# Patient Record
Sex: FEMALE | HISPANIC OR LATINO | Employment: FULL TIME | URBAN - METROPOLITAN AREA
[De-identification: names, ages, dates, MRNs, and addresses within clinical notes are randomized per-mention and may not be internally consistent; named-entity substitution may affect disease eponyms.]

---

## 2017-01-10 ENCOUNTER — CARE COORDINATION (OUTPATIENT)
Dept: CARE COORDINATION | Facility: CLINIC | Age: 52
End: 2017-01-10

## 2017-01-10 NOTE — PROGRESS NOTES
Clinic Care Coordination Contact  [facility]/Voicemail    Referral Source: PCP  Clinical Data: Care Coordinator Outreach  Outreach attempted x 1.  Left message on voicemail with call back information and requested return call.    Plan:  Care Coordinator will try to reach patient again in 10-14 business days.    STANLEY Che, [facility]  Social Work - Care Coordinator  [facility]- [place], [place], and [place]  Phone: [phone]

## 2017-01-23 ENCOUNTER — CARE COORDINATION (OUTPATIENT)
Dept: CARE COORDINATION | Facility: CLINIC | Age: 52
End: 2017-01-23

## 2017-01-23 NOTE — PROGRESS NOTES
Clinic Care Coordination Contact  [facility] [place]/Voicemail    Referral Source: PCP  Clinical Data: Care Coordinator Outreach  Outreach attempted x 2.  SW has left previous messages on voicemail with call back information and requested return call. Pt has not corresponded with SW since 07/28/16. SW has mailed UT without return responses.    Plan: Care Coordinator will try to reach patient again in one month.    STANLEY Che, LGSW  Social Work - Care Coordinator  [facility]- [place], [place], and [place]  Phone: [phone]

## 2017-02-13 ENCOUNTER — TELEPHONE (OUTPATIENT)
Dept: PEDIATRICS | Facility: CLINIC | Age: 52
End: 2017-02-13

## 2017-02-13 DIAGNOSIS — F33.1 MAJOR DEPRESSIVE DISORDER, RECURRENT EPISODE, MODERATE (H): ICD-10-CM

## 2017-02-13 DIAGNOSIS — E03.9 HYPOTHYROIDISM: ICD-10-CM

## 2017-02-13 DIAGNOSIS — E11.9 TYPE 2 DIABETES MELLITUS WITHOUT COMPLICATION, WITHOUT LONG-TERM CURRENT USE OF INSULIN (H): Primary | ICD-10-CM

## 2017-02-13 NOTE — LETTER
[facility]  [address]  [address]  [place] [address] [phone]      February 22, 2017      Jazlyn Bartlett  [address]  [place] [address]        Dear Jazlyn,      I care about your health and have reviewed your health plan. I have reviewed your medical conditions, medication list, and lab results and am making recommendations based on this review, to better manage your health.    You are in particular need of attention regarding:  -Depression  -Diabetes    I am recommending that you:  -schedule a LAB ONLY APPOINTMENT to recheck your: A1c and TSH (thyroid test) tests within the next 1-4 weeks.             - Please complete the PHQ-9 and mail it back with enclosed envelope.        Please call us at [phone] (or use [employer]) to address the above recommendations.     Thank you for trusting [facility] and we appreciate the opportunity to serve you.  We look forward to supporting your healthcare needs in the future.    Healthy Regards,    Nat Garza CMA ([facility]) / Marcy Thomas MD

## 2017-02-13 NOTE — LETTER
My Depression Action Plan  Name: Jazlyn Bartlett   Date of Birth 1965  Date: 2/13/2017    My doctor: Marcy Thomas   My clinic: [facility]  [address] [place] [address]  [address]  [facility] [address] [phone]          GREEN    ZONE   Good Control    What it looks like:     Things are going generally well. You have normal up s and down s. You may even feel depressed from time to time, but bad moods usually last less than a day.   What you need to do:  1. Continue to care for yourself (see self care plan)  2. Check your depression survival kit and update it as needed  3. Follow your physician s recommendations including any medication.  4. Do not stop taking medication unless you consult with your physician first.           YELLOW         ZONE Getting Worse    What it looks like:     Depression is starting to interfere with your life.     It may be hard to get out of bed; you may be starting to isolate yourself from others.    Symptoms of depression are starting to last most all day and this has happened for several days.     You may have suicidal thoughts but they are not constant.   What you need to do:     1. Call your care team, your response to treatment will improve if you keep your care team informed of your progress. Yellow periods are signs an adjustment may need to be made.     2. Continue your self-care, even if you have to fake it!    3. Talk to someone in your support network    4. Open up your depression survival kit           RED    ZONE Medical Alert - Get Help    What it looks like:     Depression is seriously interfering with your life.     You may experience these or other symptoms: You can t get out of bed most days, can t work or engage in other necessary activities, you have trouble taking care of basic hygiene, or basic responsibilities, thoughts of suicide or death that will not go away, self-injurious behavior.     What you need to do:  1. Call your  care team and request a same-day appointment. If they are not available (weekends or after hours) call your local crisis line, emergency room or 911.      Electronically signed by: Nat Garza, February 13, 2017    Depression Self Care Plan / Survival Kit    Self-Care for Depression  Here s the deal. Your body and mind are really not as separate as most people think.  What you do and think affects how you feel and how you feel influences what you do and think. This means if you do things that people who feel good do, it will help you feel better.  Sometimes this is all it takes.  There is also a place for medication and therapy depending on how severe your depression is, so be sure to consult with your medical provider and/ or Behavioral Health Consultant if your symptoms are worsening or not improving.     In order to better manage my stress, I will:    Exercise  Get some form of exercise, every day. This will help reduce pain and release endorphins, the  feel good  chemicals in your brain. This is almost as good as taking antidepressants!  This is not the same as joining a gym and then never going! (they count on that by the way ) It can be as simple as just going for a walk or doing some gardening, anything that will get you moving.      Hygiene   Maintain good hygiene (Get out of bed in the morning, Make your bed, Brush your teeth, Take a shower, and Get dressed like you were going to work, even if you are unemployed).  If your clothes don't fit try to get ones that do.    Diet  I will strive to eat foods that are good for me, drink plenty of water, and avoid excessive sugar, caffeine, alcohol, and other mood-altering substances.  Some foods that are helpful in depression are: complex carbohydrates, B vitamins, flaxseed, fish or fish oil, fresh fruits and vegetables.    Psychotherapy  I agree to participate in Individual Therapy (if recommended).    Medication  If prescribed medications, I agree to take them.   Missing doses can result in serious side effects.  I understand that drinking alcohol, or other illicit drug use, may cause potential side effects.  I will not stop my medication abruptly without first discussing it with my provider.    Staying Connected With Others  I will stay in touch with my friends, family members, and my primary care provider/team.    Use your imagination  Be creative.  We all have a creative side; it doesn t matter if it s oil painting, sand castles, or mud pies! This will also kick up the endorphins.    Witness Beauty  (AKA stop and smell the roses) Take a look outside, even in mid-winter. Notice colors, textures. Watch the squirrels and birds.     Service to others  Be of service to others.  There is always someone else in need.  By helping others we can  get out of ourselves  and remember the really important things.  This also provides opportunities for practicing all the other parts of the program.    Humor  Laugh and be silly!  Adjust your TV habits for less news and crime-drama and more comedy.    Control your stress  Try breathing deep, massage therapy, biofeedback, and meditation. Find time to relax each day.     My support system    Clinic Contact:  Phone number:    Contact 1:  Phone number:    Contact 2:  Phone number:    Anabaptism/:  Phone number:    Therapist:  Phone number:    Local crisis center:    Phone number:    Other community support:  Phone number:

## 2017-02-13 NOTE — TELEPHONE ENCOUNTER
Type of outreach:  Sent [facility]ESCEL message.  Health Maintenance Due   Topic Date Due     HEPATITIS C SCREENING  09/13/1983     EYE EXAM Q1 YEAR( NO INBASKET)  04/23/2016     MICROALBUMIN Q1 YEAR( NO INBASKET)  01/05/2017     DEPRESSION ACTION PLAN Q1 YR (NO INBASKET)  01/05/2017     A1C Q3 MO( NO INBASKET)  02/03/2017     TSH W/ FREE T4 REFLEX Q1 YEAR (NO INBASKET)  03/01/2017     COPD ACTION PLAN Q1 YR  03/01/2017     Nat Garza CMA ([facility])

## 2017-02-22 NOTE — TELEPHONE ENCOUNTER
Type of outreach:  Sent letter.  Health Maintenance Due   Topic Date Due     HEPATITIS C SCREENING  09[birth year]1983     EYE EXAM Q1 YEAR( NO INBASKET)  04/23/2016     MICROALBUMIN Q1 YEAR( NO INBASKET)  01/05/2017     A1C Q3 MO( NO INBASKET)  02/03/2017     TSH W/ FREE T4 REFLEX Q1 YEAR (NO INBASKET)  03/01/2017     COPD ACTION PLAN Q1 YR  03/01/2017     Nat Garza CMA ([facility])

## 2017-02-23 ENCOUNTER — CARE COORDINATION (OUTPATIENT)
Dept: CARE COORDINATION | Facility: CLINIC | Age: 52
End: 2017-02-23

## 2017-02-23 NOTE — PROGRESS NOTES
Clinic Care Coordination Contact  [facility]/Voicemail    Referral Source: PCP  Clinical Data: Care Coordinator Outreach  SW has made multiple attempts to reach pt without luck. Per chart review, pt has not responded to [employer] messages sent by care team.     Plan:  Care Coordinator will do no further outreaches at this time. Pt has SW contact information from previous attempts.     STANLEY Che, [facility]  Social Work - Care Coordinator  [facility]- [place], [place], and [place]  Phone: [phone]

## 2017-03-13 NOTE — TELEPHONE ENCOUNTER
Type of outreach:  Phone, left message for patient to call back.   Health Maintenance Due   Topic Date Due     HEPATITIS C SCREENING  09/13/1983     EYE EXAM Q1 YEAR( NO INBASKET)  04/23/2016     MICROALBUMIN Q1 YEAR( NO INBASKET)  01/05/2017     A1C Q3 MO( NO INBASKET)  02/03/2017     TSH W/ FREE T4 REFLEX Q1 YEAR (NO INBASKET)  03/01/2017     COPD ACTION PLAN Q1 YR  03/01/2017     PHQ-9 Q6 MONTHS (NO INBASKET)  03/27/2017       Attempt #3. Closing encounter.     Nat Garza CMA ([facility])

## 2017-04-26 ENCOUNTER — TELEPHONE (OUTPATIENT)
Dept: LAB | Facility: CLINIC | Age: 52
End: 2017-04-26

## 2017-04-26 NOTE — TELEPHONE ENCOUNTER
Type of outreach:  Phone, left message for patient to call back.   Health Maintenance Due   Topic Date Due     HEPATITIS C SCREENING  09/13/1983     EYE EXAM Q1 YEAR( NO INBASKET)  04/23/2016     MICROALBUMIN Q1 YEAR( NO INBASKET)  01/05/2017     A1C Q3 MO( NO INBASKET)  02/03/2017     TSH W/ FREE T4 REFLEX Q1 YEAR (NO INBASKET)  03/01/2017     COPD ACTION PLAN Q1 YR  03/01/2017     PHQ-9 Q6 MONTHS (NO INBASKET)  03/27/2017     CREATININE Q1 YEAR (NO INBASKET)  05/22/2017     Nat Garza CMA ([facility])

## 2017-04-30 ENCOUNTER — APPOINTMENT (OUTPATIENT)
Dept: CT IMAGING | Facility: CLINIC | Age: 52
End: 2017-04-30
Attending: EMERGENCY MEDICINE
Payer: COMMERCIAL

## 2017-04-30 ENCOUNTER — APPOINTMENT (OUTPATIENT)
Dept: GENERAL RADIOLOGY | Facility: CLINIC | Age: 52
End: 2017-04-30
Attending: EMERGENCY MEDICINE
Payer: COMMERCIAL

## 2017-04-30 ENCOUNTER — HOSPITAL ENCOUNTER (EMERGENCY)
Facility: CLINIC | Age: 52
Discharge: HOME | End: 2017-04-30
Attending: EMERGENCY MEDICINE | Admitting: EMERGENCY MEDICINE
Payer: COMMERCIAL

## 2017-04-30 VITALS
RESPIRATION RATE: 26 BRPM | TEMPERATURE: 97.6 F | OXYGEN SATURATION: 96 % | BODY MASS INDEX: 42.6 KG/M2 | HEIGHT: 60 IN | HEART RATE: 98 BPM | SYSTOLIC BLOOD PRESSURE: 115 MMHG | DIASTOLIC BLOOD PRESSURE: 69 MMHG | WEIGHT: 217 LBS

## 2017-04-30 DIAGNOSIS — R10.9 ABDOMINAL PAIN, UNSPECIFIED LOCATION: ICD-10-CM

## 2017-04-30 DIAGNOSIS — N39.0 ACUTE UTI: ICD-10-CM

## 2017-04-30 DIAGNOSIS — M62.830 BACK MUSCLE SPASM: ICD-10-CM

## 2017-04-30 DIAGNOSIS — S29.012A UPPER BACK STRAIN, INITIAL ENCOUNTER: ICD-10-CM

## 2017-04-30 DIAGNOSIS — R11.2 NON-INTRACTABLE VOMITING WITH NAUSEA, UNSPECIFIED VOMITING TYPE: ICD-10-CM

## 2017-04-30 LAB
ALBUMIN SERPL-MCNC: 3.8 G/DL (ref 3.4–5)
ALBUMIN UR-MCNC: NEGATIVE MG/DL
ALP SERPL-CCNC: 86 U/L (ref 40–150)
ALT SERPL W P-5'-P-CCNC: 20 U/L (ref 0–50)
ANION GAP SERPL CALCULATED.3IONS-SCNC: 7 MMOL/L (ref 3–14)
APPEARANCE UR: ABNORMAL
AST SERPL W P-5'-P-CCNC: 15 U/L (ref 0–45)
BACTERIA #/AREA URNS HPF: ABNORMAL /HPF
BASOPHILS # BLD AUTO: 0 10E9/L (ref 0–0.2)
BASOPHILS NFR BLD AUTO: 0.3 %
BILIRUB SERPL-MCNC: 0.7 MG/DL (ref 0.2–1.3)
BILIRUB UR QL STRIP: NEGATIVE
BUN SERPL-MCNC: 15 MG/DL (ref 7–30)
CALCIUM SERPL-MCNC: 9.4 MG/DL (ref 8.5–10.1)
CHLORIDE SERPL-SCNC: 102 MMOL/L (ref 94–109)
CO2 SERPL-SCNC: 28 MMOL/L (ref 20–32)
COLOR UR AUTO: ABNORMAL
CREAT SERPL-MCNC: 0.6 MG/DL (ref 0.52–1.04)
D DIMER PPP FEU-MCNC: NORMAL UG/ML FEU (ref 0–0.5)
DIFFERENTIAL METHOD BLD: ABNORMAL
EOSINOPHIL # BLD AUTO: 0.3 10E9/L (ref 0–0.7)
EOSINOPHIL NFR BLD AUTO: 2.4 %
ERYTHROCYTE [DISTWIDTH] IN BLOOD BY AUTOMATED COUNT: 14.2 % (ref 10–15)
GFR SERPL CREATININE-BSD FRML MDRD: ABNORMAL ML/MIN/1.7M2
GLUCOSE SERPL-MCNC: 129 MG/DL (ref 70–99)
GLUCOSE UR STRIP-MCNC: NEGATIVE MG/DL
HCT VFR BLD AUTO: 45.4 % (ref 35–47)
HGB BLD-MCNC: 15 G/DL (ref 11.7–15.7)
HGB UR QL STRIP: ABNORMAL
HYALINE CASTS #/AREA URNS LPF: 2 /LPF (ref 0–2)
IMM GRANULOCYTES # BLD: 0 10E9/L (ref 0–0.4)
IMM GRANULOCYTES NFR BLD: 0.3 %
INTERPRETATION ECG - MUSE: NORMAL
KETONES UR STRIP-MCNC: NEGATIVE MG/DL
LACTATE BLD-SCNC: 1.4 MMOL/L (ref 0.7–2.1)
LEUKOCYTE ESTERASE UR QL STRIP: NEGATIVE
LIPASE SERPL-CCNC: 191 U/L (ref 73–393)
LYMPHOCYTES # BLD AUTO: 3.8 10E9/L (ref 0.8–5.3)
LYMPHOCYTES NFR BLD AUTO: 31.8 %
MCH RBC QN AUTO: 29.2 PG (ref 26.5–33)
MCHC RBC AUTO-ENTMCNC: 33 G/DL (ref 31.5–36.5)
MCV RBC AUTO: 88 FL (ref 78–100)
MONOCYTES # BLD AUTO: 0.9 10E9/L (ref 0–1.3)
MONOCYTES NFR BLD AUTO: 7.5 %
MUCOUS THREADS #/AREA URNS LPF: PRESENT /LPF
NEUTROPHILS # BLD AUTO: 6.8 10E9/L (ref 1.6–8.3)
NEUTROPHILS NFR BLD AUTO: 57.7 %
NITRATE UR QL: POSITIVE
NRBC # BLD AUTO: 0 10*3/UL
NRBC BLD AUTO-RTO: 0 /100
NT-PROBNP SERPL-MCNC: 43 PG/ML (ref 0–900)
PH UR STRIP: 5 PH (ref 5–7)
PLATELET # BLD AUTO: 327 10E9/L (ref 150–450)
POTASSIUM SERPL-SCNC: 3.6 MMOL/L (ref 3.4–5.3)
PROT SERPL-MCNC: 7.8 G/DL (ref 6.8–8.8)
RBC # BLD AUTO: 5.14 10E12/L (ref 3.8–5.2)
RBC #/AREA URNS AUTO: 10 /HPF (ref 0–2)
SODIUM SERPL-SCNC: 137 MMOL/L (ref 133–144)
SP GR UR STRIP: 1.03 (ref 1–1.03)
SQUAMOUS #/AREA URNS AUTO: 12 /HPF (ref 0–1)
TROPONIN I BLD-MCNC: 0 UG/L (ref 0–0.1)
TROPONIN I SERPL-MCNC: NORMAL UG/L (ref 0–0.04)
URN SPEC COLLECT METH UR: ABNORMAL
UROBILINOGEN UR STRIP-MCNC: 4 MG/DL (ref 0–2)
WBC # BLD AUTO: 11.8 10E9/L (ref 4–11)
WBC #/AREA URNS AUTO: 3 /HPF (ref 0–2)

## 2017-04-30 PROCEDURE — 96361 HYDRATE IV INFUSION ADD-ON: CPT

## 2017-04-30 PROCEDURE — 25500064 ZZH RX 255 OP 636: Performed by: EMERGENCY MEDICINE

## 2017-04-30 PROCEDURE — 80053 COMPREHEN METABOLIC PANEL: CPT | Performed by: EMERGENCY MEDICINE

## 2017-04-30 PROCEDURE — 74177 CT ABD & PELVIS W/CONTRAST: CPT

## 2017-04-30 PROCEDURE — 85379 FIBRIN DEGRADATION QUANT: CPT | Performed by: EMERGENCY MEDICINE

## 2017-04-30 PROCEDURE — 84484 ASSAY OF TROPONIN QUANT: CPT

## 2017-04-30 PROCEDURE — 96374 THER/PROPH/DIAG INJ IV PUSH: CPT | Mod: 59

## 2017-04-30 PROCEDURE — 25000128 H RX IP 250 OP 636: Performed by: EMERGENCY MEDICINE

## 2017-04-30 PROCEDURE — 83690 ASSAY OF LIPASE: CPT | Performed by: EMERGENCY MEDICINE

## 2017-04-30 PROCEDURE — 99285 EMERGENCY DEPT VISIT HI MDM: CPT | Mod: 25

## 2017-04-30 PROCEDURE — 96375 TX/PRO/DX INJ NEW DRUG ADDON: CPT

## 2017-04-30 PROCEDURE — 83605 ASSAY OF LACTIC ACID: CPT | Performed by: EMERGENCY MEDICINE

## 2017-04-30 PROCEDURE — 71020 XR CHEST 2 VW: CPT

## 2017-04-30 PROCEDURE — 84484 ASSAY OF TROPONIN QUANT: CPT | Mod: 91 | Performed by: EMERGENCY MEDICINE

## 2017-04-30 PROCEDURE — 87086 URINE CULTURE/COLONY COUNT: CPT | Performed by: EMERGENCY MEDICINE

## 2017-04-30 PROCEDURE — 96376 TX/PRO/DX INJ SAME DRUG ADON: CPT

## 2017-04-30 PROCEDURE — 25000132 ZZH RX MED GY IP 250 OP 250 PS 637: Performed by: EMERGENCY MEDICINE

## 2017-04-30 PROCEDURE — 93005 ELECTROCARDIOGRAM TRACING: CPT

## 2017-04-30 PROCEDURE — 83880 ASSAY OF NATRIURETIC PEPTIDE: CPT | Performed by: EMERGENCY MEDICINE

## 2017-04-30 PROCEDURE — 81001 URINALYSIS AUTO W/SCOPE: CPT | Performed by: EMERGENCY MEDICINE

## 2017-04-30 PROCEDURE — 85025 COMPLETE CBC W/AUTO DIFF WBC: CPT | Performed by: EMERGENCY MEDICINE

## 2017-04-30 RX ORDER — CYCLOBENZAPRINE HCL 10 MG
10 TABLET ORAL ONCE
Status: COMPLETED | OUTPATIENT
Start: 2017-04-30 | End: 2017-04-30

## 2017-04-30 RX ORDER — TRAMADOL HYDROCHLORIDE 50 MG/1
50-100 TABLET ORAL EVERY 6 HOURS PRN
Qty: 15 TABLET | Refills: 0 | Status: SHIPPED | OUTPATIENT
Start: 2017-04-30 | End: 2017-05-16

## 2017-04-30 RX ORDER — NITROFURANTOIN 25; 75 MG/1; MG/1
100 CAPSULE ORAL 2 TIMES DAILY
Qty: 10 CAPSULE | Refills: 0 | Status: SHIPPED | OUTPATIENT
Start: 2017-04-30 | End: 2017-05-05

## 2017-04-30 RX ORDER — MORPHINE SULFATE 4 MG/ML
4 INJECTION, SOLUTION INTRAMUSCULAR; INTRAVENOUS
Status: DISCONTINUED | OUTPATIENT
Start: 2017-04-30 | End: 2017-04-30 | Stop reason: HOSPADM

## 2017-04-30 RX ORDER — CYCLOBENZAPRINE HCL 10 MG
10 TABLET ORAL 3 TIMES DAILY PRN
Qty: 20 TABLET | Refills: 0 | Status: SHIPPED | OUTPATIENT
Start: 2017-04-30 | End: 2017-05-06

## 2017-04-30 RX ORDER — OXYCODONE AND ACETAMINOPHEN 5; 325 MG/1; MG/1
2 TABLET ORAL ONCE
Status: COMPLETED | OUTPATIENT
Start: 2017-04-30 | End: 2017-04-30

## 2017-04-30 RX ORDER — LIDOCAINE 40 MG/G
CREAM TOPICAL
Status: DISCONTINUED | OUTPATIENT
Start: 2017-04-30 | End: 2017-04-30 | Stop reason: HOSPADM

## 2017-04-30 RX ORDER — IOPAMIDOL 755 MG/ML
500 INJECTION, SOLUTION INTRAVASCULAR ONCE
Status: COMPLETED | OUTPATIENT
Start: 2017-04-30 | End: 2017-04-30

## 2017-04-30 RX ORDER — ONDANSETRON 4 MG/1
4 TABLET, ORALLY DISINTEGRATING ORAL EVERY 8 HOURS PRN
Qty: 10 TABLET | Refills: 0 | Status: SHIPPED | OUTPATIENT
Start: 2017-04-30 | End: 2017-05-03

## 2017-04-30 RX ORDER — SODIUM CHLORIDE 9 MG/ML
1000 INJECTION, SOLUTION INTRAVENOUS CONTINUOUS
Status: DISCONTINUED | OUTPATIENT
Start: 2017-04-30 | End: 2017-04-30 | Stop reason: HOSPADM

## 2017-04-30 RX ORDER — ONDANSETRON 2 MG/ML
4 INJECTION INTRAMUSCULAR; INTRAVENOUS EVERY 30 MIN PRN
Status: DISCONTINUED | OUTPATIENT
Start: 2017-04-30 | End: 2017-04-30 | Stop reason: HOSPADM

## 2017-04-30 RX ADMIN — SODIUM CHLORIDE 66 ML: 9 INJECTION, SOLUTION INTRAVENOUS at 03:47

## 2017-04-30 RX ADMIN — IOPAMIDOL 100 ML: 755 INJECTION, SOLUTION INTRAVENOUS at 03:46

## 2017-04-30 RX ADMIN — MORPHINE SULFATE 4 MG: 4 INJECTION, SOLUTION INTRAMUSCULAR; INTRAVENOUS at 04:09

## 2017-04-30 RX ADMIN — OXYCODONE HYDROCHLORIDE AND ACETAMINOPHEN 2 TABLET: 5; 325 TABLET ORAL at 04:50

## 2017-04-30 RX ADMIN — MORPHINE SULFATE 4 MG: 4 INJECTION, SOLUTION INTRAMUSCULAR; INTRAVENOUS at 02:39

## 2017-04-30 RX ADMIN — CYCLOBENZAPRINE HYDROCHLORIDE 10 MG: 10 TABLET, FILM COATED ORAL at 05:15

## 2017-04-30 RX ADMIN — ONDANSETRON 4 MG: 2 INJECTION INTRAMUSCULAR; INTRAVENOUS at 02:39

## 2017-04-30 RX ADMIN — SODIUM CHLORIDE 500 ML: 9 INJECTION, SOLUTION INTRAVENOUS at 02:39

## 2017-04-30 ASSESSMENT — ENCOUNTER SYMPTOMS
DIFFICULTY URINATING: 0
ABDOMINAL PAIN: 1
DYSURIA: 0
CONSTIPATION: 0
FEVER: 0
NAUSEA: 1
DIARRHEA: 1
BACK PAIN: 1
VOMITING: 1

## 2017-04-30 NOTE — ED NOTES
Pt c/o upper back pain between her shoulder blades.  States taking a deep breath makes it worse.  Pain started on Saturday but has been getting worse.  Also notes nausea, no other associated symptoms.

## 2017-04-30 NOTE — ED AVS SNAPSHOT
[facility]    [address]    [facility] [address]    Phone:  [phone]    Fax:  [phone]                                       Jazlyn Bartlett   MRN: [id number]    Department:  [facility] Emergency Department   Date of Visit:  4/30/2017           After Visit Summary Signature Page     I have received my discharge instructions, and my questions have been answered. I have discussed any challenges I see with this plan with the nurse or doctor.    ..........................................................................................................................................  Patient/Patient Representative Signature      ..........................................................................................................................................  Patient Representative Print Name and Relationship to Patient    ..................................................               ................................................  Date                                            Time    ..........................................................................................................................................  Reviewed by Signature/Title    ...................................................              ..............................................  Date                                                            Time

## 2017-04-30 NOTE — ED AVS SNAPSHOT
[facility]    [address]    [facility] [address]    Phone:  [phone]    Fax:  [phone]                                       Jazlyn aBrtlett   MRN: [id number]    Department:  [facility]   Date of Visit:  4/30/2017           Patient Information     Date Of Birth          1965        Your diagnoses for this visit were:     Abdominal pain, unspecified location     Upper back strain, initial encounter     Back muscle spasm     Acute UTI     Non-intractable vomiting with nausea, unspecified vomiting type        You were seen by Stacey Kidd MD.      Follow-up Information     Follow up with Marcy Thomas MD.    Specialties:  Internal Medicine, Pediatrics    Why:  3 days for reassessment    Contact information:    [facility] [address] [place] [address]  [phone]          Follow up with [facility].    Specialty:  EMERGENCY MEDICINE    Why:  As needed, If symptoms worsen    Contact information:    [address]  [facility] [id number] [phone]        Discharge Instructions       Discharge Instructions  Abdominal Pain    Abdominal pain can be caused by many things. Your evaluation today does not show the exact cause for your pain. Your doctor today has decided that it is unlikely your pain is due to a life threatening problem, or a problem requiring surgery or hospital admission. Sometimes those problems cannot be found right away, so it is very important that you follow up as directed.  Sometimes only the changes which occur over time allow the cause of your pain to be found.    Return to the Emergency Department for a recheck in 8-12 hours if your pain continues.  If your pain gets worse, changes in location, or feels different, return to the Emergency Department right away.    ADULTS:  Return to the Emergency Department right  away if:      You get an oral temperature above 102oF or as directed by your doctor.    You have blood in your stools (bright red or black, tarry stools).    You keep throwing up or can t drink liquids.    You see blood when you throw up.    You can t have a bowel movement or you can t pass gas.    Your stomach gets bloated or bigger.    Your skin or the whites of your eyes look yellow.    You faint.    You have bloody, frequent or painful urination.    You have new symptoms or anything that worries you.    CHILDREN:  Return to the Emergency Department right away if your child has any of the above-listed symptoms or the following:      Pushes your hand away or screams/cries when his/her belly is touched.    You notice your child is very fussy or weak.    Your child is very tired and is too tired to eat or drink.    Your child is dehydrated.  Signs of dehydration can be:  o Your infant has had no wet diapers in 4-5 hours.  o Your older child has not passed urine in 6-8 hours.  o Your infant or child starts to have dry mouth and lips, or no saliva or tears.    PREGNANT WOMEN:  Return to the Emergency Department right away if you have any of the above-listed symptoms or the following:      You have bleeding, leaking fluid or passing tissue from the vagina.    You have worse pain or cramping, or pain in your shoulder or back.    You have vomiting that will not stop.    You have painful or bloody urination.    You have a temperature of 100oF or more.    Your baby is not moving as much as usual.    You faint.    You get a bad headache with or without eye problems and abdominal pain.    You have a convulsion or seizure.    You have unusual discharge from your vagina and abdominal pain.    Abdominal pain is pretty common during pregnancy.  Your pain may or may not be related to your pregnancy. You should follow-up closely with your OB doctor so they can evaluate you and your baby.  Until you follow-up with your regular  "doctor, do the following:       Avoid sex and do not put anything in your vagina.    Drink clear fluids.    Only take medications approved by your doctor.    MORE INFORMATION:    Appendicitis:  A possible cause of abdominal pain in any person who still has their appendix is acute appendicitis. Appendicitis is often hard to diagnose.  Testing does not always rule out early appendicitis or other causes of abdominal pain. Close follow-up with your doctor and re-evaluations may be needed to figure out the reason for your abdominal pain.    Follow-up:  It is very important that you make an appointment with your clinic and go to the appointment.  If you do not follow-up with your primary doctor, it may result in missing an important development which could result in permanent injury or disability and/or lasting pain.  If there is any problem keeping your appointment, call your doctor or return to the Emergency Department.    Medications:  Take your medications as directed by your doctor today.  Before using over-the-counter medications, ask your doctor and make sure to take the medications as directed.  If you have any questions about medications, ask your doctor.    Diet:  Resume your normal diet as much as possible, but do not eat fried, fatty or spicy foods while you have pain.  Do not drink alcohol or have caffeine.  Do not smoke tobacco.    Probiotics: If you have been given an antibiotic, you may want to also take a probiotic pill or eat yogurt with live cultures. Probiotics have \"good bacteria\" to help your intestines stay healthy. Studies have shown that probiotics help prevent diarrhea and other intestine problems (including C. diff infection) when you take antibiotics. You can buy these without a prescription in the pharmacy section of the store.     If you were given a prescription for medicine here today, be sure to read all of the information (including the package insert) that comes with your prescription.  " This will include important information about the medicine, its side effects, and any warnings that you need to know about.  The pharmacist who fills the prescription can provide more information and answer questions you may have about the medicine.  If you have questions or concerns that the pharmacist cannot address, please call or return to the Emergency Department.         Opioid Medication Information    Pain medications are among the most commonly prescribed medicines, so we are including this information for all our patients. If you did not receive pain medication or get a prescription for pain medicine, you can ignore it.     You may have been given a prescription for an opioid (narcotic) pain medicine and/or have received a pain medicine while here in the Emergency Department. These medicines can make you drowsy or impaired. You must not drive, operate dangerous equipment, or engage in any other dangerous activities while taking these medications. If you drive while taking these medications, you could be arrested for DUI, or driving under the influence. Do not drink any alcohol while you are taking these medications.     Opioid pain medications can cause addiction. If you have a history of chemical dependency of any type, you are at a higher risk of becoming addicted to pain medications.  Only take these prescribed medications to treat your pain when all other options have been tried. Take it for as short a time and as few doses as possible. Store your pain pills in a secure place, as they are frequently stolen and provide a dangerous opportunity for children or visitors in your house to start abusing these powerful medications. We will not replace any lost or stolen medicine.  As soon as your pain is better, you should flush all your remaining medication.     Many prescription pain medications contain Tylenol  (acetaminophen), including Vicodin , Tylenol #3 , Norco , Lortab , and Percocet .  You should not  take any extra pills of Tylenol  if you are using these prescription medications or you can get very sick.  Do not ever take more than 3000 mg of acetaminophen in any 24 hour period.    All opioids tend to cause constipation. Drink plenty of water and eat foods that have a lot of fiber, such as fruits, vegetables, prune juice, apple juice and high fiber cereal.  Take a laxative if you don t move your bowels at least every other day. Miralax , Milk of Magnesia, Colace , or Senna  can be used to keep you regular.      Remember that you can always come back to the Emergency Department if you are not able to see your regular doctor in the amount of time listed above, if you get any new symptoms, or if there is anything that worries you.        Back Sprain or Strain    Injury to the muscles (strain) or ligaments (sprain) around the spine can be troubling. Injury may occur after a sudden forceful twisting or bending force such as in a car accident, after a simple awkward movement, or after lifting something heavy with poor body positioning. In any case, muscle spasm is often present and adds to the pain.  Thankfully, most people feel better in 1 to 2 weeks, and most of the rest in 1 to 2 months. Most people can remain active. Unless you had a forceful physical injury such as a car accident or fall, X-rays are usually not ordered for the first evaluation of a back sprain or strain. If pain continues and does not respond to medical treatment, your healthcare provider may order X-rays and other tests.  Home care  The following guidelines will help you care for your injury at home:    When in bed, try to find a comfortable position. A firm mattress is best. Try lying flat on your back with pillows under your knees. You can also try lying on your side with your knees bent up toward your chest and a pillow between your knees.    Don't sit for long periods. Try not to take long car rides or take other trips that have you sitting  for a long time. This puts more stress on the lower back than standing or walking.    During the first 24 to 72 hours after an injury or flare-up, apply an ice pack to the painful area for 20 minutes. Then remove it for 20 minutes. Do this for 60 to 90 minutes, or several times a day, This will reduce swelling and pain. Be sure to wrap the ice pack in a thin towel or plastic to protect your skin.    You can start with ice, then switch to heat. Heat from a hot shower, hot bath, or heating pad reduces swelling and pain and works well for muscle spasms. Put heat on the painful area for 20 minutes, then remove for 20 minutes. Do this for 60 to 90 minutes, or several times a day. Do not use a heating pad while sleeping. It can burn the skin.    You can alternate the ice and heat. Talk with your healthcare provider to find out the best treatment or therapy for your back pain.    Therapeutic massage will help relax the back muscles without stretching them.    Be aware of safe lifting methods. Do not lift anything over 15 pounds until all of the pain is gone.  Medicines  Talk to your healthcare provider before using medicines, especially if you have other health problems or are taking other medicines.    You may use acetaminophen or ibuprofen to control pain, unless another pain medicine was prescribed. If you have chronic conditions like diabetes, liver or kidney disease, stomach ulcers, or gastrointestinal bleeding, or are taking blood-thinner medicines, talk with your doctor before taking any medicines.    Be careful if you are given prescription medicines, narcotics, or medicine for muscle spasm. They can cause drowsiness, and affect your coordination, reflexes, and judgment. Do not drive or operate heavy machinery.  Follow-up care  Follow up with your healthcare provider or this facility as advised. You may need physical therapy or more tests if your symptoms get worse.  If you had X-rays today, they didn t show any  broken bones, breaks, or fractures. Sometimes fractures don t show up on the first X-ray. Bruises and sprains can sometimes hurt as much as a fracture. These injuries can take time to heal completely. If your symptoms don t improve or they get worse, talk with your healthcare provider. You may need a repeat X-ray.  Call 911  Call for emergency care if any of the following occur:    Trouble breathing    Confused    Very drowsy or trouble awakening    Fainting or loss of consciousness    Rapid or very slow heart rate    Loss of bowel or bladder control  When to seek medical advice  Call your healthcare provider right away if any of the following occur:    Pain gets worse or spreads to your arms or legs    Weakness or numbness in one or both arms or legs    Numbness in the groin or genital area    [id number] [employer]. [address] [place] [address]. All rights reserved. This information is not intended as a substitute for professional medical care. Always follow your healthcare professional's instructions.    Discharge Instructions  Urinary Tract Infection (Possible)  You have urinary tract infection, or UTI. The urinary tract includes the kidneys (which make urine), ureters (the tubes that carry urine from the kidneys to the bladder), the bladder (which stores urine), and urethra (the tube that carries urine out of the bladder).  Urinary tract infections occur when bacteria travel up the urethra into the bladder. We suspect a UTI based on chemical and microscopic findings in your urine, but if there is a question about your findings, we will do a culture to see if bacteria grow. A urine culture takes several days. You should always follow-up with your primary physician to find out about results of your culture if one was done.   Return to the Emergency Department if:    You have severe back pain.    You are vomiting so that you can t take your medicine, or have signs of dehydration (such as  urinating less than 3 times per day).    You have fever over 101.5 degrees F.    You have significant confusion or are very weak, or feel very ill.    Your child seems much more ill, won t wake up, won t respond right, or is crying for a long time and won t calm down.    Your child is showing signs of dehydration, Signs of dehydration can be:  o Your infant has had no wet diapers in 4-5 hours.  o Your older child has not passed urine in 6-8 hours.  o Your infant or child starts to have dry mouth and lips, or no saliva or tears.    Follow-up with your doctor:     Children under 24 months need to be seen by their regular doctor within one week after a diagnosis of a UTI. It may be necessary to do some imaging tests to look at the child s kidney or bladder.    You should begin to feel better within 24 - 48 hours of starting your antibiotic.  If you do not, you need to be seen again.      Treatment:     You will be treated with an antibiotic to kill the bacteria. We have to make an educated guess as to which antibiotic will work for your infection. In most healthy people, we can guess right almost all of the time. Sometimes a culture is done to show which antibiotics will work. This usually takes 2-3 days. When the culture is done, we may have to contact you to put you on a different antibiotic.    Take a pain medication such as Tylenol  (acetaminophen), Advil  (ibuprofen), Nuprin  (ibuprofen), or Aleve  (naproxen). If you have been given a narcotic such as Vicodin  (hydrocodone with acetaminophen), Percocet  (oxycodone with acetaminophen), or codeine, do not drive for four hours after you have taken it. If the narcotic contains Tylenol  (acetaminophen), do not take Tylenol  with it. All narcotics will cause constipation, so eat a high fiber diet.      Pyridium  (phenazopyridine) or Uristat  (phenazopyridine) is a prescription medication that numbs the bladder to reduce the burning pain of some UTIs.  The same  "medication is available in a non-prescription version called Azo-Standard  (phenazopyridine), Urodol  (phenazopyridine), or other brand names. This medication will change the color of the urine and tears (usually blue or orange). If you wear contacts, do not wear them while taking this medication as they may be stained by the medication.    Antibiotic Warning:     If you have been placed on antibiotics - watch for signs of allergic reaction.  These include rash, lip swelling, difficulty breathing, wheezing, and dizziness.  If you develop any of these symptoms, stop the antibiotic immediately and go to an emergency room or urgent care for evaluation.    Probiotics: If you have been given an antibiotic, you may want to also take a probiotic pill or eat yogurt with live cultures. Probiotics have \"good bacteria\" to help your intestines stay healthy. Studies have shown that probiotics help prevent diarrhea and other intestine problems (including C. diff infection) when you take antibiotics. You can buy these without a prescription in the pharmacy section of the store.   If you were given a prescription for medicine here today, be sure to read all of the information (including the package insert) that comes with your prescription.  This will include important information about the medicine, its side effects, and any warnings that you need to know about.  The pharmacist who fills the prescription can provide more information and answer questions you may have about the medicine.  If you have questions or concerns that the pharmacist cannot address, please call or return to the Emergency Department.   Remember that you can always come back to the Emergency Department if you are not able to see your regular doctor in the amount of time listed above, if you get any new symptoms, or if there is anything that worries you.            24 Hour Appointment Hotline       To make an appointment at any [facility], call " [phone] ([phone]). If you don't have a family doctor or clinic, we will help you find one. [facility] clinics are conveniently located to serve the needs of you and your family.             Review of your medicines      START taking        Dose / Directions Last dose taken    cyclobenzaprine 10 MG tablet   Commonly known as:  FLEXERIL   Dose:  10 mg   Quantity:  20 tablet        Take 1 tablet (10 mg) by mouth 3 times daily as needed for muscle spasms   Refills:  0        nitrofurantoin (macrocrystal-monohydrate) 100 MG capsule   Commonly known as:  MACROBID   Dose:  100 mg   Quantity:  10 capsule        Take 1 capsule (100 mg) by mouth 2 times daily for 5 days   Refills:  0        ondansetron 4 MG ODT tab   Commonly known as:  ZOFRAN ODT   Dose:  4 mg   Quantity:  10 tablet        Take 1 tablet (4 mg) by mouth every 8 hours as needed for nausea   Refills:  0          CONTINUE these medicines which may have CHANGED, or have new prescriptions. If we are uncertain of the size of tablets/capsules you have at home, strength may be listed as something that might have changed.        Dose / Directions Last dose taken    traMADol 50 MG tablet   Commonly known as:  ULTRAM   Dose:   mg   What changed:    - how much to take  - reasons to take this   Quantity:  15 tablet        Take 1-2 tablets ( mg) by mouth every 6 hours as needed for pain   Refills:  0          Our records show that you are taking the medicines listed below. If these are incorrect, please call your family doctor or clinic.        Dose / Directions Last dose taken    * albuterol 108 (90 BASE) MCG/ACT Inhaler   Commonly known as:  PROAIR HFA/PROVENTIL HFA/VENTOLIN HFA   Dose:  2 puff   Quantity:  3 Inhaler        Inhale 2 puffs into the lungs every 6 hours as needed for shortness of breath / dyspnea   Refills:  1        * albuterol (2.5 MG/3ML) 0.083% neb solution   Dose:  1 vial   Quantity:  30 vial        Take 1 vial (2.5 mg) by  nebulization every 6 hours as needed for shortness of breath / dyspnea or wheezing   Refills:  1        ALPRAZolam 0.5 MG tablet   Commonly known as:  XANAX   Dose:  0.5 mg   Quantity:  2 tablet        Take 1 tablet (0.5 mg) by mouth once as needed for anxiety (before MRI, may repeat in 30 mins if needed)   Refills:  0        aspirin 81 MG EC tablet   Dose:  81 mg   Quantity:  100 tablet        Take 1 tablet (81 mg) by mouth daily   Refills:  3        AZO TABS PO        Refills:  0        buPROPion 150 MG 12 hr tablet   Commonly known as:  WELLBUTRIN SR   Dose:  150 mg   Quantity:  180 tablet        Take 1 tablet (150 mg) by mouth 2 times daily Take 1 tablet once daily and increase to 1 tablet twice daily after 4 to 7 days   Refills:  1        fluticasone-salmeterol 500-50 MCG/DOSE diskus inhaler   Commonly known as:  ADVAIR   Dose:  1 puff   Quantity:  3 Inhaler        Inhale 1 puff into the lungs 2 times daily   Refills:  1        gabapentin 100 MG capsule   Commonly known as:  NEURONTIN   Dose:  100-300 mg   Quantity:  540 capsule        Take 1-3 capsules (100-300 mg) by mouth 3 times daily   Refills:  0        glipiZIDE 10 MG 24 hr tablet   Commonly known as:  glipiZIDE XL   Dose:  10 mg   Quantity:  90 tablet        Take 1 tablet (10 mg) by mouth daily   Refills:  1        hydrochlorothiazide 25 MG tablet   Commonly known as:  HYDRODIURIL   Dose:  25 mg   Quantity:  90 tablet        Take 1 tablet (25 mg) by mouth daily   Refills:  3        ipratropium - albuterol 0.5 mg/2.5 mg/3 mL 0.5-2.5 (3) MG/3ML neb solution   Commonly known as:  DUONEB   Dose:  1 vial   Quantity:  1 vial        Take 1 vial (3 mLs) by nebulization every 6 hours as needed for shortness of breath / dyspnea or wheezing   Refills:  0        ipratropium 0.06 % spray   Commonly known as:  ATROVENT   Dose:  2 spray   Quantity:  1 Box        Spray 2 sprays into both nostrils 2 times daily as needed for rhinitis   Refills:  2        levothyroxine  112 MCG tablet   Commonly known as:  SYNTHROID/LEVOTHROID   Dose:  112 mcg   Quantity:  90 tablet        Take 1 tablet (112 mcg) by mouth daily   Refills:  1        losartan 25 MG tablet   Commonly known as:  COZAAR   Dose:  25 mg   Quantity:  90 tablet        Take 1 tablet (25 mg) by mouth daily   Refills:  2        nicotine polacrilex 2 MG gum   Commonly known as:  NICORETTE   Dose:  2 mg   Quantity:  160 tablet        Place 1 each (2 mg) inside cheek as needed for smoking cessation   Refills:  3        ondansetron 4 MG tablet   Commonly known as:  ZOFRAN   Dose:  4 mg   Quantity:  18 tablet        Take 1 tablet (4 mg) by mouth every 6 hours as needed for nausea   Refills:  2        polyethylene glycol powder   Commonly known as:  MIRALAX   Dose:  1 capful   Quantity:  850 g        Take 17 g by mouth daily   Refills:  2        predniSONE 20 MG tablet   Commonly known as:  DELTASONE   Quantity:  20 tablet        Take 3 tabs (60 mg) orally daily for 2 days, 2 tabs (40 mg) orally daily for 4 days, 1 tab (20 mg) orally daily for 4 days, then 1/2 tab (10 mg) orally for 4 days   Refills:  0        PROBIOTIC DAILY PO        Refills:  0        simvastatin 20 MG tablet   Commonly known as:  ZOCOR   Dose:  20 mg   Quantity:  90 tablet        Take 1 tablet (20 mg) by mouth At Bedtime   Refills:  2        tiotropium 18 MCG capsule   Commonly known as:  SPIRIVA   Dose:  18 mcg   Quantity:  90 capsule        Inhale 1 capsule (18 mcg) into the lungs daily   Refills:  3        vitamin D 2000 UNITS tablet   Dose:  2000 Units   Quantity:  100 tablet        Take 2,000 Units by mouth daily   Refills:  3        * Notice:  This list has 2 medication(s) that are the same as other medications prescribed for you. Read the directions carefully, and ask your doctor or other care provider to review them with you.            Prescriptions were sent or printed at these locations (4 Prescriptions)                   Other Prescriptions                 Printed at Department/Unit printer (4 of 4)         ondansetron (ZOFRAN ODT) 4 MG ODT tab               nitrofurantoin, macrocrystal-monohydrate, (MACROBID) 100 MG capsule               traMADol (ULTRAM) 50 MG tablet               cyclobenzaprine (FLEXERIL) 10 MG tablet                Procedures and tests performed during your visit     CBC with platelets differential    CT Abdomen Pelvis w Contrast    Cardiac Continuous Monitoring    Comprehensive metabolic panel    D dimer quantitative    EKG 12-lead, tracing only    ISTAT troponin nursing POCT    Lactic acid whole blood    Lipase    Nt probnp inpatient (BNP)    Peripheral IV catheter    Pulse oximetry nursing    Troponin I    Troponin POCT    UA with Microscopic    Urine Culture Aerobic Bacterial    XR Chest 2 Views      Orders Needing Specimen Collection     None      Pending Results     Date and Time Order Name Status Description    4/30/2017 0353 Urine Culture Aerobic Bacterial In process     4/30/2017 0222 EKG 12-lead, tracing only Preliminary             Pending Culture Results     Date and Time Order Name Status Description    4/30/2017 0353 Urine Culture Aerobic Bacterial In process             Test Results From Your Hospital Stay        4/30/2017  2:47 AM      Component Results     Component Value Ref Range & Units Status    WBC 11.8 (H) 4.0 - 11.0 10e9/L Final    RBC Count 5.14 3.8 - 5.2 10e12/L Final    Hemoglobin 15.0 11.7 - 15.7 g/dL Final    Hematocrit 45.4 35.0 - 47.0 % Final    MCV 88 78 - 100 fl Final    MCH 29.2 26.5 - 33.0 pg Final    MCHC 33.0 31.5 - 36.5 g/dL Final    RDW 14.2 10.0 - 15.0 % Final    Platelet Count 327 150 - 450 10e9/L Final    Diff Method Automated Method  Final    % Neutrophils 57.7 % Final    % Lymphocytes 31.8 % Final    % Monocytes 7.5 % Final    % Eosinophils 2.4 % Final    % Basophils 0.3 % Final    % Immature Granulocytes 0.3 % Final    Nucleated RBCs 0 0 /100 Final    Absolute Neutrophil 6.8 1.6 - 8.3 10e9/L  Final    Absolute Lymphocytes 3.8 0.8 - 5.3 10e9/L Final    Absolute Monocytes 0.9 0.0 - 1.3 10e9/L Final    Absolute Eosinophils 0.3 0.0 - 0.7 10e9/L Final    Absolute Basophils 0.0 0.0 - 0.2 10e9/L Final    Abs Immature Granulocytes 0.0 0 - 0.4 10e9/L Final    Absolute Nucleated RBC 0.0  Final         4/30/2017  2:57 AM      Component Results     Component Value Ref Range & Units Status    D Dimer  0.0 - 0.50 ug/ml FEU Final    <0.3  This D-dimer assay is intended for use in conjuntion with a clinical pretest   probability assessment model to exclude pulmonary embolism (PE) and as an aid   in the diagnosis of deep venous thrombosis (DVT) in outpatients suspected of PE   or DVT. The cut-off value is 0.5 g/mL FEU.           4/30/2017  3:06 AM      Component Results     Component Value Ref Range & Units Status    Sodium 137 133 - 144 mmol/L Final    Potassium 3.6 3.4 - 5.3 mmol/L Final    Chloride 102 94 - 109 mmol/L Final    Carbon Dioxide 28 20 - 32 mmol/L Final    Anion Gap 7 3 - 14 mmol/L Final    Glucose 129 (H) 70 - 99 mg/dL Final    Urea Nitrogen 15 7 - 30 mg/dL Final    Creatinine 0.60 0.52 - 1.04 mg/dL Final    GFR Estimate >90  Non  GFR Calc   >60 mL/min/1.7m2 Final    GFR Estimate If Black >90   GFR Calc   >60 mL/min/1.7m2 Final    Calcium 9.4 8.5 - 10.1 mg/dL Final    Bilirubin Total 0.7 0.2 - 1.3 mg/dL Final    Albumin 3.8 3.4 - 5.0 g/dL Final    Protein Total 7.8 6.8 - 8.8 g/dL Final    Alkaline Phosphatase 86 40 - 150 U/L Final    ALT 20 0 - 50 U/L Final    AST 15 0 - 45 U/L Final         4/30/2017  3:06 AM      Component Results     Component Value Ref Range & Units Status    Lipase 191 73 - 393 U/L Final         4/30/2017  2:48 AM      Component Results     Component Value Ref Range & Units Status    Lactic Acid 1.4 0.7 - 2.1 mmol/L Final         4/30/2017  3:07 AM      Component Results     Component Value Ref Range & Units Status    Troponin I ES  0.000 - 0.045 ug/L  Final    <0.015  The 99th percentile for upper reference range is 0.045 ug/L.  Troponin values in   the range of 0.045 - 0.120 ug/L may be associated with risks of adverse   clinical events.           4/30/2017  3:07 AM      Component Results     Component Value Ref Range & Units Status    N-Terminal Pro BNP Inpatient 43 0 - 900 pg/mL Final    Reference range shown and results flagged as abnormal are suggested inpatient   cut points for confirming diagnosis if CHF in an acute setting. Establishing   a   baseline value for each individual patient is useful for follow-up. An   inpatient or emergency department NT-proPBNP <300 pg/mL effectively rules out   acute CHF, with 99% negative predictive value.  The outpatient non-acute reference range for ruling out CHF is:   0-125 pg/mL (age 18 to less than 75)   0-450 pg/mL (age 75 yrs and older)           4/30/2017  3:47 AM      Component Results     Component Value Ref Range & Units Status    Color Urine Saniya  Final    Appearance Urine Slightly Cloudy  Final    Glucose Urine Negative NEG mg/dL Final    Bilirubin Urine Negative NEG Final    Ketones Urine Negative NEG mg/dL Final    Specific Gravity Urine 1.026 1.003 - 1.035 Final    Blood Urine Small (A) NEG Final    pH Urine 5.0 5.0 - 7.0 pH Final    Protein Albumin Urine Negative NEG mg/dL Final    Urobilinogen mg/dL 4.0 (H) 0.0 - 2.0 mg/dL Final    Nitrite Urine Positive (A) NEG Final    Leukocyte Esterase Urine Negative NEG Final    Source Midstream Urine  Final    WBC Urine 3 (H) 0 - 2 /HPF Final    RBC Urine 10 (H) 0 - 2 /HPF Final    Bacteria Urine Many (A) NEG /HPF Final    Squamous Epithelial /HPF Urine 12 (H) 0 - 1 /HPF Final    Mucous Urine Present (A) NEG /LPF Final    Hyaline Casts 2 0 - 2 /LPF Final         4/30/2017  3:16 AM      Narrative     XR CHEST 2 VW 4/30/2017 3:01 AM    HISTORY: Chest pain and shortness of breath.    COMPARISON: None.    FINDINGS: No consolidation, effusion or pneumothorax. Stable  heart  size.        Impression     IMPRESSION: No acute cardiopulmonary abnormality.    SCOTT DSOUZA MD         4/30/2017  2:46 AM      Component Results     Component Value Ref Range & Units Status    Troponin I 0.00 0.00 - 0.10 ug/L Final         4/30/2017  4:20 AM      Narrative     CT ABDOMEN PELVIS W CONTRAST 4/30/2017 4:02 AM    HISTORY: Abdominal pain.    COMPARISON: Noncontrast abdomen and pelvis CT, 5/22/2016    TECHNIQUE:  Abdomen and pelvis CT was performed following intravenous  administration of 100 cc Isovue-370.  Radiation dose for this scan was  reduced using automated exposure control, adjustment of the mA and/or  kV according to patient size, or iterative reconstruction technique.    FINDINGS: Small benign granuloma in the left lower lobe. Lung bases  are otherwise clear.    Liver, gallbladder, spleen, pancreas and right adrenal gland are  unremarkable. Left adrenal adenoma is unchanged from 5/22/2016. The  kidneys are excreting contrast symmetrically.     Normal caliber bowel. Normal appendix. No free air. No free or  loculated intraperitoneal fluid collection.    Urinary bladder is collapsed. Uterus and adnexa are unremarkable.        Impression     IMPRESSION: No specific finding to explain abdominal pain.    SCOTT DSOUZA MD         4/30/2017  4:40 AM                Clinical Quality Measure: Blood Pressure Screening     Your blood pressure was checked while you were in the emergency department today. The last reading we obtained was  BP: (!) 134/94 . Please read the guidelines below about what these numbers mean and what you should do about them.  If your systolic blood pressure (the top number) is less than 120 and your diastolic blood pressure (the bottom number) is less than 80, then your blood pressure is normal. There is nothing more that you need to do about it.  If your systolic blood pressure (the top number) is 120-139 or your diastolic blood pressure (the bottom number) is 80-89,  your blood pressure may be higher than it should be. You should have your blood pressure rechecked within a year by a primary care provider.  If your systolic blood pressure (the top number) is 140 or greater or your diastolic blood pressure (the bottom number) is 90 or greater, you may have high blood pressure. High blood pressure is treatable, but if left untreated over time it can put you at risk for heart attack, stroke, or kidney failure. You should have your blood pressure rechecked by a primary care provider within the next 4 weeks.  If your provider in the emergency department today gave you specific instructions to follow-up with your doctor or provider even sooner than that, you should follow that instruction and not wait for up to 4 weeks for your follow-up visit.        Thank you for choosing [facility]       Thank you for choosing [facility] for your care. Our goal is always to provide you with excellent care. Hearing back from our patients is one way we can continue to improve our services. Please take a few minutes to complete the written survey that you may receive in the mail after you visit with us. Thank you!        [employer] Information     [employer] gives you secure access to your electronic health record. If you see a primary care provider, you can also send messages to your care team and make appointments. If you have questions, please call your primary care clinic.  If you do not have a primary care provider, please call [phone] and they will assist you.        Care EveryWhere ID     This is your Care EveryWhere ID. This could be used by other organizations to access your [facility] medical records  [id number]        After Visit Summary       This is your record. Keep this with you and show to your community pharmacist(s) and doctor(s) at your next visit.

## 2017-04-30 NOTE — ED PROVIDER NOTES
"  History     Chief Complaint:  Back Pain    HPI   Jazlyn Bartlett is a 51 year old female with a history of COPD, who presents to the emergency department today for evaluation of back pain. The patient reports upper back pain that radiates to her shoulder and chest for the past few days (constant) with associated shortness of breath, abdominal pain, mild leg swelling, and nausea. She describes it as if someone is \"squishing\" it and tight. She had episode of diarrhea today and urinary continence today which is normal when she gets sick. Her pain worsened today, therefore prompting her visit to the ED for further evaluation. She notes the abdominal pain has been intermittent but is present now. Upon presentation, she had her first episode of vomiting. She also endorse abdominal pain that started today. She has had pain like this before, although not to this severity. The patient denies recent cold or illness, recent injury or strains, fever, vaginal symptoms, and leg pain. On further recheck, the patient states the back pain started to become constant after she was walking her dog and was pulled abruptly by the leash.     Allergies:  Ibuprofen Sodium  Vicodin [Hydrocodone-Acetaminophen]     Medications:    Hydrodiuril   Glipizide  Delatsone   Zocor  Synthroid   Ultram   Wellbutrin   Neurontin   Xanax  Cozaar  Nicorette  Probiotic   AZO Tabs  Duoneb  Zofran   Vitamin D  Advair   Miralax  Aspirin   Albuterol   Atrovent  Spiriva     Past Medical History:    Hypertension   Hypothyroidism   COPD  Kidney stone   GERD  Diabetes mellitus   Chronic pain   Mucoepidermoid carcinoma of parotid gland      Past Surgical History:   Removal of cancerous lump on neck  Tonsillectomy   Dilation and curettage, hysteroscopy, ablate endometrium novasure, combined (  Ablation, endometrial thermal, w/o hysteroscopic guidance   EGD combined    Family History:    Diabetes  Breast cancer    Social History:  The patient was accompanied to the " ED by her .  Smoking Status: Current  Smokeless Tobacco: Never  Alcohol Use: No  Marital Status:   [2]     Review of Systems   Constitutional: Negative for fever.   Cardiovascular: Positive for chest pain and leg swelling.   Gastrointestinal: Positive for abdominal pain, diarrhea, nausea and vomiting. Negative for constipation.   Genitourinary: Negative for decreased urine volume, difficulty urinating, dysuria, urgency, vaginal bleeding, vaginal discharge and vaginal pain.   Musculoskeletal: Positive for back pain.   All other systems reviewed and are negative.    Physical Exam   First Vitals:  BP: (!) 127/102  Pulse: 98  Temp: 97.6  F (36.4  C)  Resp: 26  Height: 152.4 cm (5')  Weight: 98.4 kg (217 lb)  SpO2: 98 %      Physical Exam  General: Uncomfortable appearing large adult female sitting upright.   Eyes: PERRL, Conjunctive within normal limits. No scleral icterus.   ENT: Moist mucous membranes, oropharynx clear.   CV: Normal S1S2, no murmur, rub or gallop. Tachycardic rate and rhythm  Resp: Diminished throughout both lungs, no wheezes, rales or rhonchi. Tachypneic.   GI: Abdomen is soft, nondistended. No palpable masses. No rebound or guarding. Diffuse tenderness most prominent in lower abdomen.   MSK: No edema. Normal active range of motion. Tenderness to palpation right paraspinal thoracic musculature. No calf asymmetry or tenderness to palpation. Normal and equal strength bilateral upper and lower extremities.  Skin: Warm and dry. No rashes or lesions or ecchymoses on visible skin.  Neuro: Alert and oriented. Responds appropriately to all questions and commands. No focal findings appreciated. Normal muscle tone.  Psych: Normal mood and affect. Pleasant.    Emergency Department Course     ECG:  ECG taken at 0303, ECG read at 0303  Sinus bradycardia  Low voltage QRS  T wave abnormality, consider anterior ischemia  Abnormal ECG  Rate 59 bpm. NC interval 144. QRS duration 94. QT/QTc 436/431. P-R-T  axes 36 9 38.    Imaging:  Radiology findings were communicated with the patient and family who voiced understanding of the findings.  CT Abdomen Pelvis w Contrast  IMPRESSION: No specific finding to explain abdominal pain.  Report per radiology     XR Chest 2 Views  IMPRESSION: No acute cardiopulmonary abnormality.  Report per radiology     Laboratory:  Laboratory findings were communicated with the patient and family who voiced understanding of the findings.  UA: slightly cloudy sara colored urine, small blood, urobilinogen 4, nitrite positive, WBC 3, RBC 10, many bacteria, squamous epithelial many, mucous present o/w WNL.  Troponin (Collected 0226): <0.015  Troponin POCT (Collected 0234): 0.00  CBC: WBC 11.8(H) o/w WNL. (, HGB 15.0, )   D Dimer (Collected 0226): <0.3  CMP: Glucose 129(H) o/w WNL (Creatinine 0.60)  Lipase: 191  Lactic Acid: 1.4  BNP: 43    Urine Culture Aerobic Bacterial: Pending      Interventions:  0239 NS 1,000mL IV  0239 Zofran 4mg IV  0409 Morphine 4mg IV  0450 Percocet 5-325mg 2 Tablets PO  0515 Flexeril 10mg PO     Emergency Department Course:  Nursing notes and vitals reviewed.  The patient provided a urine sample here in the emergency department. This was sent for laboratory testing, findings above.  IV was inserted and blood was drawn for laboratory testing, results above.  The patient was sent for a CT Abdomen Pelvis w Contrast and XR Chest 2 Views while in the emergency department, results above.   0216: I performed an exam of the patient as documented above.   0508: Patient rechecked and updated. She is sitting upright in no respiratory distress, appearing well. She reports feeling much better.  I discussed the treatment plan with the patient. They expressed understanding of this plan and consented to discharge. They will be discharged home with instructions for care and follow up. In addition, the patient will return to the emergency department if their symptoms persist,  worsen, if new symptoms arise or if there is any concern.  All questions were answered.  I personally reviewed the laboratory and imaging results with the Patient and spouse and answered all related questions prior to discharge.    Impression & Plan      Medical Decision Making:  Jazlyn Bartlett is a 51 year old female who presents to the emergency department with concerns for back pain, nausea, and intermittent abdominal pain. The back pain has been constant for days radiating to the chest. It is much worse after walking the dog when the dog pulled her on the leash. These seems musculoskeletal in origin, however, given the somewhat atypical presentation, cardiovascular work up was obtained as well. With constant pain for long duration of time and normal troponin is assuring. ECG did have some abnormalities that seemed new from previous ECG although this was in light of the constant nature symptoms and normal troponin, this seems unlikely to represent acute pathology especially given the reproducible nature of her symptoms in the back. The abdominal pain is unclear in cause although there are findings consistent with probable UTI with nitrite positive urine. She is no having dysuria, but given the nitrite positive abnormality, she is treated with Macrobid with a urine culture pending. On reassessment, she is feeling much better and appears comfortable. She is not having any vomiting with oral challenge. She is denying any significant pain. It seems appropriate to discharge her home at this time using reasonable clinical judgement. She is recommended follow up with PCP within 3 days for reassessment. Return immediately to the emergency department if symptoms worsen. Flexeril and tramadol as needed for spasm and pain. Drink plenty of fluids and rest. All questions answered prior to discharge.      Diagnosis:    ICD-10-CM    1. Abdominal pain, unspecified location R10.9    2. Upper back strain, initial encounter  S29.012A    3. Back muscle spasm M62.830    4. Acute UTI N39.0    5. Non-intractable vomiting with nausea, unspecified vomiting type R11.2      Disposition:   Discharged to home with the below prescriptions    Discharge Medications:  Discharge Medication List as of 4/30/2017  5:21 AM      START taking these medications    Details   ondansetron (ZOFRAN ODT) 4 MG ODT tab Take 1 tablet (4 mg) by mouth every 8 hours as needed for nausea, Disp-10 tablet, R-0, Local Print      nitrofurantoin, macrocrystal-monohydrate, (MACROBID) 100 MG capsule Take 1 capsule (100 mg) by mouth 2 times daily for 5 days, Disp-10 capsule, R-0, Local Print      cyclobenzaprine (FLEXERIL) 10 MG tablet Take 1 tablet (10 mg) by mouth 3 times daily as needed for muscle spasms, Disp-20 tablet, R-0, Local Print           Scribe Disclosure:  I, Tiffanie Baumann, am serving as a scribe at 2:11 AM on 4/30/2017 to document services personally performed by Stacey Kidd MD, based on my observations and the provider's statements to me.    4/30/2017   [facility] EMERGENCY DEPARTMENT       Stacey Kidd MD  05/01/17 0134

## 2017-04-30 NOTE — DISCHARGE INSTRUCTIONS
Discharge Instructions  Abdominal Pain    Abdominal pain can be caused by many things. Your evaluation today does not show the exact cause for your pain. Your doctor today has decided that it is unlikely your pain is due to a life threatening problem, or a problem requiring surgery or hospital admission. Sometimes those problems cannot be found right away, so it is very important that you follow up as directed.  Sometimes only the changes which occur over time allow the cause of your pain to be found.    Return to the Emergency Department for a recheck in 8-12 hours if your pain continues.  If your pain gets worse, changes in location, or feels different, return to the Emergency Department right away.    ADULTS:  Return to the Emergency Department right away if:      You get an oral temperature above 102oF or as directed by your doctor.    You have blood in your stools (bright red or black, tarry stools).    You keep throwing up or can t drink liquids.    You see blood when you throw up.    You can t have a bowel movement or you can t pass gas.    Your stomach gets bloated or bigger.    Your skin or the whites of your eyes look yellow.    You faint.    You have bloody, frequent or painful urination.    You have new symptoms or anything that worries you.    CHILDREN:  Return to the Emergency Department right away if your child has any of the above-listed symptoms or the following:      Pushes your hand away or screams/cries when his/her belly is touched.    You notice your child is very fussy or weak.    Your child is very tired and is too tired to eat or drink.    Your child is dehydrated.  Signs of dehydration can be:  o Your infant has had no wet diapers in 4-5 hours.  o Your older child has not passed urine in 6-8 hours.  o Your infant or child starts to have dry mouth and lips, or no saliva or tears.    PREGNANT WOMEN:  Return to the Emergency Department right away if you have any of the above-listed symptoms or  the following:      You have bleeding, leaking fluid or passing tissue from the vagina.    You have worse pain or cramping, or pain in your shoulder or back.    You have vomiting that will not stop.    You have painful or bloody urination.    You have a temperature of 100oF or more.    Your baby is not moving as much as usual.    You faint.    You get a bad headache with or without eye problems and abdominal pain.    You have a convulsion or seizure.    You have unusual discharge from your vagina and abdominal pain.    Abdominal pain is pretty common during pregnancy.  Your pain may or may not be related to your pregnancy. You should follow-up closely with your OB doctor so they can evaluate you and your baby.  Until you follow-up with your regular doctor, do the following:       Avoid sex and do not put anything in your vagina.    Drink clear fluids.    Only take medications approved by your doctor.    MORE INFORMATION:    Appendicitis:  A possible cause of abdominal pain in any person who still has their appendix is acute appendicitis. Appendicitis is often hard to diagnose.  Testing does not always rule out early appendicitis or other causes of abdominal pain. Close follow-up with your doctor and re-evaluations may be needed to figure out the reason for your abdominal pain.    Follow-up:  It is very important that you make an appointment with your clinic and go to the appointment.  If you do not follow-up with your primary doctor, it may result in missing an important development which could result in permanent injury or disability and/or lasting pain.  If there is any problem keeping your appointment, call your doctor or return to the Emergency Department.    Medications:  Take your medications as directed by your doctor today.  Before using over-the-counter medications, ask your doctor and make sure to take the medications as directed.  If you have any questions about medications, ask your doctor.    Diet:   "Resume your normal diet as much as possible, but do not eat fried, fatty or spicy foods while you have pain.  Do not drink alcohol or have caffeine.  Do not smoke tobacco.    Probiotics: If you have been given an antibiotic, you may want to also take a probiotic pill or eat yogurt with live cultures. Probiotics have \"good bacteria\" to help your intestines stay healthy. Studies have shown that probiotics help prevent diarrhea and other intestine problems (including C. diff infection) when you take antibiotics. You can buy these without a prescription in the pharmacy section of the store.     If you were given a prescription for medicine here today, be sure to read all of the information (including the package insert) that comes with your prescription.  This will include important information about the medicine, its side effects, and any warnings that you need to know about.  The pharmacist who fills the prescription can provide more information and answer questions you may have about the medicine.  If you have questions or concerns that the pharmacist cannot address, please call or return to the Emergency Department.         Opioid Medication Information    Pain medications are among the most commonly prescribed medicines, so we are including this information for all our patients. If you did not receive pain medication or get a prescription for pain medicine, you can ignore it.     You may have been given a prescription for an opioid (narcotic) pain medicine and/or have received a pain medicine while here in the Emergency Department. These medicines can make you drowsy or impaired. You must not drive, operate dangerous equipment, or engage in any other dangerous activities while taking these medications. If you drive while taking these medications, you could be arrested for DUI, or driving under the influence. Do not drink any alcohol while you are taking these medications.     Opioid pain medications can cause " addiction. If you have a history of chemical dependency of any type, you are at a higher risk of becoming addicted to pain medications.  Only take these prescribed medications to treat your pain when all other options have been tried. Take it for as short a time and as few doses as possible. Store your pain pills in a secure place, as they are frequently stolen and provide a dangerous opportunity for children or visitors in your house to start abusing these powerful medications. We will not replace any lost or stolen medicine.  As soon as your pain is better, you should flush all your remaining medication.     Many prescription pain medications contain Tylenol  (acetaminophen), including Vicodin , Tylenol #3 , Norco , Lortab , and Percocet .  You should not take any extra pills of Tylenol  if you are using these prescription medications or you can get very sick.  Do not ever take more than 3000 mg of acetaminophen in any 24 hour period.    All opioids tend to cause constipation. Drink plenty of water and eat foods that have a lot of fiber, such as fruits, vegetables, prune juice, apple juice and high fiber cereal.  Take a laxative if you don t move your bowels at least every other day. Miralax , Milk of Magnesia, Colace , or Senna  can be used to keep you regular.      Remember that you can always come back to the Emergency Department if you are not able to see your regular doctor in the amount of time listed above, if you get any new symptoms, or if there is anything that worries you.        Back Sprain or Strain    Injury to the muscles (strain) or ligaments (sprain) around the spine can be troubling. Injury may occur after a sudden forceful twisting or bending force such as in a car accident, after a simple awkward movement, or after lifting something heavy with poor body positioning. In any case, muscle spasm is often present and adds to the pain.  Thankfully, most people feel better in 1 to 2 weeks, and most of  the rest in 1 to 2 months. Most people can remain active. Unless you had a forceful physical injury such as a car accident or fall, X-rays are usually not ordered for the first evaluation of a back sprain or strain. If pain continues and does not respond to medical treatment, your healthcare provider may order X-rays and other tests.  Home care  The following guidelines will help you care for your injury at home:    When in bed, try to find a comfortable position. A firm mattress is best. Try lying flat on your back with pillows under your knees. You can also try lying on your side with your knees bent up toward your chest and a pillow between your knees.    Don't sit for long periods. Try not to take long car rides or take other trips that have you sitting for a long time. This puts more stress on the lower back than standing or walking.    During the first 24 to 72 hours after an injury or flare-up, apply an ice pack to the painful area for 20 minutes. Then remove it for 20 minutes. Do this for 60 to 90 minutes, or several times a day, This will reduce swelling and pain. Be sure to wrap the ice pack in a thin towel or plastic to protect your skin.    You can start with ice, then switch to heat. Heat from a hot shower, hot bath, or heating pad reduces swelling and pain and works well for muscle spasms. Put heat on the painful area for 20 minutes, then remove for 20 minutes. Do this for 60 to 90 minutes, or several times a day. Do not use a heating pad while sleeping. It can burn the skin.    You can alternate the ice and heat. Talk with your healthcare provider to find out the best treatment or therapy for your back pain.    Therapeutic massage will help relax the back muscles without stretching them.    Be aware of safe lifting methods. Do not lift anything over 15 pounds until all of the pain is gone.  Medicines  Talk to your healthcare provider before using medicines, especially if you have other health problems  or are taking other medicines.    You may use acetaminophen or ibuprofen to control pain, unless another pain medicine was prescribed. If you have chronic conditions like diabetes, liver or kidney disease, stomach ulcers, or gastrointestinal bleeding, or are taking blood-thinner medicines, talk with your doctor before taking any medicines.    Be careful if you are given prescription medicines, narcotics, or medicine for muscle spasm. They can cause drowsiness, and affect your coordination, reflexes, and judgment. Do not drive or operate heavy machinery.  Follow-up care  Follow up with your healthcare provider or this facility as advised. You may need physical therapy or more tests if your symptoms get worse.  If you had X-rays today, they didn t show any broken bones, breaks, or fractures. Sometimes fractures don t show up on the first X-ray. Bruises and sprains can sometimes hurt as much as a fracture. These injuries can take time to heal completely. If your symptoms don t improve or they get worse, talk with your healthcare provider. You may need a repeat X-ray.  Call 911  Call for emergency care if any of the following occur:    Trouble breathing    Confused    Very drowsy or trouble awakening    Fainting or loss of consciousness    Rapid or very slow heart rate    Loss of bowel or bladder control  When to seek medical advice  Call your healthcare provider right away if any of the following occur:    Pain gets worse or spreads to your arms or legs    Weakness or numbness in one or both arms or legs    Numbness in the groin or genital area    9849-8297 [employer]. [address] [place] [address]. All rights reserved. This information is not intended as a substitute for professional medical care. Always follow your healthcare professional's instructions.    Discharge Instructions  Urinary Tract Infection (Possible)  You have urinary tract infection, or UTI. The urinary tract includes the  kidneys (which make urine), ureters (the tubes that carry urine from the kidneys to the bladder), the bladder (which stores urine), and urethra (the tube that carries urine out of the bladder).  Urinary tract infections occur when bacteria travel up the urethra into the bladder. We suspect a UTI based on chemical and microscopic findings in your urine, but if there is a question about your findings, we will do a culture to see if bacteria grow. A urine culture takes several days. You should always follow-up with your primary physician to find out about results of your culture if one was done.   Return to the Emergency Department if:    You have severe back pain.    You are vomiting so that you can t take your medicine, or have signs of dehydration (such as urinating less than 3 times per day).    You have fever over 101.5 degrees F.    You have significant confusion or are very weak, or feel very ill.    Your child seems much more ill, won t wake up, won t respond right, or is crying for a long time and won t calm down.    Your child is showing signs of dehydration, Signs of dehydration can be:  o Your infant has had no wet diapers in 4-5 hours.  o Your older child has not passed urine in 6-8 hours.  o Your infant or child starts to have dry mouth and lips, or no saliva or tears.    Follow-up with your doctor:     Children under 24 months need to be seen by their regular doctor within one week after a diagnosis of a UTI. It may be necessary to do some imaging tests to look at the child s kidney or bladder.    You should begin to feel better within 24 - 48 hours of starting your antibiotic.  If you do not, you need to be seen again.      Treatment:     You will be treated with an antibiotic to kill the bacteria. We have to make an educated guess as to which antibiotic will work for your infection. In most healthy people, we can guess right almost all of the time. Sometimes a culture is done to show which antibiotics  "will work. This usually takes 2-3 days. When the culture is done, we may have to contact you to put you on a different antibiotic.    Take a pain medication such as Tylenol  (acetaminophen), Advil  (ibuprofen), Nuprin  (ibuprofen), or Aleve  (naproxen). If you have been given a narcotic such as Vicodin  (hydrocodone with acetaminophen), Percocet  (oxycodone with acetaminophen), or codeine, do not drive for four hours after you have taken it. If the narcotic contains Tylenol  (acetaminophen), do not take Tylenol  with it. All narcotics will cause constipation, so eat a high fiber diet.      Pyridium  (phenazopyridine) or Uristat  (phenazopyridine) is a prescription medication that numbs the bladder to reduce the burning pain of some UTIs.  The same medication is available in a non-prescription version called Azo-Standard  (phenazopyridine), Urodol  (phenazopyridine), or other brand names. This medication will change the color of the urine and tears (usually blue or orange). If you wear contacts, do not wear them while taking this medication as they may be stained by the medication.    Antibiotic Warning:     If you have been placed on antibiotics - watch for signs of allergic reaction.  These include rash, lip swelling, difficulty breathing, wheezing, and dizziness.  If you develop any of these symptoms, stop the antibiotic immediately and go to an emergency room or urgent care for evaluation.    Probiotics: If you have been given an antibiotic, you may want to also take a probiotic pill or eat yogurt with live cultures. Probiotics have \"good bacteria\" to help your intestines stay healthy. Studies have shown that probiotics help prevent diarrhea and other intestine problems (including C. diff infection) when you take antibiotics. You can buy these without a prescription in the pharmacy section of the store.   If you were given a prescription for medicine here today, be sure to read all of the information (including " the package insert) that comes with your prescription.  This will include important information about the medicine, its side effects, and any warnings that you need to know about.  The pharmacist who fills the prescription can provide more information and answer questions you may have about the medicine.  If you have questions or concerns that the pharmacist cannot address, please call or return to the Emergency Department.   Remember that you can always come back to the Emergency Department if you are not able to see your regular doctor in the amount of time listed above, if you get any new symptoms, or if there is anything that worries you.

## 2017-04-30 NOTE — LETTER
[facility] EMERGENCY DEPARTMENT  [address]  [facility] [address]  Phone: [phone]  Fax: [phone]    April 30, 2017        Jazlyn RECIO Bartlett  [address] [place] [id number]          To whom it may concern:    RE: Jazlyn L Dhruv    Please excuse Jazlyn from work 4/31/2017.  She should return to work on 5/1/2017 if feeling better.    Please contact me for questions or concerns.      Sincerely,

## 2017-05-01 LAB
BACTERIA SPEC CULT: NO GROWTH
Lab: NORMAL
MICRO REPORT STATUS: NORMAL
SPECIMEN SOURCE: NORMAL

## 2017-05-16 ENCOUNTER — OFFICE VISIT (OUTPATIENT)
Dept: FAMILY MEDICINE | Facility: CLINIC | Age: 52
End: 2017-05-16
Payer: COMMERCIAL

## 2017-05-16 VITALS
HEART RATE: 65 BPM | OXYGEN SATURATION: 97 % | SYSTOLIC BLOOD PRESSURE: 140 MMHG | HEIGHT: 60 IN | TEMPERATURE: 97.7 F | WEIGHT: 222.2 LBS | BODY MASS INDEX: 43.62 KG/M2 | DIASTOLIC BLOOD PRESSURE: 90 MMHG

## 2017-05-16 DIAGNOSIS — Z11.59 NEED FOR HEPATITIS C SCREENING TEST: ICD-10-CM

## 2017-05-16 DIAGNOSIS — J44.9 CHRONIC OBSTRUCTIVE PULMONARY DISEASE, UNSPECIFIED COPD TYPE (H): ICD-10-CM

## 2017-05-16 DIAGNOSIS — E03.8 OTHER SPECIFIED HYPOTHYROIDISM: ICD-10-CM

## 2017-05-16 DIAGNOSIS — I10 HYPERTENSION GOAL BP (BLOOD PRESSURE) < 140/80: ICD-10-CM

## 2017-05-16 DIAGNOSIS — E78.5 HYPERLIPIDEMIA LDL GOAL <100: ICD-10-CM

## 2017-05-16 DIAGNOSIS — E11.9 TYPE 2 DIABETES MELLITUS WITHOUT COMPLICATION, WITHOUT LONG-TERM CURRENT USE OF INSULIN (H): Primary | ICD-10-CM

## 2017-05-16 PROCEDURE — 99214 OFFICE O/P EST MOD 30 MIN: CPT | Performed by: FAMILY MEDICINE

## 2017-05-16 RX ORDER — GLIPIZIDE 10 MG/1
10 TABLET, FILM COATED, EXTENDED RELEASE ORAL DAILY
Qty: 90 TABLET | Refills: 1 | Status: SHIPPED | OUTPATIENT
Start: 2017-05-16 | End: 2017-11-07

## 2017-05-16 RX ORDER — LOSARTAN POTASSIUM 50 MG/1
50 TABLET ORAL DAILY
Qty: 90 TABLET | Refills: 1 | Status: SHIPPED | OUTPATIENT
Start: 2017-05-16 | End: 2017-11-07

## 2017-05-16 RX ORDER — TIOTROPIUM BROMIDE 18 UG/1
18 CAPSULE ORAL; RESPIRATORY (INHALATION) DAILY
Qty: 90 CAPSULE | Refills: 1 | Status: SHIPPED | OUTPATIENT
Start: 2017-05-16 | End: 2017-11-07

## 2017-05-16 RX ORDER — SIMVASTATIN 20 MG
20 TABLET ORAL AT BEDTIME
Qty: 90 TABLET | Refills: 3 | Status: SHIPPED | OUTPATIENT
Start: 2017-05-16 | End: 2017-11-07

## 2017-05-16 RX ORDER — LEVOTHYROXINE SODIUM 112 UG/1
112 TABLET ORAL DAILY
Qty: 90 TABLET | Refills: 1 | Status: SHIPPED | OUTPATIENT
Start: 2017-05-16 | End: 2017-05-19

## 2017-05-16 RX ORDER — HYDROCHLOROTHIAZIDE 25 MG/1
25 TABLET ORAL DAILY
Qty: 90 TABLET | Refills: 3 | Status: SHIPPED | OUTPATIENT
Start: 2017-05-16 | End: 2017-11-07

## 2017-05-16 NOTE — PROGRESS NOTES
SUBJECTIVE:                                                    Jazlyn Bartlett is a 51 year old female who presents to clinic today for the following health issues:        Diabetes Follow-up      Patient is checking blood sugars: not at all    Diabetic concerns: None     Symptoms of hypoglycemia (low blood sugar): lethargy, blurred vision, confusion     Paresthesias (numbness or burning in feet) or sores: Yes      Date of last diabetic eye exam: DUE     Hyperlipidemia Follow-Up      Rate your low fat/cholesterol diet?: not monitoring fat    Taking statin?  Yes, possible muscle aches from statin    Other lipid medications/supplements?:  none     Hypertension Follow-up      Outpatient blood pressures are not being checked.    Low Salt Diet: no added salt       COPD Follow-Up    Symptoms are currently: slightly worsened    Current fatigue or dyspnea with ambulation: none    Shortness of breath: slightly worsened    Increased or change in Cough/Sputum: Yes-      Fever(s): No      History   Smoking Status     Current Every Day Smoker     Packs/day: 0.50     Types: Cigarettes   Smokeless Tobacco     Never Used     Lab Results   Component Value Date    FEV1 1.75 10/03/2013    DQT8VJD 67% 10/03/2013        Hypothyroidism Follow-up      Since last visit, patient describes the following symptoms: Weight stable, no hair loss, no skin changes, no constipation, no loose stools       Amount of exercise or physical activity: None    Problems taking medications regularly: No    Medication side effects: none    Diet: low salt        Problem list and histories reviewed & adjusted, as indicated.  Additional history: as documented    Patient Active Problem List   Diagnosis     Hypothyroidism     Neck mass     Moderate major depression (H)     Anxiety     Vitamin D deficiency disease     CARDIOVASCULAR SCREENING; LDL GOAL LESS THAN 100     GERD (gastroesophageal reflux disease)     Hypertension goal BP (blood pressure) < 140/80      Type 2 diabetes mellitus without complication (H)     Chronic airway obstruction (H)     Advanced directives, counseling/discussion     Autoimmune gastritis- repeat UGI 1/16 with gastritis without eosinophils     Urinary incontinence     Osteoarthritis of patellofemoral joint     Morbid obesity (H)     Tobacco abuse     Lumbar radiculopathy     Hyperlipidemia LDL goal <100     Past Surgical History:   Procedure Laterality Date     ABLATION, ENDOMETRIAL, THERMAL, W/O HYSTEROSCOPIC GUIDANCE       DILATION AND CURETTAGE, HYSTEROSCOPY, ABLATE ENDOMETRIUM NOVASURE, COMBINED  10/11/2012    Procedure: COMBINED DILATION AND CURETTAGE, HYSTEROSCOPY, ABLATE ENDOMETRIUM NOVASURE;  COMBINED DILATION AND CURETTAGE, HYSTEROSCOPY, ABLATE ENDOMETRIUM ,pelvic exam under anesthesia;  Surgeon: Shruti Barrios MD;  Location: [facility] OR     ESOPHAGOSCOPY, GASTROSCOPY, DUODENOSCOPY (EGD), COMBINED N/A 1/19/2016    Procedure: COMBINED ESOPHAGOSCOPY, GASTROSCOPY, DUODENOSCOPY (EGD), BIOPSY SINGLE OR MULTIPLE;  Surgeon: Kristofer Molina MD;  Location: [facility]     Removal of cancerous lump on neck       TONSILLECTOMY         Social History   Substance Use Topics     Smoking status: Current Every Day Smoker     Packs/day: 0.50     Types: Cigarettes     Smokeless tobacco: Never Used     Alcohol use No     Family History   Problem Relation Age of Onset     DIABETES Mother      Breast Cancer Mother      DIABETES Father      Lung Cancer Father          Current Outpatient Prescriptions   Medication Sig Dispense Refill     losartan (COZAAR) 50 MG tablet Take 1 tablet (50 mg) by mouth daily 90 tablet 1     hydrochlorothiazide (HYDRODIURIL) 25 MG tablet Take 1 tablet (25 mg) by mouth daily 90 tablet 3     simvastatin (ZOCOR) 20 MG tablet Take 1 tablet (20 mg) by mouth At Bedtime 90 tablet 3     glipiZIDE (GLIPIZIDE XL) 10 MG 24 hr tablet Take 1 tablet (10 mg) by mouth daily 90 tablet 1     tiotropium (SPIRIVA) 18 MCG capsule Inhale 1 capsule (18 mcg)  into the lungs daily 90 capsule 1     ipratropium - albuterol 0.5 mg/2.5 mg/3 mL (DUONEB) 0.5-2.5 (3) MG/3ML nebulization Take 1 vial (3 mLs) by nebulization every 6 hours as needed for shortness of breath / dyspnea or wheezing 1 vial 0     ondansetron (ZOFRAN) 4 MG tablet Take 1 tablet (4 mg) by mouth every 6 hours as needed for nausea 18 tablet 2     Cholecalciferol (VITAMIN D) 2000 UNITS tablet Take 2,000 Units by mouth daily 100 tablet 3     aspirin 81 MG EC tablet Take 1 tablet (81 mg) by mouth daily 100 tablet 3     albuterol (2.5 MG/3ML) 0.083% nebulizer solution Take 1 vial (2.5 mg) by nebulization every 6 hours as needed for shortness of breath / dyspnea or wheezing 30 vial 1     ipratropium (ATROVENT) 0.06 % nasal spray Spray 2 sprays into both nostrils 2 times daily as needed for rhinitis 1 Box 2     albuterol (PROAIR HFA, PROVENTIL HFA, VENTOLIN HFA) 108 (90 BASE) MCG/ACT inhaler Inhale 2 puffs into the lungs every 6 hours as needed for shortness of breath / dyspnea 3 Inhaler 1     levothyroxine (SYNTHROID/LEVOTHROID) 137 MCG tablet Take 1 tablet (137 mcg) by mouth daily 90 tablet 0     [DISCONTINUED] glyBURIDE-metFORMIN (GLUCOVANCE) 2.5-500 MG per tablet Take 1 tablet by mouth daily (with breakfast) 90 tablet 1     Allergies   Allergen Reactions     Ibuprofen Sodium Nausea and Vomiting     Vicodin [Hydrocodone-Acetaminophen] Nausea and Vomiting       Reviewed and updated as needed this visit by clinical staff       Reviewed and updated as needed this visit by Provider         ROS:  C: NEGATIVE for fever, chills, change in weight  INTEGUMENTARY/SKIN: NEGATIVE for worrisome rashes, moles or lesions  E/M: NEGATIVE for ear, mouth and throat problems  R: NEGATIVE for significant cough or SOB  CV: NEGATIVE for chest pain, palpitations or peripheral edema  GI: NEGATIVE for nausea, abdominal pain, heartburn, or change in bowel habits    OBJECTIVE:                                                    /90   Pulse 65  Temp 97.7  F (36.5  C) (Tympanic)  Ht 5' (1.524 m)  Wt 222 lb 3.2 oz (100.8 kg)  SpO2 97%  BMI 43.4 kg/m2  Body mass index is 43.4 kg/(m^2).   GENERAL: healthy, alert, well nourished, well hydrated, no distress  HENT: ear canals- normal; TMs- normal; Nose- normal; Mouth- no ulcers, no lesions  NECK: no tenderness, no adenopathy, no asymmetry, no masses, no stiffness; thyroid- normal to palpation  RESP: lungs clear to auscultation - no rales, no rhonchi, no wheezes  CV: regular rates and rhythm, normal S1 S2, no S3 or S4 and no murmur, no click or rub -  ABDOMEN: soft, no tenderness, no  hepatosplenomegaly, no masses, normal bowel sounds  MS: extremities- no gross deformities noted, no edema  PSYCH: Alert and oriented times 3; speech- coherent , normal rate and volume; able to articulate logical thoughts, able to abstract reason, no tangential thoughts, no hallucinations or delusions, affect- normal           ASSESSMENT/PLAN:                                                      1. Hypertension goal BP (blood pressure) < 140/80  Not well controlled. Resume HCTZ 25 mg QD. Increase the dose of Losartan from 25 to 50 mg QD. F/u in 2-4 weeks for BP check.  Fasting labs ordered  - losartan (COZAAR) 50 MG tablet; Take 1 tablet (50 mg) by mouth daily  Dispense: 90 tablet; Refill: 1  - hydrochlorothiazide (HYDRODIURIL) 25 MG tablet; Take 1 tablet (25 mg) by mouth daily  Dispense: 90 tablet; Refill: 3  - Lipid Profile; Future  - Comprehensive metabolic panel; Future    2. Other specified hypothyroidism  Questionable control.   - TSH; Future    3. Type 2 diabetes mellitus without complication, without long-term current use of insulin (H)  Well controlled.   Resume current regimen.  - Hemoglobin A1c; Future  - Albumin Random Urine Quantitative; Future  - glipiZIDE (GLIPIZIDE XL) 10 MG 24 hr tablet; Take 1 tablet (10 mg) by mouth daily  Dispense: 90 tablet; Refill: 1    4. Hyperlipidemia LDL goal <100  Questionable  control.  - Lipid Profile; Future  - Comprehensive metabolic panel; Future    5. Chronic obstructive pulmonary disease, unspecified COPD type (H)  Not well controlled. Patient is instructed to use Spiriva QD instead of as needed.  - tiotropium (SPIRIVA) 18 MCG capsule; Inhale 1 capsule (18 mcg) into the lungs daily  Dispense: 90 capsule; Refill: 1    6. Need for hepatitis C screening test    - Hepatitis C Screen Reflex to HCV RNA Quant and Genotype; Future      Follow up with Provider - 1 month     Lokesh Casanova MD  [facility]

## 2017-05-16 NOTE — MR AVS SNAPSHOT
After Visit Summary   5/16/2017    Jazlyn Bartlett    MRN: [id number]           Patient Information     Date Of Birth          1965        Visit Information        Provider Department      5/16/2017 5:40 PM Lokesh Casanova MD [facility]        Today's Diagnoses     Type 2 diabetes mellitus without complication, without long-term current use of insulin (H)    -  1    Hypertension goal BP (blood pressure) < 140/80        Other specified hypothyroidism        Hyperlipidemia LDL goal <100        Chronic obstructive pulmonary disease, unspecified COPD type (H)        Need for hepatitis C screening test           Follow-ups after your visit        Future tests that were ordered for you today     Open Future Orders        Priority Expected Expires Ordered    Hepatitis C Screen Reflex to HCV RNA Quant and Genotype Routine  5/16/2018 5/16/2017    Hemoglobin A1c Routine  5/16/2018 5/16/2017    Albumin Random Urine Quantitative Routine  5/16/2018 5/16/2017    TSH Routine  5/16/2018 5/16/2017    Lipid Profile Routine  5/16/2018 5/16/2017    Comprehensive metabolic panel Routine  5/16/2018 5/16/2017            Who to contact     If you have questions or need follow up information about today's clinic visit or your schedule please [facility] directly at [phone].  Normal or non-critical lab and imaging results will be communicated to you by MyChart, letter or phone within 4 business days after the clinic has received the results. If you do not hear from us within 7 days, please contact the clinic through MyChart or phone. If you have a critical or abnormal lab result, we will notify you by phone as soon as possible.  Submit refill requests through [employer] or call your pharmacy and they will forward the refill request to us. Please allow 3 business days for your refill to be completed.          Additional Information About Your Visit        MyChart Information      [employer] gives you secure access to your electronic health record. If you see a primary care provider, you can also send messages to your care team and make appointments. If you have questions, please call your primary care clinic.  If you do not have a primary care provider, please call [phone] and they will assist you.        Care EveryWhere ID     This is your Care EveryWhere ID. This could be used by other organizations to access your [facility] medical records  [id number]        Your Vitals Were     Pulse Temperature Height Pulse Oximetry BMI (Body Mass Index)       65 97.7  F (36.5  C) (Tympanic) 5' (1.524 m) 97% 43.4 kg/m2        Blood Pressure from Last 3 Encounters:   05/16/17 140/90   04/30/17 115/69   11/26/16 106/55    Weight from Last 3 Encounters:   05/16/17 222 lb 3.2 oz (100.8 kg)   04/30/17 217 lb (98.4 kg)   11/15/16 220 lb 9.6 oz (100.1 kg)                 Today's Medication Changes          These changes are accurate as of: 5/16/17  6:23 PM.  If you have any questions, ask your nurse or doctor.               These medicines have changed or have updated prescriptions.        Dose/Directions    losartan 50 MG tablet   Commonly known as:  COZAAR   This may have changed:    - medication strength  - how much to take   Used for:  Hypertension goal BP (blood pressure) < 140/80   Changed by:  Lokesh Casanova MD        Dose:  50 mg   Take 1 tablet (50 mg) by mouth daily   Quantity:  90 tablet   Refills:  1         Stop taking these medicines if you haven't already. Please contact your care team if you have questions.     buPROPion 150 MG 12 hr tablet   Commonly known as:  WELLBUTRIN SR   Stopped by:  Lokesh Casanova MD           fluticasone-salmeterol 500-50 MCG/DOSE diskus inhaler   Commonly known as:  ADVAIR   Stopped by:  Lokesh Casanova MD           gabapentin 100 MG capsule   Commonly known as:  NEURONTIN   Stopped by:  Lokesh Casanova MD           nicotine polacrilex 2 MG gum   Commonly known as:   NICORETTE   Stopped by:  Lokesh Casanova MD           predniSONE 20 MG tablet   Commonly known as:  DELTASONE   Stopped by:  Lokesh Casanova MD           PROBIOTIC DAILY PO   Stopped by:  Lokesh Casanova MD           traMADol 50 MG tablet   Commonly known as:  ULTRAM   Stopped by:  Lokesh Casanova MD                Where to get your medicines      These medications were sent to [facility]/pharmacy #[id number] - [place], [place] - [address]  [address] [place], [facility] [address]     Phone:  [phone]     glipiZIDE 10 MG 24 hr tablet    hydrochlorothiazide 25 MG tablet    levothyroxine 112 MCG tablet    losartan 50 MG tablet    simvastatin 20 MG tablet    tiotropium 18 MCG capsule                Primary Care Provider Office Phone # Fax #    Lokesh Casanova [phone]       [address]  [place] [address]        Goals        Result Component    A1C < 7.0     Related Problems    Type 2 diabetes mellitus without complication (H)      Thank you!     Thank you for [place] [facility]  [place] your [place]. Our goal is always to provide you with excellent care. Hearing back from our patients is one way we can continue to improve our services. Please take a few minutes to complete the written survey that you may receive in the mail after your visit with us. Thank you!             Your Updated Medication List - Protect others around you: Learn how to safely use, store and throw away your medicines at www.disposemymeds.org.          This list is accurate as of: 5/16/17  6:23 PM.  Always use your most recent med list.                   Brand Name Dispense Instructions for use    * albuterol 108 (90 BASE) MCG/ACT Inhaler    PROAIR HFA/PROVENTIL HFA/VENTOLIN HFA    3 Inhaler    Inhale 2 puffs into the lungs every 6 hours as needed for shortness of breath / dyspnea       * albuterol (2.5 MG/3ML) 0.083% neb solution     30 vial    Take 1 vial (2.5 mg) by nebulization  every 6 hours as needed for shortness of breath / dyspnea or wheezing       aspirin 81 MG EC tablet     100 tablet    Take 1 tablet (81 mg) by mouth daily       glipiZIDE 10 MG 24 hr tablet    glipiZIDE XL    90 tablet    Take 1 tablet (10 mg) by mouth daily       hydrochlorothiazide 25 MG tablet    HYDRODIURIL    90 tablet    Take 1 tablet (25 mg) by mouth daily       ipratropium - albuterol 0.5 mg/2.5 mg/3 mL 0.5-2.5 (3) MG/3ML neb solution    DUONEB    1 vial    Take 1 vial (3 mLs) by nebulization every 6 hours as needed for shortness of breath / dyspnea or wheezing       ipratropium 0.06 % spray    ATROVENT    1 Box    Spray 2 sprays into both nostrils 2 times daily as needed for rhinitis       levothyroxine 112 MCG tablet    SYNTHROID/LEVOTHROID    90 tablet    Take 1 tablet (112 mcg) by mouth daily       losartan 50 MG tablet    COZAAR    90 tablet    Take 1 tablet (50 mg) by mouth daily       ondansetron 4 MG tablet    ZOFRAN    18 tablet    Take 1 tablet (4 mg) by mouth every 6 hours as needed for nausea       simvastatin 20 MG tablet    ZOCOR    90 tablet    Take 1 tablet (20 mg) by mouth At Bedtime       tiotropium 18 MCG capsule    SPIRIVA    90 capsule    Inhale 1 capsule (18 mcg) into the lungs daily       vitamin D 2000 UNITS tablet     100 tablet    Take 2,000 Units by mouth daily       * Notice:  This list has 2 medication(s) that are the same as other medications prescribed for you. Read the directions carefully, and ask your doctor or other care provider to review them with you.

## 2017-05-17 ASSESSMENT — ANXIETY QUESTIONNAIRES
3. WORRYING TOO MUCH ABOUT DIFFERENT THINGS: NEARLY EVERY DAY
5. BEING SO RESTLESS THAT IT IS HARD TO SIT STILL: NEARLY EVERY DAY
1. FEELING NERVOUS, ANXIOUS, OR ON EDGE: NEARLY EVERY DAY
7. FEELING AFRAID AS IF SOMETHING AWFUL MIGHT HAPPEN: MORE THAN HALF THE DAYS
2. NOT BEING ABLE TO STOP OR CONTROL WORRYING: NEARLY EVERY DAY
6. BECOMING EASILY ANNOYED OR IRRITABLE: NEARLY EVERY DAY
IF YOU CHECKED OFF ANY PROBLEMS ON THIS QUESTIONNAIRE, HOW DIFFICULT HAVE THESE PROBLEMS MADE IT FOR YOU TO DO YOUR WORK, TAKE CARE OF THINGS AT HOME, OR GET ALONG WITH OTHER PEOPLE: VERY DIFFICULT
GAD7 TOTAL SCORE: 20

## 2017-05-17 ASSESSMENT — PATIENT HEALTH QUESTIONNAIRE - PHQ9: 5. POOR APPETITE OR OVEREATING: NEARLY EVERY DAY

## 2017-05-18 DIAGNOSIS — Z11.59 NEED FOR HEPATITIS C SCREENING TEST: ICD-10-CM

## 2017-05-18 DIAGNOSIS — E03.8 OTHER SPECIFIED HYPOTHYROIDISM: ICD-10-CM

## 2017-05-18 DIAGNOSIS — I10 HYPERTENSION GOAL BP (BLOOD PRESSURE) < 140/80: ICD-10-CM

## 2017-05-18 DIAGNOSIS — E11.9 TYPE 2 DIABETES MELLITUS WITHOUT COMPLICATION, WITHOUT LONG-TERM CURRENT USE OF INSULIN (H): ICD-10-CM

## 2017-05-18 DIAGNOSIS — E78.5 HYPERLIPIDEMIA LDL GOAL <100: ICD-10-CM

## 2017-05-18 LAB — HBA1C MFR BLD: 7 % (ref 4.3–6)

## 2017-05-18 PROCEDURE — 80061 LIPID PANEL: CPT | Performed by: INTERNAL MEDICINE

## 2017-05-18 PROCEDURE — 80053 COMPREHEN METABOLIC PANEL: CPT | Performed by: INTERNAL MEDICINE

## 2017-05-18 PROCEDURE — 86803 HEPATITIS C AB TEST: CPT | Performed by: INTERNAL MEDICINE

## 2017-05-18 PROCEDURE — 36415 COLL VENOUS BLD VENIPUNCTURE: CPT | Performed by: INTERNAL MEDICINE

## 2017-05-18 PROCEDURE — 83036 HEMOGLOBIN GLYCOSYLATED A1C: CPT | Performed by: INTERNAL MEDICINE

## 2017-05-18 PROCEDURE — 82043 UR ALBUMIN QUANTITATIVE: CPT | Performed by: INTERNAL MEDICINE

## 2017-05-18 PROCEDURE — 84443 ASSAY THYROID STIM HORMONE: CPT | Performed by: INTERNAL MEDICINE

## 2017-05-18 ASSESSMENT — ANXIETY QUESTIONNAIRES: GAD7 TOTAL SCORE: 20

## 2017-05-18 ASSESSMENT — ASTHMA QUESTIONNAIRES: ACT_TOTALSCORE: 8

## 2017-05-18 ASSESSMENT — PATIENT HEALTH QUESTIONNAIRE - PHQ9: SUM OF ALL RESPONSES TO PHQ QUESTIONS 1-9: 19

## 2017-05-19 LAB
ALBUMIN SERPL-MCNC: 4 G/DL (ref 3.4–5)
ALP SERPL-CCNC: 83 U/L (ref 40–150)
ALT SERPL W P-5'-P-CCNC: 21 U/L (ref 0–50)
ANION GAP SERPL CALCULATED.3IONS-SCNC: 10 MMOL/L (ref 3–14)
AST SERPL W P-5'-P-CCNC: 17 U/L (ref 0–45)
BILIRUB SERPL-MCNC: 1.2 MG/DL (ref 0.2–1.3)
BUN SERPL-MCNC: 10 MG/DL (ref 7–30)
CALCIUM SERPL-MCNC: 9.6 MG/DL (ref 8.5–10.1)
CHLORIDE SERPL-SCNC: 102 MMOL/L (ref 94–109)
CHOLEST SERPL-MCNC: 141 MG/DL
CO2 SERPL-SCNC: 27 MMOL/L (ref 20–32)
CREAT SERPL-MCNC: 0.71 MG/DL (ref 0.52–1.04)
CREAT UR-MCNC: 166 MG/DL
GFR SERPL CREATININE-BSD FRML MDRD: 87 ML/MIN/1.7M2
GLUCOSE SERPL-MCNC: 101 MG/DL (ref 70–99)
HCV AB SERPL QL IA: NORMAL
HDLC SERPL-MCNC: 44 MG/DL
LDLC SERPL CALC-MCNC: 65 MG/DL
MICROALBUMIN UR-MCNC: 8 MG/L
MICROALBUMIN/CREAT UR: 4.63 MG/G CR (ref 0–25)
NONHDLC SERPL-MCNC: 97 MG/DL
POTASSIUM SERPL-SCNC: 3.3 MMOL/L (ref 3.4–5.3)
PROT SERPL-MCNC: 7.5 G/DL (ref 6.8–8.8)
SODIUM SERPL-SCNC: 139 MMOL/L (ref 133–144)
TRIGL SERPL-MCNC: 160 MG/DL
TSH SERPL DL<=0.05 MIU/L-ACNC: 10.29 MU/L (ref 0.4–4)

## 2017-05-19 RX ORDER — LEVOTHYROXINE SODIUM 137 UG/1
137 TABLET ORAL DAILY
Qty: 90 TABLET | Refills: 0 | Status: SHIPPED | OUTPATIENT
Start: 2017-05-19 | End: 2017-11-07

## 2017-06-01 NOTE — TELEPHONE ENCOUNTER
Patients health maintenance is now UTD; closing encounter.    Nat Garza CMA ([facility])

## 2017-11-06 ENCOUNTER — MYC MEDICAL ADVICE (OUTPATIENT)
Dept: FAMILY MEDICINE | Facility: CLINIC | Age: 52
End: 2017-11-06

## 2017-11-06 ASSESSMENT — PATIENT HEALTH QUESTIONNAIRE - PHQ9
10. IF YOU CHECKED OFF ANY PROBLEMS, HOW DIFFICULT HAVE THESE PROBLEMS MADE IT FOR YOU TO DO YOUR WORK, TAKE CARE OF THINGS AT HOME, OR GET ALONG WITH OTHER PEOPLE: SOMEWHAT DIFFICULT
SUM OF ALL RESPONSES TO PHQ QUESTIONS 1-9: 14
SUM OF ALL RESPONSES TO PHQ QUESTIONS 1-9: 14

## 2017-11-06 NOTE — TELEPHONE ENCOUNTER
Routing to TC to assist patient in scheduling.   Rosa Maria Daniel RN   [facility] - Triage

## 2017-11-06 NOTE — TELEPHONE ENCOUNTER
Patient is scheduled for  11/07/17 (Tue) 4:40 PM 20 min Lokesh Casanova MD       medidametricsalber message sent back  Shandra ROJAS

## 2017-11-07 ENCOUNTER — OFFICE VISIT (OUTPATIENT)
Dept: FAMILY MEDICINE | Facility: CLINIC | Age: 52
End: 2017-11-07
Payer: COMMERCIAL

## 2017-11-07 VITALS
HEART RATE: 75 BPM | TEMPERATURE: 98.1 F | HEIGHT: 60 IN | OXYGEN SATURATION: 96 % | DIASTOLIC BLOOD PRESSURE: 82 MMHG | BODY MASS INDEX: 44.76 KG/M2 | WEIGHT: 228 LBS | SYSTOLIC BLOOD PRESSURE: 139 MMHG

## 2017-11-07 DIAGNOSIS — I10 HYPERTENSION GOAL BP (BLOOD PRESSURE) < 140/80: ICD-10-CM

## 2017-11-07 DIAGNOSIS — E66.01 MORBID OBESITY (H): ICD-10-CM

## 2017-11-07 DIAGNOSIS — Z00.00 ROUTINE GENERAL MEDICAL EXAMINATION AT A HEALTH CARE FACILITY: Primary | ICD-10-CM

## 2017-11-07 DIAGNOSIS — F32.1 MODERATE MAJOR DEPRESSION (H): ICD-10-CM

## 2017-11-07 DIAGNOSIS — Z72.0 TOBACCO ABUSE: ICD-10-CM

## 2017-11-07 DIAGNOSIS — Z23 NEED FOR PROPHYLACTIC VACCINATION AND INOCULATION AGAINST INFLUENZA: ICD-10-CM

## 2017-11-07 DIAGNOSIS — F41.9 ANXIETY: ICD-10-CM

## 2017-11-07 DIAGNOSIS — E03.9 HYPOTHYROIDISM, UNSPECIFIED TYPE: ICD-10-CM

## 2017-11-07 DIAGNOSIS — J44.9 CHRONIC OBSTRUCTIVE PULMONARY DISEASE, UNSPECIFIED COPD TYPE (H): ICD-10-CM

## 2017-11-07 DIAGNOSIS — E11.9 TYPE 2 DIABETES MELLITUS WITHOUT COMPLICATION, WITHOUT LONG-TERM CURRENT USE OF INSULIN (H): ICD-10-CM

## 2017-11-07 DIAGNOSIS — Z12.31 ENCOUNTER FOR SCREENING MAMMOGRAM FOR BREAST CANCER: ICD-10-CM

## 2017-11-07 DIAGNOSIS — E78.5 HYPERLIPIDEMIA LDL GOAL <100: ICD-10-CM

## 2017-11-07 LAB — HBA1C MFR BLD: 6.7 % (ref 4.3–6)

## 2017-11-07 PROCEDURE — 80048 BASIC METABOLIC PNL TOTAL CA: CPT | Performed by: FAMILY MEDICINE

## 2017-11-07 PROCEDURE — 84443 ASSAY THYROID STIM HORMONE: CPT | Performed by: FAMILY MEDICINE

## 2017-11-07 PROCEDURE — 99396 PREV VISIT EST AGE 40-64: CPT | Mod: 25 | Performed by: FAMILY MEDICINE

## 2017-11-07 PROCEDURE — 36415 COLL VENOUS BLD VENIPUNCTURE: CPT | Performed by: FAMILY MEDICINE

## 2017-11-07 PROCEDURE — 87624 HPV HI-RISK TYP POOLED RSLT: CPT | Performed by: FAMILY MEDICINE

## 2017-11-07 PROCEDURE — 83036 HEMOGLOBIN GLYCOSYLATED A1C: CPT | Performed by: FAMILY MEDICINE

## 2017-11-07 PROCEDURE — 90686 IIV4 VACC NO PRSV 0.5 ML IM: CPT | Performed by: FAMILY MEDICINE

## 2017-11-07 PROCEDURE — 99213 OFFICE O/P EST LOW 20 MIN: CPT | Mod: 25 | Performed by: FAMILY MEDICINE

## 2017-11-07 PROCEDURE — 99207 C FOOT EXAM  NO CHARGE: CPT | Mod: 25 | Performed by: FAMILY MEDICINE

## 2017-11-07 PROCEDURE — 90471 IMMUNIZATION ADMIN: CPT | Performed by: FAMILY MEDICINE

## 2017-11-07 PROCEDURE — G0145 SCR C/V CYTO,THINLAYER,RESCR: HCPCS | Performed by: FAMILY MEDICINE

## 2017-11-07 RX ORDER — LEVOTHYROXINE SODIUM 137 UG/1
137 TABLET ORAL DAILY
Qty: 90 TABLET | Refills: 3 | Status: SHIPPED | OUTPATIENT
Start: 2017-11-07 | End: 2017-11-08

## 2017-11-07 RX ORDER — ALBUTEROL SULFATE 90 UG/1
2 AEROSOL, METERED RESPIRATORY (INHALATION) EVERY 6 HOURS PRN
Qty: 3 INHALER | Refills: 1 | Status: SHIPPED | OUTPATIENT
Start: 2017-11-07 | End: 2018-05-21

## 2017-11-07 RX ORDER — ESCITALOPRAM OXALATE 10 MG/1
10 TABLET ORAL DAILY
Qty: 30 TABLET | Refills: 1 | Status: SHIPPED | OUTPATIENT
Start: 2017-11-07 | End: 2018-02-27

## 2017-11-07 RX ORDER — TIOTROPIUM BROMIDE 18 UG/1
18 CAPSULE ORAL; RESPIRATORY (INHALATION) DAILY
Qty: 90 CAPSULE | Refills: 1 | Status: SHIPPED | OUTPATIENT
Start: 2017-11-07 | End: 2019-05-03

## 2017-11-07 RX ORDER — SIMVASTATIN 20 MG
20 TABLET ORAL AT BEDTIME
Qty: 90 TABLET | Refills: 3 | Status: SHIPPED | OUTPATIENT
Start: 2017-11-07 | End: 2018-12-05

## 2017-11-07 RX ORDER — HYDROCHLOROTHIAZIDE 25 MG/1
25 TABLET ORAL DAILY
Qty: 90 TABLET | Refills: 3 | Status: SHIPPED | OUTPATIENT
Start: 2017-11-07 | End: 2018-12-05

## 2017-11-07 RX ORDER — IPRATROPIUM BROMIDE AND ALBUTEROL SULFATE 2.5; .5 MG/3ML; MG/3ML
1 SOLUTION RESPIRATORY (INHALATION) EVERY 6 HOURS PRN
Qty: 1 VIAL | Refills: 0 | Status: CANCELLED | OUTPATIENT
Start: 2017-11-07

## 2017-11-07 RX ORDER — LOSARTAN POTASSIUM 50 MG/1
50 TABLET ORAL DAILY
Qty: 90 TABLET | Refills: 3 | Status: SHIPPED | OUTPATIENT
Start: 2017-11-07 | End: 2018-12-05

## 2017-11-07 RX ORDER — IPRATROPIUM BROMIDE AND ALBUTEROL SULFATE 2.5; .5 MG/3ML; MG/3ML
1 SOLUTION RESPIRATORY (INHALATION) EVERY 6 HOURS PRN
Qty: 1 VIAL | Refills: 0 | Status: SHIPPED | OUTPATIENT
Start: 2017-11-07 | End: 2018-05-21

## 2017-11-07 RX ORDER — GLIPIZIDE 10 MG/1
10 TABLET, FILM COATED, EXTENDED RELEASE ORAL DAILY
Qty: 90 TABLET | Refills: 3 | Status: SHIPPED | OUTPATIENT
Start: 2017-11-07 | End: 2018-03-01

## 2017-11-07 RX ORDER — IPRATROPIUM BROMIDE 42 UG/1
2 SPRAY, METERED NASAL 2 TIMES DAILY PRN
Qty: 1 BOX | Refills: 2 | Status: SHIPPED | OUTPATIENT
Start: 2017-11-07 | End: 2019-01-15

## 2017-11-07 ASSESSMENT — ANXIETY QUESTIONNAIRES
1. FEELING NERVOUS, ANXIOUS, OR ON EDGE: NEARLY EVERY DAY
3. WORRYING TOO MUCH ABOUT DIFFERENT THINGS: NEARLY EVERY DAY
7. FEELING AFRAID AS IF SOMETHING AWFUL MIGHT HAPPEN: NEARLY EVERY DAY
2. NOT BEING ABLE TO STOP OR CONTROL WORRYING: NEARLY EVERY DAY
6. BECOMING EASILY ANNOYED OR IRRITABLE: NEARLY EVERY DAY
5. BEING SO RESTLESS THAT IT IS HARD TO SIT STILL: NEARLY EVERY DAY
GAD7 TOTAL SCORE: 21
IF YOU CHECKED OFF ANY PROBLEMS ON THIS QUESTIONNAIRE, HOW DIFFICULT HAVE THESE PROBLEMS MADE IT FOR YOU TO DO YOUR WORK, TAKE CARE OF THINGS AT HOME, OR GET ALONG WITH OTHER PEOPLE: SOMEWHAT DIFFICULT

## 2017-11-07 ASSESSMENT — PATIENT HEALTH QUESTIONNAIRE - PHQ9: 5. POOR APPETITE OR OVEREATING: NEARLY EVERY DAY

## 2017-11-07 NOTE — NURSING NOTE
Chief Complaint   Patient presents with     Physical       Initial /82  Pulse 75  Temp 98.1  F (36.7  C) (Tympanic)  Ht 5' (1.524 m)  Wt 228 lb (103.4 kg)  SpO2 96%  BMI 44.53 kg/m2 Estimated body mass index is 44.53 kg/(m^2) as calculated from the following:    Height as of this encounter: 5' (1.524 m).    Weight as of this encounter: 228 lb (103.4 kg).  Medication Reconciliation: complete

## 2017-11-07 NOTE — MR AVS SNAPSHOT
After Visit Summary   11/7/2017    Jazlyn Bartlett    MRN: [id number]           Patient Information     Date Of Birth          1965        Visit Information        Provider Department      11/7/2017 4:40 PM Lokesh Casanova MD [facility] [place]        Today's Diagnoses     Routine general medical examination at a health care facility    -  1    Screening for diabetic peripheral neuropathy        Need for prophylactic vaccination and inoculation against influenza        Moderate major depression (H)        Anxiety        Hypothyroidism, unspecified type        Hypertension goal BP (blood pressure) < 140/80        Type 2 diabetes mellitus without complication, without long-term current use of insulin (H)        Chronic obstructive pulmonary disease, unspecified COPD type (H)        Hyperlipidemia LDL goal <100        Tobacco abuse        Morbid obesity (H)        Other specified hypothyroidism        Encounter for screening mammogram for breast cancer           Follow-ups after your visit        Follow-up notes from your care team     Return in about 1 month (around 12/7/2017) for Mood Recheck.      Future tests that were ordered for you today     Open Future Orders        Priority Expected Expires Ordered    *MA Screening Digital Bilateral Routine  11/7/2018 11/7/2017            Who to contact     If you have questions or need follow up information about today's clinic visit or your schedule please contact [facility] [place] directly at [phone].  Normal or non-critical lab and imaging results will be communicated to you by MyChart, letter or phone within 4 business days after the clinic has received the results. If you do not hear from us within 7 days, please contact the clinic through MyChart or phone. If you have a critical or abnormal lab result, we will notify you by phone as soon as possible.  Submit refill requests through [employer] or call your pharmacy and they will  forward the refill request to us. Please allow 3 business days for your refill to be completed.          Additional Information About Your Visit        [employer] Information     [employer] gives you secure access to your electronic health record. If you see a primary care provider, you can also send messages to your care team and make appointments. If you have questions, please call your primary care clinic.  If you do not have a primary care provider, please call [phone] and they will assist you.        Care EveryWhere ID     This is your Care EveryWhere ID. This could be used by other organizations to access your [facility] medical records  [id number]        Your Vitals Were     Pulse Temperature Height Pulse Oximetry BMI (Body Mass Index)       75 98.1  F (36.7  C) (Tympanic) 5' (1.524 m) 96% 44.53 kg/m2        Blood Pressure from Last 3 Encounters:   11/07/17 139/82   05/16/17 140/90   04/30/17 115/69    Weight from Last 3 Encounters:   11/07/17 228 lb (103.4 kg)   05/16/17 222 lb 3.2 oz (100.8 kg)   04/30/17 217 lb (98.4 kg)              We Performed the Following     Basic metabolic panel     FOOT EXAM  NO CHARGE [39228.114]     Hemoglobin A1c     HPV High Risk Types DNA Cervical     Pap imaged thin layer screen with HPV - recommended age 30 - 65 years (select HPV order below)     TSH          Today's Medication Changes          These changes are accurate as of: 11/7/17  5:16 PM.  If you have any questions, ask your nurse or doctor.               Start taking these medicines.        Dose/Directions    budesonide 180 MCG/ACT inhaler   Commonly known as:  PULMICORT FLEXHALER   Used for:  Chronic obstructive pulmonary disease, unspecified COPD type (H)   Started by:  Lokesh Casanova MD        Dose:  1 puff   Inhale 1 puff into the lungs 2 times daily   Quantity:  3 Inhaler   Refills:  3       escitalopram 10 MG tablet   Commonly known as:  LEXAPRO   Used for:  Moderate major depression (H), Anxiety   Started by:   Lokesh Casanova MD        Dose:  10 mg   Take 1 tablet (10 mg) by mouth daily   Quantity:  30 tablet   Refills:  1         These medicines have changed or have updated prescriptions.        Dose/Directions    albuterol 108 (90 BASE) MCG/ACT Inhaler   Commonly known as:  PROAIR HFA/PROVENTIL HFA/VENTOLIN HFA   This may have changed:  Another medication with the same name was removed. Continue taking this medication, and follow the directions you see here.   Used for:  Chronic obstructive pulmonary disease, unspecified COPD type (H)   Changed by:  Lokesh Casanova MD        Dose:  2 puff   Inhale 2 puffs into the lungs every 6 hours as needed for shortness of breath / dyspnea   Quantity:  3 Inhaler   Refills:  1         Stop taking these medicines if you haven't already. Please contact your care team if you have questions.     ondansetron 4 MG tablet   Commonly known as:  ZOFRAN   Stopped by:  Lokesh Casanova MD                Where to get your medicines      These medications were sent to [facility]/pharmacy #[id number] - [place], [place] - [address] [address]  [address] [address] [address]     Phone:  [phone]     albuterol 108 (90 BASE) MCG/ACT Inhaler    budesonide 180 MCG/ACT inhaler    escitalopram 10 MG tablet    glipiZIDE 10 MG 24 hr tablet    hydrochlorothiazide 25 MG tablet    ipratropium - albuterol 0.5 mg/2.5 mg/3 mL 0.5-2.5 (3) MG/3ML neb solution    ipratropium 0.06 % spray    levothyroxine 137 MCG tablet    losartan 50 MG tablet    simvastatin 20 MG tablet    tiotropium 18 MCG capsule                Primary Care Provider Office Phone # Fax #    Lokesh Casanova [phone]       [address]  [place] [address]        Goals        Result Component    A1C < 7.0     Related Problems    Type 2 diabetes mellitus without complication (H)      Equal Access to Services     SHIRLEY STOKES AH: Marga Kwon, onel west, qaybta kaaljag michael, jenifer duncan  lajuan david tovar. So [facility] [phone].    ATENCIÓN: Si habla francis, tiene a tracey disposición servicios gratuitos de asistencia lingüística. Enrique feliciano [phone].    We comply with applicable federal civil rights laws and [place] laws. We do not discriminate on the basis of race, color, national origin, age, disability, sex, sexual orientation, or gender identity.            Thank you!     Thank you for choosing [facility] [place]  for your care. Our goal is always to provide you with excellent care. Hearing back from our patients is one way we can continue to improve our services. Please take a few minutes to complete the written survey that you may receive in the mail after your visit with us. Thank you!             Your Updated Medication List - Protect others around you: Learn how to safely use, store and throw away your medicines at www.disposemymeds.org.          This list is accurate as of: 11/7/17  5:16 PM.  Always use your most recent med list.                   Brand Name Dispense Instructions for use Diagnosis    albuterol 108 (90 BASE) MCG/ACT Inhaler    PROAIR HFA/PROVENTIL HFA/VENTOLIN HFA    3 Inhaler    Inhale 2 puffs into the lungs every 6 hours as needed for shortness of breath / dyspnea    Chronic obstructive pulmonary disease, unspecified COPD type (H)       aspirin 81 MG EC tablet     100 tablet    Take 1 tablet (81 mg) by mouth daily    Hypertension goal BP (blood pressure) < 140/80       budesonide 180 MCG/ACT inhaler    PULMICORT FLEXHALER    3 Inhaler    Inhale 1 puff into the lungs 2 times daily    Chronic obstructive pulmonary disease, unspecified COPD type (H)       escitalopram 10 MG tablet    LEXAPRO    30 tablet    Take 1 tablet (10 mg) by mouth daily    Moderate major depression (H), Anxiety       glipiZIDE 10 MG 24 hr tablet    glipiZIDE XL    90 tablet    Take 1 tablet (10 mg) by mouth daily    Type 2 diabetes mellitus without complication, without long-term current use of  insulin (H)       hydrochlorothiazide 25 MG tablet    HYDRODIURIL    90 tablet    Take 1 tablet (25 mg) by mouth daily    Hypertension goal BP (blood pressure) < 140/80       ipratropium - albuterol 0.5 mg/2.5 mg/3 mL 0.5-2.5 (3) MG/3ML neb solution    DUONEB    1 vial    Take 1 vial (3 mLs) by nebulization every 6 hours as needed for shortness of breath / dyspnea or wheezing    Chronic obstructive pulmonary disease, unspecified COPD type (H)       ipratropium 0.06 % spray    ATROVENT    1 Box    Spray 2 sprays into both nostrils 2 times daily as needed for rhinitis    Chronic obstructive pulmonary disease, unspecified COPD type (H)       levothyroxine 137 MCG tablet    SYNTHROID/LEVOTHROID    90 tablet    Take 1 tablet (137 mcg) by mouth daily    Other specified hypothyroidism       losartan 50 MG tablet    COZAAR    90 tablet    Take 1 tablet (50 mg) by mouth daily    Hypertension goal BP (blood pressure) < 140/80       simvastatin 20 MG tablet    ZOCOR    90 tablet    Take 1 tablet (20 mg) by mouth At Bedtime    Hyperlipidemia LDL goal <100       tiotropium 18 MCG capsule    SPIRIVA    90 capsule    Inhale 1 capsule (18 mcg) into the lungs daily    Chronic obstructive pulmonary disease, unspecified COPD type (H)       vitamin D 2000 UNITS tablet     100 tablet    Take 2,000 Units by mouth daily    Vitamin D deficiency disease
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
## 2017-11-08 LAB
ANION GAP SERPL CALCULATED.3IONS-SCNC: 12 MMOL/L (ref 3–14)
BUN SERPL-MCNC: 11 MG/DL (ref 7–30)
CALCIUM SERPL-MCNC: 9.2 MG/DL (ref 8.5–10.1)
CHLORIDE SERPL-SCNC: 102 MMOL/L (ref 94–109)
CO2 SERPL-SCNC: 25 MMOL/L (ref 20–32)
CREAT SERPL-MCNC: 0.61 MG/DL (ref 0.52–1.04)
GFR SERPL CREATININE-BSD FRML MDRD: >90 ML/MIN/1.7M2
GLUCOSE SERPL-MCNC: 145 MG/DL (ref 70–99)
POTASSIUM SERPL-SCNC: 3.6 MMOL/L (ref 3.4–5.3)
SODIUM SERPL-SCNC: 139 MMOL/L (ref 133–144)
TSH SERPL DL<=0.005 MIU/L-ACNC: 10.24 MU/L (ref 0.4–4)

## 2017-11-08 RX ORDER — LEVOTHYROXINE SODIUM 150 UG/1
150 TABLET ORAL DAILY
Qty: 90 TABLET | Refills: 0 | Status: SHIPPED | OUTPATIENT
Start: 2017-11-08 | End: 2018-02-07

## 2017-11-08 ASSESSMENT — ANXIETY QUESTIONNAIRES: GAD7 TOTAL SCORE: 21

## 2017-11-10 LAB
COPATH REPORT: NORMAL
PAP: NORMAL

## 2017-11-13 LAB
FINAL DIAGNOSIS: NORMAL
HPV HR 12 DNA CVX QL NAA+PROBE: NEGATIVE
HPV16 DNA SPEC QL NAA+PROBE: NEGATIVE
HPV18 DNA SPEC QL NAA+PROBE: NEGATIVE
SPECIMEN DESCRIPTION: NORMAL

## 2017-11-18 DIAGNOSIS — E03.8 OTHER SPECIFIED HYPOTHYROIDISM: ICD-10-CM

## 2017-11-20 RX ORDER — LEVOTHYROXINE SODIUM 112 UG/1
TABLET ORAL
Qty: 90 TABLET | Refills: 1 | OUTPATIENT
Start: 2017-11-20

## 2017-11-20 NOTE — TELEPHONE ENCOUNTER
Med was changed to 150 mcg- info sent to pharmacy  Patient Result Comments   Entered by Lokesh Casanova MD at 11/8/2017  3:09 PM   Read by Jazlyn Bartlett at 11/10/2017  9:02 AM   Dear Jazlyn,       I have reviewed your recent labs. Here are the results:     -Kidney function is normal (Cr, GFR), Sodium is normal, Potassium is normal, Calcium is normal     -TSH (thyroid stimulating hormone) is abnormal suggesting you are currently underreplaced.  ADVISE: changing your medication dose to 150  micrograms/day and recheck your TSH with a lab appointment in 8 weeks            TSH   Date Value Ref Range Status   11/07/2017 10.24 (H) 0.40 - 4.00 mU/L Final

## 2017-11-21 ENCOUNTER — MYC MEDICAL ADVICE (OUTPATIENT)
Dept: FAMILY MEDICINE | Facility: CLINIC | Age: 52
End: 2017-11-21

## 2017-11-21 NOTE — TELEPHONE ENCOUNTER
Please see My Chart message below and advise as appropriate.  Farideh Bonilla RN - Triage  [facility]

## 2017-11-21 NOTE — TELEPHONE ENCOUNTER
This should be addressed at an office visit. She should come in for COPD recheck and bring her forms with her.     Lokesh Casanova MD  [facility], [place]

## 2017-11-28 ENCOUNTER — OFFICE VISIT (OUTPATIENT)
Dept: FAMILY MEDICINE | Facility: CLINIC | Age: 52
End: 2017-11-28
Payer: COMMERCIAL

## 2017-11-28 VITALS
WEIGHT: 229 LBS | OXYGEN SATURATION: 97 % | RESPIRATION RATE: 16 BRPM | HEIGHT: 60 IN | HEART RATE: 74 BPM | TEMPERATURE: 98 F | SYSTOLIC BLOOD PRESSURE: 136 MMHG | DIASTOLIC BLOOD PRESSURE: 72 MMHG | BODY MASS INDEX: 44.96 KG/M2

## 2017-11-28 DIAGNOSIS — J44.9 CHRONIC OBSTRUCTIVE PULMONARY DISEASE, UNSPECIFIED COPD TYPE (H): Primary | ICD-10-CM

## 2017-11-28 PROCEDURE — 99213 OFFICE O/P EST LOW 20 MIN: CPT | Performed by: FAMILY MEDICINE

## 2017-11-28 NOTE — PROGRESS NOTES
Chief Complaint   Patient presents with     COPD     follow up     Forms     [place] paper work       Initial /72  Pulse 74  Temp 98  F (36.7  C)  Resp 16  Ht 5' (1.524 m)  Wt 229 lb (103.9 kg)  SpO2 97%  BMI 44.72 kg/m2 Estimated body mass index is 44.72 kg/(m^2) as calculated from the following:    Height as of this encounter: 5' (1.524 m).    Weight as of this encounter: 229 lb (103.9 kg).  Medication Reconciliation: complete. ROSMERY Sosa LPN        SUBJECTIVE:   Jazlyn Bartlett is a 52 year old female who presents to clinic today for the following health issues:      COPD Follow-Up    Symptoms are currently: stable    Current fatigue or dyspnea with ambulation: worsened from baseline    Shortness of breath: stable    Increased or change in Cough/Sputum: Yes-  States is worse - increased phlegm    Fever(s): No    Baseline ambulation without stopping to rest:  1 block . Able to walk up 0-1 flights of stairs without stopping to rest.    Any ER/UC or hospital admissions since your last visit? No     History   Smoking Status     Current Every Day Smoker     Packs/day: 0.50     Types: Cigarettes   Smokeless Tobacco     Never Used     Lab Results   Component Value Date    FEV1 1.75 10/03/2013    [id number] 67% 10/03/2013         Amount of exercise or physical activity: None    Problems taking medications regularly: No    Medication side effects: none    Diet: regular (no restrictions)        Problem list and histories reviewed & adjusted, as indicated.  Additional history: as documented    Patient Active Problem List   Diagnosis     Neck mass     Moderate major depression (H)     Anxiety     Vitamin D deficiency disease     CARDIOVASCULAR SCREENING; LDL GOAL LESS THAN 100     GERD (gastroesophageal reflux disease)     Hypertension goal BP (blood pressure) < 140/80     Type 2 diabetes mellitus without complication (H)     Chronic airway obstruction (H)     Advanced directives, counseling/discussion     Autoimmune  gastritis- repeat UGI 1/16 with gastritis without eosinophils     Urinary incontinence     Osteoarthritis of patellofemoral joint     Morbid obesity (H)     Tobacco abuse     Lumbar radiculopathy     Hyperlipidemia LDL goal <100     Hypothyroidism, unspecified type     Chronic obstructive pulmonary disease, unspecified COPD type (H)     Past Surgical History:   Procedure Laterality Date     ABLATION, ENDOMETRIAL, THERMAL, W/O HYSTEROSCOPIC GUIDANCE       DILATION AND CURETTAGE, HYSTEROSCOPY, ABLATE ENDOMETRIUM NOVASURE, COMBINED  10/11/2012    Procedure: COMBINED DILATION AND CURETTAGE, HYSTEROSCOPY, ABLATE ENDOMETRIUM NOVASURE;  COMBINED DILATION AND CURETTAGE, HYSTEROSCOPY, ABLATE ENDOMETRIUM ,pelvic exam under anesthesia;  Surgeon: Shruti Barrios MD;  Location: [facility] OR     ESOPHAGOSCOPY, GASTROSCOPY, DUODENOSCOPY (EGD), COMBINED N/A 1/19/2016    Procedure: COMBINED ESOPHAGOSCOPY, GASTROSCOPY, DUODENOSCOPY (EGD), BIOPSY SINGLE OR MULTIPLE;  Surgeon: Kristofer Molina MD;  Location: [facility] GI     Removal of cancerous lump on neck       TONSILLECTOMY         Social History   Substance Use Topics     Smoking status: Current Every Day Smoker     Packs/day: 0.50     Types: Cigarettes     Smokeless tobacco: Never Used     Alcohol use No     Family History   Problem Relation Age of Onset     DIABETES Mother      Breast Cancer Mother      DIABETES Father      Lung Cancer Father          Current Outpatient Prescriptions   Medication Sig Dispense Refill     levothyroxine (SYNTHROID/LEVOTHROID) 150 MCG tablet Take 1 tablet (150 mcg) by mouth daily 90 tablet 0     losartan (COZAAR) 50 MG tablet Take 1 tablet (50 mg) by mouth daily 90 tablet 3     glipiZIDE (GLIPIZIDE XL) 10 MG 24 hr tablet Take 1 tablet (10 mg) by mouth daily 90 tablet 3     hydrochlorothiazide (HYDRODIURIL) 25 MG tablet Take 1 tablet (25 mg) by mouth daily 90 tablet 3     simvastatin (ZOCOR) 20 MG tablet Take 1 tablet (20 mg) by mouth At Bedtime 90 tablet 3      ipratropium (ATROVENT) 0.06 % spray Spray 2 sprays into both nostrils 2 times daily as needed for rhinitis 1 Box 2     albuterol (PROAIR HFA/PROVENTIL HFA/VENTOLIN HFA) 108 (90 BASE) MCG/ACT Inhaler Inhale 2 puffs into the lungs every 6 hours as needed for shortness of breath / dyspnea 3 Inhaler 1     ipratropium - albuterol 0.5 mg/2.5 mg/3 mL (DUONEB) 0.5-2.5 (3) MG/3ML neb solution Take 1 vial (3 mLs) by nebulization every 6 hours as needed for shortness of breath / dyspnea or wheezing 1 vial 0     tiotropium (SPIRIVA) 18 MCG capsule Inhale 1 capsule (18 mcg) into the lungs daily 90 capsule 1     budesonide (PULMICORT FLEXHALER) 180 MCG/ACT inhaler Inhale 1 puff into the lungs 2 times daily 3 Inhaler 3     Cholecalciferol (VITAMIN D) 2000 UNITS tablet Take 2,000 Units by mouth daily 100 tablet 3     aspirin 81 MG EC tablet Take 1 tablet (81 mg) by mouth daily 100 tablet 3     escitalopram (LEXAPRO) 10 MG tablet Take 1 tablet (10 mg) by mouth daily (Patient not taking: Reported on 11/28/2017) 30 tablet 1     [DISCONTINUED] glyBURIDE-metFORMIN (GLUCOVANCE) 2.5-500 MG per tablet Take 1 tablet by mouth daily (with breakfast) 90 tablet 1     Allergies   Allergen Reactions     Ibuprofen Sodium Nausea and Vomiting     Vicodin [Hydrocodone-Acetaminophen] Nausea and Vomiting         Reviewed and updated as needed this visit by clinical staffTobacco  Allergies  Meds  Surg Hx  Soc Hx      Reviewed and updated as needed this visit by Provider         ROS:  C: NEGATIVE for fever, chills, change in weight  E/M: NEGATIVE for ear, mouth and throat problems    OBJECTIVE:                                                    /72  Pulse 74  Temp 98  F (36.7  C)  Resp 16  Ht 5' (1.524 m)  Wt 229 lb (103.9 kg)  SpO2 97%  BMI 44.72 kg/m2  Body mass index is 44.72 kg/(m^2).   GENERAL: healthy, alert, well nourished, well hydrated, no distress  HENT: ear canals- normal; TMs- normal; Nose- normal; Mouth- no ulcers, no  lesions  NECK: no tenderness, no adenopathy, no asymmetry, no masses, no stiffness; thyroid- normal to palpation  RESP: lungs clear to auscultation - no rales, no rhonchi, no wheezes  CV: regular rates and rhythm, normal S1 S2, no S3 or S4 and no murmur, no click or rub -  ABDOMEN: soft, no tenderness, no  hepatosplenomegaly, no masses, normal bowel sounds         ASSESSMENT/PLAN:                                                        ICD-10-CM    1. Chronic obstructive pulmonary disease, unspecified COPD type (H) J44.9      Stable. [facility] papers for intermittent leave of absence for COPD exacerbation filled.   Follow up with Provider - as needed     Lokesh Casanova MD  [facility]

## 2017-11-28 NOTE — Clinical Note
Please abstract the following data from this visit with this patient into the appropriate field in Epic:  Eye exam with ophthalmology on this date: 01/03/2018

## 2017-11-28 NOTE — MR AVS SNAPSHOT
After Visit Summary   11/28/2017    Jazlyn Bartlett    MRN: [id number]           Patient Information     Date Of Birth          1965        Visit Information        Provider Department      11/28/2017 4:20 PM Lokesh Casanova MD [facility] [place]        Today's Diagnoses     Chronic obstructive pulmonary disease, unspecified COPD type (H)    -  1       Follow-ups after your visit        Who to contact     If you have questions or need follow up information about today's clinic visit or your schedule please contact [facility] [place] directly at [phone].  Normal or non-critical lab and imaging results will be communicated to you by BuddyBouncehart, letter or phone within 4 business days after the clinic has received the results. If you do not hear from us within 7 days, please contact the clinic through SinglePipe Communicationst or phone. If you have a critical or abnormal lab result, we will notify you by phone as soon as possible.  Submit refill requests through Cubeacon or call your pharmacy and they will forward the refill request to us. Please allow 3 business days for your refill to be completed.          Additional Information About Your Visit        MyChart Information     Cubeacon gives you secure access to your electronic health record. If you see a primary care provider, you can also send messages to your care team and make appointments. If you have questions, please call your primary care clinic.  If you do not have a primary care provider, please call [phone] and they will assist you.        Care EveryWhere ID     This is your Care EveryWhere ID. This could be used by other organizations to access your [facility] medical records  [id number]        Your Vitals Were     Pulse Temperature Respirations Height Pulse Oximetry BMI (Body Mass Index)    74 98  F (36.7  C) 16 5' (1.524 m) 97% 44.72 kg/m2       Blood Pressure from Last 3 Encounters:   11/28/17 136/72   11/07/17 139/82    05/16/17 140/90    Weight from Last 3 Encounters:   11/28/17 229 lb (103.9 kg)   11/07/17 228 lb (103.4 kg)   05/16/17 222 lb 3.2 oz (100.8 kg)              Today, you had the following     No orders found for display       Primary Care Provider Office Phone # Fax #    Lokesh Casanova [phone]        [facility] [address] [address]  [place] [address]        Goals        Result Component    A1C < 7.0     Related Problems    Type 2 diabetes mellitus without complication (H)      Equal Access to Services     [facility]: Hadii aad ku hadasho Soomaali, waaxda luqadaha, qaybta kaalmada adeegyada, jenifer saavedra . So [facility] [phone].    ATENCIÓN: Si habla español, tiene a tracey disposición servicios gratuitos de asistencia lingüística. [facility] [phone].    We comply with applicable federal civil rights laws and [place] laws. We do not discriminate on the basis of race, color, national origin, age, disability, sex, sexual orientation, or gender identity.            Thank you!     Thank you for choosing [facility] [place]  for your care. Our goal is always to provide you with excellent care. Hearing back from our patients is one way we can continue to improve our services. Please take a few minutes to complete the written survey that you may receive in the mail after your visit with us. Thank you!             Your Updated Medication List - Protect others around you: Learn how to safely use, store and throw away your medicines at www.disposemymeds.org.          This list is accurate as of: 11/28/17 11:59 PM.  Always use your most recent med list.                   Brand Name Dispense Instructions for use Diagnosis    albuterol 108 (90 BASE) MCG/ACT Inhaler    PROAIR HFA/PROVENTIL HFA/VENTOLIN HFA    3 Inhaler    Inhale 2 puffs into the lungs every 6 hours as needed for shortness of breath / dyspnea    Chronic obstructive pulmonary disease, unspecified COPD type (H)        aspirin 81 MG EC tablet     100 tablet    Take 1 tablet (81 mg) by mouth daily    Hypertension goal BP (blood pressure) < 140/80       budesonide 180 MCG/ACT inhaler    PULMICORT FLEXHALER    3 Inhaler    Inhale 1 puff into the lungs 2 times daily    Chronic obstructive pulmonary disease, unspecified COPD type (H)       escitalopram 10 MG tablet    LEXAPRO    30 tablet    Take 1 tablet (10 mg) by mouth daily    Moderate major depression (H), Anxiety       glipiZIDE 10 MG 24 hr tablet    glipiZIDE XL    90 tablet    Take 1 tablet (10 mg) by mouth daily    Type 2 diabetes mellitus without complication, without long-term current use of insulin (H)       hydrochlorothiazide 25 MG tablet    HYDRODIURIL    90 tablet    Take 1 tablet (25 mg) by mouth daily    Hypertension goal BP (blood pressure) < 140/80       ipratropium - albuterol 0.5 mg/2.5 mg/3 mL 0.5-2.5 (3) MG/3ML neb solution    DUONEB    1 vial    Take 1 vial (3 mLs) by nebulization every 6 hours as needed for shortness of breath / dyspnea or wheezing    Chronic obstructive pulmonary disease, unspecified COPD type (H)       ipratropium 0.06 % spray    ATROVENT    1 Box    Spray 2 sprays into both nostrils 2 times daily as needed for rhinitis    Chronic obstructive pulmonary disease, unspecified COPD type (H)       levothyroxine 150 MCG tablet    SYNTHROID/LEVOTHROID    90 tablet    Take 1 tablet (150 mcg) by mouth daily    Hypothyroidism, unspecified type       losartan 50 MG tablet    COZAAR    90 tablet    Take 1 tablet (50 mg) by mouth daily    Hypertension goal BP (blood pressure) < 140/80       simvastatin 20 MG tablet    ZOCOR    90 tablet    Take 1 tablet (20 mg) by mouth At Bedtime    Hyperlipidemia LDL goal <100       tiotropium 18 MCG capsule    SPIRIVA    90 capsule    Inhale 1 capsule (18 mcg) into the lungs daily    Chronic obstructive pulmonary disease, unspecified COPD type (H)       vitamin D 2000 UNITS tablet     100 tablet    Take 2,000 Units  by mouth daily    Vitamin D deficiency disease

## 2017-11-29 ENCOUNTER — HOSPITAL ENCOUNTER (OUTPATIENT)
Dept: MAMMOGRAPHY | Facility: CLINIC | Age: 52
Discharge: HOME | End: 2017-11-29
Attending: FAMILY MEDICINE | Admitting: FAMILY MEDICINE
Payer: COMMERCIAL

## 2017-11-29 DIAGNOSIS — Z12.31 ENCOUNTER FOR SCREENING MAMMOGRAM FOR BREAST CANCER: ICD-10-CM

## 2017-11-29 PROCEDURE — G0202 SCR MAMMO BI INCL CAD: HCPCS

## 2017-11-29 PROCEDURE — 77063 BREAST TOMOSYNTHESIS BI: CPT

## 2018-01-03 ENCOUNTER — TRANSFERRED RECORDS (OUTPATIENT)
Dept: HEALTH INFORMATION MANAGEMENT | Facility: CLINIC | Age: 53
End: 2018-01-03

## 2018-02-27 ENCOUNTER — OFFICE VISIT (OUTPATIENT)
Dept: FAMILY MEDICINE | Facility: CLINIC | Age: 53
End: 2018-02-27
Payer: COMMERCIAL

## 2018-02-27 VITALS
HEART RATE: 80 BPM | SYSTOLIC BLOOD PRESSURE: 128 MMHG | DIASTOLIC BLOOD PRESSURE: 76 MMHG | OXYGEN SATURATION: 96 % | WEIGHT: 224 LBS | BODY MASS INDEX: 43.75 KG/M2 | TEMPERATURE: 97.8 F

## 2018-02-27 DIAGNOSIS — E11.9 TYPE 2 DIABETES MELLITUS WITHOUT COMPLICATION, UNSPECIFIED LONG TERM INSULIN USE STATUS: ICD-10-CM

## 2018-02-27 DIAGNOSIS — R07.89 ATYPICAL CHEST PAIN: Primary | ICD-10-CM

## 2018-02-27 DIAGNOSIS — R06.83 SNORING: ICD-10-CM

## 2018-02-27 DIAGNOSIS — J44.9 CHRONIC OBSTRUCTIVE PULMONARY DISEASE, UNSPECIFIED COPD TYPE (H): ICD-10-CM

## 2018-02-27 DIAGNOSIS — R53.82 CHRONIC FATIGUE: ICD-10-CM

## 2018-02-27 DIAGNOSIS — I10 HYPERTENSION GOAL BP (BLOOD PRESSURE) < 140/80: ICD-10-CM

## 2018-02-27 DIAGNOSIS — E03.9 HYPOTHYROIDISM, UNSPECIFIED TYPE: ICD-10-CM

## 2018-02-27 DIAGNOSIS — H02.9 EYELID LESION: ICD-10-CM

## 2018-02-27 LAB — HBA1C MFR BLD: 7.3 % (ref 4.3–6)

## 2018-02-27 PROCEDURE — 36415 COLL VENOUS BLD VENIPUNCTURE: CPT | Performed by: FAMILY MEDICINE

## 2018-02-27 PROCEDURE — 84443 ASSAY THYROID STIM HORMONE: CPT | Performed by: FAMILY MEDICINE

## 2018-02-27 PROCEDURE — 99214 OFFICE O/P EST MOD 30 MIN: CPT | Performed by: FAMILY MEDICINE

## 2018-02-27 PROCEDURE — 83036 HEMOGLOBIN GLYCOSYLATED A1C: CPT | Performed by: FAMILY MEDICINE

## 2018-02-27 RX ORDER — LEVOTHYROXINE SODIUM 150 UG/1
TABLET ORAL
Qty: 90 TABLET | Refills: 1 | Status: SHIPPED | OUTPATIENT
Start: 2018-02-27 | End: 2018-09-04

## 2018-02-27 NOTE — MR AVS SNAPSHOT
After Visit Summary   2/27/2018    Jazlyn Bartlett    MRN: [id number]           Patient Information     Date Of Birth          1965        Visit Information        Provider Department      2/27/2018 4:20 PM Lokesh Casanova MD [facility] [place]        Today's Diagnoses     Eyelid lesion    -  1    Snoring        Chronic fatigue        Hypertension goal BP (blood pressure) < 140/80        Type 2 diabetes mellitus without complication, unspecified long term insulin use status (H)        Hypothyroidism, unspecified type        Atypical chest pain        Chronic obstructive pulmonary disease, unspecified COPD type (H)           Follow-ups after your visit        Additional Services     PULMONARY MEDICINE REFERRAL       Your provider has referred you to: N: [facility] [phone]   [email or web address]    Please be aware that coverage of these services is subject to the terms and limitations of your health insurance plan.  Call member services at your health plan with any benefit or coverage questions.      Please bring the following with you to your appointment:    (1) Any X-Rays, CTs or MRIs which have been performed.  Contact the facility where they were done to arrange for  prior to your scheduled appointment.    (2) List of current medications   (3) This referral request   (4) Any documents/labs given to you for this referral            SKIN CARE REFERRAL       Your provider has referred you to: [facility] [place]: [facility] [place] [phone]   [email or web address]     Please be aware that coverage of these services is subject to the terms and limitations of your health insurance plan.  Please check with your insurance prior to the appointment to ensure appropriate coverage for any services considered cosmetic in nature or not medically necessary.    Please bring the following with you to your  appointment:    (1) Any X-Rays, CTs or MRIs which have been performed.  Contact the facility where they were done to arrange for  prior to your scheduled appointment.  Any new CT, MRI or other procedures ordered by your specialist must be performed at a [place] facility or coordinated by your clinic's referral office.  (2) List of current medications  (3) This referral request   (4) Any documents/labs given to you for this referral            SLEEP EVALUATION & MANAGEMENT REFERRAL - ADULT -[facility] [place]  [phone] (Age 18 and up)       Please be aware that coverage of these services is subject to the terms and limitations of your health insurance plan.  Call member services at your health plan with any benefit or coverage questions.      Please bring the following to your appointment:    >>   List of current medications   >>   This referral request   >>   Any documents/labs given to you for this referral                      Follow-up notes from your care team     Return in about 3 months (around 5/18/2018) for Cholesterol Recheck.      Your next 10 appointments already scheduled     Feb 27, 2018  4:20 PM CST   Office Visit with Lokesh Casanova MD   [facility] [place] ([address] [id number] [phone]           Bring a current list of meds and any records pertaining to this visit. For Physicals, please bring immunization records and any forms needing to be filled out. Please arrive 10 minutes early to complete paperwork.            Mar 05, 2018 12:00 PM CST   (Arrive by 11:45 AM)   NM MPI WITH LEXISCAN with NM1   [facility] ([facility])    [address] [facility] [address]   [phone]           For a ONE day exam: Allow 3-4 hours for test. For a TWO day exam: Allow  minutes PER day for test.  On the day of your resting scan: Please stop eating 3  hours before the test. You may drink water or juice.  On the day of your stress test: Stop all caffeine 12 hours before the test. This includes coffee, tea, soda pop, chocolate and certain medicines (such as Anacin, Excedrin and NoDoz). Also avoid decaf coffee and tea, as these contain small amounts of caffeine.  Stop eating 3 hours before the test. You may drink water.  You may need to stop some medicines before the test. Follow your doctor s orders. - If you take a beta blocker: Do not take your beta-blocker on the day before your test, unless specifically told to by your doctor. And do not take it on the day of your test. Bring it with you to take after the test. - If you take Aggrenox or dipyridamole (Persantine, Permole), stop taking it 48 hours before your test. - If you take Viagra, Cialis or Levitra, stop taking it 48 hours before your test. - If you take theophylline or aminophylline, stop taking it 12 hours before your test.  Do not take nitrates on the day of your test. Do not wear your Nitro-Patch.  Please wear a loose two-piece outfit. If you will have an exercise test, bring rubber-soled walking shoes.  When you arrive, please tell us if you have a pacemaker or ICD (implantable defibrillator).  Please call your Imaging Department at your exam site with any questions.            Mar 05, 2018  1:30 PM CST   Ekg Stress Nm Lexiscan with RESRM3   [facility] ([facility])    [address] [facility] [id number] [phone]              Future tests that were ordered for you today     Open Future Orders        Priority Expected Expires Ordered    NM Lexiscan stress test Routine  2/27/2019 2/27/2018    SLEEP EVALUATION & MANAGEMENT REFERRAL - ADULT -[facility] [place]  [phone] (Age 18 and up) Routine  2/27/2019 2/27/2018            Who to contact     If you have questions or need follow up information about today's clinic visit or your  schedule please contact [facility] [place] directly at [phone].  Normal or non-critical lab and imaging results will be communicated to you by MyChart, letter or phone within 4 business days after the clinic has received the results. If you do not hear from us within 7 days, please contact the clinic through [employer] or phone. If you have a critical or abnormal lab result, we will notify you by phone as soon as possible.  Submit refill requests through bigtincan or call your pharmacy and they will forward the refill request to us. Please allow 3 business days for your refill to be completed.          Additional Information About Your Visit        [employer] Information     bigtincan gives you secure access to your electronic health record. If you see a primary care provider, you can also send messages to your care team and make appointments. If you have questions, please call your primary care clinic.  If you do not have a primary care provider, please call [phone] and they will assist you.        Care EveryWhere ID     This is your Care EveryWhere ID. This could be used by other organizations to access your [place] medical records  [id number]        Your Vitals Were     Pulse Temperature Pulse Oximetry BMI (Body Mass Index)          80 97.8  F (36.6  C) (Tympanic) 96% 43.75 kg/m2         Blood Pressure from Last 3 Encounters:   02/27/18 128/76   11/28/17 136/72   11/07/17 139/82    Weight from Last 3 Encounters:   02/27/18 224 lb (101.6 kg)   11/28/17 229 lb (103.9 kg)   11/07/17 228 lb (103.4 kg)              We Performed the Following     Hemoglobin A1c     PULMONARY MEDICINE REFERRAL     SKIN CARE REFERRAL     TSH          Today's Medication Changes          These changes are accurate as of 2/27/18  4:13 PM.  If you have any questions, ask your nurse or doctor.               These medicines have changed or have updated prescriptions.        Dose/Directions    levothyroxine 150 MCG tablet    Commonly known as:  SYNTHROID/LEVOTHROID   This may have changed:  See the new instructions.   Used for:  Hypothyroidism, unspecified type   Changed by:  Lokesh Casanova MD        TAKE 1 TABLET (150 MCG) BY MOUTH DAILY   Quantity:  90 tablet   Refills:  1            Where to get your medicines      These medications were sent to [facility]/pharmacy #[id number] - [place], [place] - [address]  [address] [place], [facility] [address]     Phone:  [phone]     levothyroxine 150 MCG tablet                Primary Care Provider Office Phone # Fax #    Lokesh Casanova [phone]       [address]  [place] [address]        Goals        Result Component    A1C < 7.0     Related Problems    Type 2 diabetes mellitus without complication (H)      Equal Access to Services     SHIRLEY STOKES : Hadii christine manjarrez Sochapo, waaxda luqadaha, qaybta kaalmada adeegyada, jenifer saavedra . So [facility] [phone].    ATENCIÓN: Si habla español, tiene a tracey disposición servicios gratuitos de asistencia lingüística. Llame al [phone].    We comply with applicable federal civil rights laws and [place] laws. We do not discriminate on the basis of race, color, national origin, age, disability, sex, sexual orientation, or gender identity.            Thank you!     [facility]  [facility] your [facility]. Our goal is always to provide you with excellent care. Hearing back from our patients is one way we can continue to improve our services. Please take a few minutes to complete the written survey that you may receive in the mail after your visit with us. Thank you!             Your Updated Medication List - Protect others around you: Learn how to safely use, store and throw away your medicines at www.disposemymeds.org.          This list is accurate as of 2/27/18  4:13 PM.  Always use your most recent med list.                   Brand Name Dispense Instructions for  use Diagnosis    albuterol 108 (90 BASE) MCG/ACT Inhaler    PROAIR HFA/PROVENTIL HFA/VENTOLIN HFA    3 Inhaler    Inhale 2 puffs into the lungs every 6 hours as needed for shortness of breath / dyspnea    Chronic obstructive pulmonary disease, unspecified COPD type (H)       aspirin 81 MG EC tablet     100 tablet    Take 1 tablet (81 mg) by mouth daily    Hypertension goal BP (blood pressure) < 140/80       budesonide 180 MCG/ACT inhaler    PULMICORT FLEXHALER    3 Inhaler    Inhale 1 puff into the lungs 2 times daily    Chronic obstructive pulmonary disease, unspecified COPD type (H)       glipiZIDE 10 MG 24 hr tablet    glipiZIDE XL    90 tablet    Take 1 tablet (10 mg) by mouth daily    Type 2 diabetes mellitus without complication, without long-term current use of insulin (H)       hydrochlorothiazide 25 MG tablet    HYDRODIURIL    90 tablet    Take 1 tablet (25 mg) by mouth daily    Hypertension goal BP (blood pressure) < 140/80       ipratropium - albuterol 0.5 mg/2.5 mg/3 mL 0.5-2.5 (3) MG/3ML neb solution    DUONEB    1 vial    Take 1 vial (3 mLs) by nebulization every 6 hours as needed for shortness of breath / dyspnea or wheezing    Chronic obstructive pulmonary disease, unspecified COPD type (H)       ipratropium 0.06 % spray    ATROVENT    1 Box    Spray 2 sprays into both nostrils 2 times daily as needed for rhinitis    Chronic obstructive pulmonary disease, unspecified COPD type (H)       levothyroxine 150 MCG tablet    SYNTHROID/LEVOTHROID    90 tablet    TAKE 1 TABLET (150 MCG) BY MOUTH DAILY    Hypothyroidism, unspecified type       losartan 50 MG tablet    COZAAR    90 tablet    Take 1 tablet (50 mg) by mouth daily    Hypertension goal BP (blood pressure) < 140/80       simvastatin 20 MG tablet    ZOCOR    90 tablet    Take 1 tablet (20 mg) by mouth At Bedtime    Hyperlipidemia LDL goal <100       tiotropium 18 MCG capsule    SPIRIVA    90 capsule    Inhale 1 capsule (18 mcg) into the lungs daily     Chronic obstructive pulmonary disease, unspecified COPD type (H)       vitamin D 2000 UNITS tablet     100 tablet    Take 2,000 Units by mouth daily    Vitamin D deficiency disease

## 2018-02-27 NOTE — PROGRESS NOTES
SUBJECTIVE:   Jazlyn Bartlett is a 52 year old female who presents to clinic today for the following health issues:    Patient complains of off-and-on mid to left-sided chest pain.  Symptoms are with exertion and on rest.  Symptoms resolved within a few minutes.  No associated shortness of breath, palpitation, diaphoresis.  No nausea or vomiting associated with that.  Symptoms are present for the past few months.  No recent evaluation done.  She does not exercise.  No radiation of the pain.  No associated numbness or tingling in the arm.  Discomfort is a 3-4 out of 10.    Diabetes Follow-up      Diabetic concerns: None     Symptoms of hypoglycemia (low blood sugar): none     Paresthesias (numbness or burning in feet) or sores: No  BP Readings from Last 2 Encounters:   02/27/18 128/76   11/28/17 136/72     Hemoglobin A1C (%)   Date Value   11/07/2017 6.7 (H)     LDL Cholesterol Calculated (mg/dL)   Date Value   05/18/2017 65   07/15/2016 58     Hyperlipidemia Follow-Up      Rate your low fat/cholesterol diet?: good    Taking statin?  Yes, no muscle aches from statin    Other lipid medications/supplements?:  none    Hypertension Follow-up      Outpatient blood pressures are not being checked.    Low Salt Diet: no added salt    COPD Follow-Up    Symptoms are currently: Not well controlled but manageable.  She is using all her medications.    Current fatigue or dyspnea with ambulation: yes- off and on.    Shortness of breath:sometimes    Increased or change in Cough/Sputum: Yes- dry.         History   Smoking Status     Current Every Day Smoker     Packs/day: 0.50     Types: Cigarettes   Smokeless Tobacco     Never Used     Lab Results   Component Value Date    FEV1 1.75 10/03/2013    SSP3ACD 67% 10/03/2013       Hypothyroidism Follow-up      Since last visit, patient describes the following symptoms: Weight stable, no hair loss, no skin changes, no constipation, no loose stools    Complain of chronic fatigue.  Her   reported that she snores a lot.  Unsure about apneic spells.  Reports of daytime sleepiness.  Her  was recently diagnosed with sleep apnea.  Wonders if she may also have that.    Complain of lesions on the eyelid.  Would like them removed.      Problem list and histories reviewed & adjusted, as indicated.  Additional history: as documented    Patient Active Problem List   Diagnosis     Neck mass     Moderate major depression (H)     Anxiety     Vitamin D deficiency disease     CARDIOVASCULAR SCREENING; LDL GOAL LESS THAN 100     GERD (gastroesophageal reflux disease)     Hypertension goal BP (blood pressure) < 140/80     Type 2 diabetes mellitus without complication (H)     Chronic airway obstruction (H)     Advanced directives, counseling/discussion     Autoimmune gastritis- repeat UGI 1/16 with gastritis without eosinophils     Urinary incontinence     Osteoarthritis of patellofemoral joint     Morbid obesity (H)     Tobacco abuse     Lumbar radiculopathy     Hyperlipidemia LDL goal <100     Hypothyroidism, unspecified type     Chronic obstructive pulmonary disease, unspecified COPD type (H)     Past Surgical History:   Procedure Laterality Date     ABLATION, ENDOMETRIAL, THERMAL, W/O HYSTEROSCOPIC GUIDANCE       DILATION AND CURETTAGE, HYSTEROSCOPY, ABLATE ENDOMETRIUM NOVASURE, COMBINED  10/11/2012    Procedure: COMBINED DILATION AND CURETTAGE, HYSTEROSCOPY, ABLATE ENDOMETRIUM NOVASURE;  COMBINED DILATION AND CURETTAGE, HYSTEROSCOPY, ABLATE ENDOMETRIUM ,pelvic exam under anesthesia;  Surgeon: Shruti Barrios MD;  Location:  OR     ESOPHAGOSCOPY, GASTROSCOPY, DUODENOSCOPY (EGD), COMBINED N/A 1/19/2016    Procedure: COMBINED ESOPHAGOSCOPY, GASTROSCOPY, DUODENOSCOPY (EGD), BIOPSY SINGLE OR MULTIPLE;  Surgeon: Kristofer Molina MD;  Location:  GI     Removal of cancerous lump on neck       TONSILLECTOMY         Social History   Substance Use Topics     Smoking status: Current Every Day Smoker      Packs/day: 0.50     Types: Cigarettes     Smokeless tobacco: Never Used     Alcohol use No     Family History   Problem Relation Age of Onset     DIABETES Mother      Breast Cancer Mother      DIABETES Father      Lung Cancer Father          Current Outpatient Prescriptions   Medication Sig Dispense Refill     levothyroxine (SYNTHROID/LEVOTHROID) 150 MCG tablet TAKE 1 TABLET (150 MCG) BY MOUTH DAILY 90 tablet 1     losartan (COZAAR) 50 MG tablet Take 1 tablet (50 mg) by mouth daily 90 tablet 3     hydrochlorothiazide (HYDRODIURIL) 25 MG tablet Take 1 tablet (25 mg) by mouth daily 90 tablet 3     simvastatin (ZOCOR) 20 MG tablet Take 1 tablet (20 mg) by mouth At Bedtime 90 tablet 3     ipratropium (ATROVENT) 0.06 % spray Spray 2 sprays into both nostrils 2 times daily as needed for rhinitis 1 Box 2     albuterol (PROAIR HFA/PROVENTIL HFA/VENTOLIN HFA) 108 (90 BASE) MCG/ACT Inhaler Inhale 2 puffs into the lungs every 6 hours as needed for shortness of breath / dyspnea 3 Inhaler 1     ipratropium - albuterol 0.5 mg/2.5 mg/3 mL (DUONEB) 0.5-2.5 (3) MG/3ML neb solution Take 1 vial (3 mLs) by nebulization every 6 hours as needed for shortness of breath / dyspnea or wheezing 1 vial 0     tiotropium (SPIRIVA) 18 MCG capsule Inhale 1 capsule (18 mcg) into the lungs daily 90 capsule 1     budesonide (PULMICORT FLEXHALER) 180 MCG/ACT inhaler Inhale 1 puff into the lungs 2 times daily 3 Inhaler 3     Cholecalciferol (VITAMIN D) 2000 UNITS tablet Take 2,000 Units by mouth daily 100 tablet 3     aspirin 81 MG EC tablet Take 1 tablet (81 mg) by mouth daily 100 tablet 3     ketoconazole (NIZORAL) 2 % cream Apply topically 2 times daily as needed for itching Apply to AA on the face bid when needed 15 g 3     glipiZIDE (GLIPIZIDE XL) 10 MG 24 hr tablet Take 1 tablet (10 mg) by mouth 2 times daily (with meals) 180 tablet 0     [DISCONTINUED] glyBURIDE-metFORMIN (GLUCOVANCE) 2.5-500 MG per tablet Take 1 tablet by mouth daily (with  breakfast) 90 tablet 1     Allergies   Allergen Reactions     Ibuprofen Sodium Nausea and Vomiting     Vicodin [Hydrocodone-Acetaminophen] Nausea and Vomiting     Recent Labs   Lab Test  02/27/18   1616  11/07/17   1725  05/18/17   1710  04/30/17   0226   07/15/16   1037  05/22/16   1746   07/24/15   0910   A1C  7.3*  6.7*  7.0*   --    < >  7.9*   --    < >  7.2*   LDL   --    --   65   --    --   58   --    --   83   HDL   --    --   44*   --    --   46*   --    --   48*   TRIG   --    --   160*   --    --   141   --    --   146   ALT   --    --   21  20   --    --   25   --    --    CR   --   0.61  0.71  0.60   --    --   0.66   < >   --    GFRESTIMATED   --   >90  87  >90  Non  GFR Calc     --    --   >90  Non  GFR Calc     < >   --    GFRESTBLACK   --   >90  >90  African American GFR Calc    >90   GFR Calc     --    --   >90   GFR Calc     < >   --    POTASSIUM   --   3.6  3.3*  3.6   --    --   3.5   < >   --    TSH  0.55  10.24*  10.29*   --    --    --    --    < >   --     < > = values in this interval not displayed.      BP Readings from Last 3 Encounters:   02/27/18 128/76   11/28/17 136/72   11/07/17 139/82    Wt Readings from Last 3 Encounters:   02/27/18 224 lb (101.6 kg)   11/28/17 229 lb (103.9 kg)   11/07/17 228 lb (103.4 kg)                  Labs reviewed in EPIC    Reviewed and updated as needed this visit by clinical staff  Tobacco       Reviewed and updated as needed this visit by Provider         ROS:  CONSTITUTIONAL: NEGATIVE for fever, chills, change in weight  INTEGUMENTARY/SKIN: NEGATIVE for worrisome rashes, moles or lesions  EYES: NEGATIVE for vision changes or irritation  ENT/MOUTH: NEGATIVE for ear, mouth and throat problems  RESP: NEGATIVE for significant cough or SOB  CV: NEGATIVE for palpitations or peripheral edema  GI: NEGATIVE for nausea, abdominal pain, heartburn, or change in bowel habits  MUSCULOSKELETAL: NEGATIVE  for significant arthralgias or myalgia  ENDOCRINE: NEGATIVE for temperature intolerance, skin/hair changes  PSYCHIATRIC: NEGATIVE for changes in mood or affect    OBJECTIVE:                                                    /76  Pulse 80  Temp 97.8  F (36.6  C) (Tympanic)  Wt 224 lb (101.6 kg)  SpO2 96%  BMI 43.75 kg/m2  Body mass index is 43.75 kg/(m^2).   GENERAL: healthy, alert, well nourished, well hydrated, no distress  EYES: Eyes grossly normal to inspection, extraocular movements - intact, and PERRL  HENT: ear canals- normal; TMs- normal; Nose- normal; Mouth- no ulcers, no lesions  NECK: no tenderness, no adenopathy, no asymmetry, no masses, no stiffness; thyroid- normal to palpation  RESP: lungs clear to auscultation - no rales, no rhonchi, off-and-on diffuse wheezes  CV: regular rates and rhythm, normal S1 S2, no S3 or S4 and no murmur, no click or rub -  ABDOMEN: soft, no tenderness, no  hepatosplenomegaly, no masses, normal bowel sounds  MS: extremities- no gross deformities noted, no edema  SKIN: Whitish papules noted on the eyelids.  PSYCH: Alert and oriented times 3; speech- coherent , normal rate and volume; able to articulate logical thoughts, able to abstract reason, no tangential thoughts, no hallucinations or delusions, affect- normal         ASSESSMENT/PLAN:                                                      1. Atypical chest pain  Patient is currently asymptomatic.  Vitals are stable.  Recommended a stress test.  Resume aspirin 81 mg daily.  If any symptomatic worsening noted, instructed to go to the emergency room.  - NM Lexiscan stress test; Future    2. Type 2 diabetes mellitus without complication, unspecified long term insulin use status (H)  Worsening of diabetic control noted.  Recommending to increase the dose of glipizide XL 10 mg from once a day to twice a day with food.  Recheck hemoglobin A1c in 3 months.  Low carbohydrate diet discussed.  - Hemoglobin A1c    3.  Hypertension goal BP (blood pressure) < 140/80  Current medications.  Blood pressures well controlled    4. Chronic obstructive pulmonary disease, unspecified COPD type (H)  Patient does not have well-controlled COPD.  She is a smoker.  Counseled about smoking cessation.  On exam today she is wheezing.  She is not hypoxic though.  Recommended to resume Spiriva, Pulmicort, albuterol inhaler, DuoNeb as needed.  Recommended to be seen by pulmonology.  No signs of exacerbation noted.  If any symptomatic worsening noted, instructed to notify me back.  - PULMONARY MEDICINE REFERRAL    5. Hypothyroidism, unspecified type  Previously not well controlled.  - TSH  - levothyroxine (SYNTHROID/LEVOTHROID) 150 MCG tablet; TAKE 1 TABLET (150 MCG) BY MOUTH DAILY  Dispense: 90 tablet; Refill: 1    6. Snoring  Patient has symptoms of chronic fatigue, snoring, daytime sleepiness.  I have recommended getting a sleep study done.  - SLEEP EVALUATION & MANAGEMENT REFERRAL - [facility]  [phone] (Age 18 and up); Future    7. Chronic fatigue  As above  - SLEEP EVALUATION & MANAGEMENT REFERRAL - [facility]  [phone] (Age 18 and up); Future    8. Eyelid lesion  Recommended to see skincare clinic.  - SKIN CARE REFERRAL          Lokesh Casanova MD  [facility] [place]

## 2018-02-27 NOTE — LETTER
[facility]          [address] [place] [address]                            [phone]  Fax: [phone]    Jazlyn Bartlett  [address] [place] [id number]    [id number]    February 27, 2018      To whom it may concern    Please excuse Jazlyn Bartlett from work for the office visit with me today.      If you have any other questions or concerns please feel free to contact me at anytime.        Sincerely,      Edilberto Campa M.D.

## 2018-02-27 NOTE — NURSING NOTE
Chief Complaint   Patient presents with     Thyroid Problem       Initial /74  Pulse 80  Temp 97.8  F (36.6  C) (Tympanic)  Wt 224 lb (101.6 kg)  SpO2 96%  BMI 43.75 kg/m2 Estimated body mass index is 43.75 kg/(m^2) as calculated from the following:    Height as of 11/28/17: 5' (1.524 m).    Weight as of this encounter: 224 lb (101.6 kg).  Medication Reconciliation: complete   Eleanor Aguilar CMA

## 2018-02-28 LAB — TSH SERPL DL<=0.005 MIU/L-ACNC: 0.55 MU/L (ref 0.4–4)

## 2018-03-01 DIAGNOSIS — E11.9 TYPE 2 DIABETES MELLITUS WITHOUT COMPLICATION, WITHOUT LONG-TERM CURRENT USE OF INSULIN (H): ICD-10-CM

## 2018-03-01 RX ORDER — GLIPIZIDE 10 MG/1
10 TABLET, FILM COATED, EXTENDED RELEASE ORAL 2 TIMES DAILY WITH MEALS
Qty: 180 TABLET | Refills: 0 | Status: SHIPPED | OUTPATIENT
Start: 2018-03-01 | End: 2018-05-31

## 2018-03-05 ENCOUNTER — HOSPITAL ENCOUNTER (OUTPATIENT)
Dept: NUCLEAR MEDICINE | Facility: CLINIC | Age: 53
Setting detail: NUCLEAR MEDICINE
Discharge: HOME | End: 2018-03-05
Attending: FAMILY MEDICINE | Admitting: FAMILY MEDICINE
Payer: COMMERCIAL

## 2018-03-05 ENCOUNTER — OFFICE VISIT (OUTPATIENT)
Dept: FAMILY MEDICINE | Facility: CLINIC | Age: 53
End: 2018-03-05
Payer: COMMERCIAL

## 2018-03-05 ENCOUNTER — HOSPITAL ENCOUNTER (OUTPATIENT)
Dept: CARDIOLOGY | Facility: CLINIC | Age: 53
Discharge: HOME | End: 2018-03-05
Attending: FAMILY MEDICINE | Admitting: FAMILY MEDICINE
Payer: COMMERCIAL

## 2018-03-05 DIAGNOSIS — L91.8 INFLAMED SKIN TAG: ICD-10-CM

## 2018-03-05 DIAGNOSIS — R07.89 ATYPICAL CHEST PAIN: ICD-10-CM

## 2018-03-05 DIAGNOSIS — L21.9 SEBORRHEIC DERMATITIS: Primary | ICD-10-CM

## 2018-03-05 PROCEDURE — 93017 CV STRESS TEST TRACING ONLY: CPT

## 2018-03-05 PROCEDURE — 78452 HT MUSCLE IMAGE SPECT MULT: CPT

## 2018-03-05 PROCEDURE — 99213 OFFICE O/P EST LOW 20 MIN: CPT | Mod: 25 | Performed by: FAMILY MEDICINE

## 2018-03-05 PROCEDURE — 78452 HT MUSCLE IMAGE SPECT MULT: CPT | Mod: 26 | Performed by: INTERNAL MEDICINE

## 2018-03-05 PROCEDURE — A9502 TC99M TETROFOSMIN: HCPCS | Performed by: FAMILY MEDICINE

## 2018-03-05 PROCEDURE — 11200 RMVL SKIN TAGS UP TO&INC 15: CPT | Performed by: FAMILY MEDICINE

## 2018-03-05 PROCEDURE — 93018 CV STRESS TEST I&R ONLY: CPT | Performed by: INTERNAL MEDICINE

## 2018-03-05 PROCEDURE — 34300033 ZZH RX 343: Performed by: FAMILY MEDICINE

## 2018-03-05 PROCEDURE — 93016 CV STRESS TEST SUPVJ ONLY: CPT | Performed by: INTERNAL MEDICINE

## 2018-03-05 RX ORDER — REGADENOSON 0.08 MG/ML
0.4 INJECTION, SOLUTION INTRAVENOUS ONCE
Status: COMPLETED | OUTPATIENT
Start: 2018-03-05 | End: 2018-03-05

## 2018-03-05 RX ORDER — REGADENOSON 0.08 MG/ML
INJECTION, SOLUTION INTRAVENOUS
Status: DISCONTINUED
Start: 2018-03-05 | End: 2018-03-06 | Stop reason: HOSPADM

## 2018-03-05 RX ORDER — KETOCONAZOLE 20 MG/G
CREAM TOPICAL 2 TIMES DAILY PRN
Qty: 15 G | Refills: 3 | Status: SHIPPED | OUTPATIENT
Start: 2018-03-05 | End: 2020-05-07

## 2018-03-05 RX ADMIN — REGADENOSON 0.4 MG: 0.08 INJECTION, SOLUTION INTRAVENOUS at 13:18

## 2018-03-05 RX ADMIN — TETROFOSMIN 10.8 MCI.: 1.38 INJECTION, POWDER, LYOPHILIZED, FOR SOLUTION INTRAVENOUS at 11:37

## 2018-03-05 RX ADMIN — TETROFOSMIN 32.5 MCI.: 1.38 INJECTION, POWDER, LYOPHILIZED, FOR SOLUTION INTRAVENOUS at 13:26

## 2018-03-05 NOTE — LETTER
"    3/5/2018         RE: Jazlyn Bartlett  [address]  [place] [address]        Dear Colleague,    Thank you for referring your patient, Jazlyn Bartlett, to the [facility]. Please see a copy of my visit note below.    [facility] - PRIMARY CARE SKIN    CC : Lesion(s)  SUBJECTIVE:                                                    Jazlyn Bartlett is a 52 year old female who presents to clinic today because of skin tags and moles.    Bothersome lesions noticed by the patient or other skin concerns :  Issue One : Skin tags on the neck, axillae and around eyes.  Onset : varies.  Enlarging : YES.  Bleeding : NO  Itchy or irritating : YES.  Pain or tenderness : YES - with increased swelling.  Changing color : NO.  Issue Two : She complains of bumps around the nose. More are beginning to develop around the nose. Scaling develops when using alcohol topically to cleanse. She denies an issue with dandruff.  Onset : \"a while\".  Enlarging : NO.  Bleeding : NO  Itchy or irritating : YES - irritated.  Pain or tenderness : YES.  Changing color : NO.    Personal history of skin cancer : NO.  Family history of skin cancer : NO.  Autoimmune disorders : NO.    Occupation : works for [employer] (indoor).    Refer to electronic medical record (EMR) for past medical history and medications.    INTEGUMENTARY/SKIN: POSITIVE for changing lesions  ROS : 14 point review of systems was negative except the symptoms listed above in the HPI.    This document serves as a record of the services and decisions personally performed and made by Martita Mills MD. It was created on her behalf by Emile Gaxiola, a trained medical scribe.  The creation of this document is based on the scribe's personal observations and the provider's statements to the medical scribe.  Emile Gaxiola, March 5, 2018 7:02 AM      OBJECTIVE:                                                    GENERAL: healthy, alert and no distress  SKIN: Clark " Skin Type - IV.  Face, Neck, Trunk and Arms were examined. The dermatoscope was used to help evaluate pigmented lesions.  Skin Pertinent Findings:  Nose : Small pinpoint flesh-colored crystalline dots around the ala grooves.    Face, Neck, Chest, Axillae : 1-3 mm in size, brown, pedunculated, benign-appearing skin tag(s).  Right upper axilla : 5 mm in size brown, pedunculated, benign-appearing skin tag(s)  Name : Lesion Removal  Indication : Irritated/inflamed benign skin tags.  Location(s) : left upper eyelid - x1, left side of neck - x4, right upper chest - x1, left axilla - x3, right axilla - 1, lateral chest wall - x2.  Completed by : Martita Mills MD  Note : Prior to treatment, discussed the risk of pain, blistering, infection, scarring, hypopigmentation, hyperpigmentation, and recurrence or need for retreatment. Benefits of treatment and alternative treatments were also discussed.     The lesion(s) were removed or treated with liquid nitrogen via cryactweezers after alcohol prep    Patient tolerated the procedure well and left in good condition.  Tissue sample sent in : No.  Total number of lesions treated : 12.    Diagnostic Test Results:  none     .MDM : the crystalline pinpoint papules ? Crystalloid milia but with scaling suggest seborrheic dermatitis .      ASSESSMENT:                                                      Encounter Diagnoses   Name Primary?     Seborrheic dermatitis Yes     Inflamed skin tag          PLAN:                                                    Patient Instructions   FUTURE APPOINTMENTS  Follow up as needed.    TOPICAL MEDICATION INSTRUCTIONS  Ketoconazole 2% cream    Apply a thin layer to the affected area(s) around the nose two times per day. Discontinue when symptoms have resolved.    This is not a steroid, and it can be used on the face.    Keep in mind to also regularly use moisturizer, as this preventative measure can help maintain your skin's natural moisture  barrier.    Apply moisturizer after application of medication.    CRYOTHERAPY POST-TREATMENT CARE INSTRUCTIONS  Liquid nitrogen is mildly uncomfortable when applied to the skin, but the discomfort rapidly subsides.    Post-Treatment:  You may experience burning and/or stinging immediately following the procedure. The discomfort from the procedure may persist over the next 12-24 hours. The area treated will look pinker and slightly swollen before the healing process begins. You may also notice redness, swelling, tenderness, weeping and crusts or scabs. Healing time is approximately 10-14 days.    Blister - You may or may notice blistering from the freezing. If you develop an uncomfortable blister from today's treatment, you may gently puncture this with a needle that has been cleaned with alcohol. However, do not remove the protective skin layer of the blister.    Scab - After a few days, you may notice scaliness or scab formation. Do not pick at the scabs because this may cause slower healing and a permanent scar.    The skin may appear temporarily darker at the treatment site, but this usually fades over a period of months, provided that the area is protected from the sun.    Care of the areas treated:    Wash the area with a mild cleanser.    Gently pat dry.    Do not rub.     Keep protected from the sun during the healing process and for a full year following treatment as the skin continues to remodel during this time.    If you experience dryness or persistent burning, you may use Vaseline or Aquaphor ointment sparingly.    Do not use Neosporin, as many people eventually develop a medication allergy, that can easily be confused with an infection, to Neomycin.    Return if:  If there is any concern that the lesion has persisted, make an appointment for a re-check. Healing time does vary depending on your individual healing process and the area of the body treated. Most patients will be healed in one  month.    Signs of Infection:  Thankfully this is rare. However if you notice persistent colored drainage, increasing pain, fever or other signs of infection, please call back at [phone].        PROCEDURES:                                                    None.    TT : 20 minutes.  CT : 15 minutes.      The information in this document, created by the medical scribe for me, accurately reflects the services I personally performed and the decisions made by me. I have reviewed and approved this document for accuracy prior to leaving the patient care area.  Martita Mills MD March 5, 2018 7:02 AM  [facility]    Again, thank you for allowing me to participate in the care of your patient.        Sincerely,        Cuca Mills MD

## 2018-03-05 NOTE — LETTER
March 8, 2018      Kassie Herrera  [address]  [place] [facility] [address]        Dear Kassie,     The stress test is negative.  They did not see any abnormality on the test.  Normal function of the heart and normal wall motion noted.    Lokesh Casanova MD  Results for orders placed or performed during the hospital encounter of 03/05/18   NM Lexiscan stress test    Narrative    GATED MYOCARDIAL PERFUSION SCINTIGRAPHY WITH INTRAVENOUS PHARMACOLOGIC  VASODILATATION LEXISCAN -ONE DAY STUDY     3/5/2018 2:27 PM  KASSIE HERRERA  52 years  Female  1965.    Indication/Clinical History: Chest pain    Impression  1.  Myocardial perfusion imaging using single isotope technique  demonstrated normal myocardial perfusion. There is no ischemia or  infarction identified on this study.   2. Gated images demonstrated normal wall motion and normal left  ventricular chamber size.  The left ventricular systolic function is  normal, with an ejection fraction measured at 87%.  3. No previous study available for comparison    Procedure  Pharmacologic stress testing was performed with Lexiscan at a rate of  0.08 mg/ml rapid bolus injection, for 15 seconds, 0.4 mg/5ml  intravenously. Low-level exercise was not performed along with the  vasodilator infusion.  The heart rate was 73 at baseline and danny to  95 beats per minute during the Lexiscan infusion. The rest blood  pressure was 141/92 mmHg and was 137/82 mm Hg during Lexiscan  infusion. The patient experienced shortness of breath and nausea   during the test.    Myocardial perfusion imaging was performed at rest, approximately 45  minutes after the injection intravenously of 10.8 mCi of Tc-99m  Myoview. At peak pharmacologic effect, 10-20 seconds after Lexiscan,   the patient was injected intravenously with 32.5 mCi of  Tc-99m  Myoview. The post-stress tomographic imaging was performed  approximately 60 minutes after stress.    EKG Findings  The resting EKG demonstrated  normal sinus rhythm without abnormality.  The stress EKG demonstrated no ischemic changes.    Tomographic Findings  Overall, the study quality is good . On the stress images, perfusion  is normal. On the rest images, perfusion is normal . Gated images  demonstrated normal wall motion and normal left ventricular chamber  size. The left ventricular ejection fraction was calculated to be 87%.  TID was absent.    DENA MURPHY MD         Sincerely,      Lokesh Casanova MD/O

## 2018-03-05 NOTE — MR AVS SNAPSHOT
After Visit Summary   3/5/2018    Jazlyn Bartlett    MRN: [id number]           Patient Information     Date Of Birth          1965        Visit Information        Provider Department      3/5/2018 7:20 AM Cuca Mills MD [facility]        Today's Diagnoses     Seborrheic dermatitis    -  1    Inflamed skin tag          Care Instructions    FUTURE APPOINTMENTS  Follow up as needed.    TOPICAL MEDICATION INSTRUCTIONS  Ketoconazole 2% cream    Apply a thin layer to the affected area(s) around the nose two times per day. Discontinue when symptoms have resolved.    This is not a steroid, and it can be used on the face.    Keep in mind to also regularly use moisturizer, as this preventative measure can help maintain your skin's natural moisture barrier.    Apply moisturizer after application of medication.    CRYOTHERAPY POST-TREATMENT CARE INSTRUCTIONS  Liquid nitrogen is mildly uncomfortable when applied to the skin, but the discomfort rapidly subsides.    Post-Treatment:  You may experience burning and/or stinging immediately following the procedure. The discomfort from the procedure may persist over the next 12-24 hours. The area treated will look pinker and slightly swollen before the healing process begins. You may also notice redness, swelling, tenderness, weeping and crusts or scabs. Healing time is approximately 10-14 days.    Blister - You may or may notice blistering from the freezing. If you develop an uncomfortable blister from today's treatment, you may gently puncture this with a needle that has been cleaned with alcohol. However, do not remove the protective skin layer of the blister.    Scab - After a few days, you may notice scaliness or scab formation. Do not pick at the scabs because this may cause slower healing and a permanent scar.    The skin may appear temporarily darker at the treatment site, but this usually fades over a period of months,  provided that the area is protected from the sun.    Care of the areas treated:    Wash the area with a mild cleanser.    Gently pat dry.    Do not rub.     Keep protected from the sun during the healing process and for a full year following treatment as the skin continues to remodel during this time.    If you experience dryness or persistent burning, you may use Vaseline or Aquaphor ointment sparingly.    Do not use Neosporin, as many people eventually develop a medication allergy, that can easily be confused with an infection, to Neomycin.    Return if:  If there is any concern that the lesion has persisted, make an appointment for a re-check. Healing time does vary depending on your individual healing process and the area of the body treated. Most patients will be healed in one month.    Signs of Infection:  Thankfully this is rare. However if you notice persistent colored drainage, increasing pain, fever or other signs of infection, please call back at [phone].          Follow-ups after your visit        Your next 10 appointments already scheduled     Mar 05, 2018  7:20 AM CST   Office Visit with Cuca Mills MD   [facility] ([address] [id number] [phone]           Bring a current list of meds and any records pertaining to this visit. For Physicals, please bring immunization records and any forms needing to be filled out. Please arrive 10 minutes early to complete paperwork.            Mar 05, 2018 12:00 PM CST   (Arrive by 11:45 AM)   NM MPI WITH LEXISCAN with [facility]   [facility] ([facility])    [address] [facility] [address]   [phone]           For a ONE day exam: Allow 3-4 hours for test. For a TWO day exam: Allow  minutes PER day for test.  On the day of your resting scan: Please stop eating 3 hours before the test. You may drink water or  juice.  On the day of your stress test: Stop all caffeine 12 hours before the test. This includes coffee, tea, soda pop, chocolate and certain medicines (such as Anacin, Excedrin and NoDoz). Also avoid decaf coffee and tea, as these contain small amounts of caffeine.  Stop eating 3 hours before the test. You may drink water.  You may need to stop some medicines before the test. Follow your doctor s orders. - If you take a beta blocker: Do not take your beta-blocker on the day before your test, unless specifically told to by your doctor. And do not take it on the day of your test. Bring it with you to take after the test. - If you take Aggrenox or dipyridamole (Persantine, Permole), stop taking it 48 hours before your test. - If you take Viagra, Cialis or Levitra, stop taking it 48 hours before your test. - If you take theophylline or aminophylline, stop taking it 12 hours before your test.  Do not take nitrates on the day of your test. Do not wear your Nitro-Patch.  Please wear a loose two-piece outfit. If you will have an exercise test, bring rubber-soled walking shoes.  When you arrive, please tell us if you have a pacemaker or ICD (implantable defibrillator).  Please call your Imaging Department at your exam site with any questions.            Mar 05, 2018  1:30 PM CST   Ekg Stress Nm Lexiscan with RESRM3   [facility] ([facility])    [address]  [facility] [id number] [phone]            Mar 13, 2018  3:00 PM CDT   New Sleep Patient with Rob Sierra MD   [facility] ([facility])    [address]  [facility] [address] [phone]              Who to contact     If you have questions or need follow up information about today's clinic visit or your schedule please contact [facility] [place] directly at [phone].  Normal or non-critical lab and imaging results will be  communicated to you by [employer], letter or phone within 4 business days after the clinic has received the results. If you do not hear from us within 7 days, please contact the clinic through [employer] or phone. If you have a critical or abnormal lab result, we will notify you by phone as soon as possible.  Submit refill requests through [employer] or call your pharmacy and they will forward the refill request to us. Please allow 3 business days for your refill to be completed.          Additional Information About Your Visit        [employer] Information     [employer] gives you secure access to your electronic health record. If you see a primary care provider, you can also send messages to your care team and make appointments. If you have questions, please call your primary care clinic.  If you do not have a primary care provider, please call [phone] and they will assist you.        Care EveryWhere ID     This is your Care EveryWhere ID. This could be used by other organizations to access your [facility] medical records  [id number]         Blood Pressure from Last 3 Encounters:   02/27/18 128/76   11/28/17 136/72   11/07/17 139/82    Weight from Last 3 Encounters:   02/27/18 224 lb (101.6 kg)   11/28/17 229 lb (103.9 kg)   11/07/17 228 lb (103.4 kg)              Today, you had the following     No orders found for display       Primary Care Provider Office Phone # Fax #    Lokesh Casanova [phone]       [address]  [place] [address]        Goals        Result Component    A1C < 7.0     Related Problems    Type 2 diabetes mellitus without complication (H)      Equal Access to Services     [facility]: Hadii aad ku hadasho Soomaali, waaxda luqadaha, qaybta kaalmada ademarshall, jenifer saavedra . So [facility] [phone].    ATENCIÓN: Si habla español, tiene a tracey disposición servicios gratuitos de asistencia lingüística. Llame al [phone].    We comply with applicable  federal civil rights laws and [place] laws. We do not discriminate on the basis of race, color, national origin, age, disability, sex, sexual orientation, or gender identity.            Thank you!     Thank you for choosing [facility] [place]  for your care. Our goal is always to provide you with excellent care. Hearing back from our patients is one way we can continue to improve our services. Please take a few minutes to complete the written survey that you may receive in the mail after your visit with us. Thank you!             Your Updated Medication List - Protect others around you: Learn how to safely use, store and throw away your medicines at www.disposemymeds.org.          This list is accurate as of 3/5/18  7:17 AM.  Always use your most recent med list.                   Brand Name Dispense Instructions for use Diagnosis    albuterol 108 (90 BASE) MCG/ACT Inhaler    PROAIR HFA/PROVENTIL HFA/VENTOLIN HFA    3 Inhaler    Inhale 2 puffs into the lungs every 6 hours as needed for shortness of breath / dyspnea    Chronic obstructive pulmonary disease, unspecified COPD type (H)       aspirin 81 MG EC tablet     100 tablet    Take 1 tablet (81 mg) by mouth daily    Hypertension goal BP (blood pressure) < 140/80       budesonide 180 MCG/ACT inhaler    PULMICORT FLEXHALER    3 Inhaler    Inhale 1 puff into the lungs 2 times daily    Chronic obstructive pulmonary disease, unspecified COPD type (H)       glipiZIDE 10 MG 24 hr tablet    glipiZIDE XL    180 tablet    Take 1 tablet (10 mg) by mouth 2 times daily (with meals)    Type 2 diabetes mellitus without complication, without long-term current use of insulin (H)       hydrochlorothiazide 25 MG tablet    HYDRODIURIL    90 tablet    Take 1 tablet (25 mg) by mouth daily    Hypertension goal BP (blood pressure) < 140/80       ipratropium - albuterol 0.5 mg/2.5 mg/3 mL 0.5-2.5 (3) MG/3ML neb solution    DUONEB    1 vial    Take 1 vial (3 mLs) by  nebulization every 6 hours as needed for shortness of breath / dyspnea or wheezing    Chronic obstructive pulmonary disease, unspecified COPD type (H)       ipratropium 0.06 % spray    ATROVENT    1 Box    Spray 2 sprays into both nostrils 2 times daily as needed for rhinitis    Chronic obstructive pulmonary disease, unspecified COPD type (H)       levothyroxine 150 MCG tablet    SYNTHROID/LEVOTHROID    90 tablet    TAKE 1 TABLET (150 MCG) BY MOUTH DAILY    Hypothyroidism, unspecified type       losartan 50 MG tablet    COZAAR    90 tablet    Take 1 tablet (50 mg) by mouth daily    Hypertension goal BP (blood pressure) < 140/80       simvastatin 20 MG tablet    ZOCOR    90 tablet    Take 1 tablet (20 mg) by mouth At Bedtime    Hyperlipidemia LDL goal <100       tiotropium 18 MCG capsule    SPIRIVA    90 capsule    Inhale 1 capsule (18 mcg) into the lungs daily    Chronic obstructive pulmonary disease, unspecified COPD type (H)       vitamin D 2000 UNITS tablet     100 tablet    Take 2,000 Units by mouth daily    Vitamin D deficiency disease

## 2018-03-05 NOTE — PATIENT INSTRUCTIONS
FUTURE APPOINTMENTS  Follow up as needed.    TOPICAL MEDICATION INSTRUCTIONS  Ketoconazole 2% cream    Apply a thin layer to the affected area(s) around the nose two times per day. Discontinue when symptoms have resolved.    This is not a steroid, and it can be used on the face.    Keep in mind to also regularly use moisturizer, as this preventative measure can help maintain your skin's natural moisture barrier.    Apply moisturizer after application of medication.    CRYOTHERAPY POST-TREATMENT CARE INSTRUCTIONS  Liquid nitrogen is mildly uncomfortable when applied to the skin, but the discomfort rapidly subsides.    Post-Treatment:  You may experience burning and/or stinging immediately following the procedure. The discomfort from the procedure may persist over the next 12-24 hours. The area treated will look pinker and slightly swollen before the healing process begins. You may also notice redness, swelling, tenderness, weeping and crusts or scabs. Healing time is approximately 10-14 days.    Blister - You may or may notice blistering from the freezing. If you develop an uncomfortable blister from today's treatment, you may gently puncture this with a needle that has been cleaned with alcohol. However, do not remove the protective skin layer of the blister.    Scab - After a few days, you may notice scaliness or scab formation. Do not pick at the scabs because this may cause slower healing and a permanent scar.    The skin may appear temporarily darker at the treatment site, but this usually fades over a period of months, provided that the area is protected from the sun.    Care of the areas treated:    Wash the area with a mild cleanser.    Gently pat dry.    Do not rub.     Keep protected from the sun during the healing process and for a full year following treatment as the skin continues to remodel during this time.    If you experience dryness or persistent burning, you may use Vaseline or Aquaphor ointment  sparingly.    Do not use Neosporin, as many people eventually develop a medication allergy, that can easily be confused with an infection, to Neomycin.    Return if:  If there is any concern that the lesion has persisted, make an appointment for a re-check. Healing time does vary depending on your individual healing process and the area of the body treated. Most patients will be healed in one month.    Signs of Infection:  Thankfully this is rare. However if you notice persistent colored drainage, increasing pain, fever or other signs of infection, please call back at [phone].

## 2018-03-05 NOTE — PROGRESS NOTES
Pre-procedure:    Initial vital signs: /93, HR72, RR 11  Allergies reviewed:    Rhythm:   Medications taken within 48 hours of procedure:    Last Caffeine:   Lung sounds: CTA, no wheezing, crackles or rtx  Health History (COPD, Asthma, etc):     Procedure: Lexiscan  Reaction/symptoms after receiving Gloria injection:   Intensity of Pain:   Rhythm:   1. Vital Signs:/85, HR 94, RR 14  2. Vital Signs:/91, HR 84, RR 15     Reversal agent:     Post:   Resolution of symptoms?: YES  Vital signs: /91, HR 80, RR 11  Vital signs: BP /, HR , RR   Rhythm:   Walk: NO  Comment:   Return to Radiology  141

## 2018-03-05 NOTE — PROGRESS NOTES
"[facility] - PRIMARY CARE SKIN    CC : Lesion(s)  SUBJECTIVE:                                                    Jazlyn Bartlett is a 52 year old female who presents to clinic today because of skin tags and moles.    Bothersome lesions noticed by the patient or other skin concerns :  Issue One : Skin tags on the neck, axillae and around eyes.  Onset : varies.  Enlarging : YES.  Bleeding : NO  Itchy or irritating : YES.  Pain or tenderness : YES - with increased swelling.  Changing color : NO.  Issue Two : She complains of bumps around the nose. More are beginning to develop around the nose. Scaling develops when using alcohol topically to cleanse. She denies an issue with dandruff.  Onset : \"a while\".  Enlarging : NO.  Bleeding : NO  Itchy or irritating : YES - irritated.  Pain or tenderness : YES.  Changing color : NO.    Personal history of skin cancer : NO.  Family history of skin cancer : NO.  Autoimmune disorders : NO.    Occupation : works for [employer] (indoor).    Refer to electronic medical record (EMR) for past medical history and medications.    INTEGUMENTARY/SKIN: POSITIVE for changing lesions  ROS : 14 point review of systems was negative except the symptoms listed above in the HPI.    This document serves as a record of the services and decisions personally performed and made by Martita Mills MD. It was created on her behalf by Emile Gaxiola, a trained medical scribe.  The creation of this document is based on the scribe's personal observations and the provider's statements to the medical scribe.  Emile Gaxiola, March 5, 2018 7:02 AM      OBJECTIVE:                                                    GENERAL: healthy, alert and no distress  SKIN: Clark Skin Type - IV.  Face, Neck, Trunk and Arms were examined. The dermatoscope was used to help evaluate pigmented lesions.  Skin Pertinent Findings:  Nose : Small pinpoint flesh-colored crystalline dots around the ala grooves.    Face, Neck, Chest, Axillae " : 1-3 mm in size, brown, pedunculated, benign-appearing skin tag(s).  Right upper axilla : 5 mm in size brown, pedunculated, benign-appearing skin tag(s)  Name : Lesion Removal  Indication : Irritated/inflamed benign skin tags.  Location(s) : left upper eyelid - x1, left side of neck - x4, right upper chest - x1, left axilla - x3, right axilla - 1, lateral chest wall - x2.  Completed by : Martita Mills MD  Note : Prior to treatment, discussed the risk of pain, blistering, infection, scarring, hypopigmentation, hyperpigmentation, and recurrence or need for retreatment. Benefits of treatment and alternative treatments were also discussed.     The lesion(s) were removed or treated with liquid nitrogen via cryactweezers after alcohol prep    Patient tolerated the procedure well and left in good condition.  Tissue sample sent in : No.  Total number of lesions treated : 12.    Diagnostic Test Results:  none     .MDM : the crystalline pinpoint papules ? Crystalloid milia but with scaling suggest seborrheic dermatitis .      ASSESSMENT:                                                      Encounter Diagnoses   Name Primary?     Seborrheic dermatitis Yes     Inflamed skin tag          PLAN:                                                    Patient Instructions   FUTURE APPOINTMENTS  Follow up as needed.    TOPICAL MEDICATION INSTRUCTIONS  Ketoconazole 2% cream    Apply a thin layer to the affected area(s) around the nose two times per day. Discontinue when symptoms have resolved.    This is not a steroid, and it can be used on the face.    Keep in mind to also regularly use moisturizer, as this preventative measure can help maintain your skin's natural moisture barrier.    Apply moisturizer after application of medication.    CRYOTHERAPY POST-TREATMENT CARE INSTRUCTIONS  Liquid nitrogen is mildly uncomfortable when applied to the skin, but the discomfort rapidly subsides.    Post-Treatment:  You may experience burning  and/or stinging immediately following the procedure. The discomfort from the procedure may persist over the next 12-24 hours. The area treated will look pinker and slightly swollen before the healing process begins. You may also notice redness, swelling, tenderness, weeping and crusts or scabs. Healing time is approximately 10-14 days.    Blister - You may or may notice blistering from the freezing. If you develop an uncomfortable blister from today's treatment, you may gently puncture this with a needle that has been cleaned with alcohol. However, do not remove the protective skin layer of the blister.    Scab - After a few days, you may notice scaliness or scab formation. Do not pick at the scabs because this may cause slower healing and a permanent scar.    The skin may appear temporarily darker at the treatment site, but this usually fades over a period of months, provided that the area is protected from the sun.    Care of the areas treated:    Wash the area with a mild cleanser.    Gently pat dry.    Do not rub.     Keep protected from the sun during the healing process and for a full year following treatment as the skin continues to remodel during this time.    If you experience dryness or persistent burning, you may use Vaseline or Aquaphor ointment sparingly.    Do not use Neosporin, as many people eventually develop a medication allergy, that can easily be confused with an infection, to Neomycin.    Return if:  If there is any concern that the lesion has persisted, make an appointment for a re-check. Healing time does vary depending on your individual healing process and the area of the body treated. Most patients will be healed in one month.    Signs of Infection:  Thankfully this is rare. However if you notice persistent colored drainage, increasing pain, fever or other signs of infection, please call back at [phone].        PROCEDURES:                                                     None.    TT : 20 minutes.  CT : 15 minutes.      The information in this document, created by the medical scribe for me, accurately reflects the services I personally performed and the decisions made by me. I have reviewed and approved this document for accuracy prior to leaving the patient care area.  Martita Mills MD March 5, 2018 7:02 AM  [facility]

## 2018-03-06 ENCOUNTER — TELEPHONE (OUTPATIENT)
Dept: FAMILY MEDICINE | Facility: CLINIC | Age: 53
End: 2018-03-06

## 2018-03-06 NOTE — PROGRESS NOTES
Please call the patient to notify patient that the stress test is negative.  They did not see any abnormality on the test.  Normal function of the heart and normal wall motion noted.    Lokesh Casanova MD

## 2018-03-06 NOTE — TELEPHONE ENCOUNTER
Reason for Call:  Other returning call    Detailed comments: Pt called this evening and was returning a phone call for the skin clinic. Please give pt a call back when you can. Thank you.    Phone Number Patient can be reached at: Home number on file [phone] (home)    Best Time:     Can we leave a detailed message on this number? YES    Call taken on 3/6/2018 at 5:34 PM by Bethany Bynum

## 2018-03-07 NOTE — TELEPHONE ENCOUNTER
Called patient- left voice mail that the skin clinic did not call- it may have been an appointment confirmation call from her 03/5/18 appointment.  apologized for the confusion.    Leah MURILLO RN  [facility]  [phone]  [facility]   [phone]

## 2018-03-08 NOTE — PROGRESS NOTES
Please call the patients  to notify patient to call us back. He is also a patient of mine.    Lokesh Casanova MD

## 2018-03-13 ENCOUNTER — OFFICE VISIT (OUTPATIENT)
Dept: SLEEP MEDICINE | Facility: CLINIC | Age: 53
End: 2018-03-13
Attending: FAMILY MEDICINE
Payer: COMMERCIAL

## 2018-03-13 VITALS
HEART RATE: 86 BPM | RESPIRATION RATE: 21 BRPM | WEIGHT: 225 LBS | OXYGEN SATURATION: 95 % | DIASTOLIC BLOOD PRESSURE: 79 MMHG | BODY MASS INDEX: 42.48 KG/M2 | SYSTOLIC BLOOD PRESSURE: 125 MMHG | HEIGHT: 61 IN

## 2018-03-13 DIAGNOSIS — E66.01 MORBID OBESITY WITH BODY MASS INDEX (BMI) OF 40.0 TO 44.9 IN ADULT (H): ICD-10-CM

## 2018-03-13 DIAGNOSIS — F51.12 INSUFFICIENT SLEEP SYNDROME: ICD-10-CM

## 2018-03-13 DIAGNOSIS — G47.10 HYPERSOMNOLENCE: ICD-10-CM

## 2018-03-13 DIAGNOSIS — G47.9 SLEEP DISTURBANCE: ICD-10-CM

## 2018-03-13 DIAGNOSIS — G25.81 RESTLESS LEGS SYNDROME (RLS): ICD-10-CM

## 2018-03-13 DIAGNOSIS — R06.83 SNORING: Primary | ICD-10-CM

## 2018-03-13 PROCEDURE — 99244 OFF/OP CNSLTJ NEW/EST MOD 40: CPT | Performed by: INTERNAL MEDICINE

## 2018-03-13 RX ORDER — ZOLPIDEM TARTRATE 5 MG/1
TABLET ORAL
Qty: 1 TABLET | Refills: 0 | Status: SHIPPED | OUTPATIENT
Start: 2018-03-13 | End: 2018-06-19

## 2018-03-13 NOTE — PATIENT INSTRUCTIONS
"MY TREATMENT INFORMATION FOR SLEEP DISTURBANCE-  Jazlyn Bartlett    DOCTOR : Rob Sierra MD  SLEEP CENTER :  [facility]  MY CONTACT NUMBER:[phone]        If I haven't had a sleep study yet, what can I expect?  A personal story from Kenneth  [email or web address] turn [email or web address] the lights.com/watch?v=AxPLmlRpnCs    Suspected sleep apnea: Sleep study ordered.    Follow up in sleep clinic 1-2 weeks after sleep study to discuss results of sleep study and treatment options.    Patient was advised not to drive if drowsy or sleepy.    Frequently asked questions:  1. What is Obstructive Sleep Apnea (KATIE)? KATIE is the most common type of sleep apnea. Apnea literally means, \"without breath.\" It is characterized by repetitive pauses in breathing, despite continued effort to breathe, and is usually associated with a reduction in blood oxygen saturation. Apneas can last 10 to over 60 seconds. It is caused by narrowing or collapse of the upper airway as muscles relax during sleep. Severity of sleep apnea is determined by frequency of breathing events and their effect on your sleep and oxygen levels determined during sleep testing.   2. What are the consequences of KATIE? Symptoms include: daytime sleepiness- possibly increasing the risk of falling asleep while driving, unrefreshing/restless sleep, snoring, insomnia, waking frequently to urinate, waking with heartburn or reflux, reduced concentration and memory, and morning headaches. Other health consequences may include development of high blood pressure and other cardiovascular disease in persons who are susceptible. Untreated KATIE  can contribute to heart disease, stroke and diabetes.   3. What are the treatment options? In most situations, sleep apnea is a lifelong disease that must be managed with daily therapy. Medications are not effective for sleep apnea and surgery is generally not performed until other therapies have been tried. Therapy is usually tailored to the individual patient based on " many factors including your wishes as well as severity of sleep apnea and severity of obesity. Continuous Positive Airway (CPAP) is the most reliable treatment. An oral device to hold your jaw forward is usually the next most reliable option. Other options include postioning devices (to keep you off your back), weight loss, and surgery including a tongue pacing device. There is more detail about some of these options below.    Central sleep apnea is a disorder in which your breathing repeatedly stops and starts during sleep.  Central sleep apnea occurs because your brain doesn't send proper signals to the muscles that control your breathing. This condition is different from obstructive sleep apnea, in which you can't breathe normally because of upper airway obstruction. Central sleep apnea is less common than obstructive sleep apnea.  Central sleep apnea may occur as a result of other conditions, such as heart failure and stroke. Sleeping at a high altitude and pain medications also may cause central sleep apnea.        1. CPAP-  WHAT DOES IT DO AND HOW CAN I LEARN TO WEAR IT?                               BEFORE I START, CAN I WATCH A MOVIE TO GET A PLAN ON HOW TO USE CPAP?  https://www.Smallable.com/watch?y=p6Q86ce449A      Continuous positive airway pressure, or CPAP, is the most effective treatment for obstructive sleep apnea. It works by blowing room air, through a mask, to hold your throat open. A decision to use CPAP is a major step forward in the pursuit of a healthier life. The successful use of CPAP will help you breathe easier, sleep better and live healthier. You can choose CPAP equipment from any durable medical equipment provider that meets your needs.  Using CPAP can be a positive experience if you keep these garcia points in mind:  1. Commitment  CPAP is not a quick fix for your problem. It involves a long-term commitment to improve your sleep and your health.    2. Communication  Stay in close  "communication with both your sleep doctor and your CPAP supplier. Ask lots of questions and seek help when you need it.    3. Consistency  Use CPAP all night, every night and for every nap. You will receive the maximum health benefits from CPAP when you use it every time that you sleep. This will also make it easier for your body to adjust to the treatment.    4. Correction  The first machine and mask that you try may not be the best ones for you. Work with your sleep doctor and your CPAP supplier to make corrections to your equipment selection. Ask about trying a different type of machine or mask if you have ongoing problems. Make sure that your mask is a good fit and learn to use your equipment properly.    5. Challenge  Tell a family member or close friend to ask you each morning if you used your CPAP the previous night. Have someone to challenge you to give it your best effort.    6. Connection   Your adjustment to CPAP will be easier if you are able to connect with others who use the same treatment. Ask your sleep doctor if there is a support group in your area for people who have sleep apnea, or look for one on the Internet.  7. Comfort   Increase your level of comfort by using a saline spray, decongestant or heated humidifier if CPAP irritates your nose, mouth or throat. Use your unit's \"ramp\" setting to slowly get used to the air pressure level. There may be soft pads you can buy that will fit over your mask straps. Look on www.CPAP.com for accessories that can help make CPAP use more comfortable.  8. Cleaning   Clean your mask, tubing and headgear on a regular basis. Put this time in your schedule so that you don't forget to do it. Check and replace the filters for your CPAP unit and humidifier.    9. Completion   Although you are never finished with CPAP therapy, you should reward yourself by celebrating the completion of your first month of treatment. Expect this first month to be your hardest period of " adjustment. It will involve some trial and error as you find the machine, mask and pressure settings that are right for you.    10. Continuation  After your first month of treatment, continue to make a daily commitment to use your CPAP all night, every night and for every nap.    CPAP-Tips to starting with success:  Begin using your CPAP for short periods of time during the day while you watch TV or read.    Use CPAP every night and for every nap. Using it less often reduces the health benefits and makes it harder for your body to get used to it.    Make small adjustments to your mask, tubing, straps and headgear until you get the right fit. Tightening the mask may actually worsen the leak.  If it leaves significant marks on your face or irritates the bridge of your nose, it may not be the best mask for you.  Speak with the person who supplied the mask and consider trying other masks. Insurances will allow you to try different masks during the first month of starting CPAP.  Insurance also covers a new mask, hose and filter about every 6 months.    Use a saline nasal spray to ease mild nasal congestion. Neti-Pot or saline nasal rinses may also help. Nasal gel sprays can help reduce nasal dryness.  Biotene mouthwash can be helpful to protect your teeth if you experience frequent dry mouth.  Dry mouth may be a sign of air escaping out of your mouth or out of the mask in the case of a full face mask.  Speak with your provider if you expect that is the case.     Take a nasal decongestant to relieve more severe nasal or sinus congestion.  Do not use Afrin (oxymetazoline) nasal spray more than 3 days in a row.  Speak with your sleep doctor if your nasal congestion is chronic.    Use a heated humidifier that fits your CPAP model to enhance your breathing comfort. Adjust the heat setting up if you get a dry nose or throat, down if you get condensation in the hose or mask.  Position the CPAP lower than you so that any  condensation in the hose drains back into the machine rather than towards the mask.    Try a system that uses nasal pillows if traditional masks give you problems.    Clean your mask, tubing and headgear once a week. Make sure the equipment dries fully.    Regularly check and replace the filters for your CPAP unit and humidifier.    Work closely with your sleep provider and your CPAP supplier to make sure that you have the machine, mask and air pressure setting that works best for you. It is better to stop using it and call your provider to solve problems than to lay awake all night frustrated with the device.    BESIDES CPAP, WHAT OTHER THERAPIES ARE THERE?      Positioning Device  Positioning devices are generally used when sleep apnea is mild and only occurs on your back.This example shows a pillow that straps around the waist. It may be appropriate for those whose sleep study shows milder sleep apnea that occurs primarily when lying flat on one's back. Preliminary studies have shown benefit but effectiveness at home may need to be verified by a home sleep test. These devices are generally not covered by medical insurance.                      Oral Appliance  What is oral appliance therapy?  An oral appliance is a small acrylic device that fits over the upper and lower teeth or tongue (similar to an orthodontic retainer or a mouth guard). This device slightly advances the lower jaw or tongue, which moves the base of the tongue forward, opens the airway, improves breathing and can effectively treat snoring and obstructive sleep apnea sleep apnea. The appliance is fabricated and customized by a qualified dentist with experience in treating snoring and sleep apnea. Oral appliances are usually well tolerated and have relatively high compliance by patients1, 2, 3.  When is an oral appliance indicated?  Oral appliance therapy is recommended as a first-line treatment for patients with primary snoring, mild sleep apnea,  and for patients with moderate sleep apnea who prefer appliance therapy to use of CPAP4, 5. Severity of sleep apnea is determined by sleep testing and is based on the number of respiratory events per hour of sleep.   How successful is oral appliance therapy?  The success rate of oral appliance therapy in patients with mild sleep apnea is 75-80% while in patients with moderate sleep apnea it is 50-70%. The chance of success in patients with severe sleep apnea is 40-50%. The research also shows that oral appliances have a beneficial effect on the cardiovascular health of KATIE patients at the same magnitude as CPAP therapy7.  Oral appliances should be a second-line treatment in cases of severe sleep apnea, but if not completely successful then a combination therapy utilizing CPAP plus oral appliance therapy may be effective. Oral appliances tend to be effective in a broad range of patients although studies show that the patients who have the highest success are females, younger patients, those with milder disease, and less severe obesity. 3, 6.   The chances of success are lower in patients who have more severe KATIE, are older, and those who are morbidly obese.     Example of an oral appliance   Finding a dentist that practices dental sleep medicine  Specific training is available through the American Academy of Dental Sleep Medicine for dentists interested in working in the field of sleep. To find a dentist who is educated in the field of sleep and the use of oral appliances, near you, visit the Web site of the American Academy of Dental Sleep Medicine; also see   http://www.accpstorage.org/newOrganization/patients/oralAppliances.pdf  To search for a dentist certified in these practices:  Http://aadsm.org/FindADentist.aspx?1  1. Sury et al. Objectively measured vs self-reported compliance during oral appliance therapy for sleep-disordered breathing. Chest 2013; 144(5): 5717-3701.  2. Faustino et al. Objective  measurement of compliance during oral appliance therapy for sleep-disordered breathing. Thorax 2013; 68(1): 91-96.  3. Brenda et al. Mandibular advancement devices in 620 men and women with KATIE and snoring: tolerability and predictors of treatment success. Chest 2004; 125: 4905-0929.  4. Lena et al. Oral appliances for snoring and KATIE: a review. Sleep 2006; 29: 244-262.  5. Sander et al. Oral appliance treatment for KATIE: an update. J Clin Sleep Med 2014; 10(2): 215-227.  6. Flory et al. Predictors of OSAH treatment outcome. J Dent Res 2007; 86: 7416-1881.    Nasal Valves                 Nasal valves may not be effective if you have frequent nasal congestion or have difficulty breathing through your nose. They may be an option for mild apnea if other options are not well tolerated. The efficacy of these devices is generally less than CPAP or oral appliances.      Weight Loss:    Weight management is a personal decision.  If you are interested in exploring weight loss strategies, the following discussion covers the impact on weight loss on sleep apnea and the approaches that may be successful.    Weight loss decreases severity of sleep apnea in most people with obesity. For those with mild obesity who have developed snoring with weight gain, even 15-30 pound weight loss can improve and occasionally eliminate sleep apnea.  Structured and life-long dietary and health habits are necessary to lose weight and keep healthier weight levels.     Though there may be significant health benefits from weight loss, long-term weight loss is very difficult to achieve- studies show success with dietary management in less than 10% of people. In addition, substantial weight loss may require years of dietary control and may be difficult if patients have severe obesity. In these cases, surgical management may be considered.  Finally, older individuals who have tolerated obesity without health complications may be less  likely to benefit from weight loss strategies.    Your BMI is Body mass index is 42.51 kg/(m^2).  Body mass index (BMI) is one way to tell whether you are at a healthy weight, overweight, or obese. It measures your weight in relation to your height.  A BMI of 18.5 to 24.9 is in the healthy range. A person with a BMI of 25 to 29.9 is considered overweight, and someone with a BMI of 30 or greater is considered obese. More than two-thirds of American adults are considered overweight or obese.  Being overweight or obese increases the risk for further weight gain. Excess weight may lead to heart disease and diabetes.  Creating and following plans for healthy eating and physical activity may help you improve your health.  Weight control is part of healthy lifestyle and includes exercise, emotional health, and healthy eating habits. Careful eating habits lifelong are the mainstay of weight control. Though there are significant health benefits from weight loss, long-term weight loss with diet alone may be very difficult to achieve- studies show long-term success with dietary management in less than 10% of people. Attaining a healthy weight may be especially difficult to achieve in those with severe obesity. In some cases, medications, devices and surgical management might be considered.  What can you do?  If you are overweight or obese and are interested in methods for weight loss, you should discuss this with your provider.     Consider reducing daily calorie intake by 500 calories.     Keep a food journal.     Avoiding skipping meals, consider cutting portions instead.    Diet combined with exercise helps maintain muscle while optimizing fat loss. Strength training is particularly important for building and maintaining muscle mass. Exercise helps reduce stress, increase energy, and improves fitness. Increasing exercise without diet control, however, may not burn enough calories to loose weight.       Start walking three  days a week 10-20 minutes at a time    Work towards walking thirty minutes five days a week     Eventually, increase the speed of your walking for 1-2 minutes at time    In addition, we recommend that you review healthy lifestyles and methods for weight loss available through the National Institutes of Health patient information sites:  http://win.niddk.nih.gov/publications/index.htm    And look into health and wellness programs that may be available through your health insurance provider, employer, local community center, or noemi club.    Weight management plan: Patient was referred to their PCP to discuss a diet and exercise plan.    Surgery:    Upper Airway Surgery for KATIE  Surgery for KATIE is a second-line treatment option in the management of sleep apnea.  Surgery should be considered for patients who are having a difficult time tolerating CPAP.    Surgery for KATIE is directed at areas that are responsible for narrowing or complete obstruction of the airway during sleep.  There are a wide range of procedures available to enlarge and/or stabilize the airway to prevent blockage of breathing in the three major areas where it can occur: the palate, tongue, and nasal regions.  Successful surgical treatment depends on the accurate identification of the factors responsible for obstructive sleep apnea in each person.  A personalized approach is required because there is no single treatment that works well for everyone.  Because of anatomic variation, consultation with an examination by a sleep surgeon is a critical first step in determining what surgical options are best for each patient.  In some cases, examination during sedation may be recommended in order to guide the selection of procedures.  Patients will be counseled about risks and benefits as well as the typical recovery course after surgery. Surgery is typically not a cure for a person s KATIE.  However, surgery will often significantly improve one s KATIE  severity (termed  success rate ).  Even in the absence of a cure, surgery will decrease the cardiovascular risk associated with OSA7; improve overall quality of life8 (sleepiness, functionality, sleep quality, etc).          Palate Procedures:  Patients with KATIE often have narrowing of their airway in the region of their tonsils and uvula.  The goals of palate procedures are to widen the airway in this region as well as to help the tissues resist collapse.  Modern palate procedure techniques focus on tissue conservation and soft tissue rearrangement, rather than tissue removal.  Often the uvula is preserved in this procedure. Residual sleep apnea is common in patient after pharyngoplasty with an average reduction in sleep apnea events of 33%2.      Tongue Procedures:  While patients are awake, the muscles that surround the throat are active and keep this region open for breathing. These muscles relax during sleep, allowing the tongue and other structures to collapse and block breathing.  There are several different tongue procedures available.  Selection of a tongue base procedure depends on characteristics seen on physical exam.  Generally, procedures are aimed at removing bulky tissues in this area or preventing the back of the tongue from falling back during sleep.  Success rates for tongue surgery range from 50-62%3.    Hypoglossal Nerve Stimulation:  Hypoglossal nerve stimulation has recently received approval from the [place] Food and Drug Administration for the treatment of obstructive sleep apnea.  This is based on research showing that the system was safe and effective in treating sleep apnea6.  Results showed that the median AHI score decreased 68%, from 29.3 to 9.0. This therapy uses an implant system that senses breathing patterns and delivers mild stimulation to airway muscles, which keeps the airway open during sleep.  The system consists of three fully implanted components: a small generator  (similar in size to a pacemaker), a breathing sensor, and a stimulation lead.  Using a small handheld remote, a patient turns the therapy on before bed and off upon awakening.    Candidates for this device must be greater than 22 years of age, have moderate to severe KATIE (AHI between 20-65), BMI less than 32, have tried CPAP/oral appliance without success, and have appropriate upper airway anatomy (determined by a sleep endoscopy performed by Dr. Hidalgo).    Hypoglossal Nerve Stimulation Pathway:    The sleep surgeon s office will work with the patient through the insurance prior-authorization process (including communications and appeals).    Nasal Procedures:  Nasal obstruction can interfere with nasal breathing during the day and night.  Studies have shown that relief of nasal obstruction can improve the ability of some patients to tolerate positive airway pressure therapy for obstructive sleep apnea1.  Treatment options include medications such as nasal saline, topical corticosteroid and antihistamine sprays, and oral medications such as antihistamines or decongestants. Non-surgical treatments can include external nasal dilators for selected patients. If these are not successful by themselves, surgery can improve the nasal airway either alone or in combination with these other options.      Combination Procedures:  Combination of surgical procedures and other treatments may be recommended, particularly if patients have more than one area of narrowing or persistent positional disease.  The success rate of combination surgery ranges from 66-80%2,3.      1. Eliana COREA. The Role of the Nose in Snoring and Obstructive Sleep Apnoea: An Update.  Eur Arch Otorhinolaryngol. 2011; 268: 1365-73.  2.  Tesha SM; Clyde JA; Marcial JR; Pallanch JF; Aviva SWIFT; Vinh AUSTIN; Melina KITCHEN. Surgical modifications of the upper airway for obstructive sleep apnea in adults: a systematic review and meta-analysis. SLEEP  2010;33(10):3294-4251. Nataly CONNOR. Hypopharyngeal surgery in obstructive sleep apnea: an evidence-based medicine review.  Arch Otolaryngol Head Neck Surg. 2006 Feb;132(2):206-13.  3. Tom CLEMENTS, Louie Y, John EDITA. The efficacy of anatomically based multilevel surgery for obstructive sleep apnea. Otolaryngol Head Neck Surg. 2003 Oct;129(4):327-35.  4. Kezirian E, Goldberg A. Hypopharyngeal Surgery in Obstructive Sleep Apnea: An Evidence-Based Medicine Review. Arch Otolaryngol Head Neck Surg. 2006 Feb;132(2):206-13.  5. Karena WATTS et al. Upper-Airway Stimulation for Obstructive Sleep Apnea.  N Engl J Med. 2014 Jan 9;370(2):139-49.  6. Dania Y et al. Increased Incidence of Cardiovascular Disease in Middle-aged Men with Obstructive Sleep Apnea. Am J Respir Crit Care Med; 2002 166: 159-165  7. Gisell EM et al. Studying Life Effects and Effectiveness of Palatopharyngoplasty (SLEEP) study: Subjective Outcomes of Isolated Uvulopalatopharyngoplasty. Otolaryngol Head Neck Surg. 2011; 144: 623-631.  8.   Your blood pressure was checked while you were in clinic today.  Please read the guidelines below about what these numbers mean and what you should do about them.  Your systolic blood pressure is the top number.  This is the pressure when the heart is pumping.  Your diastolic blood pressure is the bottom number.  This is the pressure in between beats.  If your systolic blood pressure is less than 120 and your diastolic blood pressure is less than 80, then your blood pressure is normal. There is nothing more that you need to do about it  If your systolic blood pressure is 120-139 or your diastolic blood pressure is 80-89, your blood pressure may be higher than it should be.  You should have your blood pressure re-checked within a year by a primary care provider.  If your systolic blood pressure is 140 or greater or your diastolic blood pressure is 90 or greater, you may have high blood pressure.  High blood pressure is treatable,  but if left untreated over time it can put you at risk for heart attack, stroke, or kidney failure.  You should have your blood pressure re-checked by a primary care provider within the next four weeks.    Restless Leg Syndrome:    Based on the information that you provided today you are likely to have restless leg syndrome that may be interfering with your sleep.  Treatment of restless leg syndrome usually depends on how much it is bothering you.  If it is a major problem then you might want to do something about it.    Here are some treatment options that you might want to consider:   Behavior Changes:   Reduce or eliminate caffeine and alcohol products   Increase the amount of stretching that you do, particularly before bedtime   Sometimes a leg message can be helpful   Some people find that a warm bath or shower in the evening is helpful    We recommend:  Ferritin level ordered    Low iron (below 50 ng/ml) can cause motor restlessness. Your iron [ferritin] level is in a low range that may be causing restlessness.     If ferritin is low, we recommend to follow up your primary care doctor to investigate the source of iron loss or bleeding, including the need for colonoscopy/upper endoscopy.        Good Sleep hygiene tips (American Academy of Sleep Medicine):  Maintain a regular sleep-wake routine    Go to bed at the same time. Wake up at the same time. Ideally, your schedule will remain the same (+/- 20 minutes) every night of the week.  Avoid naps if possible    Naps decrease the  Sleep Debt  that is so necessary for easy sleep onset.    Each of us needs a certain amount of sleep per 24-hour period. We need that amount, and we don t need more than that.    When we take naps, it decreases the amount of sleep that we need the next night   which may cause sleep fragmentation and difficulty initiating sleep, and may lead to insomnia.  Don t stay in bed awake for more than 15-20 minutes (Stimulus control)    If you  find your mind racing, or worrying about not being able to sleep during the middle of the night, get out of bed, and sit in a chair in the dark. Do your mind racing in the chair until you are sleepy, then return to bed. No TV, or phone/Ipad, or computer or internet or eat during these periods! That will just stimulate you more than desired.    If this happens several times during the night, that is OK. Just maintain your regular wake time, and try to avoid naps.  Don t watch TV, phone, computer, video games or read in bed or eat in bed.    When you watch TV or read in bed or eat in bed, you associate the bed with wakefulness.    The bed is reserved for two things   sleep and hanky panky.  Drink caffeinated drinks with caution    The effects of caffeine may last for several hours after ingestion. Caffeine can fragment sleep, and cause difficulty initiating sleep. If you drink caffeine, use it only before noon.    Remember that soda and tea contain caffeine as well.  Avoid inappropriate substances that interfere with sleep    Cigarettes, alcohol, and over-the-counter medications may cause fragmented sleep.  Exercise regularly    Exercise promotes continuous sleep.    Rigorous exercise (close to bedtime cause) circulates endorphins into the body which may cause difficulty initiating sleep.   Have a quiet, comfortable bedroom    Set your bedroom thermostat at a comfortable temperature. Generally, a little cooler is better than a little warmer.    Turn off the TV and other extraneous noise that may disrupt sleep. Background  white noise  like a fan is OK.    If your pets awaken you, keep them outside the bedroom.    Your bedroom should be dark. Turn off bright lights.    Have a comfortable mattress.  If you are a  clock watcher  at night, hide the clock.      Have a comfortable pre-bedtime routine    A warm bath, shower    Meditation, or quiet time    Wind-down 20 minutes prior of bedtime.     Dream enactment behavior  (Suspected REM behavior disorder):   Encourage good sleep hygiene, instructions given.  Reduce/avoid precipitating factors: sleep deprivation, alcohol use.  Environmental precautions is needed: mat under the bed, remove night stand and lamp. Lock guns in safe.

## 2018-03-13 NOTE — PROGRESS NOTES
[facility]  Outpatient Sleep Medicine Consultation  March 13, 2018      Name: Jazlyn Bartlett MRN# [id number]   Age: 52 year old YOB: 1965     Date of Consultation: March 13, 2018  Consultation is requested by: Lokesh Casanova MD  [address] [place] [address]  Primary care provider: Lokesh Casanova  Home clinic: [facility]       Reason for Sleep Consult:     Jazlyn Bartlett is a 52 year old female nightly snoring, gasping, poor quality of sleep and excessive daytime sleepiness and tiredness, acting out her dreams, leg restless and leg kicking, difficulty falling and staying asleep.         Assessment and Plan:     Summary Sleep Diagnoses/Recommendations:    1. Sleep Disturbance/hypersomnolence:  High suspicion of sleep disordered breathing based on patient's symptoms (snoring, excessive daytime sleepiness and tiredness), high BMI, neck circumference and oropharyngeal examination in setting of COPD, dream enactment, comorbid insomnia and restless legs. Will schedule PSG with PAP titration in second half if patient meets criteria for KATIE in first half of PSG with TCM CO2 and 4 limb montage. We also discussed the pathophysiology of sleep disordered breathing and the importance of treating it if S/he should have it. Patient is advised not to drive if he/she feels drowsy or sleepy. Patient is a poor candidate for Home Sleep Testing due to COPD, PLMD, insomnia and dream enactment parasomnia. Follow up after sleep study to discuss the result of sleep study and treatment options.    2. Morbid Obesity:  Counseled regarding weight loss through diet modification and increased physical activity. Patient was given instuctions of weight loss and advised to follow up her PCP for further weight loss interventions.     3. Dream enactment behavior (Suspected REM behavior disorder): may related to untreated sleep disordered breathing  Encourage good sleep hygiene, instructions  given.  Reduce/avoid precipitating factors: sleep deprivation, alcohol use.  Environmental precautions is needed: mat under the bed, remove night stand and lamp. Lock guns in safe.     4.  Insomnia, poor sleep hygiene and insufficient sleep syndrome  is also related untreated sleep disordered breathing, as above, sleep 7-8 hrs and sleep study and good sleep hygiene and stimulus control.    5. Restless legs and leg kicking may periodic leg movement, get sleep study. Get ferritin level too.    Addendum:  Patient's insurance denied in-lab sleep study, will order HST initially, if that fails, consider in-lab sleep study. May use Ambien during HST.      Orders Placed This Encounter   Procedures     Comprehensive Sleep Study     Ferritin     Ambien 5 mg x1    Summary Counseling:  See instructions    Counseling included a comprehensive review of diagnostic and therapeutic strategies as well as risks of inadequate therapy.  Educational materials provided in instructions.    All questions were answered.  The patient indicates understanding of the above issues and agrees with the plan set forth.           History of Present Illness:     Jazlyn Bartlett is a 52 year old female with history of hypertension, COPD, DM type II, GERD, hypothyroidism, chronic pain and kidney stones  who presents to the [facility] in [place] with complains of sleep disturbance. I was asked to see Ms. Bartlett regarding snoring and fatigue by Dr. Casanova.   Patient complains loud snoring, waking up gasping air, excessive daytime sleepiness and tiredness related to frequent awakening at night for over 10 years but getting worse for the last few years. Her sleepiness affects her job and tasks. Patient has morning headache, restless legs, leg kicking, sleep talks and fighting during asleep. She hit her  with arms and legs. She has difficulty falling and staying asleep. She denies witnessed apnea. She does not recall any vivid dreams. She  denies weight gain recently.    Please see below for sleep ROS details.    PREVIOUS IN- LAB or HOME SLEEP STUDIES:  None    SLEEP-WAKE SCHEDULE:     Jazlyn Bartlett     -Describes themself as neither a morning or night person;      -ON WEEKDAYS, goes to sleep at 9:30 PM during the week; awakens  4:00-5:15 AM with an alarm; falls asleep in 60 minutes; has difficulty falling asleep.     -ON WEEKENDS, goes to sleep at 11:00 PM and wakes up at 5:00 AM without an alarm; falls asleep in 60 minutes.       -Awakens 4-5 times a night for 60 minutes before falling back to sleep; awakens to go to the bathroom, uncertain reasons and pain.      -Total sleep time: 4-5 hours per night.    -Naps 1-2 times/days per week       BEDTIME ACTIVITIES AND SHIFT WORK:    Jazlyn Bartlett    -does use electronics in bed and watch TV in bed and does not read in bed.     -does not do shift work.  She works day shifts.      Occupation:      [employer]       SLEEP APNEA: [employer]ang score: 6       INSOMNIA:  Insomnia severity score: 24       SLEEPINESS: [place] sleepiness scale (ESS):13   Drowsy driving yes but no near accidents, she takes bus          PHQ9: N/A    SLEEP COMPLAINTS:   Snoring- 7 days/week  Witness apnea: No  Gasping/Choking: Yes  Excessive daytime sleep: Yes  Toss/turn: Yes  Excessive tiredness/fatigue:  Yes  Morning headaches: Yes  Dry mouth/throat: Yes  Dyspnea: Yes  Coexisting Lung disease: Yes    Coexisting Heart disease: No    Does patient have a bed partner: Yes  Has bed partner been sleeping separately because of snoring:  No            RLS Screen: When you try to relax in the evening or sleep at  night, do you ever have unpleasant, restless feelings in your  legs that can be relieved by walking or movement? Yes    Periodic limb movement: Yes    Narcolepsy:       denies sudden urges of sleep attacks     denies cataplexy     denies sleep paralysis      denies hallucinations     Sleep Behaviors:     denies leg  symptoms/movements     denies motor restlessness     denies night terrors     denies bruxism     denies automatic behaviors    Other subjective complaints:     denies anxiety or rumination      denies pain and discomfort at  night     denies waking up with heart pounding or racing     Yes GERD/heartburn         Parasomnia:   NREM - denies recurrent persistent confusional arousal, night eating, sleep walking or sleep terrors   REM  - she has dream enactment, as above; no injuries.     Safety: None             Medications:     Current Outpatient Prescriptions   Medication Sig     ketoconazole (NIZORAL) 2 % cream Apply topically 2 times daily as needed for itching Apply to AA on the face bid when needed     glipiZIDE (GLIPIZIDE XL) 10 MG 24 hr tablet Take 1 tablet (10 mg) by mouth 2 times daily (with meals)     levothyroxine (SYNTHROID/LEVOTHROID) 150 MCG tablet TAKE 1 TABLET (150 MCG) BY MOUTH DAILY     losartan (COZAAR) 50 MG tablet Take 1 tablet (50 mg) by mouth daily     hydrochlorothiazide (HYDRODIURIL) 25 MG tablet Take 1 tablet (25 mg) by mouth daily     simvastatin (ZOCOR) 20 MG tablet Take 1 tablet (20 mg) by mouth At Bedtime     ipratropium (ATROVENT) 0.06 % spray Spray 2 sprays into both nostrils 2 times daily as needed for rhinitis     albuterol (PROAIR HFA/PROVENTIL HFA/VENTOLIN HFA) 108 (90 BASE) MCG/ACT Inhaler Inhale 2 puffs into the lungs every 6 hours as needed for shortness of breath / dyspnea     ipratropium - albuterol 0.5 mg/2.5 mg/3 mL (DUONEB) 0.5-2.5 (3) MG/3ML neb solution Take 1 vial (3 mLs) by nebulization every 6 hours as needed for shortness of breath / dyspnea or wheezing     tiotropium (SPIRIVA) 18 MCG capsule Inhale 1 capsule (18 mcg) into the lungs daily     budesonide (PULMICORT FLEXHALER) 180 MCG/ACT inhaler Inhale 1 puff into the lungs 2 times daily     Cholecalciferol (VITAMIN D) 2000 UNITS tablet Take 2,000 Units by mouth daily     aspirin 81 MG EC tablet Take 1 tablet (81 mg) by  mouth daily     [DISCONTINUED] glyBURIDE-metFORMIN (GLUCOVANCE) 2.5-500 MG per tablet Take 1 tablet by mouth daily (with breakfast)     No current facility-administered medications for this visit.         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, s/p resection               Past Surgical History:    Yes previous upper airway surgery     Past Surgical History:   Procedure Laterality Date     ABLATION, ENDOMETRIAL, THERMAL, W/O HYSTEROSCOPIC GUIDANCE       DILATION AND CURETTAGE, HYSTEROSCOPY, ABLATE ENDOMETRIUM NOVASURE, COMBINED  10/11/2012    Procedure: COMBINED DILATION AND CURETTAGE, HYSTEROSCOPY, ABLATE ENDOMETRIUM NOVASURE;  COMBINED DILATION AND CURETTAGE, HYSTEROSCOPY, ABLATE ENDOMETRIUM ,pelvic exam under anesthesia;  Surgeon: Shruti Barrios MD;  Location:  OR     ESOPHAGOSCOPY, GASTROSCOPY, DUODENOSCOPY (EGD), COMBINED N/A 1/19/2016    Procedure: COMBINED ESOPHAGOSCOPY, GASTROSCOPY, DUODENOSCOPY (EGD), BIOPSY SINGLE OR MULTIPLE;  Surgeon: Kristofer Molina MD;  Location:  GI     Removal of cancerous lump on neck       TONSILLECTOMY              Social History:     Social History   Substance Use Topics     Smoking status: Current Every Day Smoker     Packs/day: 0.50     Types: Cigarettes     Smokeless tobacco: Never Used     Alcohol use No         Chemical History:     Tobacco: Yes, plan to quit     Uses 3 sodas/day. Last caffeine intake is usually  "before dinner    EtOH: No   Recreational Drugs: No    Psych Hx:   Depression and anxiety    Current dangers to self or others: None           Family History:     Family History   Problem Relation Age of Onset     DIABETES Mother      Breast Cancer Mother      DIABETES Father      Lung Cancer Father         Sleep Family Hx:        RLS- No  KATIE - No  Insomnia - No  Parasomnia - Nephew with sleepwalking         Review of Systems:     A complete 10 point review of systems was negative other than HPI or as commented below:   Patient denies chest pain, abdominal pain, n&v, fever, chills, dysuria, leg pain or swelling.Patient has sinus pain, sore throat, postnasal drip, running nose, dry cough, productive cough, dyspnea with activity and or rest, wheezing, headache, joint and muscle pain, night sweat and weight gain.      Jazlyn Bartlett has gained 0-5 pounds in the last year.            Physical Examination:   /79  Pulse 86  Resp 21  Ht 1.549 m (5' 1\")  Wt 102.1 kg (225 lb)  SpO2 95%  BMI 42.51 kg/m2     Neck Circumference: 42 cm   Constitutional: . Awake, alert, cooperative, in no apparent distress  Mood: euthymic; affect congruent with full range and intensity.  Attention/Concentration:  Normal   Eyes: Pupils round and reactive. No icterus.  ENT: Mallampati Class: IV.   Tonsillar Stage: 0  surgically removed  Clear nasal passages. Enlarged inferior turbinates. No deviated septum.  Oropharynx: No high arched palate. No pharyngeal erythema or exudates, elongated uvula. No lateral narrowing  Tongue: No relative macroglossia   Dentition: fair, missing frontal upper teeth.  Dentures: None  Neck: Supple, no thyroid enlargement.   Cardiovascular: Regular S1 and S2, no gallops or murmurs.   Pulmonary:  Chest symmetric, lungs clear bilaterally and no crackles, wheezes or rales.  Abdomen: Soft, obese, non tender.  Extremities:  No pedal edema.  Muscle/joint: Strength and tone normal   Skin:  No rash or significant " lesions.   Neurologic: Alert, oriented x3, no focal neurological deficit.           Data: All pertinent previous laboratory data reviewed     No results found for: PH, PHARTERIAL, PO2, [id number], SAT, PCO2, HCO3, BASEEXCESS, DONNIE, BEB  Lab Results   Component Value Date    TSH 0.55 02/27/2018    TSH 10.24 (H) 11/07/2017     Lab Results   Component Value Date     (H) 11/07/2017     (H) 05/18/2017     Lab Results   Component Value Date    HGB 15.0 04/30/2017    HGB 14.7 05/22/2016     Lab Results   Component Value Date    BUN 11 11/07/2017    BUN 10 05/18/2017    CR 0.61 11/07/2017    CR 0.71 05/18/2017     Lab Results   Component Value Date    CO2 25 11/07/2017    CO2 27 05/18/2017     No results found for: ASAD      Stress Echocardiography: 1/13/12  This was a normal stress echocardiogram.  Left ventricular cavity size decreases with exercise.  Global LV systolic function augments with exercise.  Normal resting wall motion and no stress-induced wall motion abnormality.    Chest x-ray: No    PFT: 10/3/13  FVC  90%  FEV1 73%  FEV/FVC 80%        Copy to: Lokesh Casanova  Copy to: Lokesh Sierra MD 3/13/2018   [facility] [place] [facility] [address], [place] [address]   [phone] Clinic    Total time spent with patient: 41 minutes with this patient today in which 25 minutes was spent in counseling/coordination of care and going over planned testing and recommendations.

## 2018-03-13 NOTE — NURSING NOTE
"Chief Complaint   Patient presents with     Consult     Snores per family,  Fatigued all the time,  Can fall asleep but not stay asleep.  No SS or cpap       Initial /79  Pulse 86  Resp 21  Ht 1.549 m (5' 1\")  Wt 102.1 kg (225 lb)  SpO2 95%  BMI 42.51 kg/m2 Estimated body mass index is 42.51 kg/(m^2) as calculated from the following:    Height as of this encounter: 1.549 m (5' 1\").    Weight as of this encounter: 102.1 kg (225 lb).  Medication Reconciliation: complete       Neck 42cm  16.5in  Ess 13      Sharron Connell LPN/CMA  "

## 2018-03-13 NOTE — MR AVS SNAPSHOT
"              After Visit Summary   3/13/2018    Jazlyn Bartlett    MRN: [id number]           Patient Information     Date Of Birth          1965        Visit Information        Provider Department      3/13/2018 3:00 PM Rob Sierra MD [facility]        Today's Diagnoses     Snoring    -  1    Sleep disturbance        Hypersomnolence        Morbid obesity with body mass index (BMI) of 40.0 to 44.9 in adult (H)        Insufficient sleep syndrome        Restless legs syndrome (RLS)          Care Instructions    MY TREATMENT INFORMATION FOR SLEEP DISTURBANCE-  Jazlyn HEMAL Dhruv    DOCTOR : Rob Sierra MD  SLEEP CENTER :  [facility]  MY CONTACT NUMBER:[phone]        If I haven't had a sleep study yet, what can I expect?  A personal story from [employer]  https://www.[employer].com/watch?v=AxPLmlRpnCs    Suspected sleep apnea: Sleep study ordered.    Follow up in sleep clinic 1-2 weeks after sleep study to discuss results of sleep study and treatment options.    Patient was advised not to drive if drowsy or sleepy.    Frequently asked questions:  1. What is Obstructive Sleep Apnea (KATIE)? KATIE is the most common type of sleep apnea. Apnea literally means, \"without breath.\" It is characterized by repetitive pauses in breathing, despite continued effort to breathe, and is usually associated with a reduction in blood oxygen saturation. Apneas can last 10 to over 60 seconds. It is caused by narrowing or collapse of the upper airway as muscles relax during sleep. Severity of sleep apnea is determined by frequency of breathing events and their effect on your sleep and oxygen levels determined during sleep testing.   2. What are the consequences of KATIE? Symptoms include: daytime sleepiness- possibly increasing the risk of falling asleep while driving, unrefreshing/restless sleep, snoring, insomnia, waking frequently to urinate, waking with heartburn or reflux, reduced concentration and " memory, and morning headaches. Other health consequences may include development of high blood pressure and other cardiovascular disease in persons who are susceptible. Untreated KATIE  can contribute to heart disease, stroke and diabetes.   3. What are the treatment options? In most situations, sleep apnea is a lifelong disease that must be managed with daily therapy. Medications are not effective for sleep apnea and surgery is generally not performed until other therapies have been tried. Therapy is usually tailored to the individual patient based on many factors including your wishes as well as severity of sleep apnea and severity of obesity. Continuous Positive Airway (CPAP) is the most reliable treatment. An oral device to hold your jaw forward is usually the next most reliable option. Other options include postioning devices (to keep you off your back), weight loss, and surgery including a tongue pacing device. There is more detail about some of these options below.    Central sleep apnea is a disorder in which your breathing repeatedly stops and starts during sleep.  Central sleep apnea occurs because your brain doesn't send proper signals to the muscles that control your breathing. This condition is different from obstructive sleep apnea, in which you can't breathe normally because of upper airway obstruction. Central sleep apnea is less common than obstructive sleep apnea.  Central sleep apnea may occur as a result of other conditions, such as heart failure and stroke. Sleeping at a high altitude and pain medications also may cause central sleep apnea.        1. CPAP-  WHAT DOES IT DO AND HOW CAN I LEARN TO WEAR IT?                               BEFORE I START, CAN I WATCH A MOVIE TO GET A PLAN ON HOW TO USE CPAP?  https://www.Extend Media.com/watch?n=j8A69ct631K      Continuous positive airway pressure, or CPAP, is the most effective treatment for obstructive sleep apnea. It works by blowing room air, through a  "mask, to hold your throat open. A decision to use CPAP is a major step forward in the pursuit of a healthier life. The successful use of CPAP will help you breathe easier, sleep better and live healthier. You can choose CPAP equipment from any durable medical equipment provider that meets your needs.  Using CPAP can be a positive experience if you keep these garcia points in mind:  1. Commitment  CPAP is not a quick fix for your problem. It involves a long-term commitment to improve your sleep and your health.    2. Communication  Stay in close communication with both your sleep doctor and your CPAP supplier. Ask lots of questions and seek help when you need it.    3. Consistency  Use CPAP all night, every night and for every nap. You will receive the maximum health benefits from CPAP when you use it every time that you sleep. This will also make it easier for your body to adjust to the treatment.    4. Correction  The first machine and mask that you try may not be the best ones for you. Work with your sleep doctor and your CPAP supplier to make corrections to your equipment selection. Ask about trying a different type of machine or mask if you have ongoing problems. Make sure that your mask is a good fit and learn to use your equipment properly.    5. Challenge  Tell a family member or close friend to ask you each morning if you used your CPAP the previous night. Have someone to challenge you to give it your best effort.    6. Connection   Your adjustment to CPAP will be easier if you are able to connect with others who use the same treatment. Ask your sleep doctor if there is a support group in your area for people who have sleep apnea, or look for one on the Internet.  7. Comfort   Increase your level of comfort by using a saline spray, decongestant or heated humidifier if CPAP irritates your nose, mouth or throat. Use your unit's \"ramp\" setting to slowly get used to the air pressure level. There may be soft pads " you can buy that will fit over your mask straps. Look on www.CPAP.com for accessories that can help make CPAP use more comfortable.  8. Cleaning   Clean your mask, tubing and headgear on a regular basis. Put this time in your schedule so that you don't forget to do it. Check and replace the filters for your CPAP unit and humidifier.    9. Completion   Although you are never finished with CPAP therapy, you should reward yourself by celebrating the completion of your first month of treatment. Expect this first month to be your hardest period of adjustment. It will involve some trial and error as you find the machine, mask and pressure settings that are right for you.    10. Continuation  After your first month of treatment, continue to make a daily commitment to use your CPAP all night, every night and for every nap.    CPAP-Tips to starting with success:  Begin using your CPAP for short periods of time during the day while you watch TV or read.    Use CPAP every night and for every nap. Using it less often reduces the health benefits and makes it harder for your body to get used to it.    Make small adjustments to your mask, tubing, straps and headgear until you get the right fit. Tightening the mask may actually worsen the leak.  If it leaves significant marks on your face or irritates the bridge of your nose, it may not be the best mask for you.  Speak with the person who supplied the mask and consider trying other masks. Insurances will allow you to try different masks during the first month of starting CPAP.  Insurance also covers a new mask, hose and filter about every 6 months.    Use a saline nasal spray to ease mild nasal congestion. Neti-Pot or saline nasal rinses may also help. Nasal gel sprays can help reduce nasal dryness.  Biotene mouthwash can be helpful to protect your teeth if you experience frequent dry mouth.  Dry mouth may be a sign of air escaping out of your mouth or out of the mask in the case  of a full face mask.  Speak with your provider if you expect that is the case.     Take a nasal decongestant to relieve more severe nasal or sinus congestion.  Do not use Afrin (oxymetazoline) nasal spray more than 3 days in a row.  Speak with your sleep doctor if your nasal congestion is chronic.    Use a heated humidifier that fits your CPAP model to enhance your breathing comfort. Adjust the heat setting up if you get a dry nose or throat, down if you get condensation in the hose or mask.  Position the CPAP lower than you so that any condensation in the hose drains back into the machine rather than towards the mask.    Try a system that uses nasal pillows if traditional masks give you problems.    Clean your mask, tubing and headgear once a week. Make sure the equipment dries fully.    Regularly check and replace the filters for your CPAP unit and humidifier.    Work closely with your sleep provider and your CPAP supplier to make sure that you have the machine, mask and air pressure setting that works best for you. It is better to stop using it and call your provider to solve problems than to lay awake all night frustrated with the device.    BESIDES CPAP, WHAT OTHER THERAPIES ARE THERE?      Positioning Device  Positioning devices are generally used when sleep apnea is mild and only occurs on your back.This example shows a pillow that straps around the waist. It may be appropriate for those whose sleep study shows milder sleep apnea that occurs primarily when lying flat on one's back. Preliminary studies have shown benefit but effectiveness at home may need to be verified by a home sleep test. These devices are generally not covered by medical insurance.                      Oral Appliance  What is oral appliance therapy?  An oral appliance is a small acrylic device that fits over the upper and lower teeth or tongue (similar to an orthodontic retainer or a mouth guard). This device slightly advances the lower  jaw or tongue, which moves the base of the tongue forward, opens the airway, improves breathing and can effectively treat snoring and obstructive sleep apnea sleep apnea. The appliance is fabricated and customized by a qualified dentist with experience in treating snoring and sleep apnea. Oral appliances are usually well tolerated and have relatively high compliance by patients1, 2, 3.  When is an oral appliance indicated?  Oral appliance therapy is recommended as a first-line treatment for patients with primary snoring, mild sleep apnea, and for patients with moderate sleep apnea who prefer appliance therapy to use of CPAP4, 5. Severity of sleep apnea is determined by sleep testing and is based on the number of respiratory events per hour of sleep.   How successful is oral appliance therapy?  The success rate of oral appliance therapy in patients with mild sleep apnea is 75-80% while in patients with moderate sleep apnea it is 50-70%. The chance of success in patients with severe sleep apnea is 40-50%. The research also shows that oral appliances have a beneficial effect on the cardiovascular health of KATIE patients at the same magnitude as CPAP therapy7.  Oral appliances should be a second-line treatment in cases of severe sleep apnea, but if not completely successful then a combination therapy utilizing CPAP plus oral appliance therapy may be effective. Oral appliances tend to be effective in a broad range of patients although studies show that the patients who have the highest success are females, younger patients, those with milder disease, and less severe obesity. 3, 6.   The chances of success are lower in patients who have more severe KATIE, are older, and those who are morbidly obese.     Example of an oral appliance   Finding a dentist that practices dental sleep medicine  Specific training is available through the American Academy of Dental Sleep Medicine for dentists interested in working in the field of  sleep. To find a dentist who is educated in the field of sleep and the use of oral appliances, near you, visit the Web site of the American Academy of Dental Sleep Medicine; also see   http://www.accpstorage.org/newOrganization/patients/oralAppliances.pdf  To search for a dentist certified in these practices:  Http://aadsm.org/FindADentist.aspx?1  1. Sury, et al. Objectively measured vs self-reported compliance during oral appliance therapy for sleep-disordered breathing. Chest 2013; 144(5): 2223-0057.  2. Faustino, et al. Objective measurement of compliance during oral appliance therapy for sleep-disordered breathing. Thorax 2013; 68(1): 91-96.  3. Brenda et al. Mandibular advancement devices in 620 men and women with KATIE and snoring: tolerability and predictors of treatment success. Chest 2004; 125: 8056-7214.  4. Lena, et al. Oral appliances for snoring and KATIE: a review. Sleep 2006; 29: 244-262.  5. Sander et al. Oral appliance treatment for KATIE: an update. J Clin Sleep Med 2014; 10(2): 215-227.  6. Flory, et al. Predictors of OSAH treatment outcome. J Dent Res 2007; 86: 1903-9025.    Nasal Valves                 Nasal valves may not be effective if you have frequent nasal congestion or have difficulty breathing through your nose. They may be an option for mild apnea if other options are not well tolerated. The efficacy of these devices is generally less than CPAP or oral appliances.      Weight Loss:    Weight management is a personal decision.  If you are interested in exploring weight loss strategies, the following discussion covers the impact on weight loss on sleep apnea and the approaches that may be successful.    Weight loss decreases severity of sleep apnea in most people with obesity. For those with mild obesity who have developed snoring with weight gain, even 15-30 pound weight loss can improve and occasionally eliminate sleep apnea.  Structured and life-long dietary and health  habits are necessary to lose weight and keep healthier weight levels.     Though there may be significant health benefits from weight loss, long-term weight loss is very difficult to achieve- studies show success with dietary management in less than 10% of people. In addition, substantial weight loss may require years of dietary control and may be difficult if patients have severe obesity. In these cases, surgical management may be considered.  Finally, older individuals who have tolerated obesity without health complications may be less likely to benefit from weight loss strategies.    Your BMI is Body mass index is 42.51 kg/(m^2).  Body mass index (BMI) is one way to tell whether you are at a healthy weight, overweight, or obese. It measures your weight in relation to your height.  A BMI of 18.5 to 24.9 is in the healthy range. A person with a BMI of 25 to 29.9 is considered overweight, and someone with a BMI of 30 or greater is considered obese. More than two-thirds of American adults are considered overweight or obese.  Being overweight or obese increases the risk for further weight gain. Excess weight may lead to heart disease and diabetes.  Creating and following plans for healthy eating and physical activity may help you improve your health.  Weight control is part of healthy lifestyle and includes exercise, emotional health, and healthy eating habits. Careful eating habits lifelong are the mainstay of weight control. Though there are significant health benefits from weight loss, long-term weight loss with diet alone may be very difficult to achieve- studies show long-term success with dietary management in less than 10% of people. Attaining a healthy weight may be especially difficult to achieve in those with severe obesity. In some cases, medications, devices and surgical management might be considered.  What can you do?  If you are overweight or obese and are interested in methods for weight loss, you  should discuss this with your provider.     Consider reducing daily calorie intake by 500 calories.     Keep a food journal.     Avoiding skipping meals, consider cutting portions instead.    Diet combined with exercise helps maintain muscle while optimizing fat loss. Strength training is particularly important for building and maintaining muscle mass. Exercise helps reduce stress, increase energy, and improves fitness. Increasing exercise without diet control, however, may not burn enough calories to loose weight.       Start walking three days a week 10-20 minutes at a time    Work towards walking thirty minutes five days a week     Eventually, increase the speed of your walking for 1-2 minutes at time    In addition, we recommend that you review healthy lifestyles and methods for weight loss available through the National Institutes of Health patient information sites:  http://win.niddk.nih.gov/publications/index.htm    And look into health and wellness programs that may be available through your health insurance provider, employer, local community center, or noemi club.    Weight management plan: Patient was referred to their PCP to discuss a diet and exercise plan.    Surgery:    Upper Airway Surgery for KATIE  Surgery for KATIE is a second-line treatment option in the management of sleep apnea.  Surgery should be considered for patients who are having a difficult time tolerating CPAP.    Surgery for KATIE is directed at areas that are responsible for narrowing or complete obstruction of the airway during sleep.  There are a wide range of procedures available to enlarge and/or stabilize the airway to prevent blockage of breathing in the three major areas where it can occur: the palate, tongue, and nasal regions.  Successful surgical treatment depends on the accurate identification of the factors responsible for obstructive sleep apnea in each person.  A personalized approach is required because there is no single  treatment that works well for everyone.  Because of anatomic variation, consultation with an examination by a sleep surgeon is a critical first step in determining what surgical options are best for each patient.  In some cases, examination during sedation may be recommended in order to guide the selection of procedures.  Patients will be counseled about risks and benefits as well as the typical recovery course after surgery. Surgery is typically not a cure for a person s KATIE.  However, surgery will often significantly improve one s KATIE severity (termed  success rate ).  Even in the absence of a cure, surgery will decrease the cardiovascular risk associated with OSA7; improve overall quality of life8 (sleepiness, functionality, sleep quality, etc).          Palate Procedures:  Patients with KATIE often have narrowing of their airway in the region of their tonsils and uvula.  The goals of palate procedures are to widen the airway in this region as well as to help the tissues resist collapse.  Modern palate procedure techniques focus on tissue conservation and soft tissue rearrangement, rather than tissue removal.  Often the uvula is preserved in this procedure. Residual sleep apnea is common in patient after pharyngoplasty with an average reduction in sleep apnea events of 33%2.      Tongue Procedures:  While patients are awake, the muscles that surround the throat are active and keep this region open for breathing. These muscles relax during sleep, allowing the tongue and other structures to collapse and block breathing.  There are several different tongue procedures available.  Selection of a tongue base procedure depends on characteristics seen on physical exam.  Generally, procedures are aimed at removing bulky tissues in this area or preventing the back of the tongue from falling back during sleep.  Success rates for tongue surgery range from 50-62%3.    Hypoglossal Nerve Stimulation:  Hypoglossal nerve  stimulation has recently received approval from the [place] Food and Drug Administration for the treatment of obstructive sleep apnea.  This is based on research showing that the system was safe and effective in treating sleep apnea6.  Results showed that the median AHI score decreased 68%, from 29.3 to 9.0. This therapy uses an implant system that senses breathing patterns and delivers mild stimulation to airway muscles, which keeps the airway open during sleep.  The system consists of three fully implanted components: a small generator (similar in size to a pacemaker), a breathing sensor, and a stimulation lead.  Using a small handheld remote, a patient turns the therapy on before bed and off upon awakening.    Candidates for this device must be greater than 22 years of age, have moderate to severe KATIE (AHI between 20-65), BMI less than 32, have tried CPAP/oral appliance without success, and have appropriate upper airway anatomy (determined by a sleep endoscopy performed by Dr. Hidalgo).    Hypoglossal Nerve Stimulation Pathway:    The sleep surgeon s office will work with the patient through the insurance prior-authorization process (including communications and appeals).    Nasal Procedures:  Nasal obstruction can interfere with nasal breathing during the day and night.  Studies have shown that relief of nasal obstruction can improve the ability of some patients to tolerate positive airway pressure therapy for obstructive sleep apnea1.  Treatment options include medications such as nasal saline, topical corticosteroid and antihistamine sprays, and oral medications such as antihistamines or decongestants. Non-surgical treatments can include external nasal dilators for selected patients. If these are not successful by themselves, surgery can improve the nasal airway either alone or in combination with these other options.      Combination Procedures:  Combination of surgical procedures and other treatments may  be recommended, particularly if patients have more than one area of narrowing or persistent positional disease.  The success rate of combination surgery ranges from 66-80%2,3.      1. Eliana COREA. The Role of the Nose in Snoring and Obstructive Sleep Apnoea: An Update.  Eur Arch Otorhinolaryngol. 2011; 268: 1365-73.  2.  Tesha SM; Clyde JA; Marcial JR; Pallanch JF; Aviva MB; Vinh SG; Melina KITCHEN. Surgical modifications of the upper airway for obstructive sleep apnea in adults: a systematic review and meta-analysis. SLEEP 2010;33(10):2230-6107. Nataly CONNOR. Hypopharyngeal surgery in obstructive sleep apnea: an evidence-based medicine review.  Arch Otolaryngol Head Neck Surg. 2006 Feb;132(2):206-13.  3. Tom LEEH1, Louie Y, John EDITA. The efficacy of anatomically based multilevel surgery for obstructive sleep apnea. Otolaryngol Head Neck Surg. 2003 Oct;129(4):327-35.  4. Nataly CONNOR, Goldberg A. Hypopharyngeal Surgery in Obstructive Sleep Apnea: An Evidence-Based Medicine Review. Arch Otolaryngol Head Neck Surg. 2006 Feb;132(2):206-13.  5. Karena WATTS et al. Upper-Airway Stimulation for Obstructive Sleep Apnea.  N Engl J Med. 2014 Jan 9;370(2):139-49.  6. Dania Y et al. Increased Incidence of Cardiovascular Disease in Middle-aged Men with Obstructive Sleep Apnea. Am J Respir Crit Care Med; 2002 166: 159-165  7. Jameson EM et al. Studying Life Effects and Effectiveness of Palatopharyngoplasty (SLEEP) study: Subjective Outcomes of Isolated Uvulopalatopharyngoplasty. Otolaryngol Head Neck Surg. 2011; 144: 623-631.  8.   Your blood pressure was checked while you were in clinic today.  Please read the guidelines below about what these numbers mean and what you should do about them.  Your systolic blood pressure is the top number.  This is the pressure when the heart is pumping.  Your diastolic blood pressure is the bottom number.  This is the pressure in between beats.  If your systolic blood pressure is less than 120 and your  diastolic blood pressure is less than 80, then your blood pressure is normal. There is nothing more that you need to do about it  If your systolic blood pressure is 120-139 or your diastolic blood pressure is 80-89, your blood pressure may be higher than it should be.  You should have your blood pressure re-checked within a year by a primary care provider.  If your systolic blood pressure is 140 or greater or your diastolic blood pressure is 90 or greater, you may have high blood pressure.  High blood pressure is treatable, but if left untreated over time it can put you at risk for heart attack, stroke, or kidney failure.  You should have your blood pressure re-checked by a primary care provider within the next four weeks.    Restless Leg Syndrome:    Based on the information that you provided today you are likely to have restless leg syndrome that may be interfering with your sleep.  Treatment of restless leg syndrome usually depends on how much it is bothering you.  If it is a major problem then you might want to do something about it.    Here are some treatment options that you might want to consider:   Behavior Changes:   Reduce or eliminate caffeine and alcohol products   Increase the amount of stretching that you do, particularly before bedtime   Sometimes a leg message can be helpful   Some people find that a warm bath or shower in the evening is helpful    We recommend:  Ferritin level ordered    Low iron (below 50 ng/ml) can cause motor restlessness. Your iron [ferritin] level is in a low range that may be causing restlessness.     If ferritin is low, we recommend to follow up your primary care doctor to investigate the source of iron loss or bleeding, including the need for colonoscopy/upper endoscopy.        Good Sleep hygiene tips (American Academy of Sleep Medicine):  Maintain a regular sleep-wake routine    Go to bed at the same time. Wake up at the same time. Ideally, your schedule will remain the  same (+/- 20 minutes) every night of the week.  Avoid naps if possible    Naps decrease the  Sleep Debt  that is so necessary for easy sleep onset.    Each of us needs a certain amount of sleep per 24-hour period. We need that amount, and we don t need more than that.    When we take naps, it decreases the amount of sleep that we need the next night - which may cause sleep fragmentation and difficulty initiating sleep, and may lead to insomnia.  Don t stay in bed awake for more than 15-20 minutes (Stimulus control)    If you find your mind racing, or worrying about not being able to sleep during the middle of the night, get out of bed, and sit in a chair in the dark. Do your mind racing in the chair until you are sleepy, then return to bed. No TV, or phone/Ipad, or computer or internet or eat during these periods! That will just stimulate you more than desired.    If this happens several times during the night, that is OK. Just maintain your regular wake time, and try to avoid naps.  Don t watch TV, phone, computer, video games or read in bed or eat in bed.    When you watch TV or read in bed or eat in bed, you associate the bed with wakefulness.    The bed is reserved for two things - sleep and hanky panky.  Drink caffeinated drinks with caution    The effects of caffeine may last for several hours after ingestion. Caffeine can fragment sleep, and cause difficulty initiating sleep. If you drink caffeine, use it only before noon.    Remember that soda and tea contain caffeine as well.  Avoid inappropriate substances that interfere with sleep    Cigarettes, alcohol, and over-the-counter medications may cause fragmented sleep.  Exercise regularly    Exercise promotes continuous sleep.    Rigorous exercise (close to bedtime cause) circulates endorphins into the body which may cause difficulty initiating sleep.   Have a quiet, comfortable bedroom    Set your bedroom thermostat at a comfortable temperature. Generally, a  little cooler is better than a little warmer.    Turn off the TV and other extraneous noise that may disrupt sleep. Background  white noise  like a fan is OK.    If your pets awaken you, keep them outside the bedroom.    Your bedroom should be dark. Turn off bright lights.    Have a comfortable mattress.  If you are a  clock watcher  at night, hide the clock.      Have a comfortable pre-bedtime routine    A warm bath, shower    Meditation, or quiet time    Wind-down 20 minutes prior of bedtime.     Dream enactment behavior (Suspected REM behavior disorder):   Encourage good sleep hygiene, instructions given.  Reduce/avoid precipitating factors: sleep deprivation, alcohol use.  Environmental precautions is needed: mat under the bed, remove night stand and lamp. Lock guns in safe.                         Follow-ups after your visit        Future tests that were ordered for you today     Open Future Orders        Priority Expected Expires Ordered    Comprehensive Sleep Study Routine  9/9/2018 3/13/2018    Ferritin Routine  5/12/2018 3/13/2018            Who to contact     If you have questions or need follow up information about today's clinic visit or your schedule please contact [facility] directly at [phone].  Normal or non-critical lab and imaging results will be communicated to you by MyChart, letter or phone within 4 business days after the clinic has received the results. If you do not hear from us within 7 days, please contact the clinic through indenihart or phone. If you have a critical or abnormal lab result, we will notify you by phone as soon as possible.  Submit refill requests through Scint-X or call your pharmacy and they will forward the refill request to us. Please allow 3 business days for your refill to be completed.          Additional Information About Your Visit        indenihart Information     Scint-X gives you secure access to your electronic health record. If you see a  "primary care provider, you can also send messages to your care team and make appointments. If you have questions, please call your primary care clinic.  If you do not have a primary care provider, please call [phone] and they will assist you.        Care EveryWhere ID     This is your Care EveryWhere ID. This could be used by other organizations to access your [facility] medical records  [id number]        Your Vitals Were     Pulse Respirations Height Pulse Oximetry BMI (Body Mass Index)       86 21 1.549 m (5' 1\") 95% 42.51 kg/m2        Blood Pressure from Last 3 Encounters:   03/13/18 125/79   02/27/18 128/76   11/28/17 136/72    Weight from Last 3 Encounters:   03/13/18 102.1 kg (225 lb)   02/27/18 101.6 kg (224 lb)   11/28/17 103.9 kg (229 lb)              We Performed the Following     SLEEP EVALUATION & MANAGEMENT REFERRAL - ADULT -[facility]  [phone] (Age 18 and up)          Today's Medication Changes          These changes are accurate as of 3/13/18  3:34 PM.  If you have any questions, ask your nurse or doctor.               Start taking these medicines.        Dose/Directions    zolpidem 5 MG tablet   Commonly known as:  AMBIEN   Used for:  Sleep disturbance        Take tablet by mouth 15 minutes prior to sleep, for Sleep Study   Quantity:  1 tablet   Refills:  0            Where to get your medicines      Some of these will need a paper prescription and others can be bought over the counter.  Ask your nurse if you have questions.     Bring a paper prescription for each of these medications     zolpidem 5 MG tablet                Primary Care Provider Office Phone # Fax #    Lokesh Casanova [phone]       [address]  [place] [address]        Goals        Result Component    A1C < 7.0     Related Problems    Type 2 diabetes mellitus without complication (H)      Equal Access to Services     SHIRLEY STOKES AH: Marga Kwon, " waclarissaabdi west, qaybta kaeduin michael, jenifer mercado maamediane gatesaaautumn ah. So [facility] [phone].    ATENCIÓN: Si linda blanco, tiene a tracey disposición servicios gratuitos de asistencia lingüística. Enrique al [phone].    We comply with applicable federal civil rights laws and [place] laws. We do not discriminate on the basis of race, color, national origin, age, disability, sex, sexual orientation, or gender identity.            Thank you!     Thank you for choosing [facility]  for your care. Our goal is always to provide you with excellent care. Hearing back from our patients is one way we can continue to improve our services. Please take a few minutes to complete the written survey that you may receive in the mail after your visit with us. Thank you!             Your Updated Medication List - Protect others around you: Learn how to safely use, store and throw away your medicines at www.disposemymeds.org.          This list is accurate as of 3/13/18  3:34 PM.  Always use your most recent med list.                   Brand Name Dispense Instructions for use Diagnosis    albuterol 108 (90 BASE) MCG/ACT Inhaler    PROAIR HFA/PROVENTIL HFA/VENTOLIN HFA    3 Inhaler    Inhale 2 puffs into the lungs every 6 hours as needed for shortness of breath / dyspnea    Chronic obstructive pulmonary disease, unspecified COPD type (H)       aspirin 81 MG EC tablet     100 tablet    Take 1 tablet (81 mg) by mouth daily    Hypertension goal BP (blood pressure) < 140/80       budesonide 180 MCG/ACT inhaler    PULMICORT FLEXHALER    3 Inhaler    Inhale 1 puff into the lungs 2 times daily    Chronic obstructive pulmonary disease, unspecified COPD type (H)       glipiZIDE 10 MG 24 hr tablet    glipiZIDE XL    180 tablet    Take 1 tablet (10 mg) by mouth 2 times daily (with meals)    Type 2 diabetes mellitus without complication, without long-term current use of insulin (H)       hydrochlorothiazide  25 MG tablet    HYDRODIURIL    90 tablet    Take 1 tablet (25 mg) by mouth daily    Hypertension goal BP (blood pressure) < 140/80       ipratropium - albuterol 0.5 mg/2.5 mg/3 mL 0.5-2.5 (3) MG/3ML neb solution    DUONEB    1 vial    Take 1 vial (3 mLs) by nebulization every 6 hours as needed for shortness of breath / dyspnea or wheezing    Chronic obstructive pulmonary disease, unspecified COPD type (H)       ipratropium 0.06 % spray    ATROVENT    1 Box    Spray 2 sprays into both nostrils 2 times daily as needed for rhinitis    Chronic obstructive pulmonary disease, unspecified COPD type (H)       ketoconazole 2 % cream    NIZORAL    15 g    Apply topically 2 times daily as needed for itching Apply to AA on the face bid when needed    Seborrheic dermatitis       levothyroxine 150 MCG tablet    SYNTHROID/LEVOTHROID    90 tablet    TAKE 1 TABLET (150 MCG) BY MOUTH DAILY    Hypothyroidism, unspecified type       losartan 50 MG tablet    COZAAR    90 tablet    Take 1 tablet (50 mg) by mouth daily    Hypertension goal BP (blood pressure) < 140/80       simvastatin 20 MG tablet    ZOCOR    90 tablet    Take 1 tablet (20 mg) by mouth At Bedtime    Hyperlipidemia LDL goal <100       tiotropium 18 MCG capsule    SPIRIVA    90 capsule    Inhale 1 capsule (18 mcg) into the lungs daily    Chronic obstructive pulmonary disease, unspecified COPD type (H)       vitamin D 2000 UNITS tablet     100 tablet    Take 2,000 Units by mouth daily    Vitamin D deficiency disease       zolpidem 5 MG tablet    AMBIEN    1 tablet    Take tablet by mouth 15 minutes prior to sleep, for Sleep Study    Sleep disturbance

## 2018-03-20 ENCOUNTER — OFFICE VISIT (OUTPATIENT)
Dept: FAMILY MEDICINE | Facility: CLINIC | Age: 53
End: 2018-03-20
Payer: COMMERCIAL

## 2018-03-20 ENCOUNTER — RADIANT APPOINTMENT (OUTPATIENT)
Dept: GENERAL RADIOLOGY | Facility: CLINIC | Age: 53
End: 2018-03-20
Attending: FAMILY MEDICINE
Payer: COMMERCIAL

## 2018-03-20 VITALS
SYSTOLIC BLOOD PRESSURE: 132 MMHG | BODY MASS INDEX: 42.7 KG/M2 | HEART RATE: 81 BPM | WEIGHT: 226 LBS | DIASTOLIC BLOOD PRESSURE: 87 MMHG | TEMPERATURE: 97 F | OXYGEN SATURATION: 96 %

## 2018-03-20 DIAGNOSIS — R07.89 CHEST WALL PAIN: ICD-10-CM

## 2018-03-20 DIAGNOSIS — G25.81 RESTLESS LEGS SYNDROME (RLS): ICD-10-CM

## 2018-03-20 DIAGNOSIS — M25.549 METACARPOPHALANGEAL JOINT PAIN, UNSPECIFIED LATERALITY: ICD-10-CM

## 2018-03-20 DIAGNOSIS — N62 LARGE BREASTS: ICD-10-CM

## 2018-03-20 DIAGNOSIS — M25.549 METACARPOPHALANGEAL JOINT PAIN, UNSPECIFIED LATERALITY: Primary | ICD-10-CM

## 2018-03-20 LAB — ERYTHROCYTE [SEDIMENTATION RATE] IN BLOOD BY WESTERGREN METHOD: 17 MM/H (ref 0–30)

## 2018-03-20 PROCEDURE — 73130 X-RAY EXAM OF HAND: CPT | Mod: 59

## 2018-03-20 PROCEDURE — 86225 DNA ANTIBODY NATIVE: CPT | Performed by: FAMILY MEDICINE

## 2018-03-20 PROCEDURE — 86431 RHEUMATOID FACTOR QUANT: CPT | Performed by: FAMILY MEDICINE

## 2018-03-20 PROCEDURE — 86140 C-REACTIVE PROTEIN: CPT | Performed by: FAMILY MEDICINE

## 2018-03-20 PROCEDURE — 86235 NUCLEAR ANTIGEN ANTIBODY: CPT | Performed by: FAMILY MEDICINE

## 2018-03-20 PROCEDURE — 82728 ASSAY OF FERRITIN: CPT | Performed by: INTERNAL MEDICINE

## 2018-03-20 PROCEDURE — 85652 RBC SED RATE AUTOMATED: CPT | Performed by: FAMILY MEDICINE

## 2018-03-20 PROCEDURE — 36415 COLL VENOUS BLD VENIPUNCTURE: CPT | Performed by: FAMILY MEDICINE

## 2018-03-20 PROCEDURE — 99214 OFFICE O/P EST MOD 30 MIN: CPT | Performed by: FAMILY MEDICINE

## 2018-03-20 PROCEDURE — 86200 CCP ANTIBODY: CPT | Performed by: FAMILY MEDICINE

## 2018-03-20 PROCEDURE — 86038 ANTINUCLEAR ANTIBODIES: CPT | Performed by: FAMILY MEDICINE

## 2018-03-20 ASSESSMENT — ANXIETY QUESTIONNAIRES
3. WORRYING TOO MUCH ABOUT DIFFERENT THINGS: NEARLY EVERY DAY
7. FEELING AFRAID AS IF SOMETHING AWFUL MIGHT HAPPEN: MORE THAN HALF THE DAYS
IF YOU CHECKED OFF ANY PROBLEMS ON THIS QUESTIONNAIRE, HOW DIFFICULT HAVE THESE PROBLEMS MADE IT FOR YOU TO DO YOUR WORK, TAKE CARE OF THINGS AT HOME, OR GET ALONG WITH OTHER PEOPLE: NOT DIFFICULT AT ALL
GAD7 TOTAL SCORE: 17
5. BEING SO RESTLESS THAT IT IS HARD TO SIT STILL: SEVERAL DAYS
6. BECOMING EASILY ANNOYED OR IRRITABLE: NEARLY EVERY DAY
1. FEELING NERVOUS, ANXIOUS, OR ON EDGE: NEARLY EVERY DAY
2. NOT BEING ABLE TO STOP OR CONTROL WORRYING: MORE THAN HALF THE DAYS

## 2018-03-20 ASSESSMENT — PATIENT HEALTH QUESTIONNAIRE - PHQ9: 5. POOR APPETITE OR OVEREATING: NEARLY EVERY DAY

## 2018-03-20 NOTE — MR AVS SNAPSHOT
After Visit Summary   3/20/2018    Jazlyn Bartlett    MRN: [id number]           Patient Information     Date Of Birth          1965        Visit Information        Provider Department      3/20/2018 5:00 PM Lokesh Casanova MD [facility] [place]        Today's Diagnoses     Metacarpophalangeal joint pain, unspecified laterality    -  1    Large breasts        Chest wall pain           Follow-ups after your visit        Additional Services     PLASTIC SURGERY REFERRAL       Your provider has referred you to: [facility] [place] [phone]   [email or web address]    Please be aware that coverage of these services is subject to the terms and limitations of your health insurance plan.  Call member services at your health plan with any benefit or coverage questions.      Please bring the following with you to your appointment:    (1) Any X-Rays, CTs or MRIs which have been performed.  Contact the facility where they were done to arrange for  prior to your scheduled appointment.    (2) List of current medications  (3) This referral request   (4) Any documents/labs given to you for this referral                  Follow-up notes from your care team     Return in about 3 months (around 6/5/2018) for Diabetes Recheck, Mood Recheck.      Your next 10 appointments already scheduled     May 04, 2018  8:30 PM CDT   Psg Split W/Tcm with BED 5 SH SLEEP   [facility] ([facility])    [address]  [place] [id number] [phone]            May 10, 2018  5:00 PM CDT   Return Sleep Patient with Rob Sierra MD   [facility] ([facility])    [address]  [place] [id number] [phone]            Jun 05, 2018  5:20 PM CDT   Office Visit with Lokesh Casanova MD   [facility] [place] ([facility] [place])    [address]  [facility]  [place] [address] [phone]           Bring a current list of meds and any records pertaining to this visit. For Physicals, please bring immunization records and any forms needing to be filled out. Please arrive 10 minutes early to complete paperwork.              Who to contact     If you have questions or need follow up information about today's clinic visit or your schedule please contact [facility] [place] directly at [phone].  Normal or non-critical lab and imaging results will be communicated to you by Secrettehart, letter or phone within 4 business days after the clinic has received the results. If you do not hear from us within 7 days, please contact the clinic through Zazzlet or phone. If you have a critical or abnormal lab result, we will notify you by phone as soon as possible.  Submit refill requests through NaPopravku or call your pharmacy and they will forward the refill request to us. Please allow 3 business days for your refill to be completed.          Additional Information About Your Visit        Secrettehart Information     NaPopravku gives you secure access to your electronic health record. If you see a primary care provider, you can also send messages to your care team and make appointments. If you have questions, please call your primary care clinic.  If you do not have a primary care provider, please call [phone] and they will assist you.        Care EveryWhere ID     This is your Care EveryWhere ID. This could be used by other organizations to access your [facility] medical records  [id number]        Your Vitals Were     Pulse Temperature Pulse Oximetry BMI (Body Mass Index)          81 97  F (36.1  C) (Oral) 96% 42.7 kg/m2         Blood Pressure from Last 3 Encounters:   03/20/18 132/87   03/13/18 125/79   02/27/18 128/76    Weight from Last 3 Encounters:   03/20/18 226 lb (102.5 kg)   03/13/18 225 lb (102.1 kg)   02/27/18 224 lb (101.6 kg)              We  Performed the Following     Anti Nuclear Hilda IgG by IFA with Reflex     CRP inflammation     Cyclic Citrullinated Peptide Antibody IgG     DNA double stranded antibodies     Erythrocyte sedimentation rate auto     PLASTIC SURGERY REFERRAL     Rheumatoid factor     [place] MIRIAM Antibody IgG        Primary Care Provider Office Phone # Fax #    Lokesh Casanova [phone]       [address]  [place] [address]        Goals        Result Component    A1C < 7.0     Related Problems    Type 2 diabetes mellitus without complication (H)      Equal Access to Services     SHIRLEY STOKES : Hadii aad ku hadasho Soomaali, waaxda luqadaha, qaybta kaalmada adeegyada, waxay idiin hayaan adeeg kharash la'aan . So [facility] [phone].    ATENCIÓN: Si habla español, tiene a tracey disposición servicios gratuitos de asistencia lingüística. [facility] [phone].    We comply with applicable federal civil rights laws and [place] laws. We do not discriminate on the basis of race, color, national origin, age, disability, sex, sexual orientation, or gender identity.            Thank you!     [facility]. Our goal is always to provide you with excellent care. Hearing back from our patients is one way we can continue to improve our services. Please take a few minutes to complete the written survey that you may receive in the mail after your visit with us. Thank you!             Your Updated Medication List - Protect others around you: Learn how to safely use, store and throw away your medicines at www.disposemymeds.org.          This list is accurate as of 3/20/18  6:14 PM.  Always use your most recent med list.                   Brand Name Dispense Instructions for use Diagnosis    albuterol 108 (90 BASE) MCG/ACT Inhaler    PROAIR HFA/PROVENTIL HFA/VENTOLIN HFA    3 Inhaler    Inhale 2 puffs into the lungs every 6 hours as needed for shortness of breath / dyspnea     Chronic obstructive pulmonary disease, unspecified COPD type (H)       aspirin 81 MG EC tablet     100 tablet    Take 1 tablet (81 mg) by mouth daily    Hypertension goal BP (blood pressure) < 140/80       budesonide 180 MCG/ACT inhaler    PULMICORT FLEXHALER    3 Inhaler    Inhale 1 puff into the lungs 2 times daily    Chronic obstructive pulmonary disease, unspecified COPD type (H)       glipiZIDE 10 MG 24 hr tablet    glipiZIDE XL    180 tablet    Take 1 tablet (10 mg) by mouth 2 times daily (with meals)    Type 2 diabetes mellitus without complication, without long-term current use of insulin (H)       hydrochlorothiazide 25 MG tablet    HYDRODIURIL    90 tablet    Take 1 tablet (25 mg) by mouth daily    Hypertension goal BP (blood pressure) < 140/80       ipratropium - albuterol 0.5 mg/2.5 mg/3 mL 0.5-2.5 (3) MG/3ML neb solution    DUONEB    1 vial    Take 1 vial (3 mLs) by nebulization every 6 hours as needed for shortness of breath / dyspnea or wheezing    Chronic obstructive pulmonary disease, unspecified COPD type (H)       ipratropium 0.06 % spray    ATROVENT    1 Box    Spray 2 sprays into both nostrils 2 times daily as needed for rhinitis    Chronic obstructive pulmonary disease, unspecified COPD type (H)       ketoconazole 2 % cream    NIZORAL    15 g    Apply topically 2 times daily as needed for itching Apply to AA on the face bid when needed    Seborrheic dermatitis       levothyroxine 150 MCG tablet    SYNTHROID/LEVOTHROID    90 tablet    TAKE 1 TABLET (150 MCG) BY MOUTH DAILY    Hypothyroidism, unspecified type       losartan 50 MG tablet    COZAAR    90 tablet    Take 1 tablet (50 mg) by mouth daily    Hypertension goal BP (blood pressure) < 140/80       simvastatin 20 MG tablet    ZOCOR    90 tablet    Take 1 tablet (20 mg) by mouth At Bedtime    Hyperlipidemia LDL goal <100       tiotropium 18 MCG capsule    SPIRIVA    90 capsule    Inhale 1 capsule (18 mcg) into the lungs daily    Chronic  obstructive pulmonary disease, unspecified COPD type (H)       vitamin D 2000 UNITS tablet     100 tablet    Take 2,000 Units by mouth daily    Vitamin D deficiency disease       zolpidem 5 MG tablet    AMBIEN    1 tablet    Take tablet by mouth 15 minutes prior to sleep, for Sleep Study    Sleep disturbance

## 2018-03-20 NOTE — PROGRESS NOTES
SUBJECTIVE:   Jazlyn Bartlett is a 52 year old female who presents to clinic today for the following health issues:      Joint Pain    Onset:  Few months     Description:   Location: both hands   Character: Sharp    Intensity: severe    Progression of Symptoms: worse    Accompanying Signs & Symptoms:  Other symptoms: none    History:   Previous similar pain: YES      Precipitating factors:   Trauma or overuse: YES typing at work all day     Alleviating factors:  Improved by: nothing    Therapies Tried and outcome: ice      Also c/o large breasts and due to which she has chest wall pain. Would like to get breast reduction.           Problem list and histories reviewed & adjusted, as indicated.  Additional history: as documented    Patient Active Problem List   Diagnosis     Neck mass     Moderate major depression (H)     Anxiety     Vitamin D deficiency disease     CARDIOVASCULAR SCREENING; LDL GOAL LESS THAN 100     GERD (gastroesophageal reflux disease)     Hypertension goal BP (blood pressure) < 140/80     Type 2 diabetes mellitus without complication (H)     Chronic airway obstruction (H)     Advanced directives, counseling/discussion     Autoimmune gastritis- repeat UGI 1/16 with gastritis without eosinophils     Urinary incontinence     Osteoarthritis of patellofemoral joint     Morbid obesity (H)     Tobacco abuse     Lumbar radiculopathy     Hyperlipidemia LDL goal <100     Hypothyroidism, unspecified type     Chronic obstructive pulmonary disease, unspecified COPD type (H)     Past Surgical History:   Procedure Laterality Date     ABLATION, ENDOMETRIAL, THERMAL, W/O HYSTEROSCOPIC GUIDANCE       DILATION AND CURETTAGE, HYSTEROSCOPY, ABLATE ENDOMETRIUM NOVASURE, COMBINED  10/11/2012    Procedure: COMBINED DILATION AND CURETTAGE, HYSTEROSCOPY, ABLATE ENDOMETRIUM NOVASURE;  COMBINED DILATION AND CURETTAGE, HYSTEROSCOPY, ABLATE ENDOMETRIUM ,pelvic exam under anesthesia;  Surgeon: Shruti Barrios MD;   Location: [facility] OR     ESOPHAGOSCOPY, GASTROSCOPY, DUODENOSCOPY (EGD), COMBINED N/A 1/19/2016    Procedure: COMBINED ESOPHAGOSCOPY, GASTROSCOPY, DUODENOSCOPY (EGD), BIOPSY SINGLE OR MULTIPLE;  Surgeon: Kristofer Molina MD;  Location: [facility]     Removal of cancerous lump on neck       TONSILLECTOMY         Social History   Substance Use Topics     Smoking status: Current Every Day Smoker     Packs/day: 0.50     Types: Cigarettes     Smokeless tobacco: Never Used     Alcohol use No     Family History   Problem Relation Age of Onset     DIABETES Mother      Breast Cancer Mother      DIABETES Father      Lung Cancer Father      Rheumatoid Arthritis Father          Current Outpatient Prescriptions   Medication Sig Dispense Refill     zolpidem (AMBIEN) 5 MG tablet Take tablet by mouth 15 minutes prior to sleep, for Sleep Study 1 tablet 0     ketoconazole (NIZORAL) 2 % cream Apply topically 2 times daily as needed for itching Apply to AA on the face bid when needed 15 g 3     glipiZIDE (GLIPIZIDE XL) 10 MG 24 hr tablet Take 1 tablet (10 mg) by mouth 2 times daily (with meals) 180 tablet 0     levothyroxine (SYNTHROID/LEVOTHROID) 150 MCG tablet TAKE 1 TABLET (150 MCG) BY MOUTH DAILY 90 tablet 1     losartan (COZAAR) 50 MG tablet Take 1 tablet (50 mg) by mouth daily 90 tablet 3     hydrochlorothiazide (HYDRODIURIL) 25 MG tablet Take 1 tablet (25 mg) by mouth daily 90 tablet 3     simvastatin (ZOCOR) 20 MG tablet Take 1 tablet (20 mg) by mouth At Bedtime 90 tablet 3     ipratropium (ATROVENT) 0.06 % spray Spray 2 sprays into both nostrils 2 times daily as needed for rhinitis 1 Box 2     albuterol (PROAIR HFA/PROVENTIL HFA/VENTOLIN HFA) 108 (90 BASE) MCG/ACT Inhaler Inhale 2 puffs into the lungs every 6 hours as needed for shortness of breath / dyspnea 3 Inhaler 1     ipratropium - albuterol 0.5 mg/2.5 mg/3 mL (DUONEB) 0.5-2.5 (3) MG/3ML neb solution Take 1 vial (3 mLs) by nebulization every 6 hours as needed for shortness of  breath / dyspnea or wheezing 1 vial 0     tiotropium (SPIRIVA) 18 MCG capsule Inhale 1 capsule (18 mcg) into the lungs daily 90 capsule 1     budesonide (PULMICORT FLEXHALER) 180 MCG/ACT inhaler Inhale 1 puff into the lungs 2 times daily 3 Inhaler 3     Cholecalciferol (VITAMIN D) 2000 UNITS tablet Take 2,000 Units by mouth daily 100 tablet 3     aspirin 81 MG EC tablet Take 1 tablet (81 mg) by mouth daily 100 tablet 3     [DISCONTINUED] glyBURIDE-metFORMIN (GLUCOVANCE) 2.5-500 MG per tablet Take 1 tablet by mouth daily (with breakfast) 90 tablet 1     Allergies   Allergen Reactions     Ibuprofen Sodium Nausea and Vomiting     Vicodin [Hydrocodone-Acetaminophen] Nausea and Vomiting       Reviewed and updated as needed this visit by clinical staff  Tobacco  Allergies  Meds  Med Hx       Reviewed and updated as needed this visit by Provider  Med Hx         ROS:  CONSTITUTIONAL: NEGATIVE for fever, chills, change in weight  ENT/MOUTH: NEGATIVE for ear, mouth and throat problems  RESP: NEGATIVE for significant cough or SOB  CV: NEGATIVE for chest pain, palpitations or peripheral edema    OBJECTIVE:                                                    /87  Pulse 81  Temp 97  F (36.1  C) (Oral)  Wt 226 lb (102.5 kg)  SpO2 96%  BMI 42.7 kg/m2  Body mass index is 42.7 kg/(m^2).   GENERAL: healthy, alert, well nourished, well hydrated, no distress  HENT: ear canals- normal; TMs- normal; Nose- normal; Mouth- no ulcers, no lesions  NECK: no tenderness, no adenopathy, no asymmetry, no masses, no stiffness; thyroid- normal to palpation  RESP: lungs clear to auscultation - no rales, no rhonchi, no wheezes  CV: regular rates and rhythm, normal S1 S2, no S3 or S4 and no murmur, no click or rub -  ABDOMEN: soft, no tenderness, no  hepatosplenomegaly, no masses, normal bowel sounds  Hands: b/l 1st MCP joint swelling and tenderness noted.      ASSESSMENT/PLAN:                                                      1.  Metacarpophalangeal joint pain, unspecified laterality    - XR Hand Bilateral G/E 3 Views; ordered and reviewed. MCP degenerative changes noted b/l. Recommended to see ortho.   RA and lupus work up ordered as below.   - Rheumatoid factor  - Erythrocyte sedimentation rate auto  - CRP inflammation  - Anti Nuclear Hilda IgG by IFA with Reflex  - Cyclic Citrullinated Peptide Antibody IgG  - DNA double stranded antibodies  - [place] Antibody IgG  - ORTHOPEDICS ADULT REFERRAL    2. Large breasts  Recommended to see plastic surgery  - PLASTIC SURGERY REFERRAL    3. Chest wall pain  Likely due to large b/l breast.           Lokesh Casanova MD  [facility] [place]

## 2018-03-21 LAB
CRP SERPL-MCNC: 12 MG/L (ref 0–8)
FERRITIN SERPL-MCNC: 85 NG/ML (ref 8–252)

## 2018-03-21 ASSESSMENT — PATIENT HEALTH QUESTIONNAIRE - PHQ9: SUM OF ALL RESPONSES TO PHQ QUESTIONS 1-9: 17

## 2018-03-21 ASSESSMENT — ANXIETY QUESTIONNAIRES: GAD7 TOTAL SCORE: 17

## 2018-03-22 LAB
ANA SER QL IF: NEGATIVE
ENA SM IGG SER-ACNC: <0.2 AI (ref 0–0.9)
RHEUMATOID FACT SER NEPH-ACNC: <20 IU/ML (ref 0–20)

## 2018-03-23 LAB
CCP AB SER IA-ACNC: 1 U/ML
DSDNA AB SER-ACNC: 3 IU/ML

## 2018-03-28 ENCOUNTER — APPOINTMENT (OUTPATIENT)
Dept: CT IMAGING | Facility: CLINIC | Age: 53
End: 2018-03-28
Attending: EMERGENCY MEDICINE
Payer: COMMERCIAL

## 2018-03-28 ENCOUNTER — HOSPITAL ENCOUNTER (EMERGENCY)
Facility: CLINIC | Age: 53
Discharge: HOME | End: 2018-03-28
Attending: EMERGENCY MEDICINE | Admitting: EMERGENCY MEDICINE
Payer: COMMERCIAL

## 2018-03-28 VITALS
HEART RATE: 96 BPM | OXYGEN SATURATION: 94 % | RESPIRATION RATE: 18 BRPM | SYSTOLIC BLOOD PRESSURE: 111 MMHG | TEMPERATURE: 98 F | DIASTOLIC BLOOD PRESSURE: 75 MMHG

## 2018-03-28 DIAGNOSIS — R30.0 DYSURIA: ICD-10-CM

## 2018-03-28 DIAGNOSIS — R10.9 FLANK PAIN: ICD-10-CM

## 2018-03-28 LAB
ALBUMIN UR-MCNC: 30 MG/DL
ANION GAP SERPL CALCULATED.3IONS-SCNC: 8 MMOL/L (ref 3–14)
APPEARANCE UR: CLEAR
BACTERIA #/AREA URNS HPF: ABNORMAL /HPF
BASOPHILS # BLD AUTO: 0 10E9/L (ref 0–0.2)
BASOPHILS NFR BLD AUTO: 0.4 %
BILIRUB UR QL STRIP: NEGATIVE
BUN SERPL-MCNC: 7 MG/DL (ref 7–30)
CALCIUM SERPL-MCNC: 9.2 MG/DL (ref 8.5–10.1)
CHLORIDE SERPL-SCNC: 102 MMOL/L (ref 94–109)
CO2 SERPL-SCNC: 30 MMOL/L (ref 20–32)
COLOR UR AUTO: ABNORMAL
CREAT SERPL-MCNC: 0.69 MG/DL (ref 0.52–1.04)
DIFFERENTIAL METHOD BLD: NORMAL
EOSINOPHIL # BLD AUTO: 0.3 10E9/L (ref 0–0.7)
EOSINOPHIL NFR BLD AUTO: 3.8 %
ERYTHROCYTE [DISTWIDTH] IN BLOOD BY AUTOMATED COUNT: 13.7 % (ref 10–15)
GFR SERPL CREATININE-BSD FRML MDRD: 89 ML/MIN/1.7M2
GLUCOSE SERPL-MCNC: 146 MG/DL (ref 70–99)
GLUCOSE UR STRIP-MCNC: NEGATIVE MG/DL
HCT VFR BLD AUTO: 45.6 % (ref 35–47)
HGB BLD-MCNC: 15.1 G/DL (ref 11.7–15.7)
HGB UR QL STRIP: ABNORMAL
IMM GRANULOCYTES # BLD: 0 10E9/L (ref 0–0.4)
IMM GRANULOCYTES NFR BLD: 0.3 %
KETONES UR STRIP-MCNC: NEGATIVE MG/DL
LEUKOCYTE ESTERASE UR QL STRIP: NEGATIVE
LYMPHOCYTES # BLD AUTO: 2.9 10E9/L (ref 0.8–5.3)
LYMPHOCYTES NFR BLD AUTO: 32.8 %
MCH RBC QN AUTO: 29.4 PG (ref 26.5–33)
MCHC RBC AUTO-ENTMCNC: 33.1 G/DL (ref 31.5–36.5)
MCV RBC AUTO: 89 FL (ref 78–100)
MONOCYTES # BLD AUTO: 0.7 10E9/L (ref 0–1.3)
MONOCYTES NFR BLD AUTO: 8.3 %
MUCOUS THREADS #/AREA URNS LPF: PRESENT /LPF
NEUTROPHILS # BLD AUTO: 4.8 10E9/L (ref 1.6–8.3)
NEUTROPHILS NFR BLD AUTO: 54.4 %
NITRATE UR QL: POSITIVE
NRBC # BLD AUTO: 0 10*3/UL
NRBC BLD AUTO-RTO: 0 /100
PH UR STRIP: 5 PH (ref 5–7)
PLATELET # BLD AUTO: 331 10E9/L (ref 150–450)
POTASSIUM SERPL-SCNC: 3.4 MMOL/L (ref 3.4–5.3)
RBC # BLD AUTO: 5.14 10E12/L (ref 3.8–5.2)
RBC #/AREA URNS AUTO: 4 /HPF (ref 0–2)
SODIUM SERPL-SCNC: 140 MMOL/L (ref 133–144)
SOURCE: ABNORMAL
SP GR UR STRIP: 1.03 (ref 1–1.03)
SQUAMOUS #/AREA URNS AUTO: 9 /HPF (ref 0–1)
UROBILINOGEN UR STRIP-MCNC: 4 MG/DL (ref 0–2)
WBC # BLD AUTO: 8.9 10E9/L (ref 4–11)
WBC #/AREA URNS AUTO: 2 /HPF (ref 0–5)

## 2018-03-28 PROCEDURE — 96361 HYDRATE IV INFUSION ADD-ON: CPT

## 2018-03-28 PROCEDURE — 74176 CT ABD & PELVIS W/O CONTRAST: CPT

## 2018-03-28 PROCEDURE — 25000132 ZZH RX MED GY IP 250 OP 250 PS 637: Performed by: PHYSICIAN ASSISTANT

## 2018-03-28 PROCEDURE — 25000132 ZZH RX MED GY IP 250 OP 250 PS 637: Performed by: EMERGENCY MEDICINE

## 2018-03-28 PROCEDURE — 81001 URINALYSIS AUTO W/SCOPE: CPT | Performed by: EMERGENCY MEDICINE

## 2018-03-28 PROCEDURE — 87086 URINE CULTURE/COLONY COUNT: CPT | Performed by: EMERGENCY MEDICINE

## 2018-03-28 PROCEDURE — 80048 BASIC METABOLIC PNL TOTAL CA: CPT | Performed by: EMERGENCY MEDICINE

## 2018-03-28 PROCEDURE — 96374 THER/PROPH/DIAG INJ IV PUSH: CPT

## 2018-03-28 PROCEDURE — 25000128 H RX IP 250 OP 636: Performed by: EMERGENCY MEDICINE

## 2018-03-28 PROCEDURE — 85025 COMPLETE CBC W/AUTO DIFF WBC: CPT | Performed by: EMERGENCY MEDICINE

## 2018-03-28 PROCEDURE — 99285 EMERGENCY DEPT VISIT HI MDM: CPT | Mod: 25

## 2018-03-28 RX ORDER — SODIUM CHLORIDE 9 MG/ML
1000 INJECTION, SOLUTION INTRAVENOUS CONTINUOUS
Status: DISCONTINUED | OUTPATIENT
Start: 2018-03-28 | End: 2018-03-29 | Stop reason: HOSPADM

## 2018-03-28 RX ORDER — CEPHALEXIN 500 MG/1
500 CAPSULE ORAL 3 TIMES DAILY
Qty: 15 CAPSULE | Refills: 0 | Status: SHIPPED | OUTPATIENT
Start: 2018-03-28 | End: 2018-04-02

## 2018-03-28 RX ORDER — OXYCODONE HYDROCHLORIDE 5 MG/1
5 TABLET ORAL EVERY 6 HOURS PRN
Qty: 14 TABLET | Refills: 0 | Status: SHIPPED | OUTPATIENT
Start: 2018-03-28 | End: 2018-05-07

## 2018-03-28 RX ORDER — KETOROLAC TROMETHAMINE 15 MG/ML
15 INJECTION, SOLUTION INTRAMUSCULAR; INTRAVENOUS ONCE
Status: COMPLETED | OUTPATIENT
Start: 2018-03-28 | End: 2018-03-28

## 2018-03-28 RX ORDER — CEPHALEXIN 500 MG/1
500 CAPSULE ORAL ONCE
Status: COMPLETED | OUTPATIENT
Start: 2018-03-28 | End: 2018-03-28

## 2018-03-28 RX ORDER — OXYCODONE HYDROCHLORIDE 5 MG/1
5 TABLET ORAL ONCE
Status: COMPLETED | OUTPATIENT
Start: 2018-03-28 | End: 2018-03-28

## 2018-03-28 RX ADMIN — CEPHALEXIN 500 MG: 500 CAPSULE ORAL at 23:24

## 2018-03-28 RX ADMIN — KETOROLAC TROMETHAMINE 15 MG: 15 INJECTION, SOLUTION INTRAMUSCULAR; INTRAVENOUS at 22:31

## 2018-03-28 RX ADMIN — OXYCODONE HYDROCHLORIDE 5 MG: 5 TABLET ORAL at 22:40

## 2018-03-28 RX ADMIN — SODIUM CHLORIDE 1000 ML: 9 INJECTION, SOLUTION INTRAVENOUS at 22:31

## 2018-03-28 ASSESSMENT — ENCOUNTER SYMPTOMS
FEVER: 0
ABDOMINAL PAIN: 1
FATIGUE: 1
DYSURIA: 1
FLANK PAIN: 1

## 2018-03-28 NOTE — ED AVS SNAPSHOT
[facility] Emergency Department    [address] [place] [address]    Phone:  [phone]    Fax:  [phone]                                       Jazlyn Bartlett   MRN: [id number]    Department:  [facility] Emergency Department   Date of Visit:  3/28/2018           Patient Information     Date Of Birth          1965        Your diagnoses for this visit were:     Flank pain     Dysuria        You were seen by Rogelio Sarmiento MD.      Follow-up Information     Follow up with Lokesh Casanova MD In 3 days.    Specialty:  Family Practice    Why:  As needed    Contact information:    [address] [place] [address] [address]  [place] [address]  [phone]          Discharge Instructions       We have done a ct scan that shows no kidney stones, but small kidney stones can be missed.  Return to the ER with severe increase in pain, or fever greater than 100.4.    Please follow up with your regular physician if pain continues.      Discharge Instructions  Urinary Tract Infection  You have urinary tract infection, or UTI. The urinary tract includes the kidneys (which make urine), ureters (the tubes that carry urine from the kidneys to the bladder), the bladder (which stores urine), and urethra (the tube that carries urine out of the bladder).  Urinary tract infections occur when bacteria travel up the urethra into the bladder. We suspect a UTI based on chemical and microscopic findings in your urine, but if there is a question about your findings, we will do a culture to see if bacteria grow. A urine culture takes several days. You should always follow-up with your primary physician to find out about results of your culture if one was done.   Return to the Emergency Department if:    You have severe back pain.    You are vomiting so that you can t take your medicine, or have signs of dehydration (such as urinating less than 3 times per day).    You have fever over 101.5 degrees  F.    You have significant confusion or are very weak, or feel very ill.    Your child seems much more ill, won t wake up, won t respond right, or is crying for a long time and won t calm down.    Your child is showing signs of dehydration, Signs of dehydration can be:  o Your infant has had no wet diapers in 4-5 hours.  o Your older child has not passed urine in 6-8 hours.  o Your infant or child starts to have dry mouth and lips, or no saliva or tears.    Follow-up with your doctor:     Children under 24 months need to be seen by their regular doctor within one week after a diagnosis of a UTI. It may be necessary to do some imaging tests to look at the child s kidney or bladder.    You should begin to feel better within 24 - 48 hours of starting your antibiotic.  If you do not, you need to be seen again.      Treatment:     You will be treated with an antibiotic to kill the bacteria. We have to make an educated guess as to which antibiotic will work for your infection. In most healthy people, we can guess right almost all of the time. Sometimes a culture is done to show which antibiotics will work. This usually takes 2-3 days. When the culture is done, we may have to contact you to put you on a different antibiotic.    Take a pain medication such as Tylenol  (acetaminophen), Advil  (ibuprofen), Nuprin  (ibuprofen), or Aleve  (naproxen). If you have been given a narcotic such as Vicodin  (hydrocodone with acetaminophen), Percocet  (oxycodone with acetaminophen), or codeine, do not drive for four hours after you have taken it. If the narcotic contains Tylenol  (acetaminophen), do not take Tylenol  with it. All narcotics will cause constipation, so eat a high fiber diet.      Pyridium  (phenazopyridine) or Uristat  (phenazopyridine) is a prescription medication that numbs the bladder to reduce the burning pain of some UTIs.  The same medication is available in a non-prescription version called Azo-Standard   "(phenazopyridine), Urodol  (phenazopyridine), or other brand names. This medication will change the color of the urine and tears (usually blue or orange). If you wear contacts, do not wear them while taking this medication as they may be stained by the medication.    Antibiotic Warning:     If you have been placed on antibiotics - watch for signs of allergic reaction.  These include rash, lip swelling, difficulty breathing, wheezing, and dizziness.  If you develop any of these symptoms, stop the antibiotic immediately and go to an emergency room or urgent care for evaluation.    Probiotics: If you have been given an antibiotic, you may want to also take a probiotic pill or eat yogurt with live cultures. Probiotics have \"good bacteria\" to help your intestines stay healthy. Studies have shown that probiotics help prevent diarrhea and other intestine problems (including C. diff infection) when you take antibiotics. You can buy these without a prescription in the pharmacy section of the store.   If you were given a prescription for medicine here today, be sure to read all of the information (including the package insert) that comes with your prescription.  This will include important information about the medicine, its side effects, and any warnings that you need to know about.  The pharmacist who fills the prescription can provide more information and answer questions you may have about the medicine.  If you have questions or concerns that the pharmacist cannot address, please call or return to the Emergency Department.   Opioid Medication Information    Pain medications are among the most commonly prescribed medicines, so we are including this information for all our patients. If you did not receive pain medication or get a prescription for pain medicine, you can ignore it.     You may have been given a prescription for an opioid (narcotic) pain medicine and/or have received a pain medicine while here in the Emergency " Department. These medicines can make you drowsy or impaired. You must not drive, operate dangerous equipment, or engage in any other dangerous activities while taking these medications. If you drive while taking these medications, you could be arrested for DUI, or driving under the influence. Do not drink any alcohol while you are taking these medications.     Opioid pain medications can cause addiction. If you have a history of chemical dependency of any type, you are at a higher risk of becoming addicted to pain medications.  Only take these prescribed medications to treat your pain when all other options have been tried. Take it for as short a time and as few doses as possible. Store your pain pills in a secure place, as they are frequently stolen and provide a dangerous opportunity for children or visitors in your house to start abusing these powerful medications. We will not replace any lost or stolen medicine.  As soon as your pain is better, you should flush all your remaining medication.     Many prescription pain medications contain Tylenol  (acetaminophen), including Vicodin , Tylenol #3 , Norco , Lortab , and Percocet .  You should not take any extra pills of Tylenol  if you are using these prescription medications or you can get very sick.  Do not ever take more than 3000 mg of acetaminophen in any 24 hour period.    All opioids tend to cause constipation. Drink plenty of water and eat foods that have a lot of fiber, such as fruits, vegetables, prune juice, apple juice and high fiber cereal.  Take a laxative if you don t move your bowels at least every other day. Miralax , Milk of Magnesia, Colace , or Senna  can be used to keep you regular.      Remember that you can always come back to the Emergency Department if you are not able to see your regular doctor in the amount of time listed above, if you get any new symptoms, or if there is anything that worries you.        Your next 10 appointments already  scheduled     May 04, 2018  8:30 PM CDT   Psg Split W/Tcm with BED 5 SH SLEEP   [facility] ([facility]    [address]  [address]  [facility] [address] [phone]            May 10, 2018  5:00 PM CDT   Return Sleep Patient with Rob Sierra MD   [facility]    [address]  [facility] [id number] [phone]            Jun 05, 2018  5:20 PM CDT   Office Visit with Lokesh Casanova MD   [facility]    [address] [address] [phone]           Bring a current list of meds and any records pertaining to this visit. For Physicals, please bring immunization records and any forms needing to be filled out. Please arrive 10 minutes early to complete paperwork.              24 Hour Appointment Hotline       To make an appointment at any [facility] call [phone] ([phone]). If you don't have a family doctor or clinic, we will help you find one. [facility] are conveniently located to serve the needs of you and your family.             Review of your medicines      START taking        Dose / Directions Last dose taken    cephALEXin 500 MG capsule   Commonly known as:  KEFLEX   Dose:  500 mg   Quantity:  15 capsule        Take 1 capsule (500 mg) by mouth 3 times daily for 5 days   Refills:  0        oxyCODONE IR 5 MG tablet   Commonly known as:  ROXICODONE   Dose:  5 mg   Quantity:  14 tablet        Take 1 tablet (5 mg) by mouth every 6 hours as needed for pain   Refills:  0          Our records show that you are taking the medicines listed below. If these are incorrect, please call your family doctor or clinic.        Dose / Directions Last dose taken    albuterol 108 (90 BASE) MCG/ACT Inhaler   Commonly known as:  PROAIR HFA/PROVENTIL HFA/VENTOLIN HFA   Dose:  2 puff   Quantity:  3  Inhaler        Inhale 2 puffs into the lungs every 6 hours as needed for shortness of breath / dyspnea   Refills:  1        aspirin 81 MG EC tablet   Dose:  81 mg   Quantity:  100 tablet        Take 1 tablet (81 mg) by mouth daily   Refills:  3        budesonide 180 MCG/ACT inhaler   Commonly known as:  PULMICORT FLEXHALER   Dose:  1 puff   Quantity:  3 Inhaler        Inhale 1 puff into the lungs 2 times daily   Refills:  3        glipiZIDE 10 MG 24 hr tablet   Commonly known as:  glipiZIDE XL   Dose:  10 mg   Quantity:  180 tablet        Take 1 tablet (10 mg) by mouth 2 times daily (with meals)   Refills:  0        hydrochlorothiazide 25 MG tablet   Commonly known as:  HYDRODIURIL   Dose:  25 mg   Quantity:  90 tablet        Take 1 tablet (25 mg) by mouth daily   Refills:  3        ipratropium - albuterol 0.5 mg/2.5 mg/3 mL 0.5-2.5 (3) MG/3ML neb solution   Commonly known as:  DUONEB   Dose:  1 vial   Quantity:  1 vial        Take 1 vial (3 mLs) by nebulization every 6 hours as needed for shortness of breath / dyspnea or wheezing   Refills:  0        ipratropium 0.06 % spray   Commonly known as:  ATROVENT   Dose:  2 spray   Quantity:  1 Box        Spray 2 sprays into both nostrils 2 times daily as needed for rhinitis   Refills:  2        ketoconazole 2 % cream   Commonly known as:  NIZORAL   Quantity:  15 g        Apply topically 2 times daily as needed for itching Apply to AA on the face bid when needed   Refills:  3        levothyroxine 150 MCG tablet   Commonly known as:  SYNTHROID/LEVOTHROID   Quantity:  90 tablet        TAKE 1 TABLET (150 MCG) BY MOUTH DAILY   Refills:  1        losartan 50 MG tablet   Commonly known as:  COZAAR   Dose:  50 mg   Quantity:  90 tablet        Take 1 tablet (50 mg) by mouth daily   Refills:  3        simvastatin 20 MG tablet   Commonly known as:  ZOCOR   Dose:  20 mg   Quantity:  90 tablet        Take 1 tablet (20 mg) by mouth At Bedtime   Refills:  3        tiotropium 18 MCG  capsule   Commonly known as:  SPIRIVA   Dose:  18 mcg   Quantity:  90 capsule        Inhale 1 capsule (18 mcg) into the lungs daily   Refills:  1        vitamin D 2000 UNITS tablet   Dose:  2000 Units   Quantity:  100 tablet        Take 2,000 Units by mouth daily   Refills:  3        zolpidem 5 MG tablet   Commonly known as:  AMBIEN   Quantity:  1 tablet        Take tablet by mouth 15 minutes prior to sleep, for Sleep Study   Refills:  0                Prescriptions were sent or printed at these locations (2 Prescriptions)                   Other Prescriptions                Printed at Department/Unit printer (2 of 2)         cephALEXin (KEFLEX) 500 MG capsule               oxyCODONE IR (ROXICODONE) 5 MG tablet                Procedures and tests performed during your visit     Abd/pelvis CT no contrast - [facility] Protocol    Basic metabolic panel    CBC with platelets differential    UA with Microscopic    Urine Culture Aerobic Bacterial      Orders Needing Specimen Collection     None      Pending Results     Date and Time Order Name Status Description    3/28/2018 2316 Urine Culture Aerobic Bacterial In process     3/28/2018 2249 Abd/pelvis CT no contrast - [facility] Protocol Preliminary             Pending Culture Results     Date and Time Order Name Status Description    3/28/2018 2316 Urine Culture Aerobic Bacterial In process             Pending Results Instructions     If you had any lab results that were not finalized at the time of your Discharge, you can call the ED Lab Result RN at [phone]. You will be contacted by this team for any positive Lab results or changes in treatment. The nurses are available 7 days a week from 10A to 6:30P.  You can leave a message 24 hours per day and they will return your call.        Test Results From Your Hospital Stay        3/28/2018 10:27 PM      Component Results     Component Value Ref Range & Units Status    WBC 8.9 4.0 - 11.0 10e9/L Final    RBC Count 5.14 3.8 - 5.2  10e12/L Final    Hemoglobin 15.1 11.7 - 15.7 g/dL Final    Hematocrit 45.6 35.0 - 47.0 % Final    MCV 89 78 - 100 fl Final    MCH 29.4 26.5 - 33.0 pg Final    MCHC 33.1 31.5 - 36.5 g/dL Final    RDW 13.7 10.0 - 15.0 % Final    Platelet Count 331 150 - 450 10e9/L Final    Diff Method Automated Method  Final    % Neutrophils 54.4 % Final    % Lymphocytes 32.8 % Final    % Monocytes 8.3 % Final    % Eosinophils 3.8 % Final    % Basophils 0.4 % Final    % Immature Granulocytes 0.3 % Final    Nucleated RBCs 0 0 /100 Final    Absolute Neutrophil 4.8 1.6 - 8.3 10e9/L Final    Absolute Lymphocytes 2.9 0.8 - 5.3 10e9/L Final    Absolute Monocytes 0.7 0.0 - 1.3 10e9/L Final    Absolute Eosinophils 0.3 0.0 - 0.7 10e9/L Final    Absolute Basophils 0.0 0.0 - 0.2 10e9/L Final    Abs Immature Granulocytes 0.0 0 - 0.4 10e9/L Final    Absolute Nucleated RBC 0.0  Final         3/28/2018 10:43 PM      Component Results     Component Value Ref Range & Units Status    Sodium 140 133 - 144 mmol/L Final    Potassium 3.4 3.4 - 5.3 mmol/L Final    Chloride 102 94 - 109 mmol/L Final    Carbon Dioxide 30 20 - 32 mmol/L Final    Anion Gap 8 3 - 14 mmol/L Final    Glucose 146 (H) 70 - 99 mg/dL Final    Urea Nitrogen 7 7 - 30 mg/dL Final    Creatinine 0.69 0.52 - 1.04 mg/dL Final    GFR Estimate 89 >60 mL/min/1.7m2 Final    Non  GFR Calc    GFR Estimate If Black >90 >60 mL/min/1.7m2 Final    African American GFR Calc    Calcium 9.2 8.5 - 10.1 mg/dL Final         3/28/2018 10:46 PM      Component Results     Component Value Ref Range & Units Status    Color Urine Red  Final    Appearance Urine Clear  Final    Glucose Urine Negative NEG^Negative mg/dL Final    Bilirubin Urine Negative NEG^Negative Final    Ketones Urine Negative NEG^Negative mg/dL Final    Specific Gravity Urine 1.030 1.003 - 1.035 Final    Blood Urine Small (A) NEG^Negative Final    pH Urine 5.0 5.0 - 7.0 pH Final    Protein Albumin Urine 30 (A) NEG^Negative mg/dL  Final    Urobilinogen mg/dL 4.0 (H) 0.0 - 2.0 mg/dL Final    Nitrite Urine Positive (A) NEG^Negative Final    Leukocyte Esterase Urine Negative NEG^Negative Final    Source Midstream Urine  Final    WBC Urine 2 0 - 5 /HPF Final    RBC Urine 4 (H) 0 - 2 /HPF Final    Bacteria Urine Few (A) NEG^Negative /HPF Final    Squamous Epithelial /HPF Urine 9 (H) 0 - 1 /HPF Final    Mucous Urine Present (A) NEG^Negative /LPF Final         3/28/2018 11:09 PM      Narrative     CT ABDOMEN PELVIS W/O CONTRAST  3/28/2018 11:01 PM     INDICATION: Right flank pain.     TECHNIQUE: Thin axial images through the abdomen and pelvis without  contrast. Coronal reformatted images. Radiation dose for this scan was  reduced using automated exposure control, adjustment of the mA and/or  kV according to patient size, or iterative reconstruction technique.    COMPARISON: 4/30/2017.    FINDINGS: No urinary stones or hydronephrosis. Liver, gallbladder,  spleen, pancreas, adrenal glands and kidneys demonstrate no worrisome  findings without contrast. Stable 2 cm low-density left adrenal nodule  compatible with a benign adenoma. Tiny 0.2 cm nodule at the right lung  base medially is stable consistent with a benign nodule.    Uterus is present. No suspicious pelvic masses. No inflammatory  changes, bowel obstruction or ascites. Negative appendix. Mild  degenerative and hypertrophic changes in the visualized spine.        Impression     IMPRESSION:  1. No urinary stones or hydronephrosis.  2. No other acute findings within limits of a noncontrast scan.         3/28/2018 11:19 PM                Clinical Quality Measure: Blood Pressure Screening     Your blood pressure was checked while you were in the emergency department today. The last reading we obtained was  BP: 111/75 . Please read the guidelines below about what these numbers mean and what you should do about them.  If your systolic blood pressure (the top number) is less than 120 and your  diastolic blood pressure (the bottom number) is less than 80, then your blood pressure is normal. There is nothing more that you need to do about it.  If your systolic blood pressure (the top number) is 120-139 or your diastolic blood pressure (the bottom number) is 80-89, your blood pressure may be higher than it should be. You should have your blood pressure rechecked within a year by a primary care provider.  If your systolic blood pressure (the top number) is 140 or greater or your diastolic blood pressure (the bottom number) is 90 or greater, you may have high blood pressure. High blood pressure is treatable, but if left untreated over time it can put you at risk for heart attack, stroke, or kidney failure. You should have your blood pressure rechecked by a primary care provider within the next 4 weeks.  If your provider in the emergency department today gave you specific instructions to follow-up with your doctor or provider even sooner than that, you should follow that instruction and not wait for up to 4 weeks for your follow-up visit.        Thank you for choosing [facility]       Thank you for choosing [facility] for your care. Our goal is always to provide you with excellent care. Hearing back from our patients is one way we can continue to improve our services. Please take a few minutes to complete the written survey that you may receive in the mail after you visit with us. Thank you!        [employer] Information     [employer] gives you secure access to your electronic health record. If you see a primary care provider, you can also send messages to your care team and make appointments. If you have questions, please call your primary care clinic.  If you do not have a primary care provider, please call [phone] and they will assist you.        Care EveryWhere ID     This is your Care EveryWhere ID. This could be used by other organizations to access your [facility] medical records  [id number]        Equal  Access to Services     [facility] Hospital[facility] : Marga Kwon, onel west, qajenifer hutchison. So [facility] [phone].    ATENCIÓN: Si habla español, tiene a tracey disposición servicios gratuitos de asistencia lingüística. Llame al [phone].    We comply with applicable federal civil rights laws and [place] laws. We do not discriminate on the basis of race, color, national origin, age, disability, sex, sexual orientation, or gender identity.            After Visit Summary       This is your record. Keep this with you and show to your community pharmacist(s) and doctor(s) at your next visit.

## 2018-03-28 NOTE — ED AVS SNAPSHOT
[facility] Emergency Department    [address]    [facility] [address]    Phone:  [phone]    Fax:  [phone]                                       Jazlyn Bartlett   MRN: [id number]    Department:  [facility] Emergency Department   Date of Visit:  3/28/2018           After Visit Summary Signature Page     I have received my discharge instructions, and my questions have been answered. I have discussed any challenges I see with this plan with the nurse or doctor.    ..........................................................................................................................................  Patient/Patient Representative Signature      ..........................................................................................................................................  Patient Representative Print Name and Relationship to Patient    ..................................................               ................................................  Date                                            Time    ..........................................................................................................................................  Reviewed by Signature/Title    ...................................................              ..............................................  Date                                                            Time

## 2018-03-28 NOTE — LETTER
March 28, 2018      To Whom It May Concern:      Jazlyn Bartlett was seen in our Emergency Department today, 03/28/18.  I expect her condition to improve over the next 2 days.  She may return to work/school when improved.    Sincerely,        Rogelio Sarmiento MD

## 2018-03-29 NOTE — ED PROVIDER NOTES
History     Chief Complaint:  Flank Pain     HPI   Jazlyn Bartlett is a 52 year old female with history of kidney stones who presents to the emergency department today for evaluation of right flank pain.The patient reports two days of right sided flank pain with urinary urgency and dysuria.Today the patient states that while urinating she started to have significant right sided flank pain that radiated into her right abdomen. The patient states it is present when she moves a certain way, but the pain is worst with urination.The radiation into her abdomen began today. Additionally she has been urinating much more frequently, and some times she has the sensation of needing to urinate however no urine is produced. She states her flank pain started suddenly. Patient also endorses being more fatigued today. She has taken Azo for the pain, but she mentions that it hasn't really helped today.     Allergies:  Ibuprofen Sodium  Vicodin [Hydrocodone-Acetaminophen]      Medications:    zolpidem (AMBIEN) 5 MG tablet  ketoconazole (NIZORAL) 2 % cream  glipiZIDE (GLIPIZIDE XL) 10 MG 24 hr tablet  levothyroxine (SYNTHROID/LEVOTHROID) 150 MCG tablet  losartan (COZAAR) 50 MG tablet  hydrochlorothiazide (HYDRODIURIL) 25 MG tablet  simvastatin (ZOCOR) 20 MG tablet  ipratropium (ATROVENT) 0.06 % spray  albuterol (PROAIR HFA/PROVENTIL HFA/VENTOLIN HFA) 108 (90 BASE) MCG/ACT Inhaler  ipratropium - albuterol 0.5 mg/2.5 mg/3 mL (DUONEB) 0.5-2.5 (3) MG/3ML neb solution  tiotropium (SPIRIVA) 18 MCG capsule  budesonide (PULMICORT FLEXHALER) 180 MCG/ACT inhaler  Cholecalciferol (VITAMIN D) 2000 UNITS tablet  aspirin 81 MG EC tablet    Past Medical History:    Chronic pain  COPD   Diabetes   GERD   Hypertension   Hypothyroidism   Kidney stones   Mucoepidermoid carcinoma of parotid gland     Past Surgical History:    Endometrial ablation  D&C  EGD combined   Tonsillectomy     Family History:    History reviewed. No pertinent family history.       Social History:  The patient was accompanied to the ED by her .  Smoking Status: Current every day smoker  Smokeless Tobacco: No  Alcohol Use: No    Marital Status:        Review of Systems   Constitutional: Positive for fatigue. Negative for fever.   Gastrointestinal: Positive for abdominal pain (right side radiated from flank).   Genitourinary: Positive for dysuria, flank pain (right) and urgency.   All other systems reviewed and are negative.    Physical Exam     Patient Vitals for the past 24 hrs:   BP Temp Temp src Pulse Heart Rate Resp SpO2   03/28/18 2202 (!) 144/91 - - - - - -   03/28/18 2201 - 98  F (36.7  C) Temporal 96 96 18 96 %      Physical Exam  General: Alert and interactive. Clearly in distress.   ENT: The pupils are equal and round. EOMs intact. No scleral icterus. No erythema of the posterior oropharynx.        CV: Regular rate and rhythm. S1 and S2 normal without murmur, click, gallop or rub.   Resp: Wheezing in bilateral upper lung fields. Non-labored, no retractions or accessory muscle use.     GI: Abdomen is soft without distension. Prominent CVA tenderness on the right side. No erythema of rash near to the right flank. No peritoneal signs.    MS: Moving all extremities well.   Skin: Warm and dry. No rash or lesions noted.  Neuro: Alert and oriented x 3. GCS 15.    Psych: Awake. Alert.  Normal affect. Appropriate interactions.  Lymph: No anterior or posterior cervical lymphadenopathy noted.    Emergency Department Course   Laboratory:  Laboratory findings were communicated with the patient who voiced understanding of the findings.  CBC: AWNL. (WBC 8.9, HGB 15.1, )   BMP: Glucose 146 (H), o/w WNL (Creatinine 0.69)  UA: Blood small (A), Protein 30 (A), Urobilinogen 4.0 (H), Nitrite positive (A), RBC 4 (H), Bacteria few (A), Squamous 9 (H), Mucous present (A)     Imaging:   CT Abdomen/Pelvis no contrast:   1. No urinary stones or hydronephrosis.  2. No other acute  findings within limits of a noncontrast scan.  Report per radiology.     Interventions:  2231 1000 mL NS Bolus IV  2231 Toradol 15 mg IV  2240 Oxycodone 5 mg PO    Emergency Department Course:  Nursing notes and vitals reviewed.  2204 performed an exam of the patient as documented above.   The patient provided a urine sample here in the emergency department. This was sent for laboratory testing, findings above.   IV was inserted and blood was drawn for laboratory testing, results above.   Patient provided a urine sample for urinalysis testing.   Dr. Sarmiento, my supervising physician, examined the patient.  Dr. Sarmiento and I reviewed the laboratory results with the patient and answered all related questions prior to discharge.     Emergency Department Attending Supervision Note  4/15/2018  8:45 PM      I evaluated this patient in conjunction with [facility]      Briefly, the patient presented with   Flank pain      On my exam,  Tender over the flank, well appearing, mild discomfort    Results:    ED course:    My impression is flank pain rule out pyelonephritis, urine culture pending,         Diagnosis    ICD-10-CM    1. Flank pain R10.9 Urine Culture Aerobic Bacterial   2. Dysuria R30.0          No att. providers found     Impression & Plan    Medical Decision Making: Jazlyn Bartlett is a 52 year old female who presents for evaluation of right flank pain, urgency, and dysuria. Urinalysis showed some small amount of blood, positive nitrite, but few bacteria and WBC, making it less suspicious of a pyelonephritis. CT scan of abdomen/pelvis shows no signs of hydronephrosis or kidney stone. I doubt intraabdominal pathology, including appendicitis, colitis, diverticulitis, or other intraabdominal catastrophe. CBC and BMP were WNL. Given symptoms, I will treat her with a short course of Keflex, should this actually be a UTI. First dose given here in Emergency Department. Her urine will be cultured for further analysis.  Patient was much improved with the above interventions. Given well appearance, stable vitals, and lack of leukocytosis, I believe outpatient management is indicated. I have given her a small prescription for Oxycodone to use for breakthrough pain. She should return if increasing pain, vomiting, or fever above 100.4. Close primary care follow up encouraged for further evaluation. She has no further questions or concerns.   Diagnosis:    ICD-10-CM    1. Flank pain R10.9 Urine Culture Aerobic Bacterial   2. Dysuria R30.0      Disposition: Discharged to home.     Discharge Medications:  New Prescriptions    CEPHALEXIN (KEFLEX) 500 MG CAPSULE    Take 1 capsule (500 mg) by mouth 3 times daily for 5 days    OXYCODONE IR (ROXICODONE) 5 MG TABLET    Take 1 tablet (5 mg) by mouth every 6 hours as needed for pain     IAmber PA-C, interviewed the patient, explained the course of action and discussed the patient with Dr. Sarmiento, who then evaluated the patient.    3/28/2018   [facility] EMERGENCY DEPARTMENT       Amber Pickett PA-C  03/28/18 0450       Rogelio Sarmiento MD  04/15/18 4152

## 2018-03-29 NOTE — DISCHARGE INSTRUCTIONS
We have done a ct scan that shows no kidney stones, but small kidney stones can be missed.  Return to the ER with severe increase in pain, or fever greater than 100.4.    Please follow up with your regular physician if pain continues.      Discharge Instructions  Urinary Tract Infection  You have urinary tract infection, or UTI. The urinary tract includes the kidneys (which make urine), ureters (the tubes that carry urine from the kidneys to the bladder), the bladder (which stores urine), and urethra (the tube that carries urine out of the bladder).  Urinary tract infections occur when bacteria travel up the urethra into the bladder. We suspect a UTI based on chemical and microscopic findings in your urine, but if there is a question about your findings, we will do a culture to see if bacteria grow. A urine culture takes several days. You should always follow-up with your primary physician to find out about results of your culture if one was done.   Return to the Emergency Department if:    You have severe back pain.    You are vomiting so that you can t take your medicine, or have signs of dehydration (such as urinating less than 3 times per day).    You have fever over 101.5 degrees F.    You have significant confusion or are very weak, or feel very ill.    Your child seems much more ill, won t wake up, won t respond right, or is crying for a long time and won t calm down.    Your child is showing signs of dehydration, Signs of dehydration can be:  o Your infant has had no wet diapers in 4-5 hours.  o Your older child has not passed urine in 6-8 hours.  o Your infant or child starts to have dry mouth and lips, or no saliva or tears.    Follow-up with your doctor:     Children under 24 months need to be seen by their regular doctor within one week after a diagnosis of a UTI. It may be necessary to do some imaging tests to look at the child s kidney or bladder.    You should begin to feel better within 24 - 48 hours  "of starting your antibiotic.  If you do not, you need to be seen again.      Treatment:     You will be treated with an antibiotic to kill the bacteria. We have to make an educated guess as to which antibiotic will work for your infection. In most healthy people, we can guess right almost all of the time. Sometimes a culture is done to show which antibiotics will work. This usually takes 2-3 days. When the culture is done, we may have to contact you to put you on a different antibiotic.    Take a pain medication such as Tylenol  (acetaminophen), Advil  (ibuprofen), Nuprin  (ibuprofen), or Aleve  (naproxen). If you have been given a narcotic such as Vicodin  (hydrocodone with acetaminophen), Percocet  (oxycodone with acetaminophen), or codeine, do not drive for four hours after you have taken it. If the narcotic contains Tylenol  (acetaminophen), do not take Tylenol  with it. All narcotics will cause constipation, so eat a high fiber diet.      Pyridium  (phenazopyridine) or Uristat  (phenazopyridine) is a prescription medication that numbs the bladder to reduce the burning pain of some UTIs.  The same medication is available in a non-prescription version called Azo-Standard  (phenazopyridine), Urodol  (phenazopyridine), or other brand names. This medication will change the color of the urine and tears (usually blue or orange). If you wear contacts, do not wear them while taking this medication as they may be stained by the medication.    Antibiotic Warning:     If you have been placed on antibiotics - watch for signs of allergic reaction.  These include rash, lip swelling, difficulty breathing, wheezing, and dizziness.  If you develop any of these symptoms, stop the antibiotic immediately and go to an emergency room or urgent care for evaluation.    Probiotics: If you have been given an antibiotic, you may want to also take a probiotic pill or eat yogurt with live cultures. Probiotics have \"good bacteria\" to help " your intestines stay healthy. Studies have shown that probiotics help prevent diarrhea and other intestine problems (including C. diff infection) when you take antibiotics. You can buy these without a prescription in the pharmacy section of the store.   If you were given a prescription for medicine here today, be sure to read all of the information (including the package insert) that comes with your prescription.  This will include important information about the medicine, its side effects, and any warnings that you need to know about.  The pharmacist who fills the prescription can provide more information and answer questions you may have about the medicine.  If you have questions or concerns that the pharmacist cannot address, please call or return to the Emergency Department.   Opioid Medication Information    Pain medications are among the most commonly prescribed medicines, so we are including this information for all our patients. If you did not receive pain medication or get a prescription for pain medicine, you can ignore it.     You may have been given a prescription for an opioid (narcotic) pain medicine and/or have received a pain medicine while here in the Emergency Department. These medicines can make you drowsy or impaired. You must not drive, operate dangerous equipment, or engage in any other dangerous activities while taking these medications. If you drive while taking these medications, you could be arrested for DUI, or driving under the influence. Do not drink any alcohol while you are taking these medications.     Opioid pain medications can cause addiction. If you have a history of chemical dependency of any type, you are at a higher risk of becoming addicted to pain medications.  Only take these prescribed medications to treat your pain when all other options have been tried. Take it for as short a time and as few doses as possible. Store your pain pills in a secure place, as they are  frequently stolen and provide a dangerous opportunity for children or visitors in your house to start abusing these powerful medications. We will not replace any lost or stolen medicine.  As soon as your pain is better, you should flush all your remaining medication.     Many prescription pain medications contain Tylenol  (acetaminophen), including Vicodin , Tylenol #3 , Norco , Lortab , and Percocet .  You should not take any extra pills of Tylenol  if you are using these prescription medications or you can get very sick.  Do not ever take more than 3000 mg of acetaminophen in any 24 hour period.    All opioids tend to cause constipation. Drink plenty of water and eat foods that have a lot of fiber, such as fruits, vegetables, prune juice, apple juice and high fiber cereal.  Take a laxative if you don t move your bowels at least every other day. Miralax , Milk of Magnesia, Colace , or Senna  can be used to keep you regular.      Remember that you can always come back to the Emergency Department if you are not able to see your regular doctor in the amount of time listed above, if you get any new symptoms, or if there is anything that worries you.

## 2018-03-29 NOTE — ED NOTES
Pt given discharge instructions and verbalized understanding that she will not drive home related to medications.

## 2018-03-29 NOTE — ED PROVIDER NOTES
Emergency Department Attending Supervision Note  3/28/2018  10:06 PM      I evaluated this patient in conjunction with Amber Pickett PA-C      Briefly, the patient presented with right flank pain, dysuria, and urinary frequency.       On my exam, ***    Results:    ED course:    My impression is ***        Diagnosis  No diagnosis found.      Rogelio Sarmiento MD

## 2018-03-30 LAB
BACTERIA SPEC CULT: NORMAL
SPECIMEN SOURCE: NORMAL

## 2018-04-04 ENCOUNTER — OFFICE VISIT (OUTPATIENT)
Dept: FAMILY MEDICINE | Facility: CLINIC | Age: 53
End: 2018-04-04
Payer: COMMERCIAL

## 2018-04-04 VITALS
TEMPERATURE: 97.4 F | WEIGHT: 221 LBS | HEART RATE: 83 BPM | BODY MASS INDEX: 41.76 KG/M2 | SYSTOLIC BLOOD PRESSURE: 118 MMHG | OXYGEN SATURATION: 96 % | DIASTOLIC BLOOD PRESSURE: 80 MMHG

## 2018-04-04 DIAGNOSIS — M54.6 ACUTE RIGHT-SIDED THORACIC BACK PAIN: Primary | ICD-10-CM

## 2018-04-04 DIAGNOSIS — R10.9 FLANK PAIN: ICD-10-CM

## 2018-04-04 LAB
ALBUMIN UR-MCNC: NEGATIVE MG/DL
APPEARANCE UR: CLEAR
BACTERIA #/AREA URNS HPF: ABNORMAL /HPF
BILIRUB UR QL STRIP: NEGATIVE
COLOR UR AUTO: YELLOW
GLUCOSE UR STRIP-MCNC: NEGATIVE MG/DL
HGB UR QL STRIP: ABNORMAL
KETONES UR STRIP-MCNC: 15 MG/DL
LEUKOCYTE ESTERASE UR QL STRIP: NEGATIVE
MUCOUS THREADS #/AREA URNS LPF: PRESENT /LPF
NITRATE UR QL: NEGATIVE
NON-SQ EPI CELLS #/AREA URNS LPF: ABNORMAL /LPF
PH UR STRIP: 5.5 PH (ref 5–7)
RBC #/AREA URNS AUTO: ABNORMAL /HPF
SOURCE: ABNORMAL
SP GR UR STRIP: 1.02 (ref 1–1.03)
UROBILINOGEN UR STRIP-ACNC: 0.2 EU/DL (ref 0.2–1)
WBC #/AREA URNS AUTO: ABNORMAL /HPF

## 2018-04-04 PROCEDURE — 81001 URINALYSIS AUTO W/SCOPE: CPT | Performed by: INTERNAL MEDICINE

## 2018-04-04 PROCEDURE — 99214 OFFICE O/P EST MOD 30 MIN: CPT | Performed by: INTERNAL MEDICINE

## 2018-04-04 RX ORDER — CYCLOBENZAPRINE HCL 5 MG
5-10 TABLET ORAL 3 TIMES DAILY PRN
Qty: 42 TABLET | Refills: 1 | Status: SHIPPED | OUTPATIENT
Start: 2018-04-04 | End: 2018-05-21

## 2018-04-04 ASSESSMENT — PAIN SCALES - GENERAL: PAINLEVEL: SEVERE PAIN (6)

## 2018-04-04 NOTE — NURSING NOTE
"Chief Complaint   Patient presents with     Hospital F/U       Initial /80 (BP Location: Right arm, Patient Position: Chair, Cuff Size: Adult Large)  Pulse 83  Temp 97.4  F (36.3  C) (Tympanic)  Wt 221 lb (100.2 kg)  SpO2 96%  BMI 41.76 kg/m2 Estimated body mass index is 41.76 kg/(m^2) as calculated from the following:    Height as of 3/13/18: 5' 1\" (1.549 m).    Weight as of this encounter: 221 lb (100.2 kg).  Medication Reconciliation: complete  "

## 2018-04-04 NOTE — MR AVS SNAPSHOT
After Visit Summary   4/4/2018    Jazlyn Bartlett    MRN: [id number]           Patient Information     Date Of Birth          1965        Visit Information        Provider Department      4/4/2018 3:20 PM Lulu Dasilva MD [facility]        Today's Diagnoses     Flank pain    -  1    Acute right-sided thoracic back pain          Care Instructions    You can take tylenol for the pain ( up to 4000mg per day). Continue heating pad, topical creams.     Try a gentle massage.     Try muscle relaxant, be aware this may make you drowsy.     If pain not improving over the next week or two, recommend seeing sports medicine.           Follow-ups after your visit        Additional Services     SPORTS MEDICINE REFERRAL       Your provider has referred you to:  [facility]: [facility] [place] -  [facility]  [phone]   [email or web address]    Please be aware that coverage of these services is subject to the terms and limitations of your health insurance plan.  Call member services at your health plan with any benefit or coverage questions.      Please bring the following to your appointment:    >>   Any x-rays, CTs or MRIs which have been performed.  Contact the facility where they were done to arrange for  prior to your scheduled appointment.    >>   List of current medications   >>   This referral request   >>   Any documents/labs given to you for this referral                  Your next 10 appointments already scheduled     May 04, 2018  8:30 PM CDT   Psg Split W/Tcm with BED 5 SH SLEEP   [facility]    [address] [address]   [phone]            May 10, 2018  5:00 PM CDT   Return Sleep Patient with Rob Sierra MD   [facility] ([facility]  [place] [facility]    [address]  [facility] [place] [address]   [phone]            Jun 05, 2018  5:20 PM CDT   Office Visit with Lokesh Casanova MD   [facility] [place] ([facility])    [address]  [place] [id number] [phone]           Bring a current list of meds and any records pertaining to this visit. For Physicals, please bring immunization records and any forms needing to be filled out. Please arrive 10 minutes early to complete paperwork.              Who to contact     If you have questions or need follow up information about today's clinic visit or your schedule please contact [facility] directly at [phone].  Normal or non-critical lab and imaging results will be communicated to you by [employer], letter or phone within 4 business days after the clinic has received the results. If you do not hear from us within 7 days, please contact the clinic through [employer]ent or phone. If you have a critical or abnormal lab result, we will notify you by phone as soon as possible.  Submit refill requests through [employer] or call your pharmacy and they will forward the refill request to us. Please allow 3 business days for your refill to be completed.          Additional Information About Your Visit        SocialCompare Information     SocialCompare gives you secure access to your electronic health record. If you see a primary care provider, you can also send messages to your care team and make appointments. If you have questions, please call your primary care clinic.  If you do not have a primary care provider, please call [phone] and they will assist you.        Care EveryWhere ID     This is your Care EveryWhere ID. This could be used by other organizations to access your [place] medical records  [id number]        Your Vitals Were     Pulse Temperature Pulse Oximetry BMI (Body Mass Index)          83 97.4  F (36.3  C) (Tympanic) 96% 41.76  kg/m2         Blood Pressure from Last 3 Encounters:   04/04/18 118/80   03/28/18 111/75   03/20/18 132/87    Weight from Last 3 Encounters:   04/04/18 221 lb (100.2 kg)   03/20/18 226 lb (102.5 kg)   03/13/18 225 lb (102.1 kg)              We Performed the Following     *UA reflex to Microscopic and Culture ([facility] (except [place] and [place])     SPORTS MEDICINE REFERRAL     Urine Microscopic          Today's Medication Changes          These changes are accurate as of 4/4/18  3:49 PM.  If you have any questions, ask your nurse or doctor.               Start taking these medicines.        Dose/Directions    cyclobenzaprine 5 MG tablet   Commonly known as:  FLEXERIL   Used for:  Acute right-sided thoracic back pain   Started by:  Lulu Dasilva MD        Dose:  5-10 mg   Take 1-2 tablets (5-10 mg) by mouth 3 times daily as needed for muscle spasms   Quantity:  42 tablet   Refills:  1            Where to get your medicines      These medications were sent to [facility]/pharmacy #[id number] - [place], [place] - [address] [address]  [address] [address] [address]     Phone:  [phone]     cyclobenzaprine 5 MG tablet                Primary Care Provider Office Phone # Fax #    Lokesh Casanova [phone]        [facility] [address] [facility] [place] [address]        Goals        Result Component    A1C < 7.0     Related Problems    Type 2 diabetes mellitus without complication (H)      Equal Access to Services     SHIRLEY STOKES : Hadii christine staffordo Sochapo, waaxda luqadaha, qaybta kaalmada adeegjuan, jenifer tovar. So [facility] [phone].    ATENCIÓN: Si habla francis, tiene a tracey disposición servicios gratuitos de asistencia lingüística. Llseb al [phone].    We comply with applicable federal civil rights laws and [place] laws. We do not discriminate on the basis of race, color, national origin, age, disability, sex, sexual orientation, or gender  identity.            Thank you!     Thank you for choosing [facility]  for your care. Our goal is always to provide you with excellent care. Hearing back from our patients is one way we can continue to improve our services. Please take a few minutes to complete the written survey that you may receive in the mail after your visit with us. Thank you!             Your Updated Medication List - Protect others around you: Learn how to safely use, store and throw away your medicines at www.disposemymeds.org.          This list is accurate as of 4/4/18  3:49 PM.  Always use your most recent med list.                   Brand Name Dispense Instructions for use Diagnosis    albuterol 108 (90 BASE) MCG/ACT Inhaler    PROAIR HFA/PROVENTIL HFA/VENTOLIN HFA    3 Inhaler    Inhale 2 puffs into the lungs every 6 hours as needed for shortness of breath / dyspnea    Chronic obstructive pulmonary disease, unspecified COPD type (H)       aspirin 81 MG EC tablet     100 tablet    Take 1 tablet (81 mg) by mouth daily    Hypertension goal BP (blood pressure) < 140/80       budesonide 180 MCG/ACT inhaler    PULMICORT FLEXHALER    3 Inhaler    Inhale 1 puff into the lungs 2 times daily    Chronic obstructive pulmonary disease, unspecified COPD type (H)       cyclobenzaprine 5 MG tablet    FLEXERIL    42 tablet    Take 1-2 tablets (5-10 mg) by mouth 3 times daily as needed for muscle spasms    Acute right-sided thoracic back pain       glipiZIDE 10 MG 24 hr tablet    glipiZIDE XL    180 tablet    Take 1 tablet (10 mg) by mouth 2 times daily (with meals)    Type 2 diabetes mellitus without complication, without long-term current use of insulin (H)       hydrochlorothiazide 25 MG tablet    HYDRODIURIL    90 tablet    Take 1 tablet (25 mg) by mouth daily    Hypertension goal BP (blood pressure) < 140/80       ipratropium - albuterol 0.5 mg/2.5 mg/3 mL 0.5-2.5 (3) MG/3ML neb solution    DUONEB    1 vial    Take 1 vial (3 mLs)  by nebulization every 6 hours as needed for shortness of breath / dyspnea or wheezing    Chronic obstructive pulmonary disease, unspecified COPD type (H)       ipratropium 0.06 % spray    ATROVENT    1 Box    Spray 2 sprays into both nostrils 2 times daily as needed for rhinitis    Chronic obstructive pulmonary disease, unspecified COPD type (H)       ketoconazole 2 % cream    NIZORAL    15 g    Apply topically 2 times daily as needed for itching Apply to AA on the face bid when needed    Seborrheic dermatitis       levothyroxine 150 MCG tablet    SYNTHROID/LEVOTHROID    90 tablet    TAKE 1 TABLET (150 MCG) BY MOUTH DAILY    Hypothyroidism, unspecified type       losartan 50 MG tablet    COZAAR    90 tablet    Take 1 tablet (50 mg) by mouth daily    Hypertension goal BP (blood pressure) < 140/80       oxyCODONE IR 5 MG tablet    ROXICODONE    14 tablet    Take 1 tablet (5 mg) by mouth every 6 hours as needed for pain        simvastatin 20 MG tablet    ZOCOR    90 tablet    Take 1 tablet (20 mg) by mouth At Bedtime    Hyperlipidemia LDL goal <100       tiotropium 18 MCG capsule    SPIRIVA    90 capsule    Inhale 1 capsule (18 mcg) into the lungs daily    Chronic obstructive pulmonary disease, unspecified COPD type (H)       vitamin D 2000 UNITS tablet     100 tablet    Take 2,000 Units by mouth daily    Vitamin D deficiency disease       zolpidem 5 MG tablet    AMBIEN    1 tablet    Take tablet by mouth 15 minutes prior to sleep, for Sleep Study    Sleep disturbance

## 2018-04-04 NOTE — PROGRESS NOTES
SUBJECTIVE:   Jazlyn Bartlett is a 52 year old female who presents to clinic today for the following health issues:      ED/UC Followup:    Facility:  [facility]   Date of visit: 3/28  Reason for visit: Flank pain   Current Status: still experiencing pain      Jazlyn is here for follow-up for back and side pain.  This started about a week ago when she woke up in the morning, first set was a back strain and she slept wrong.  Was not able to get out of bed much that day.  So had been constipated so wondered if that was playing a role.  Today the pain was just his back, she went to the ER.  She had a noncontrast CT scan of the abdomen which was negative for kidney stone, otherwise unremarkable.  UA was concerning for UTI, so she was treated with a course of Keflex.    Overall she is feeling a little better, but still in quite a bit of pain.  The pain seems to wrap from a tender spot on her anterior right rib cage around the middle of her right back.  She did take some medication and had several bowel movements, which has helped slightly.  She has an electrotherapy device which has helped the back as well.  She is not sure whether the antibiotics have helped anything.  She is also using Biofreeze.  NSAIDs upset her stomach.    Urine culture came back as 10-50,000 CFU urogenital brayden.           Reviewed and updated as needed this visit by clinical staff  Tobacco  Allergies  Meds       Reviewed and updated as needed this visit by Provider         ROS:  Const, msk, neuro, pulm, gi, gu reviewed,  otherwise negative unless noted above.       OBJECTIVE:     /80 (BP Location: Right arm, Patient Position: Chair, Cuff Size: Adult Large)  Pulse 83  Temp 97.4  F (36.3  C) (Tympanic)  Wt 221 lb (100.2 kg)  SpO2 96%  BMI 41.76 kg/m2  Body mass index is 41.76 kg/(m^2).    Gen: well appearing, pleasant, obese woman, no distress  HEENT: PERRL, sclera nonicteric, MMM  Pulm: breathing comfortably, CTAB, no wheezes or  rales  CV: RRR, normal S1 and S2, no murmurs  Abd: BS present, soft, nontender, nondistended  MSK: point tender over anterior lower right ribs and across mid right back      Diagnostic Test Results:  Results for orders placed or performed in visit on 04/04/18 (from the past 24 hour(s))   *UA reflex to Microscopic and Culture ([facility] (except [place] and [place])   Result Value Ref Range    Color Urine Yellow     Appearance Urine Clear     Glucose Urine Negative NEG^Negative mg/dL    Bilirubin Urine Negative NEG^Negative    Ketones Urine 15 (A) NEG^Negative mg/dL    Specific Gravity Urine 1.025 1.003 - 1.035    Blood Urine Moderate (A) NEG^Negative    pH Urine 5.5 5.0 - 7.0 pH    Protein Albumin Urine Negative NEG^Negative mg/dL    Urobilinogen Urine 0.2 0.2 - 1.0 EU/dL    Nitrite Urine Negative NEG^Negative    Leukocyte Esterase Urine Negative NEG^Negative    Source Midstream Urine    Urine Microscopic   Result Value Ref Range    WBC Urine 0 - 5 OTO5^0 - 5 /HPF    RBC Urine 2-5 (A) OTO2^O - 2 /HPF    Squamous Epithelial /LPF Urine Moderate (A) FEW^Few /LPF    Bacteria Urine Moderate (A) NEG^Negative /HPF    Mucous Urine Present (A) NEG^Negative /LPF       ASSESSMENT/PLAN:         ICD-10-CM    1. Acute right-sided thoracic back pain M54.6 cyclobenzaprine (FLEXERIL) 5 MG tablet     SPORTS MEDICINE REFERRAL   2. Flank pain R10.9 *UA reflex to Microscopic and Culture ([facility] (except [place] and [place])     Urine Microscopic     Her pain seems to be more musculoskeletal, given the tenderness on exam and change with position.  Her urine culture was negative and abdominal CT was normal.  Recommended continue topical therapies, heating pad, try massage, muscle relaxant.  If pain is not improving over the next couple weeks, could see sports medicine for possible myofascial injection.      F/U as needed for persistent or worsening symptoms.       Lulu Dasilva MD  [place]  [facility]

## 2018-04-04 NOTE — PATIENT INSTRUCTIONS
You can take tylenol for the pain ( up to 4000mg per day). Continue heating pad, topical creams.     Try a gentle massage.     Try muscle relaxant, be aware this may make you drowsy.     If pain not improving over the next week or two, recommend seeing sports medicine.

## 2018-04-04 NOTE — LETTER
[facility]          [address] [place] [address]                            [phone]  Fax: [phone]    Jazlyn Bartlett  [address] [place] [facility] [address]    [id number]    April 4, 2018      To whom it may concern    Please excuse Jazlyn Bartlett from work 4/4/2018 due to acute musculoskeletal issue.  She may return when improved, likely in the next 2-3 days.  If you have any other questions or concerns please feel free to contact me at anytime.        Sincerely,      Lulu Dasilva MD

## 2018-04-09 ENCOUNTER — TELEPHONE (OUTPATIENT)
Dept: PALLIATIVE MEDICINE | Facility: CLINIC | Age: 53
End: 2018-04-09

## 2018-04-09 NOTE — TELEPHONE ENCOUNTER
Pre-screening Questions for Radiology Injections:    Injection to be done at which interventional clinic site? [facility]    Procedure ordered by Dr. Casanova    Procedure ordered? Lumbar Epidural Steroid Injection    What insurance would patient like us to bill for this procedure? [employer]      Worker's comp or MVA (motor vehicle accident) -Any injection DO NOT SCHEDULE and route to Chrissy Mckeon.      [employer] insurance - For SI joint injections, DO NOT SCHEDULE and route Glory Srivastava. Get[employer]back [employer] NO PA REQUIRED EFFECTIVE 11/1/2017      [employer]- MBB's must be scheduled at LEAST two weeks apart      [employer] - Any injection besides hip/shoulder/knee joint DO NOT SCHEDULE and route to Glory Srivastava. She will obtain PA and call pt back to schedule procedure or notify pt of denial.       HP C[employer]-PA REQUIRED FOR NON-OVI OR Joint injections    Any chance of pregnancy? NO   If YES, do NOT schedule and route to RN pool    Is an  needed? No     Patient has a drive home? (mandatory) YES:     Is patient taking any blood thinners (plavix, coumadin, jantoven, warfarin, heparin, pradaxa or dabigatran )? No   If hold needed, do NOT schedule, route to RN pool     Is patient taking any aspirin products? Yes - Pt takes 81 mg daily; instructed to hold 0 day(s) prior to procedure.      If more than 325mg/day do NOT schedule; route to RN pool     For CERVICAL procedures, hold all aspirin products for 6 days.      Does the patient have a bleeding or clotting disorder? No     If YES, okay to schedule AND route to RN nurse pool    **For any patients with platelet count <100, must be forwarded to provider**     Is patient diabetic?  Yes  If YES, have them bring their glucometer.    Does patient have an active infection or treated for one within the past week? No     Is patient currently taking any antibiotics?  No     For patients on chronic, preventative, or prophylactic antibiotics,  procedures may be scheduled.     For patients on antibiotics for active or recent infection:    Ana Maria Pablo Burton, Snitzer-antibiotic course must have been completed for 4 days    Dr. Lopez-antibiotic course must have been completed for 7 days    Is patient currently taking any steroid medications? (i.e. Prednisone, Medrol)  No     For patients on steroid medications:    Ana Maria Pablo Burton, Snitzer-steroid course must have been completed for 4 days    -steroid course must have been completed for 7 days    Reviewed with patient:  If you are started on any steroids or antibiotics between now and your appointment, you must contact us because it may affect our ability to perform your procedure.  Yes    Is patient actively being treated for cancer or immunocompromised? No  If YES, do NOT schedule and route to RN pool     Are you able to get on and off an exam table with minimal or no assistance? Yes  If NO, do NOT schedule and route to RN pool    Are you able to roll over and lay on your stomach with minimal or no assistance? Yes  If NO, do NOT schedule and route to RN pool     Any allergies to contrast dye, iodine, shellfish, or numbing and steroid medications? No  If YES, route to RN pool AND add allergy information to appointment notes    Allergies: Ibuprofen sodium and Vicodin [hydrocodone-acetaminophen]      Has the patient had a flu shot or any other vaccinations within 7 days before or after the procedure.  No     Does patient have an MRI/CT?  YES:   (SI joint, hip injections, lumbar sympathetic blocks, and stellate ganglion blocks do not require an MRI)    Was the MRI done w/in the last 3 years?  Yes    Was MRI done at [place]? Yes      If not, where was it done? N/A       If MRI was not done at [place], [facility] or [facility] do NOT schedule and route to nursing.  If pt has an imaging disc, the injection may be scheduled but pt has to bring disc to appt. If they show up w/out  disc the injection cannot be done    Reminders (please tell patient if applicable):       Instructed pt to arrive 30 minutes early for IV start if this is for a cervical procedure, ALL sympathetic (stellate ganglion, hypogastric, or lumbar sympathetic block) and all sedation procedures (RFA, spinal cord stimulation trials).  Not Applicable   -IVs are not routinely placed for Dr. Mccann cervical cases   -Dr. Nichols: IVs for cervical ESIs and cervical TBDs (not CMBBs/facet inj)      If NPO for sedation, informed patient that it is okay to take medications with sips of water (except if they are to hold blood thinners).  Not Applicable   *DO take blood pressure medication if it is prescribed*      If this is for a cervical OVI, informed patient that aspirin needs to be held for 6 days.   Not Applicable      For all patients not having spinal cord stimulator (SCS) trials or radiofrequency ablations (RFAs), informed patient:    IV sedation is not provided for this procedure.  If you feel that an oral anti-anxiety medication is needed, you can discuss this further with your referring provider or primary care provider.  The Pain Clinic provider will discuss specifics of what the procedure includes at your appointment.  Most procedures last 10-20 minutes.  We use numbing medications to help with any discomfort during the procedure.  Not Applicable      Do not schedule procedures requiring IV placement in the first appointment of the day or first appointment after lunch. Do NOT schedule at 0745, 0815 or 1245.  reviewed       For patients 85 or older we recommend having an adult stay w/ them for the remainder of the day.       Does the patient have any questions?  NO  Sarah Lennon  [facility]

## 2018-04-12 ENCOUNTER — MYC MEDICAL ADVICE (OUTPATIENT)
Dept: FAMILY MEDICINE | Facility: CLINIC | Age: 53
End: 2018-04-12

## 2018-04-12 NOTE — TELEPHONE ENCOUNTER
Please see My Chart message below and advise as appropriate.  Appears to need provider to provider.  Farideh Bonilla RN - Triage  [facility]

## 2018-04-13 NOTE — TELEPHONE ENCOUNTER
TC to call and find out if a form needs to be filled out or if they need any other information.    Lokesh Casanova MD  [facility] [place]

## 2018-04-13 NOTE — TELEPHONE ENCOUNTER
I am assuming patient needs a CPT code in order for her insurance to know if it will be a covered service. I will check this out and call insurance and then notify patient.     Marcella Harp  Referral Coordinator

## 2018-04-17 ENCOUNTER — RADIANT APPOINTMENT (OUTPATIENT)
Dept: GENERAL RADIOLOGY | Facility: CLINIC | Age: 53
End: 2018-04-17
Attending: PAIN MEDICINE
Payer: COMMERCIAL

## 2018-04-17 ENCOUNTER — RADIOLOGY INJECTION OFFICE VISIT (OUTPATIENT)
Dept: PALLIATIVE MEDICINE | Facility: CLINIC | Age: 53
End: 2018-04-17
Attending: FAMILY MEDICINE
Payer: COMMERCIAL

## 2018-04-17 VITALS — DIASTOLIC BLOOD PRESSURE: 85 MMHG | OXYGEN SATURATION: 95 % | HEART RATE: 66 BPM | SYSTOLIC BLOOD PRESSURE: 137 MMHG

## 2018-04-17 DIAGNOSIS — M54.16 LUMBAR RADICULOPATHY: Primary | ICD-10-CM

## 2018-04-17 DIAGNOSIS — M54.16 LUMBAR RADICULOPATHY: ICD-10-CM

## 2018-04-17 PROCEDURE — 62323 NJX INTERLAMINAR LMBR/SAC: CPT | Performed by: PAIN MEDICINE

## 2018-04-17 NOTE — MR AVS SNAPSHOT
After Visit Summary   4/17/2018    Jazlyn Bartlett    MRN: [id number]           Patient Information     Date Of Birth          1965        Visit Information        Provider Department      4/17/2018 8:15 AM Robert Nichols MD [facility]        Care Instructions    [facility] Procedure Discharge Instructions    Today you saw:  Dr. Robert Nichols    Your procedure:  Epidural steroid injection     Medications used:  Lidocaine (anesthetic)  Bupivacaine (anesthetic)  Kenalog (steroid) Normal Saline Omnipaque (contrast)          Be cautious when walking as numbness and/or weakness in the legs may occur up to 6-8 hours after the procedure due to effect of the local anesthetic    Do not drive for 6 hours. The effect of the local anesthetic could slow your reflexes.     Avoid strenuous activity for the first 24 hours. You may resume your regular activities after that.     You may shower, however avoid swimming, tub baths or hot tubs for 24 hours following your procedure    You may have a mild to moderate increase in pain for several days following the injection.      You may use ice packs for 10-15 minutes, 3 to 4 times a day at the injection site for comfort    Do not use heat to painful areas for 6 to 8 hours. This will give the local anesthetic time to wear off and prevent you from accidentally burning your skin.    You may use anti-inflammatory medications (such as Ibuprofen/Advil or Aleve) or Tylenol for pain control if necessary    With diabetes, check your blood sugar more frequently than usual as your blood sugar may be higher than normal for 10-14 days following a steroid injection. Contact your doctor who manages your diabetes if your blood sugar is higher than usual    It may take up to 14 days for the steroid medication to start working although you may feel the effect as early as a few days after the procedure.     Follow up with your referring provider  in 2-3 weeks      If you experience any of the following, call the pain center line during work hours at [phone] or on-call physician after hours at [phone]:  -Fever over 100 degree F  -Swelling, bleeding, redness, drainage, warmth at the injection site  -Progressive weakness or numbness in your legs or arms  -Loss of bowel or bladder function  -Unusual headache that is not relieved by Tylenol or your regular headache medication  -Unusual new onset of pain that is not improving    Phone #s:  Nurse triage line for general questions: [phone]            Follow-ups after your visit        Your next 10 appointments already scheduled     Apr 17, 2018  8:15 AM CDT   Radiology Injections with Robert Nichols MD   [facility] ([facility])    [address] [address] [address]   [phone]            May 04, 2018  8:30 PM CDT   Psg Split W/Tcm with BED 5 SH SLEEP   [facility] ([facility])    [address] [place] [id number] [phone]            May 10, 2018  5:00 PM CDT   Return Sleep Patient with Rob Sierra MD   [facility] ([facility]    [address] [place] [id number]   [phone]            Jun 05, 2018  5:20 PM CDT   Office Visit with Lokesh Casanova MD   [facility] ([facility]    [address] [place] [id number] [phone]           Bring a current list of meds and any records pertaining to this visit. For Physicals, please bring immunization records and any forms needing to be filled out. Please arrive 10 minutes early to complete paperwork.              Who to contact     If you have questions or need follow up information about today's clinic visit or your schedule please contact [facility] directly at  [phone].  Normal or non-critical lab and imaging results will be communicated to you by MyChart, letter or phone within 4 business days after the clinic has received the results. If you do not hear from us within 7 days, please contact the clinic through [employer] or phone. If you have a critical or abnormal lab result, we will notify you by phone as soon as possible.  Submit refill requests through [employer] or call your pharmacy and they will forward the refill request to us. Please allow 3 business days for your refill to be completed.          Additional Information About Your Visit        [employer] Information     [employer] gives you secure access to your electronic health record. If you see a primary care provider, you can also send messages to your care team and make appointments. If you have questions, please call your primary care clinic.  If you do not have a primary care provider, please call [phone] and they will assist you.        Care EveryWhere ID     This is your Care EveryWhere ID. This could be used by other organizations to access your [place] medical records  [id number]        Your Vitals Were     Pulse Pulse Oximetry                68 93%           Blood Pressure from Last 3 Encounters:   04/17/18 130/83   04/04/18 118/80   03/28/18 111/75    Weight from Last 3 Encounters:   04/04/18 100.2 kg (221 lb)   03/20/18 102.5 kg (226 lb)   03/13/18 102.1 kg (225 lb)              Today, you had the following     No orders found for display       Primary Care Provider Office Phone # Fax #    Lokesh Casanova [phone]       [address]  [place] [address]        Goals        Result Component    A1C < 7.0     Related Problems    Type 2 diabetes mellitus without complication (H)      Equal Access to Services     SHIRLEY STOKES : Marga Kwon, onel west, qaybta kristelaljenifer augustine. So [facility]  [phone].    ATENCIÓN: Si linda blanco, tiene a tracey disposición servicios gratuitos de asistencia lingüística. Enrique feliciano [phone].    We comply with applicable federal civil rights laws and [place] laws. We do not discriminate on the basis of race, color, national origin, age, disability, sex, sexual orientation, or gender identity.            Thank you!     Thank you for choosing [facility]  for your care. Our goal is always to provide you with excellent care. Hearing back from our patients is one way we can continue to improve our services. Please take a few minutes to complete the written survey that you may receive in the mail after your visit with us. Thank you!             Your Updated Medication List - Protect others around you: Learn how to safely use, store and throw away your medicines at www.disposemymeds.org.          This list is accurate as of 4/17/18  8:05 AM.  Always use your most recent med list.                   Brand Name Dispense Instructions for use Diagnosis    albuterol 108 (90 Base) MCG/ACT Inhaler    PROAIR HFA/PROVENTIL HFA/VENTOLIN HFA    3 Inhaler    Inhale 2 puffs into the lungs every 6 hours as needed for shortness of breath / dyspnea    Chronic obstructive pulmonary disease, unspecified COPD type (H)       aspirin 81 MG EC tablet     100 tablet    Take 1 tablet (81 mg) by mouth daily    Hypertension goal BP (blood pressure) < 140/80       budesonide 180 MCG/ACT inhaler    PULMICORT FLEXHALER    3 Inhaler    Inhale 1 puff into the lungs 2 times daily    Chronic obstructive pulmonary disease, unspecified COPD type (H)       cyclobenzaprine 5 MG tablet    FLEXERIL    42 tablet    Take 1-2 tablets (5-10 mg) by mouth 3 times daily as needed for muscle spasms    Acute right-sided thoracic back pain       glipiZIDE 10 MG 24 hr tablet    glipiZIDE XL    180 tablet    Take 1 tablet (10 mg) by mouth 2 times daily (with meals)    Type 2 diabetes mellitus without  complication, without long-term current use of insulin (H)       hydrochlorothiazide 25 MG tablet    HYDRODIURIL    90 tablet    Take 1 tablet (25 mg) by mouth daily    Hypertension goal BP (blood pressure) < 140/80       ipratropium - albuterol 0.5 mg/2.5 mg/3 mL 0.5-2.5 (3) MG/3ML neb solution    DUONEB    1 vial    Take 1 vial (3 mLs) by nebulization every 6 hours as needed for shortness of breath / dyspnea or wheezing    Chronic obstructive pulmonary disease, unspecified COPD type (H)       ipratropium 0.06 % spray    ATROVENT    1 Box    Spray 2 sprays into both nostrils 2 times daily as needed for rhinitis    Chronic obstructive pulmonary disease, unspecified COPD type (H)       ketoconazole 2 % cream    NIZORAL    15 g    Apply topically 2 times daily as needed for itching Apply to AA on the face bid when needed    Seborrheic dermatitis       levothyroxine 150 MCG tablet    SYNTHROID/LEVOTHROID    90 tablet    TAKE 1 TABLET (150 MCG) BY MOUTH DAILY    Hypothyroidism, unspecified type       losartan 50 MG tablet    COZAAR    90 tablet    Take 1 tablet (50 mg) by mouth daily    Hypertension goal BP (blood pressure) < 140/80       oxyCODONE IR 5 MG tablet    ROXICODONE    14 tablet    Take 1 tablet (5 mg) by mouth every 6 hours as needed for pain        simvastatin 20 MG tablet    ZOCOR    90 tablet    Take 1 tablet (20 mg) by mouth At Bedtime    Hyperlipidemia LDL goal <100       tiotropium 18 MCG capsule    SPIRIVA    90 capsule    Inhale 1 capsule (18 mcg) into the lungs daily    Chronic obstructive pulmonary disease, unspecified COPD type (H)       vitamin D 2000 units tablet     100 tablet    Take 2,000 Units by mouth daily    Vitamin D deficiency disease       zolpidem 5 MG tablet    AMBIEN    1 tablet    Take tablet by mouth 15 minutes prior to sleep, for Sleep Study    Sleep disturbance

## 2018-04-17 NOTE — PROGRESS NOTES
Pre procedure Diagnosis: Lumbar radiculopathy   Post procedure Diagnosis: Same  Procedure performed: L5-S1 interlaminar epidural steroid injection   Anesthesia: none  Complications: none  Operators: Robert Nichols MD     Indications:   Jazlyn Bartlett is a 52 year old female.  The patient has a history of bilat LBP R>L. The pain occasionally radiates to his post thigh.  Examination shows diffuse TTP.  she has tried conservative treatment including meds.    MRI  IMPRESSION:    1. At L3-L4 there is mild central stenosis.  2. At L5-S1 there is severe right and moderate to severe left  foraminal stenosis.  Options/alternatives, benefits and risks were discussed with the patient including but not limited to bleeding, infection, no pain relief, tissue trauma, exposure to radiation, reaction to medications, spinal cord injury, dural puncture, weakness, numbness and headache.  Questions were answered to her satisfaction and she wishes to proceed. Voluntary informed consent was obtained and signed.     Vitals were reviewed: Yes  Allergies were reviewed:  Yes   Medications were reviewed:  Yes   Pre-procedure pain score: 6/10    Procedure:  The patient's medical history, medications, and allergies were reviewed and reconciled.  After obtaining signed informed consent, the patient was brought into the procedure suite and was placed in a prone position on the procedure table.   A Pause for the Cause was performed.  Patient was prepped and draped in the usual sterile fashion.     The L5-S1 interspace was identified with AP fluoroscopy.  A total of 6ml of 1% lidocaine was used to anesthetize the skin and subcutaneous tissues for a  midline approach.    A 20gauge 3.5inch Touhy needle was advanced utilizing intermittent AP and Lateral fluoroscopy and air for loss of resistance.  The epidural space was encountered on the first pass without difficulties.  Aspiration for blood and CSF was negative.  Needle position was verified by  injecting 1 ml of Omnipaque utilizing real-time fluoroscopy that showed good needle placement and epidural spread without signs of intravascular or intrathecal uptake.  Omnipaque wasted:  9 ml.    Then, after repeated negative aspiration for blood or CSF, a combination of Kenalog 40 mg, Bupivicaine 0.25% 2 ml, diluted with 3 ml of normal saline to a total injectate volume of 6 ml was injected into the epidural space in a slow and incremental fashion and the needle was restyletted and withdrawn.  All injected medications were preservative free.    The injection site was cleaned and a sterile dressing was applied.    The patient tolerated the procedure well without complications and was taken to the recovery room for continued observation.    Images were saved to PACS.    Post-procedure pain score: 2/10  Follow-up includes:   -f/u phone call in one week  -f/u with referring provider     Robert Nichols MD  [facility]

## 2018-04-17 NOTE — NURSING NOTE
Discharge Information    IV Discontiued Time:  NA    Amount of Fluid Infused:  NA    Discharge Criteria = When patient returns to baseline or as per MD order    Consciousness:  Pt is fully awake    Circulation:  BP +/- 20% of pre-procedure level    Respiration:  Patient is able to breathe deeply    O2 Sat:  Patient is able to maintain O2 Sat >92% on room air    Activity:  Moves 4 extremities on command    Ambulation:  Patient is able to stand and walk or stand and pivot into wheelchair    Dressing:  Clean/dry or No Dressing    Notes:   Discharge instructions and AVS given to patient    Patient meets criteria for discharge?  YES    Admitted to PCU?  No    Responsible adult present to accompany patient home?  Yes    Signature/Title:    Nedra Johnson  RN Care Coordinator  [facility]

## 2018-04-17 NOTE — NURSING NOTE
Pre-procedure Intake    Have you been fasting? No     If yes, for how long?     Are you taking a prescribed blood thinner such as coumadin, Plavix, Xarelto?    No    If yes, when did you take your last dose?     Do you take aspirin?  No    If cervical procedure, have you held aspirin for 6 days?   No     Do you have any allergies to contrast dye, iodine, steroid and/or numbing medications?  NO    Are you currently taking antibiotics or have an active infection?  NO    Have you had a fever/elevated temperature within the past week? NO    Are you currently taking oral steroids? NO    Do you have a ? Yes       Are you pregnant or breastfeeding?  Not Applicable    Are the vital signs normal?  Yes

## 2018-04-17 NOTE — PATIENT INSTRUCTIONS
[facility] Procedure Discharge Instructions    Today you saw:  Dr. Robert Nichols    Your procedure:  Epidural steroid injection     Medications used:  Lidocaine (anesthetic)  Bupivacaine (anesthetic)  Kenalog (steroid) Normal Saline Omnipaque (contrast)          Be cautious when walking as numbness and/or weakness in the legs may occur up to 6-8 hours after the procedure due to effect of the local anesthetic    Do not drive for 6 hours. The effect of the local anesthetic could slow your reflexes.     Avoid strenuous activity for the first 24 hours. You may resume your regular activities after that.     You may shower, however avoid swimming, tub baths or hot tubs for 24 hours following your procedure    You may have a mild to moderate increase in pain for several days following the injection.      You may use ice packs for 10-15 minutes, 3 to 4 times a day at the injection site for comfort    Do not use heat to painful areas for 6 to 8 hours. This will give the local anesthetic time to wear off and prevent you from accidentally burning your skin.    You may use anti-inflammatory medications (such as Ibuprofen/Advil or Aleve) or Tylenol for pain control if necessary    With diabetes, check your blood sugar more frequently than usual as your blood sugar may be higher than normal for 10-14 days following a steroid injection. Contact your doctor who manages your diabetes if your blood sugar is higher than usual    It may take up to 14 days for the steroid medication to start working although you may feel the effect as early as a few days after the procedure.     Follow up with your referring provider in 2-3 weeks      If you experience any of the following, call the pain center line during work hours at [phone] or on-call physician after hours at [phone]:  -Fever over 100 degree F  -Swelling, bleeding, redness, drainage, warmth at the injection site  -Progressive weakness or numbness in your legs  or arms  -Loss of bowel or bladder function  -Unusual headache that is not relieved by Tylenol or your regular headache medication  -Unusual new onset of pain that is not improving    Phone #s:  Nurse triage line for general questions: [phone]

## 2018-04-23 ENCOUNTER — TELEPHONE (OUTPATIENT)
Dept: SLEEP MEDICINE | Facility: CLINIC | Age: 53
End: 2018-04-23

## 2018-04-24 ENCOUNTER — TELEPHONE (OUTPATIENT)
Dept: PALLIATIVE MEDICINE | Facility: CLINIC | Age: 53
End: 2018-04-24

## 2018-04-24 NOTE — TELEPHONE ENCOUNTER
Patient had a L5-S1 interlaminar epidural steroid injection on 4/17/18.  Called patient for an update.      Left message that we were calling for an update about how she was doing after the injection.  LM that if she has any problems or questions to call the clinic at [phone].

## 2018-04-25 ENCOUNTER — OFFICE VISIT (OUTPATIENT)
Dept: FAMILY MEDICINE | Facility: CLINIC | Age: 53
End: 2018-04-25
Payer: COMMERCIAL

## 2018-04-25 VITALS
HEIGHT: 61 IN | HEART RATE: 71 BPM | OXYGEN SATURATION: 97 % | SYSTOLIC BLOOD PRESSURE: 134 MMHG | WEIGHT: 220 LBS | DIASTOLIC BLOOD PRESSURE: 78 MMHG | BODY MASS INDEX: 41.54 KG/M2 | TEMPERATURE: 97.2 F

## 2018-04-25 DIAGNOSIS — M54.6 ACUTE RIGHT-SIDED THORACIC BACK PAIN: Primary | ICD-10-CM

## 2018-04-25 PROCEDURE — 99214 OFFICE O/P EST MOD 30 MIN: CPT | Performed by: FAMILY MEDICINE

## 2018-04-25 NOTE — MR AVS SNAPSHOT
After Visit Summary   4/25/2018    Jazlyn Bartlett    MRN: [id number]           Patient Information     Date Of Birth          1965        Visit Information        Provider Department      4/25/2018 9:00 AM Lokesh Casanova MD [facility] [place]        Today's Diagnoses     Acute right-sided thoracic back pain    -  1       Follow-ups after your visit        Additional Services     [facility] PT, HAND, AND CHIROPRACTIC REFERRAL       **This order will print in the [facility] Scheduling Office**    Physical Therapy, Hand Therapy and Chiropractic Care are available through:    *[facility]  *[facility]  *[facility]    Call one number to schedule at any of the above locations: [phone].    Your provider has referred you to: Physical Therapy at [facility] or [facility]    Indication/Reason for Referral: mid Back Pain  Onset of Illness: 3/27/18  Therapy Orders: Evaluate and Treat  Special Programs:   Special Request:     [place] [place]      Additional Comments for the Therapist or Chiropractor:     Please be aware that coverage of these services is subject to the terms and limitations of your health insurance plan.  Call member services at your health plan with any benefit or coverage questions.      Please bring the following to your appointment:    *Your personal calendar for scheduling future appointments  *Comfortable clothing                  Follow-up notes from your care team     Return in about 6 weeks (around 6/5/2018) for Diabetes Recheck.      Your next 10 appointments already scheduled     May 09, 2018  4:00 PM CDT   HST  with  SLEEP LAB   [facility] ([facility])    [address]  [place] [address] [phone]            May 10, 2018  9:00 AM CDT   HST Drop Off with  SLEEP DME   [facility] ([facility])    [address]  [address]  [facility] [place] [facility] [address]   [phone]            May 21, 2018  5:00 PM CDT   Return Sleep Patient with Rob Sierra MD   [facility] ([facility])    [address] [address] [address]   [phone]            Jun 05, 2018  5:20 PM CDT   Office Visit with Lokesh Casanova MD   [facility] [place]    [address] [address] [phone]           Bring a current list of meds and any records pertaining to this visit. For Physicals, please bring immunization records and any forms needing to be filled out. Please arrive 10 minutes early to complete paperwork.              Who to contact     If you have questions or need follow up information about today's clinic visit or your schedule please contact [facility] directly at [phone].  Normal or non-critical lab and imaging results will be communicated to you by St[employer], letter or phone within 4 business days after the clinic has received the results. If you do not hear from us within 7 days, please contact the clinic through [employer] or phone. If you have a critical or abnormal lab result, we will notify you by phone as soon as possible.  Submit refill requests through "[employer]" or call your pharmacy and they will forward the refill request to us. Please allow 3 business days for your refill to be completed.          Additional Information About Your Visit        "[employer] Information     "[employer] gives you secure access to your electronic health record. If you see a primary care provider, you can also send messages to your care team and make appointments. If you have questions, please call your primary care clinic.  If you do not have a primary care provider, please call [phone] and they will assist you.        Care EveryWhere ID     This is your Care EveryWhere ID. This could be  "used by other organizations to access your [place] medical records  [id number]        Your Vitals Were     Pulse Temperature Height Pulse Oximetry BMI (Body Mass Index)       71 97.2  F (36.2  C) (Tympanic) 5' 1\" (1.549 m) 97% 41.57 kg/m2        Blood Pressure from Last 3 Encounters:   04/25/18 134/78   04/17/18 137/85   04/04/18 118/80    Weight from Last 3 Encounters:   04/25/18 220 lb (99.8 kg)   04/04/18 221 lb (100.2 kg)   03/20/18 226 lb (102.5 kg)              We Performed the Following     [facility] PT, HAND, AND CHIROPRACTIC REFERRAL        Primary Care Provider Office Phone # Fax #    Lokesh Casanova [phone]       [address]  [place] [address]        Goals        Result Component    A1C < 7.0     Related Problems    Type 2 diabetes mellitus without complication (H)      Equal Access to Services     [facility]: Hadii aad ku hadasho Soomaali, waaxda luqadaha, qaybta kaalmada adeegyada, waxay idiin haymigueln rogelio saavedra . So [facility] [phone].    ATENCIÓN: Si habla español, tiene a tracey disposición servicios gratuitos de asistencia lingüística. Llame al [phone].    We comply with applicable federal civil rights laws and [place] laws. We do not discriminate on the basis of race, color, national origin, age, disability, sex, sexual orientation, or gender identity.            Thank you!     Thank you for choosing [facility] [place]  for your care. Our goal is always to provide you with excellent care. Hearing back from our patients is one way we can continue to improve our services. Please take a few minutes to complete the written survey that you may receive in the mail after your visit with us. Thank you!             Your Updated Medication List - Protect others around you: Learn how to safely use, store and throw away your medicines at www.disposemymeds.org.          This list is accurate as of 4/25/18  9:26 AM.  Always use your most recent med list.    "                Brand Name Dispense Instructions for use Diagnosis    albuterol 108 (90 Base) MCG/ACT Inhaler    PROAIR HFA/PROVENTIL HFA/VENTOLIN HFA    3 Inhaler    Inhale 2 puffs into the lungs every 6 hours as needed for shortness of breath / dyspnea    Chronic obstructive pulmonary disease, unspecified COPD type (H)       aspirin 81 MG EC tablet     100 tablet    Take 1 tablet (81 mg) by mouth daily    Hypertension goal BP (blood pressure) < 140/80       budesonide 180 MCG/ACT inhaler    PULMICORT FLEXHALER    3 Inhaler    Inhale 1 puff into the lungs 2 times daily    Chronic obstructive pulmonary disease, unspecified COPD type (H)       cyclobenzaprine 5 MG tablet    FLEXERIL    42 tablet    Take 1-2 tablets (5-10 mg) by mouth 3 times daily as needed for muscle spasms    Acute right-sided thoracic back pain       glipiZIDE 10 MG 24 hr tablet    glipiZIDE XL    180 tablet    Take 1 tablet (10 mg) by mouth 2 times daily (with meals)    Type 2 diabetes mellitus without complication, without long-term current use of insulin (H)       hydrochlorothiazide 25 MG tablet    HYDRODIURIL    90 tablet    Take 1 tablet (25 mg) by mouth daily    Hypertension goal BP (blood pressure) < 140/80       ipratropium - albuterol 0.5 mg/2.5 mg/3 mL 0.5-2.5 (3) MG/3ML neb solution    DUONEB    1 vial    Take 1 vial (3 mLs) by nebulization every 6 hours as needed for shortness of breath / dyspnea or wheezing    Chronic obstructive pulmonary disease, unspecified COPD type (H)       ipratropium 0.06 % spray    ATROVENT    1 Box    Spray 2 sprays into both nostrils 2 times daily as needed for rhinitis    Chronic obstructive pulmonary disease, unspecified COPD type (H)       ketoconazole 2 % cream    NIZORAL    15 g    Apply topically 2 times daily as needed for itching Apply to AA on the face bid when needed    Seborrheic dermatitis       levothyroxine 150 MCG tablet    SYNTHROID/LEVOTHROID    90 tablet    TAKE 1 TABLET (150 MCG) BY MOUTH  DAILY    Hypothyroidism, unspecified type       losartan 50 MG tablet    COZAAR    90 tablet    Take 1 tablet (50 mg) by mouth daily    Hypertension goal BP (blood pressure) < 140/80       oxyCODONE IR 5 MG tablet    ROXICODONE    14 tablet    Take 1 tablet (5 mg) by mouth every 6 hours as needed for pain        simvastatin 20 MG tablet    ZOCOR    90 tablet    Take 1 tablet (20 mg) by mouth At Bedtime    Hyperlipidemia LDL goal <100       tiotropium 18 MCG capsule    SPIRIVA    90 capsule    Inhale 1 capsule (18 mcg) into the lungs daily    Chronic obstructive pulmonary disease, unspecified COPD type (H)       vitamin D 2000 units tablet     100 tablet    Take 2,000 Units by mouth daily    Vitamin D deficiency disease       zolpidem 5 MG tablet    AMBIEN    1 tablet    Take tablet by mouth 15 minutes prior to sleep, for Sleep Study    Sleep disturbance

## 2018-04-25 NOTE — NURSING NOTE
"Chief Complaint   Patient presents with     Back Pain       Initial /78  Pulse 71  Temp 97.2  F (36.2  C) (Tympanic)  Ht 5' 1\" (1.549 m)  Wt 220 lb (99.8 kg)  SpO2 97%  BMI 41.57 kg/m2 Estimated body mass index is 41.57 kg/(m^2) as calculated from the following:    Height as of this encounter: 5' 1\" (1.549 m).    Weight as of this encounter: 220 lb (99.8 kg).  Medication Reconciliation: complete   Eleanor Aguilar CMA      "

## 2018-04-25 NOTE — PROGRESS NOTES
SUBJECTIVE:   Jazlyn Bartlett is a 52 year old female who presents to clinic today for the following health issues:      Concern - follow up back issues   Onset: off work since 3/28/18    Description:   Follow up back.  Did have cortisone injection on the 17th.  Stated it would take about 2 weeks for it to work.  Is still having back pain needs paperwork filled out for work.    Intensity: moderate    Progression of Symptoms:  Is feeling a little bit better    Since the onset of pain on March 27, 2018.  Patient has been to the emergency room and to see another provider at our clinic.  Pain clinic for steroid injection and today she is back seeing me.    Patient plans to go back to work on 4/30/18.  She would like to start with 4 hours per day for the first week instead of the full 8 hours.  After the first week she would like to try going back to the full-time duty which is 8 hours per day.  She only works on the weekdays.  After this point she tells it is hard to drive and hard to sit or stand too long due to discomfort.    She has not tried ibuprofen as in the past has caused some nausea symptoms.  She has 3 pills of oxycodone left which she would like to save for any severe symptoms.  She does not need a refill.  Forms needs to be filled out.    Problem list and histories reviewed & adjusted, as indicated.  Additional history: as documented    Patient Active Problem List   Diagnosis     Neck mass     Moderate major depression (H)     Anxiety     Vitamin D deficiency disease     CARDIOVASCULAR SCREENING; LDL GOAL LESS THAN 100     GERD (gastroesophageal reflux disease)     Hypertension goal BP (blood pressure) < 140/80     Type 2 diabetes mellitus without complication (H)     Chronic airway obstruction (H)     Advanced directives, counseling/discussion     Autoimmune gastritis- repeat UGI 1/16 with gastritis without eosinophils     Urinary incontinence     Osteoarthritis of patellofemoral joint     Morbid obesity  (H)     Tobacco abuse     Lumbar radiculopathy     Hyperlipidemia LDL goal <100     Hypothyroidism, unspecified type     Chronic obstructive pulmonary disease, unspecified COPD type (H)     Past Surgical History:   Procedure Laterality Date     ABLATION, ENDOMETRIAL, THERMAL, W/O HYSTEROSCOPIC GUIDANCE       DILATION AND CURETTAGE, HYSTEROSCOPY, ABLATE ENDOMETRIUM NOVASURE, COMBINED  10/11/2012    Procedure: COMBINED DILATION AND CURETTAGE, HYSTEROSCOPY, ABLATE ENDOMETRIUM NOVASURE;  COMBINED DILATION AND CURETTAGE, HYSTEROSCOPY, ABLATE ENDOMETRIUM ,pelvic exam under anesthesia;  Surgeon: Shruti Barrios MD;  Location: [facility] OR     ESOPHAGOSCOPY, GASTROSCOPY, DUODENOSCOPY (EGD), COMBINED N/A 1/19/2016    Procedure: COMBINED ESOPHAGOSCOPY, GASTROSCOPY, DUODENOSCOPY (EGD), BIOPSY SINGLE OR MULTIPLE;  Surgeon: Kristofer Molina MD;  Location: [facility] GI     Removal of cancerous lump on neck       TONSILLECTOMY         Social History   Substance Use Topics     Smoking status: Current Every Day Smoker     Packs/day: 0.50     Types: Cigarettes     Smokeless tobacco: Never Used     Alcohol use No     Family History   Problem Relation Age of Onset     DIABETES Mother      Breast Cancer Mother      DIABETES Father      Lung Cancer Father      Rheumatoid Arthritis Father          Current Outpatient Prescriptions   Medication Sig Dispense Refill     albuterol (PROAIR HFA/PROVENTIL HFA/VENTOLIN HFA) 108 (90 BASE) MCG/ACT Inhaler Inhale 2 puffs into the lungs every 6 hours as needed for shortness of breath / dyspnea 3 Inhaler 1     aspirin 81 MG EC tablet Take 1 tablet (81 mg) by mouth daily 100 tablet 3     budesonide (PULMICORT FLEXHALER) 180 MCG/ACT inhaler Inhale 1 puff into the lungs 2 times daily 3 Inhaler 3     Cholecalciferol (VITAMIN D) 2000 UNITS tablet Take 2,000 Units by mouth daily 100 tablet 3     cyclobenzaprine (FLEXERIL) 5 MG tablet Take 1-2 tablets (5-10 mg) by mouth 3 times daily as needed for muscle spasms 42  tablet 1     glipiZIDE (GLIPIZIDE XL) 10 MG 24 hr tablet Take 1 tablet (10 mg) by mouth 2 times daily (with meals) 180 tablet 0     hydrochlorothiazide (HYDRODIURIL) 25 MG tablet Take 1 tablet (25 mg) by mouth daily 90 tablet 3     ipratropium (ATROVENT) 0.06 % spray Spray 2 sprays into both nostrils 2 times daily as needed for rhinitis 1 Box 2     ipratropium - albuterol 0.5 mg/2.5 mg/3 mL (DUONEB) 0.5-2.5 (3) MG/3ML neb solution Take 1 vial (3 mLs) by nebulization every 6 hours as needed for shortness of breath / dyspnea or wheezing 1 vial 0     ketoconazole (NIZORAL) 2 % cream Apply topically 2 times daily as needed for itching Apply to AA on the face bid when needed 15 g 3     levothyroxine (SYNTHROID/LEVOTHROID) 150 MCG tablet TAKE 1 TABLET (150 MCG) BY MOUTH DAILY 90 tablet 1     losartan (COZAAR) 50 MG tablet Take 1 tablet (50 mg) by mouth daily 90 tablet 3     oxyCODONE IR (ROXICODONE) 5 MG tablet Take 1 tablet (5 mg) by mouth every 6 hours as needed for pain 14 tablet 0     simvastatin (ZOCOR) 20 MG tablet Take 1 tablet (20 mg) by mouth At Bedtime 90 tablet 3     tiotropium (SPIRIVA) 18 MCG capsule Inhale 1 capsule (18 mcg) into the lungs daily 90 capsule 1     zolpidem (AMBIEN) 5 MG tablet Take tablet by mouth 15 minutes prior to sleep, for Sleep Study 1 tablet 0     [DISCONTINUED] glyBURIDE-metFORMIN (GLUCOVANCE) 2.5-500 MG per tablet Take 1 tablet by mouth daily (with breakfast) 90 tablet 1     Allergies   Allergen Reactions     Ibuprofen Sodium Nausea and Vomiting     Vicodin [Hydrocodone-Acetaminophen] Nausea and Vomiting       Reviewed and updated as needed this visit by clinical staff       Reviewed and updated as needed this visit by Provider         ROS:  CONSTITUTIONAL: NEGATIVE for fever, chills, change in weight  ENT/MOUTH: NEGATIVE for ear, mouth and throat problems  RESP: NEGATIVE for significant cough or SOB  CV: NEGATIVE for chest pain, palpitations or peripheral edema    OBJECTIVE:           "                                          /78  Pulse 71  Temp 97.2  F (36.2  C) (Tympanic)  Ht 5' 1\" (1.549 m)  Wt 220 lb (99.8 kg)  SpO2 97%  BMI 41.57 kg/m2  Body mass index is 41.57 kg/(m^2).   GENERAL: healthy, alert, well nourished, well hydrated, no distress  RESP: lungs clear to auscultation - no rales, no rhonchi, no wheezes  CV: regular rates and rhythm, normal S1 S2, no S3 or S4 and no murmur, no click or rub -  Back: Point tenderness noted over the right side of the thoracic area.    No erythema or ecchymosis noted.  No tenderness over the ribs noted.       ASSESSMENT/PLAN:                                                        ICD-10-CM    1. Acute right-sided thoracic back pain M54.6 [facility], HAND, AND CHIROPRACTIC REFERRAL     Recommended to use ibuprofen 400 mg with food, twice a day as needed, if she is able to tolerated.  It should help with inflammation.  Physical therapy ordered.  Form filled out today and scanned for our records.    Total time spent was 25 minutes, more than half the time was spent in counseling the patient about the disease pathogenesis, treatment plan and coordinating care.        Lokesh Casanova MD  [facility]  "

## 2018-04-27 ENCOUNTER — THERAPY VISIT (OUTPATIENT)
Dept: PHYSICAL THERAPY | Facility: CLINIC | Age: 53
End: 2018-04-27
Payer: COMMERCIAL

## 2018-04-27 DIAGNOSIS — M54.6 THORACIC SPINE PAIN: Primary | ICD-10-CM

## 2018-04-27 PROCEDURE — 97161 PT EVAL LOW COMPLEX 20 MIN: CPT | Mod: GP | Performed by: PHYSICAL THERAPIST

## 2018-04-27 PROCEDURE — 97110 THERAPEUTIC EXERCISES: CPT | Mod: GP | Performed by: PHYSICAL THERAPIST

## 2018-04-27 NOTE — PROGRESS NOTES
[facility] Initial Evaluation  Subjective:  Patient is a 52 year old female presenting with rehab back hpi. The history is provided by the patient. No  was used.   Jazlyn Bartlett is a 52 year old female with a thoracic and lumbar condition.  Condition occurred with:  Lifting.  Condition occurred: at home.  This is a chronic condition  Pt reports having lower right thoracic pain that has been present for greater than 2 years.  She attributes her pain to lifting her brother in a wheelchair 2 years ago.  Sx's have been episodic since with recent episode starting 4 weeks ago.  MD jose guadalupe antonella 4/10/18..    Patient reports pain:  Thoracic spine right, lumbar spine right and central lumbar spine.  Radiates to:  No radiation.  Pain is described as aching and is intermittent and reported as 5/10.  Associated symptoms:  Loss of strength and loss of motion/stiffness. Pain is worse during the day.  Symptoms are exacerbated by bending, lifting, sitting, walking and standing and relieved by rest.  Since onset symptoms are unchanged.  Special tests:  MRI (L3-L5 stenosis).  Previous treatment includes physical therapy and other (injection ).  There was mild improvement following previous treatment.  General health as reported by patient is fair.  Pertinent medical history includes:  Cancer, diabetes, overweight, high blood pressure, depression, thyroid problems, asthma, smoking and sleep disorder/apnea.  Medical allergies: no.  Other surgeries include:  Cancer surgery (neck 2010).  Current medications:  Thyroid medication, steroids and high blood pressure medication.  Current occupation is [employer]  .  Patient is working in normal job without restrictions.  Primary job tasks include:  Prolonged sitting and repetitive tasks.    Barriers include:  None as reported by the patient.    Red flags:  None as reported by the patient.                        Objective:  Standing Alignment:        Lumbar:   Lordosis incr                Flexibility/Screens:   Positive screens:  Thoracic and LumbarNegative screens: Hip                    Lumbar/SI Evaluation  ROM:    AROM Lumbar:   Flexion:          100% with ERP  Ext:                    75% with ERP   Side Bend:        Left:  50% with PDM    Right:  50% with PDM  Rotation:           Left:     Right:   Side Glide:        Left:     Right:       AROM Thoracic:  Flex:             100%  Ext:                <25%  Rotation:        Left:     Right:        Lumbar Myotomes:  normal                Lumbar Dermtomes:  normal                Neural Tension/Mobility:  Lumbar:  Normal  Thoracic:  Normal      Lumbar Palpation:  normal          Spinal Segmental Conclusions:     Level: Hypo noted at S1, L5, L4, L3, L2, L1 and T12                                                   General     ROS    Assessment/Plan:    Patient is a 52 year old female with thoracic complaints.    Patient has the following significant findings with corresponding treatment plan.                Diagnosis 1:  R thoracic pain  Pain -  self management, education, directional preference exercise and home program  Decreased ROM/flexibility - manual therapy and therapeutic exercise  Decreased strength - therapeutic exercise and therapeutic activities  Impaired muscle performance - neuro re-education  Decreased function - therapeutic activities    Therapy Evaluation Codes:   1) History comprised of:   Personal factors that impact the plan of care:      None.    Comorbidity factors that impact the plan of care are:      Asthma, Cancer, Diabetes, Depression, High blood pressure, Menopausal, Numbness/tingling, Overweight, Sleep disorder/apnea, Smoking and Weakness.     Medications impacting care: High blood pressure, Muscle relaxant and Steroids.  2) Examination of Body Systems comprised of:   Body structures and functions that impact the plan of care:      Lumbar spine and Thoracic Spine.   Activity limitations that  impact the plan of care are:      Bending, Sitting, Standing and Walking.  3) Clinical presentation characteristics are:   Stable/Uncomplicated.  4) Decision-Making    Low complexity using standardized patient assessment instrument and/or measureable assessment of functional outcome.  Cumulative Therapy Evaluation is: Low complexity.    Previous and current functional limitations:  (See Goal Flow Sheet for this information)    Short term and Long term goals: (See Goal Flow Sheet for this information)     Communication ability:  Patient appears to be able to clearly communicate and understand verbal and written communication and follow directions correctly.  Treatment Explanation - The following has been discussed with the patient:   RX ordered/plan of care  Anticipated outcomes  Possible risks and side effects  This patient would benefit from PT intervention to resume normal activities.   Rehab potential is good.    Frequency:  1 X week, once daily  Duration:  for 6 weeks  Discharge Plan:  Achieve all LTG.  Independent in home treatment program.  Reach maximal therapeutic benefit.    Please refer to the daily flowsheet for treatment today, total treatment time and time spent performing 1:1 timed codes.

## 2018-04-27 NOTE — MR AVS SNAPSHOT
After Visit Summary   4/27/2018    Jazlyn Bartlett    MRN: [id number]           Patient Information     Date Of Birth          1965        Visit Information        Provider Department      4/27/2018 2:20 PM Pierce Baker, LISA [facility]        Today's Diagnoses     Thoracic spine pain    -  1       Follow-ups after your visit        Your next 10 appointments already scheduled     May 03, 2018  1:10 PM CDT   CALLIE Spine with Pierce Baker PT   [facility] ([facility])    [address]  [facility] [address]   [phone]            May 09, 2018  4:00 PM CDT   HST  with  SLEEP LAB   [facility] ([facility])    [address] [place] [address] [address] [place] [address]   [phone]            May 10, 2018  9:00 AM CDT   HST Drop Off with [facility]   [facility] ([facility])    [address] [place] [address] [address] [place] [address]   [phone]            May 10, 2018  1:50 PM CDT   CALLIE Spine with Pierce Baker PT   [facility] ([facility])    [address]  [facility] [address]   [phone]            May 21, 2018  5:00 PM CDT   Return Sleep Patient with Rob Sierra MD   [facility] ([facility])    [address] [place] [address] [address] [place] [address]   [phone]            Jun 05, 2018  5:20 PM CDT   Office Visit with Lokesh Casanova MD   [facility] ([facility])    [address] [place] [id number] [phone]           Bring a current list of meds and any records pertaining to this visit. For Physicals, please bring immunization records and any forms needing to be filled  out. Please arrive 10 minutes early to complete paperwork.              Who to contact     If you have questions or need follow up information about today's clinic visit or your schedule please contact [place] [facility] directly at [phone].  Normal or non-critical lab and imaging results will be communicated to you by MyChart, letter or phone within 4 business days after the clinic has received the results. If you do not hear from us within 7 days, please contact the clinic through [employer] or phone. If you have a critical or abnormal lab result, we will notify you by phone as soon as possible.  Submit refill requests through Placed or call your pharmacy and they will forward the refill request to us. Please allow 3 business days for your refill to be completed.          Additional Information About Your Visit        [employer] Fun Information     Placed gives you secure access to your electronic health record. If you see a primary care provider, you can also send messages to your care team and make appointments. If you have questions, please call your primary care clinic.  If you do not have a primary care provider, please call [phone] and they will assist you.        Care EveryWhere ID     This is your Care EveryWhere ID. This could be used by other organizations to access your [place] medical records  [id number]         Blood Pressure from Last 3 Encounters:   04/25/18 134/78   04/17/18 137/85   04/04/18 118/80    Weight from Last 3 Encounters:   04/25/18 99.8 kg (220 lb)   04/04/18 100.2 kg (221 lb)   03/20/18 102.5 kg (226 lb)              We Performed the Following     HC PT EVAL, LOW COMPLEXITY     CALLIE INITIAL EVAL REPORT     THERAPEUTIC EXERCISES        Primary Care Provider Office Phone # Fax #    Lokesh Casanova [phone]       [address]  [place] [address]        Goals        Result Component    A1C < 7.0     Related  Problems    Type 2 diabetes mellitus without complication (H)      Equal Access to Services     SHIRLEY STOKES : Hadii aad ku hadjohnkeshia Kwon, waclarissaabdi west, qaphongnaomy humphriesnatividadjenifer stone. So [facility] [phone].    ATENCIÓN: Si habla español, tiene a tracey disposición servicios gratuitos de asistencia lingüística. Llame al [phone].    We comply with applicable federal civil rights laws and [place] laws. We do not discriminate on the basis of race, color, national origin, age, disability, sex, sexual orientation, or gender identity.            Thank you!     Thank you for choosing [place] [facility]  for your care. Our goal is always to provide you with excellent care. Hearing back from our patients is one way we can continue to improve our services. Please take a few minutes to complete the written survey that you may receive in the mail after your visit with us. Thank you!             Your Updated Medication List - Protect others around you: Learn how to safely use, store and throw away your medicines at www.disposemymeds.org.          This list is accurate as of 4/27/18  2:51 PM.  Always use your most recent med list.                   Brand Name Dispense Instructions for use Diagnosis    albuterol 108 (90 Base) MCG/ACT Inhaler    PROAIR HFA/PROVENTIL HFA/VENTOLIN HFA    3 Inhaler    Inhale 2 puffs into the lungs every 6 hours as needed for shortness of breath / dyspnea    Chronic obstructive pulmonary disease, unspecified COPD type (H)       aspirin 81 MG EC tablet     100 tablet    Take 1 tablet (81 mg) by mouth daily    Hypertension goal BP (blood pressure) < 140/80       budesonide 180 MCG/ACT inhaler    PULMICORT FLEXHALER    3 Inhaler    Inhale 1 puff into the lungs 2 times daily    Chronic obstructive pulmonary disease, unspecified COPD type (H)       cyclobenzaprine 5 MG tablet    FLEXERIL    42 tablet    Take 1-2  tablets (5-10 mg) by mouth 3 times daily as needed for muscle spasms    Acute right-sided thoracic back pain       glipiZIDE 10 MG 24 hr tablet    glipiZIDE XL    180 tablet    Take 1 tablet (10 mg) by mouth 2 times daily (with meals)    Type 2 diabetes mellitus without complication, without long-term current use of insulin (H)       hydrochlorothiazide 25 MG tablet    HYDRODIURIL    90 tablet    Take 1 tablet (25 mg) by mouth daily    Hypertension goal BP (blood pressure) < 140/80       ipratropium - albuterol 0.5 mg/2.5 mg/3 mL 0.5-2.5 (3) MG/3ML neb solution    DUONEB    1 vial    Take 1 vial (3 mLs) by nebulization every 6 hours as needed for shortness of breath / dyspnea or wheezing    Chronic obstructive pulmonary disease, unspecified COPD type (H)       ipratropium 0.06 % spray    ATROVENT    1 Box    Spray 2 sprays into both nostrils 2 times daily as needed for rhinitis    Chronic obstructive pulmonary disease, unspecified COPD type (H)       ketoconazole 2 % cream    NIZORAL    15 g    Apply topically 2 times daily as needed for itching Apply to AA on the face bid when needed    Seborrheic dermatitis       levothyroxine 150 MCG tablet    SYNTHROID/LEVOTHROID    90 tablet    TAKE 1 TABLET (150 MCG) BY MOUTH DAILY    Hypothyroidism, unspecified type       losartan 50 MG tablet    COZAAR    90 tablet    Take 1 tablet (50 mg) by mouth daily    Hypertension goal BP (blood pressure) < 140/80       oxyCODONE IR 5 MG tablet    ROXICODONE    14 tablet    Take 1 tablet (5 mg) by mouth every 6 hours as needed for pain        simvastatin 20 MG tablet    ZOCOR    90 tablet    Take 1 tablet (20 mg) by mouth At Bedtime    Hyperlipidemia LDL goal <100       tiotropium 18 MCG capsule    SPIRIVA    90 capsule    Inhale 1 capsule (18 mcg) into the lungs daily    Chronic obstructive pulmonary disease, unspecified COPD type (H)       vitamin D 2000 units tablet     100 tablet    Take 2,000 Units by mouth daily    Vitamin D  deficiency disease       zolpidem 5 MG tablet    AMBIEN    1 tablet    Take tablet by mouth 15 minutes prior to sleep, for Sleep Study    Sleep disturbance

## 2018-05-07 ENCOUNTER — RADIANT APPOINTMENT (OUTPATIENT)
Dept: GENERAL RADIOLOGY | Facility: CLINIC | Age: 53
End: 2018-05-07
Attending: PHYSICIAN ASSISTANT
Payer: COMMERCIAL

## 2018-05-07 ENCOUNTER — OFFICE VISIT (OUTPATIENT)
Dept: URGENT CARE | Facility: URGENT CARE | Age: 53
End: 2018-05-07
Payer: COMMERCIAL

## 2018-05-07 VITALS
SYSTOLIC BLOOD PRESSURE: 118 MMHG | DIASTOLIC BLOOD PRESSURE: 72 MMHG | OXYGEN SATURATION: 96 % | HEART RATE: 94 BPM | RESPIRATION RATE: 16 BRPM | TEMPERATURE: 98.2 F

## 2018-05-07 DIAGNOSIS — M79.671 RIGHT FOOT PAIN: Primary | ICD-10-CM

## 2018-05-07 DIAGNOSIS — M79.671 RIGHT FOOT PAIN: ICD-10-CM

## 2018-05-07 DIAGNOSIS — S92.902A CLOSED FRACTURE OF LEFT FOOT, INITIAL ENCOUNTER: ICD-10-CM

## 2018-05-07 PROCEDURE — 99213 OFFICE O/P EST LOW 20 MIN: CPT | Performed by: PHYSICIAN ASSISTANT

## 2018-05-07 PROCEDURE — 73630 X-RAY EXAM OF FOOT: CPT | Mod: RT

## 2018-05-07 NOTE — NURSING NOTE
Chief Complaint   Patient presents with     Urgent Care     Musculoskeletal Problem     right foot pain. Pain started after using foot to dig out weeds from her yard.        KEVIN HAYS CMA

## 2018-05-07 NOTE — PROGRESS NOTES
SUBJECTIVE:  Chief Complaint   Patient presents with     Urgent Care     Musculoskeletal Problem     right foot pain. Pain started after using foot to dig out weeds from her yard.     Jazlyn Bartlett is a 52 year old female who presents with a chief complaint of right foot pain and tenderness.  Symptoms began 1 day(s) ago, are moderate and sudden onset  Context:  Injury:Possibly -- Patient was weeding her yard yesterday and when she went in, noticed that her foot hurt over 5th MT  Pain exacerbated by walking and weight-bearing Relieved by rest.  She treated it initially with ice. This is the first time this type of injury has occurred to this patient.     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, s/p resection     Current Outpatient Prescriptions   Medication Sig Dispense Refill     albuterol (PROAIR HFA/PROVENTIL HFA/VENTOLIN HFA) 108 (90 BASE) MCG/ACT Inhaler Inhale 2 puffs into the lungs every 6 hours as needed for shortness of breath / dyspnea 3 Inhaler 1     aspirin 81 MG EC tablet Take 1 tablet (81 mg) by mouth daily 100 tablet 3     budesonide (PULMICORT FLEXHALER) 180 MCG/ACT inhaler Inhale 1 puff into the lungs 2 times daily 3 Inhaler 3     Cholecalciferol (VITAMIN D) 2000 UNITS tablet Take 2,000 Units by mouth daily 100 tablet 3     glipiZIDE (GLIPIZIDE XL) 10 MG 24 hr tablet Take 1 tablet (10 mg) by mouth 2 times daily (with meals) 180 tablet 0     hydrochlorothiazide (HYDRODIURIL) 25 MG tablet Take 1 tablet (25 mg) by mouth daily 90 tablet 3     ipratropium (ATROVENT) 0.06 % spray Spray 2 sprays into both nostrils 2 times daily as needed for rhinitis 1 Box 2     ipratropium - albuterol 0.5 mg/2.5  mg/3 mL (DUONEB) 0.5-2.5 (3) MG/3ML neb solution Take 1 vial (3 mLs) by nebulization every 6 hours as needed for shortness of breath / dyspnea or wheezing 1 vial 0     ketoconazole (NIZORAL) 2 % cream Apply topically 2 times daily as needed for itching Apply to AA on the face bid when needed 15 g 3     levothyroxine (SYNTHROID/LEVOTHROID) 150 MCG tablet TAKE 1 TABLET (150 MCG) BY MOUTH DAILY 90 tablet 1     losartan (COZAAR) 50 MG tablet Take 1 tablet (50 mg) by mouth daily 90 tablet 3     simvastatin (ZOCOR) 20 MG tablet Take 1 tablet (20 mg) by mouth At Bedtime 90 tablet 3     tiotropium (SPIRIVA) 18 MCG capsule Inhale 1 capsule (18 mcg) into the lungs daily 90 capsule 1     cyclobenzaprine (FLEXERIL) 5 MG tablet Take 1-2 tablets (5-10 mg) by mouth 3 times daily as needed for muscle spasms (Patient not taking: Reported on 5/7/2018) 42 tablet 1     zolpidem (AMBIEN) 5 MG tablet Take tablet by mouth 15 minutes prior to sleep, for Sleep Study (Patient not taking: Reported on 5/7/2018) 1 tablet 0     [DISCONTINUED] glyBURIDE-metFORMIN (GLUCOVANCE) 2.5-500 MG per tablet Take 1 tablet by mouth daily (with breakfast) 90 tablet 1     Social History   Substance Use Topics     Smoking status: Current Every Day Smoker     Packs/day: 0.50     Types: Cigarettes     Smokeless tobacco: Never Used     Alcohol use No     ROS:  C: NEGATIVE for fever, chills, change in weight  INTEGUMENTARY/SKIN: NEGATIVE for worrisome rashes, moles or lesions  MUSCULOSKELETAL: POSITIVE for R foot pain  NEURO: NEGATIVE for weakness, dizziness or paresthesias  ENDOCRINE:HX of DM  HEME/ALLERGY/IMMUNE: NEGATIVE for swollen nodes    EXAM:   /72 (BP Location: Right arm, Patient Position: Chair, Cuff Size: Adult Large)  Pulse 94  Temp 98.2  F (36.8  C) (Oral)  Resp 16  SpO2 96%  M/S Exam: R foot has erythema, swelling and tenderness to palpation. Small nodule noted over base of 5th MT.   GENERAL APPEARANCE: healthy, alert and no  distress  EXTREMITIES: peripheral pulses normal  SKIN: no suspicious lesions or rashes  NEURO: Normal strength and tone, sensory exam grossly normal, mentation intact and speech normal    X-RAY was done -- small radio lucency and break in cortex over 5th MT. Lateral view shows radiolucency in 5th MT    ASSESSMENT/PLAN:  1. Right foot pain  - XR Foot Right G/E 3 Views; Future    2. Closed fracture of left foot, initial encounter  Radioluceny noted over base of 5th, concerning for fracture. Short walking boot given to patient to wear for 4 weeks. Follow up with PCP for management.  Tylenol for pain  - order for DME; Equipment being ordered: short walking boot  Dispense: 1 Device; Refill: 0    Leanna Garcia PA-C

## 2018-05-07 NOTE — MR AVS SNAPSHOT
After Visit Summary   5/7/2018    Jazlyn Bartlett    MRN: [id number]           Patient Information     Date Of Birth          1965        Visit Information        Provider Department      5/7/2018 4:55 PM Leanna Garcia PA-C [facility]        Today's Diagnoses     Right foot pain    -  1      Care Instructions      Foot Fracture  You have a broken bone (fracture) in your foot. This will cause pain, swelling, and often bruising. It will usually take about 4 to 8 weeks to heal. A foot fracture may be treated with a special shoe, splint, cast, or boot.  Home care  Follow these guidelines when caring for yourself at home:    You may be given a splint, cast, shoe, or boot to keep the injured area from moving. Unless you were told otherwise, use crutches or a walker. Don t put weight on the injured foot until your health care provider says you can do so. (You can rent crutches and a walker at many pharmacies and surgical or orthopedic supply stores.) Don t put weight on a splint, or it will break.    Keep your leg elevated to reduce pain and swelling. When sleeping, put a pillow under the injured leg. When sitting, support the injured leg so it is above your waist. This is very important during the first 2 days (48 hours).    Put an ice pack on the injured area. Do this for 20 minutes every 1 to 2 hours the first day for pain relief. You can make an ice pack by wrapping a plastic bag of ice cubes in a thin towel. As the ice melts, be careful that the splint, cast, boot, or shoe doesn t get wet. You can place the ice pack directly over the splint or cast. Unless told otherwise, you can open the boot or shoe to apply the ice pack. Continue using the ice pack 3 to 4 times a day for the next 2 days. Then use the ice pack as needed to ease pain and swelling.    Keep the splint, cast, boot, or shoe dry. When bathing, protect it with a large plastic bag, rubber-banded at the top end. If  a fiberglass splint or cast or boot gets wet, you can dry it with a hair dryer. Unless told otherwise, you can take off the boot or shoe to bathe.    You may use acetaminophen or ibuprofen to control pain, unless another pain medicine was prescribed. If you have chronic liver or kidney disease, talk with your healthcare provider before using these medicines. Also talk with your provider if you ve had a stomach ulcer or gastrointestinal bleeding.    Don t put creams or objects under the cast if you have itching.  Follow-up care  Follow up with your healthcare provider, or as advised. This is to make sure the bone is healing the way it should. If you were given a splint, it may be changed to a cast or boot at your follow-up visit.  X-rays may be taken. You will be told of any new findings that may affect your care.  When to seek medical advice  Call your healthcare provider right away if any of these occur:    The cast or splint cracks    The plaster cast or splint becomes wet or soft    The fiberglass cast or splint stays wet for more than 24 hours    Bad odor from the cast or wound fluid stains the cast    Tightness or pain under the cast or splint gets worse    Toes become swollen, cold, blue, numb, or tingly    You can t move your toes    Skin around cast or splint becomes red    Fever of 100.4 F (38 C) or higher, or as directed by your healthcare provider  Date Last Reviewed: 2/1/2017 2000-2017 [employer]. [address] [place] [address]. All rights reserved. This information is not intended as a substitute for professional medical care. Always follow your healthcare professional's instructions.                Follow-ups after your visit        Your next 10 appointments already scheduled     May 09, 2018  4:00 PM CDT   HST  with  SLEEP LAB   [facility] ([facility])    [address] [place] [id number]    [phone]            May 10, 2018  9:00 AM CDT   HST Drop Off with [facility]   [facility]    [address]  [facility] [address] [phone]            May 10, 2018  1:50 PM CDT   [facility] Spine with Pierce Baker PT   [facility] ([facility])    [address]  [facility] [id number] [phone]            May 21, 2018  5:00 PM CDT   Return Sleep Patient with Rob Sierra MD   [facility] ([facility])    [address]  [facility] [address] [phone]            Jun 05, 2018  5:20 PM CDT   Office Visit with Lokesh Casanova MD   [facility] [place] [place] ([facility] [place]    [address] [address] [phone]           Bring a current list of meds and any records pertaining to this visit. For Physicals, please bring immunization records and any forms needing to be filled out. Please arrive 10 minutes early to complete paperwork.              Who to contact     If you have questions or need follow up information about today's clinic visit or your schedule please contact [facility] directly at [phone].  Normal or non-critical lab and imaging results will be communicated to you by MyChart, letter or phone within 4 business days after the clinic has received the results. If you do not hear from us within 7 days, please contact the clinic through [employer] or phone. If you have a critical or abnormal lab result, we will notify you by phone as soon as possible.  Submit refill requests through [employer] or call your pharmacy and they will forward the refill request to us. Please allow 3 business days for your refill to be completed.          Additional Information About Your Visit        MyChart Information     [employer]  gives you secure access to your electronic health record. If you see a primary care provider, you can also send messages to your care team and make appointments. If you have questions, please call your primary care clinic.  If you do not have a primary care provider, please call [phone] and they will assist you.        Care EveryWhere ID     This is your Care EveryWhere ID. This could be used by other organizations to access your [place] medical records  [id number]        Your Vitals Were     Pulse Temperature Respirations Pulse Oximetry          94 98.2  F (36.8  C) (Oral) 16 96%         Blood Pressure from Last 3 Encounters:   05/07/18 118/72   04/25/18 134/78   04/17/18 137/85    Weight from Last 3 Encounters:   04/25/18 220 lb (99.8 kg)   04/04/18 221 lb (100.2 kg)   03/20/18 226 lb (102.5 kg)               Primary Care Provider Office Phone # Fax #    Lokesh Casanova [phone]       [address] [place] [facility] [place] [address]        Goals        Result Component    A1C < 7.0     Related Problems    Type 2 diabetes mellitus without complication (H)      Equal Access to Services     SHIRLEY STOKES AH: Hadii christine staffordo Sochapo, waaxda luqadaha, qaybta kaalmada adeegyada, jenifer tovar. So [facility] [phone].    ATENCIÓN: Si habla español, tiene a tracey disposición servicios gratuitos de asistencia lingüística. [facility] [phone].    We comply with applicable federal civil rights laws and [place] laws. We do not discriminate on the basis of race, color, national origin, age, disability, sex, sexual orientation, or gender identity.            Thank you!     Thank you for choosing [facility]  for your care. Our goal is always to provide you with excellent care. Hearing back from our patients is one way we can continue to improve our services. Please take a few minutes to complete the written survey that you may receive in the mail after your visit  with us. Thank you!             Your Updated Medication List - Protect others around you: Learn how to safely use, store and throw away your medicines at www.disposemymeds.org.          This list is accurate as of 5/7/18  5:49 PM.  Always use your most recent med list.                   Brand Name Dispense Instructions for use Diagnosis    albuterol 108 (90 Base) MCG/ACT Inhaler    PROAIR HFA/PROVENTIL HFA/VENTOLIN HFA    3 Inhaler    Inhale 2 puffs into the lungs every 6 hours as needed for shortness of breath / dyspnea    Chronic obstructive pulmonary disease, unspecified COPD type (H)       aspirin 81 MG EC tablet     100 tablet    Take 1 tablet (81 mg) by mouth daily    Hypertension goal BP (blood pressure) < 140/80       budesonide 180 MCG/ACT inhaler    PULMICORT FLEXHALER    3 Inhaler    Inhale 1 puff into the lungs 2 times daily    Chronic obstructive pulmonary disease, unspecified COPD type (H)       cyclobenzaprine 5 MG tablet    FLEXERIL    42 tablet    Take 1-2 tablets (5-10 mg) by mouth 3 times daily as needed for muscle spasms    Acute right-sided thoracic back pain       glipiZIDE 10 MG 24 hr tablet    glipiZIDE XL    180 tablet    Take 1 tablet (10 mg) by mouth 2 times daily (with meals)    Type 2 diabetes mellitus without complication, without long-term current use of insulin (H)       hydrochlorothiazide 25 MG tablet    HYDRODIURIL    90 tablet    Take 1 tablet (25 mg) by mouth daily    Hypertension goal BP (blood pressure) < 140/80       ipratropium - albuterol 0.5 mg/2.5 mg/3 mL 0.5-2.5 (3) MG/3ML neb solution    DUONEB    1 vial    Take 1 vial (3 mLs) by nebulization every 6 hours as needed for shortness of breath / dyspnea or wheezing    Chronic obstructive pulmonary disease, unspecified COPD type (H)       ipratropium 0.06 % spray    ATROVENT    1 Box    Spray 2 sprays into both nostrils 2 times daily as needed for rhinitis    Chronic obstructive pulmonary disease, unspecified COPD type (H)        ketoconazole 2 % cream    NIZORAL    15 g    Apply topically 2 times daily as needed for itching Apply to AA on the face bid when needed    Seborrheic dermatitis       levothyroxine 150 MCG tablet    SYNTHROID/LEVOTHROID    90 tablet    TAKE 1 TABLET (150 MCG) BY MOUTH DAILY    Hypothyroidism, unspecified type       losartan 50 MG tablet    COZAAR    90 tablet    Take 1 tablet (50 mg) by mouth daily    Hypertension goal BP (blood pressure) < 140/80       simvastatin 20 MG tablet    ZOCOR    90 tablet    Take 1 tablet (20 mg) by mouth At Bedtime    Hyperlipidemia LDL goal <100       tiotropium 18 MCG capsule    SPIRIVA    90 capsule    Inhale 1 capsule (18 mcg) into the lungs daily    Chronic obstructive pulmonary disease, unspecified COPD type (H)       vitamin D 2000 units tablet     100 tablet    Take 2,000 Units by mouth daily    Vitamin D deficiency disease       zolpidem 5 MG tablet    AMBIEN    1 tablet    Take tablet by mouth 15 minutes prior to sleep, for Sleep Study    Sleep disturbance

## 2018-05-07 NOTE — PATIENT INSTRUCTIONS
Foot Fracture  You have a broken bone (fracture) in your foot. This will cause pain, swelling, and often bruising. It will usually take about 4 to 8 weeks to heal. A foot fracture may be treated with a special shoe, splint, cast, or boot.  Home care  Follow these guidelines when caring for yourself at home:    You may be given a splint, cast, shoe, or boot to keep the injured area from moving. Unless you were told otherwise, use crutches or a walker. Don t put weight on the injured foot until your health care provider says you can do so. (You can rent crutches and a walker at many pharmacies and surgical or orthopedic supply stores.) Don t put weight on a splint, or it will break.    Keep your leg elevated to reduce pain and swelling. When sleeping, put a pillow under the injured leg. When sitting, support the injured leg so it is above your waist. This is very important during the first 2 days (48 hours).    Put an ice pack on the injured area. Do this for 20 minutes every 1 to 2 hours the first day for pain relief. You can make an ice pack by wrapping a plastic bag of ice cubes in a thin towel. As the ice melts, be careful that the splint, cast, boot, or shoe doesn t get wet. You can place the ice pack directly over the splint or cast. Unless told otherwise, you can open the boot or shoe to apply the ice pack. Continue using the ice pack 3 to 4 times a day for the next 2 days. Then use the ice pack as needed to ease pain and swelling.    Keep the splint, cast, boot, or shoe dry. When bathing, protect it with a large plastic bag, rubber-banded at the top end. If a fiberglass splint or cast or boot gets wet, you can dry it with a hair dryer. Unless told otherwise, you can take off the boot or shoe to bathe.    You may use acetaminophen or ibuprofen to control pain, unless another pain medicine was prescribed. If you have chronic liver or kidney disease, talk with your healthcare provider before using these  medicines. Also talk with your provider if you ve had a stomach ulcer or gastrointestinal bleeding.    Don t put creams or objects under the cast if you have itching.  Follow-up care  Follow up with your healthcare provider, or as advised. This is to make sure the bone is healing the way it should. If you were given a splint, it may be changed to a cast or boot at your follow-up visit.  X-rays may be taken. You will be told of any new findings that may affect your care.  When to seek medical advice  Call your healthcare provider right away if any of these occur:    The cast or splint cracks    The plaster cast or splint becomes wet or soft    The fiberglass cast or splint stays wet for more than 24 hours    Bad odor from the cast or wound fluid stains the cast    Tightness or pain under the cast or splint gets worse    Toes become swollen, cold, blue, numb, or tingly    You can t move your toes    Skin around cast or splint becomes red    Fever of 100.4 F (38 C) or higher, or as directed by your healthcare provider  Date Last Reviewed: 2/1/2017 2000-2017 [employer]. [address] [place] [address]. All rights reserved. This information is not intended as a substitute for professional medical care. Always follow your healthcare professional's instructions.

## 2018-05-08 ENCOUNTER — NURSE TRIAGE (OUTPATIENT)
Dept: NURSING | Facility: CLINIC | Age: 53
End: 2018-05-08

## 2018-05-08 NOTE — TELEPHONE ENCOUNTER
Conferenced Pt back to [facility]  staff Cleve Stover had a  follow up message  To call Ash and Ck HERNANDEZ agrees to take call and is aware of what it is about.   .Akosua Traylor RN [place] nurse advisors.

## 2018-05-09 ENCOUNTER — MYC MEDICAL ADVICE (OUTPATIENT)
Dept: FAMILY MEDICINE | Facility: CLINIC | Age: 53
End: 2018-05-09

## 2018-05-09 NOTE — TELEPHONE ENCOUNTER
EDI received from patient  TC faxed Office notes from 3/28/18 to 4/30/18 to [facility] [phone]  EDI sent to stat abstracting  Will wait to hear if patient wants the copies mailed to her or if she wants to  (copies in TC top bin)  WE CANT EMAIL them-TC already responded to pt against this  Shandra ROJAS

## 2018-05-09 NOTE — TELEPHONE ENCOUNTER
TC can please review the my chart message from patient.  Can you assist with proper release?  Farideh Bonilla RN - Triage  [facility]

## 2018-05-09 NOTE — TELEPHONE ENCOUNTER
Release of Information  #[phone]- called and left a message to contact patient  Called back trying to reach a live person to discuss patient request- they are all unavailable-   Leah Sandoval RN BSN  [facility]  [phone]

## 2018-05-09 NOTE — TELEPHONE ENCOUNTER
TC doesn't see one on file for Joanne or her disability team at [facility]   [employer] message sent to patient with instructions on obtaining a release  Shandra ROJAS

## 2018-05-18 PROBLEM — M54.6 THORACIC SPINE PAIN: Status: RESOLVED | Noted: 2018-04-27 | Resolved: 2018-05-18

## 2018-05-21 ENCOUNTER — OFFICE VISIT (OUTPATIENT)
Dept: FAMILY MEDICINE | Facility: CLINIC | Age: 53
End: 2018-05-21
Payer: COMMERCIAL

## 2018-05-21 VITALS
HEIGHT: 61 IN | BODY MASS INDEX: 41.35 KG/M2 | TEMPERATURE: 98.4 F | SYSTOLIC BLOOD PRESSURE: 112 MMHG | DIASTOLIC BLOOD PRESSURE: 80 MMHG | WEIGHT: 219 LBS | OXYGEN SATURATION: 97 % | RESPIRATION RATE: 20 BRPM | HEART RATE: 84 BPM

## 2018-05-21 DIAGNOSIS — J44.9 CHRONIC OBSTRUCTIVE PULMONARY DISEASE, UNSPECIFIED COPD TYPE (H): Primary | ICD-10-CM

## 2018-05-21 DIAGNOSIS — F32.1 MODERATE MAJOR DEPRESSION (H): ICD-10-CM

## 2018-05-21 PROCEDURE — 94640 AIRWAY INHALATION TREATMENT: CPT | Performed by: NURSE PRACTITIONER

## 2018-05-21 PROCEDURE — 99214 OFFICE O/P EST MOD 30 MIN: CPT | Mod: 25 | Performed by: NURSE PRACTITIONER

## 2018-05-21 RX ORDER — AZITHROMYCIN 250 MG/1
TABLET, FILM COATED ORAL
Qty: 6 TABLET | Refills: 0 | Status: SHIPPED | OUTPATIENT
Start: 2018-05-21 | End: 2018-06-19

## 2018-05-21 RX ORDER — ALBUTEROL SULFATE 90 UG/1
2 AEROSOL, METERED RESPIRATORY (INHALATION) EVERY 6 HOURS PRN
Qty: 3 INHALER | Refills: 1 | Status: SHIPPED | OUTPATIENT
Start: 2018-05-21 | End: 2019-05-03

## 2018-05-21 RX ORDER — PREDNISONE 20 MG/1
40 TABLET ORAL DAILY
Qty: 10 TABLET | Refills: 0 | Status: SHIPPED | OUTPATIENT
Start: 2018-05-21 | End: 2018-05-26

## 2018-05-21 RX ORDER — IPRATROPIUM BROMIDE AND ALBUTEROL SULFATE 2.5; .5 MG/3ML; MG/3ML
1 SOLUTION RESPIRATORY (INHALATION) EVERY 6 HOURS PRN
Qty: 90 VIAL | Refills: 3 | Status: SHIPPED | OUTPATIENT
Start: 2018-05-21 | End: 2019-05-03

## 2018-05-21 NOTE — MR AVS SNAPSHOT
After Visit Summary   5/21/2018    Jazlyn Bartlett    MRN: [id number]           Patient Information     Date Of Birth          1965        Visit Information        Provider Department      5/21/2018 10:30 AM Haase, Susan Rachele, APRN CNP [facility]        Today's Diagnoses     Chronic obstructive pulmonary disease, unspecified COPD type (H)    -  1    Moderate major depression (H)           Follow-ups after your visit        Follow-up notes from your care team     Return in about 1 day (around 5/22/2018).      Your next 10 appointments already scheduled     May 21, 2018  5:00 PM CDT   Return Sleep Patient with Rob Seirra MD   [facility] ([facility])    [address]  [facility] [id number] [phone]            Jun 05, 2018  5:20 PM CDT   Office Visit with Lokesh Casanova MD   [facility] [place] [facility]    [address] [address] [phone]           Bring a current list of meds and any records pertaining to this visit. For Physicals, please bring immunization records and any forms needing to be filled out. Please arrive 10 minutes early to complete paperwork.              Who to contact     If you have questions or need follow up information about today's clinic visit or your schedule please contact [facility] directly at [phone].  Normal or non-critical lab and imaging results will be communicated to you by MyChart, letter or phone within 4 business days after the clinic has received the results. If you do not hear from us within 7 days, please contact the clinic through MyChart or phone. If you have a critical or abnormal lab result, we will notify you by phone as soon as possible.  Submit refill requests through [employer] or call your pharmacy and they will forward the refill request to us. Please allow 3  "business days for your refill to be completed.          Additional Information About Your Visit        [employer] Information     [employer] gives you secure access to your electronic health record. If you see a primary care provider, you can also send messages to your care team and make appointments. If you have questions, please call your primary care clinic.  If you do not have a primary care provider, please call [phone] and they will assist you.        Care EveryWhere ID     This is your Care EveryWhere ID. This could be used by other organizations to access your [place] medical records  [id number]        Your Vitals Were     Pulse Temperature Respirations Height Pulse Oximetry BMI (Body Mass Index)    84 98.4  F (36.9  C) (Oral) 20 5' 1\" (1.549 m) 97% 41.38 kg/m2       Blood Pressure from Last 3 Encounters:   05/21/18 112/80   05/07/18 118/72   04/25/18 134/78    Weight from Last 3 Encounters:   05/21/18 219 lb (99.3 kg)   04/25/18 220 lb (99.8 kg)   04/04/18 221 lb (100.2 kg)              We Performed the Following     ALBUTEROL/IPRATROPIUM 3ML NEB - .001     COPD ACTION PLAN     DEPRESSION ACTION PLAN (DAP)     INHALATION/NEBULIZER TREATMENT, INITIAL          Today's Medication Changes          These changes are accurate as of 5/21/18 11:24 AM.  If you have any questions, ask your nurse or doctor.               Start taking these medicines.        Dose/Directions    azithromycin 250 MG tablet   Commonly known as:  ZITHROMAX   Used for:  Chronic obstructive pulmonary disease, unspecified COPD type (H)   Started by:  Haase, Susan Rachele, APRN CNP        Two tablets first day, then one tablet daily for four days.   Quantity:  6 tablet   Refills:  0       predniSONE 20 MG tablet   Commonly known as:  DELTASONE   Used for:  Chronic obstructive pulmonary disease, unspecified COPD type (H)   Started by:  Haase, Susan Rachele, APRN CNP        Dose:  40 mg   Take 2 tablets (40 mg) by mouth daily for 5 days "   Quantity:  10 tablet   Refills:  0         These medicines have changed or have updated prescriptions.        Dose/Directions    * ipratropium - albuterol 0.5 mg/2.5 mg/3 mL 0.5-2.5 (3) MG/3ML neb solution   Commonly known as:  DUONEB   This may have changed:  Another medication with the same name was added. Make sure you understand how and when to take each.   Used for:  Chronic obstructive pulmonary disease, unspecified COPD type (H)   Changed by:  Haase, Susan Rachele, APRN CNP        Dose:  1 vial   Take 1 vial (3 mLs) by nebulization every 6 hours as needed for shortness of breath / dyspnea or wheezing   Quantity:  1 vial   Refills:  0       * ipratropium - albuterol 0.5 mg/2.5 mg/3 mL 0.5-2.5 (3) MG/3ML neb solution   Commonly known as:  DUONEB   This may have changed:  You were already taking a medication with the same name, and this prescription was added. Make sure you understand how and when to take each.   Used for:  Chronic obstructive pulmonary disease, unspecified COPD type (H)   Changed by:  Haase, Susan Rachele, APRN CNP        Dose:  1 vial   Take 1 vial (3 mLs) by nebulization every 6 hours as needed for shortness of breath / dyspnea or wheezing   Quantity:  90 vial   Refills:  3       * Notice:  This list has 2 medication(s) that are the same as other medications prescribed for you. Read the directions carefully, and ask your doctor or other care provider to review them with you.      Stop taking these medicines if you haven't already. Please contact your care team if you have questions.     cyclobenzaprine 5 MG tablet   Commonly known as:  FLEXERIL   Stopped by:  Haase, Susan Rachele, APRN CNP                Where to get your medicines      These medications were sent to [facility]/pharmacy #[id number] - [facility] [address]  [address] [address] [address]     Phone:  [phone]     albuterol 108 (90 Base) MCG/ACT Inhaler    azithromycin 250 MG tablet    budesonide 180 MCG/ACT  inhaler    ipratropium - albuterol 0.5 mg/2.5 mg/3 mL 0.5-2.5 (3) MG/3ML neb solution    predniSONE 20 MG tablet                Primary Care Provider Office Phone # Fax #    Lokesh Casanova [phone]       [address]  [place] [address]        Goals        Result Component    A1C < 7.0     Related Problems    Type 2 diabetes mellitus without complication (H)      Equal Access to Services     SHIRLEY STOKES : Hadii aad ku hadasho Soomaali, waaxda luqadaha, qaybta kaalmada adeegyada, waxay idiin hayaan adeeg kharash lajuan david . So [facility] [phone].    ATENCIÓN: Si habla español, tiene a tracey disposición servicios gratuitos de asistencia lingüística. [facility] [phone].    We comply with applicable federal civil rights laws and [place] laws. We do not discriminate on the basis of race, color, national origin, age, disability, sex, sexual orientation, or gender identity.            Thank you!     Thank you for choosing [facility]  for your care. Our goal is always to provide you with excellent care. Hearing back from our patients is one way we can continue to improve our services. Please take a few minutes to complete the written survey that you may receive in the mail after your visit with us. Thank you!             Your Updated Medication List - Protect others around you: Learn how to safely use, store and throw away your medicines at www.disposemymeds.org.          This list is accurate as of 5/21/18 11:24 AM.  Always use your most recent med list.                   Brand Name Dispense Instructions for use Diagnosis    albuterol 108 (90 Base) MCG/ACT Inhaler    PROAIR HFA/PROVENTIL HFA/VENTOLIN HFA    3 Inhaler    Inhale 2 puffs into the lungs every 6 hours as needed for shortness of breath / dyspnea    Chronic obstructive pulmonary disease, unspecified COPD type (H)       aspirin 81 MG EC tablet     100 tablet    Take 1 tablet (81 mg) by mouth daily    Hypertension  goal BP (blood pressure) < 140/80       azithromycin 250 MG tablet    ZITHROMAX    6 tablet    Two tablets first day, then one tablet daily for four days.    Chronic obstructive pulmonary disease, unspecified COPD type (H)       budesonide 180 MCG/ACT inhaler    PULMICORT FLEXHALER    3 Inhaler    Inhale 1 puff into the lungs 2 times daily    Chronic obstructive pulmonary disease, unspecified COPD type (H)       glipiZIDE 10 MG 24 hr tablet    glipiZIDE XL    180 tablet    Take 1 tablet (10 mg) by mouth 2 times daily (with meals)    Type 2 diabetes mellitus without complication, without long-term current use of insulin (H)       hydrochlorothiazide 25 MG tablet    HYDRODIURIL    90 tablet    Take 1 tablet (25 mg) by mouth daily    Hypertension goal BP (blood pressure) < 140/80       * ipratropium - albuterol 0.5 mg/2.5 mg/3 mL 0.5-2.5 (3) MG/3ML neb solution    DUONEB    1 vial    Take 1 vial (3 mLs) by nebulization every 6 hours as needed for shortness of breath / dyspnea or wheezing    Chronic obstructive pulmonary disease, unspecified COPD type (H)       * ipratropium - albuterol 0.5 mg/2.5 mg/3 mL 0.5-2.5 (3) MG/3ML neb solution    DUONEB    90 vial    Take 1 vial (3 mLs) by nebulization every 6 hours as needed for shortness of breath / dyspnea or wheezing    Chronic obstructive pulmonary disease, unspecified COPD type (H)       ipratropium 0.06 % spray    ATROVENT    1 Box    Spray 2 sprays into both nostrils 2 times daily as needed for rhinitis    Chronic obstructive pulmonary disease, unspecified COPD type (H)       ketoconazole 2 % cream    NIZORAL    15 g    Apply topically 2 times daily as needed for itching Apply to AA on the face bid when needed    Seborrheic dermatitis       levothyroxine 150 MCG tablet    SYNTHROID/LEVOTHROID    90 tablet    TAKE 1 TABLET (150 MCG) BY MOUTH DAILY    Hypothyroidism, unspecified type       losartan 50 MG tablet    COZAAR    90 tablet    Take 1 tablet (50 mg) by mouth  daily    Hypertension goal BP (blood pressure) < 140/80       order for DME     1 Device    Equipment being ordered: short walking boot    Closed fracture of left foot, initial encounter       predniSONE 20 MG tablet    DELTASONE    10 tablet    Take 2 tablets (40 mg) by mouth daily for 5 days    Chronic obstructive pulmonary disease, unspecified COPD type (H)       simvastatin 20 MG tablet    ZOCOR    90 tablet    Take 1 tablet (20 mg) by mouth At Bedtime    Hyperlipidemia LDL goal <100       tiotropium 18 MCG capsule    SPIRIVA    90 capsule    Inhale 1 capsule (18 mcg) into the lungs daily    Chronic obstructive pulmonary disease, unspecified COPD type (H)       vitamin D 2000 units tablet     100 tablet    Take 2,000 Units by mouth daily    Vitamin D deficiency disease       zolpidem 5 MG tablet    AMBIEN    1 tablet    Take tablet by mouth 15 minutes prior to sleep, for Sleep Study    Sleep disturbance       * Notice:  This list has 2 medication(s) that are the same as other medications prescribed for you. Read the directions carefully, and ask your doctor or other care provider to review them with you.

## 2018-05-21 NOTE — PROGRESS NOTES
SUBJECTIVE:   Jazlyn Bartlett is a 52 year old female who presents to clinic today for the following health issues:    RESPIRATORY SYMPTOMS      Duration: 2 weeks    Description  nasal congestion, sore throat, cough, fatigue/malaise and myalgias    Severity: moderate    Accompanying signs and symptoms: None    History (predisposing factors):  COPD and tobacco abuse    Precipitating or alleviating factors: None    Therapies tried and outcome:  oral decongestant, inhaler and nebulizer    Symptoms began 2 weeks ago, include shortness of breath, wheezing, fever, rhinorrhea, congestion, sinus pressure, sore throat, fatigue, cough. Symptoms inhibit sleeping. Using albuterol nebulizer frequently over the past two days without relief. She's tried DayQuil, NyQuil, Mucinex, Theraflu with little relief. Smoker. Has COPD, usually well controlled with pulmicort.      Problem list and histories reviewed & adjusted, as indicated.  Additional history: as documented  Patient Active Problem List   Diagnosis     Neck mass     Moderate major depression (H)     Anxiety     Vitamin D deficiency disease     CARDIOVASCULAR SCREENING; LDL GOAL LESS THAN 100     GERD (gastroesophageal reflux disease)     Hypertension goal BP (blood pressure) < 140/80     Type 2 diabetes mellitus without complication (H)     Chronic airway obstruction (H)     Advanced directives, counseling/discussion     Autoimmune gastritis- repeat UGI 1/16 with gastritis without eosinophils     Urinary incontinence     Osteoarthritis of patellofemoral joint     Morbid obesity (H)     Tobacco abuse     Lumbar radiculopathy     Hyperlipidemia LDL goal <100     Hypothyroidism, unspecified type     Chronic obstructive pulmonary disease, unspecified COPD type (H)     Past Surgical History:   Procedure Laterality Date     ABLATION, ENDOMETRIAL, THERMAL, W/O HYSTEROSCOPIC GUIDANCE       DILATION AND CURETTAGE, HYSTEROSCOPY, ABLATE ENDOMETRIUM NOVASURE, COMBINED  10/11/2012     Procedure: COMBINED DILATION AND CURETTAGE, HYSTEROSCOPY, ABLATE ENDOMETRIUM NOVASURE;  COMBINED DILATION AND CURETTAGE, HYSTEROSCOPY, ABLATE ENDOMETRIUM ,pelvic exam under anesthesia;  Surgeon: Shruti Barrios MD;  Location: [facility] OR     ESOPHAGOSCOPY, GASTROSCOPY, DUODENOSCOPY (EGD), COMBINED N/A 1/19/2016    Procedure: COMBINED ESOPHAGOSCOPY, GASTROSCOPY, DUODENOSCOPY (EGD), BIOPSY SINGLE OR MULTIPLE;  Surgeon: Kristofer Molina MD;  Location:  GI     Removal of cancerous lump on neck       TONSILLECTOMY         Social History   Substance Use Topics     Smoking status: Current Every Day Smoker     Packs/day: 0.50     Types: Cigarettes     Smokeless tobacco: Never Used     Alcohol use No     Family History   Problem Relation Age of Onset     DIABETES Mother      Breast Cancer Mother      DIABETES Father      Lung Cancer Father      Rheumatoid Arthritis Father          Current Outpatient Prescriptions   Medication Sig Dispense Refill     albuterol (PROAIR HFA/PROVENTIL HFA/VENTOLIN HFA) 108 (90 BASE) MCG/ACT Inhaler Inhale 2 puffs into the lungs every 6 hours as needed for shortness of breath / dyspnea 3 Inhaler 1     aspirin 81 MG EC tablet Take 1 tablet (81 mg) by mouth daily 100 tablet 3     budesonide (PULMICORT FLEXHALER) 180 MCG/ACT inhaler Inhale 1 puff into the lungs 2 times daily 3 Inhaler 3     Cholecalciferol (VITAMIN D) 2000 UNITS tablet Take 2,000 Units by mouth daily 100 tablet 3     glipiZIDE (GLIPIZIDE XL) 10 MG 24 hr tablet Take 1 tablet (10 mg) by mouth 2 times daily (with meals) 180 tablet 0     hydrochlorothiazide (HYDRODIURIL) 25 MG tablet Take 1 tablet (25 mg) by mouth daily 90 tablet 3     ipratropium (ATROVENT) 0.06 % spray Spray 2 sprays into both nostrils 2 times daily as needed for rhinitis 1 Box 2     ipratropium - albuterol 0.5 mg/2.5 mg/3 mL (DUONEB) 0.5-2.5 (3) MG/3ML neb solution Take 1 vial (3 mLs) by nebulization every 6 hours as needed for shortness of breath / dyspnea or  "wheezing 1 vial 0     ketoconazole (NIZORAL) 2 % cream Apply topically 2 times daily as needed for itching Apply to AA on the face bid when needed 15 g 3     levothyroxine (SYNTHROID/LEVOTHROID) 150 MCG tablet TAKE 1 TABLET (150 MCG) BY MOUTH DAILY 90 tablet 1     losartan (COZAAR) 50 MG tablet Take 1 tablet (50 mg) by mouth daily 90 tablet 3     order for DME Equipment being ordered: short walking boot 1 Device 0     simvastatin (ZOCOR) 20 MG tablet Take 1 tablet (20 mg) by mouth At Bedtime 90 tablet 3     tiotropium (SPIRIVA) 18 MCG capsule Inhale 1 capsule (18 mcg) into the lungs daily 90 capsule 1     zolpidem (AMBIEN) 5 MG tablet Take tablet by mouth 15 minutes prior to sleep, for Sleep Study 1 tablet 0     [DISCONTINUED] glyBURIDE-metFORMIN (GLUCOVANCE) 2.5-500 MG per tablet Take 1 tablet by mouth daily (with breakfast) 90 tablet 1     Allergies   Allergen Reactions     Ibuprofen Sodium Nausea and Vomiting     Vicodin [Hydrocodone-Acetaminophen] Nausea and Vomiting       Reviewed and updated as needed this visit by clinical staff       Reviewed and updated as needed this visit by Provider         ROS:  Positive for significant cough  Constitutional, cardiovascular, pulmonary, GI, , musculoskeletal, neuro, skin, endocrine and psych systems are negative, except as otherwise noted.    This document serves as a record of the services and decisions personally performed and made by Susan Haase, CNP. It was created on her behalf by Chantel Gallardo, a trained medical scribe. The creation of this document is based on the provider's statements to the medical scribe.  Chantel Gallardo 10:48 AM May 21, 2018  OBJECTIVE:   /80 (BP Location: Left arm, Patient Position: Chair, Cuff Size: Adult Large)  Pulse 84  Temp 98.4  F (36.9  C) (Oral)  Resp 20  Ht 1.549 m (5' 1\")  Wt 99.3 kg (219 lb)  SpO2 97%  BMI 41.38 kg/m2  Body mass index is 41.38 kg/(m^2).  GENERAL: healthy, alert and no distress  HENT: ear canals and TM's " normal, nose and mouth without ulcers or lesions  NECK: no adenopathy, no asymmetry, masses, or scars and thyroid normal to palpation  RESP: lungs with Inspiratory and expiratory wheeze, tight, frequent cough.  After neb expiratory wheeze only, cough improved.   CV: regular rate and rhythm, normal S1 S2, no S3 or S4, no murmur, click or rub, no peripheral edema   PSYCH: mentation appears normal, affect normal/bright    ASSESSMENT/PLAN:   Jazlyn was seen today for uri.    Diagnoses and all orders for this visit:    Chronic obstructive pulmonary disease, unspecified COPD type (H): gave duoneb in clinic with improvement in wheeze and cough.  Discharge with prednisone and azithromycin.  Continue albuterol inhaler and duonebs on prn basis.  Is aware of need to go to the ED if symptoms do not improve.   -     COPD ACTION PLAN  -     ALBUTEROL/IPRATROPIUM 3ML NEB - .001  -     INHALATION/NEBULIZER TREATMENT, INITIAL  -     azithromycin (ZITHROMAX) 250 MG tablet; Two tablets first day, then one tablet daily for four days.  -     predniSONE (DELTASONE) 20 MG tablet; Take 2 tablets (40 mg) by mouth daily for 5 days  -     budesonide (PULMICORT FLEXHALER) 180 MCG/ACT inhaler; Inhale 1 puff into the lungs 2 times daily  -     albuterol (PROAIR HFA/PROVENTIL HFA/VENTOLIN HFA) 108 (90 Base) MCG/ACT Inhaler; Inhale 2 puffs into the lungs every 6 hours as needed for shortness of breath / dyspnea  -     ipratropium - albuterol 0.5 mg/2.5 mg/3 mL (DUONEB) 0.5-2.5 (3) MG/3ML neb solution; Take 1 vial (3 mLs) by nebulization every 6 hours as needed for shortness of breath / dyspnea or wheezing    Moderate major depression (H)  -     DEPRESSION ACTION PLAN (DAP)    Follow up with PCP in the next 1-2 days if symptoms do not improve, sooner as needed.       The information in this document, created by the medical scribe for me, accurately reflects the services I personally performed and the decisions made by me. I have reviewed and  approved this document for accuracy prior to leaving the patient care area.  May 21, 2018 10:50 AM  Susan Haase, APRN [facility]

## 2018-05-21 NOTE — NURSING NOTE
The following nebulizer treatment was given:     MEDICATION: Duoneb  : Stealth10  LOT #: [id number]  EXPIRATION DATE:  3/2019  NDC # 3338-5815-36

## 2018-05-31 DIAGNOSIS — E11.9 TYPE 2 DIABETES MELLITUS WITHOUT COMPLICATION, WITHOUT LONG-TERM CURRENT USE OF INSULIN (H): ICD-10-CM

## 2018-05-31 NOTE — TELEPHONE ENCOUNTER
Requested Prescriptions   Pending Prescriptions Disp Refills     glipiZIDE (GLUCOTROL XL) 10 MG 24 hr tablet [Pharmacy Med Name: GLIPIZIDE ER 10 MG TABLET]  Last Written Prescription Date:  3/1/18  Last Fill Quantity: 180,  # refills: 0   Last office visit: 5/21/2018 with prescribing provider:  Haase   Future Office Visit:   Next 5 appointments (look out 90 days)     Jun 05, 2018  5:20 PM CDT   Office Visit with Lokesh Casanova MD   [facility] ([address] [address]   [phone]                  180 tablet 0     Sig: TAKE 1 TABLET (10 MG) BY MOUTH 2 TIMES DAILY (WITH MEALS)    Sulfonylurea Agents Failed    5/31/2018  1:27 AM       Failed - Patient has documented LDL within the past 12 mos.    Recent Labs   Lab Test  05/18/17   1710   LDL  65            Failed - Patient has had a Microalbumin in the past 12 mos.    Recent Labs   Lab Test  05/18/17   1712   MICROL  8   UMALCR  4.63            Passed - Blood pressure less than 140/90 in past 6 months    BP Readings from Last 3 Encounters:   05/21/18 112/80   05/07/18 118/72   04/25/18 134/78                Passed - Patient has documented A1c within the specified period of time.    If HgbA1C is 8 or greater, it needs to be on file within the past 3 months.  If less than 8, must be on file within the past 6 months.     Recent Labs   Lab Test  02/27/18   1616   A1C  7.3*            Passed - Patient is age 18 or older       Passed - No active pregnancy on record       Passed - Patient has a recent creatinine (normal) within the past 12 mos.    Recent Labs   Lab Test  03/28/18   2211   CR  0.69            Passed - Patient has not had a positive pregnancy test within the past 12 mos.       Passed - Recent (6 mo) or future (30 days) visit within the authorizing provider's specialty    Patient had office visit in the last 6 months or has a visit in the next 30 days with authorizing provider or  "within the authorizing provider's specialty.  See \"Patient Info\" tab in inbasket, or \"Choose Columns\" in Meds & Orders section of the refill encounter.              "

## 2018-06-01 RX ORDER — GLIPIZIDE 10 MG/1
TABLET, FILM COATED, EXTENDED RELEASE ORAL
Qty: 180 TABLET | Refills: 0 | Status: SHIPPED | OUTPATIENT
Start: 2018-06-01 | End: 2019-01-10

## 2018-06-01 NOTE — TELEPHONE ENCOUNTER
Medication is being filled for 1 time refill only due to:  Patient needs labs patient is due for labs and has appt scheduled with Dr Casanova.   Annie Wyatt RN- Triage [employer]

## 2018-06-08 DIAGNOSIS — J44.9 CHRONIC OBSTRUCTIVE PULMONARY DISEASE, UNSPECIFIED COPD TYPE (H): ICD-10-CM

## 2018-06-08 RX ORDER — IPRATROPIUM BROMIDE AND ALBUTEROL SULFATE 2.5; .5 MG/3ML; MG/3ML
1 SOLUTION RESPIRATORY (INHALATION) EVERY 6 HOURS PRN
Qty: 90 VIAL | Refills: 3 | Status: CANCELLED | OUTPATIENT
Start: 2018-06-08

## 2018-06-08 NOTE — TELEPHONE ENCOUNTER
"Requested Prescriptions   Pending Prescriptions Disp Refills     ipratropium - albuterol 0.5 mg/2.5 mg/3 mL (DUONEB) 0.5-2.5 (3) MG/3ML neb solution  THIS MAY BE TOO SOON  Last Written Prescription Date: 5/21/18  Last Fill Quantity: 90 vial,  # refills: 3   Last office visit: 5/21/2018 with prescribing provider: Haase     Future Office Visit:   90 vial 3     Sig: Take 1 vial (3 mLs) by nebulization every 6 hours as needed for shortness of breath / dyspnea or wheezing    Short-Acting Beta Agonist Inhalers Protocol  Passed    6/8/2018  9:10 AM       Passed - Patient is age 12 or older       Passed - Recent (12 mo) or future (30 days) visit within the authorizing provider's specialty    Patient had office visit in the last 12 months or has a visit in the next 30 days with authorizing provider or within the authorizing provider's specialty.  See \"Patient Info\" tab in inbasket, or \"Choose Columns\" in Meds & Orders section of the refill encounter.                "

## 2018-06-18 ENCOUNTER — TELEPHONE (OUTPATIENT)
Dept: PALLIATIVE MEDICINE | Facility: CLINIC | Age: 53
End: 2018-06-18

## 2018-06-18 NOTE — TELEPHONE ENCOUNTER
Received call from a Dr. Dotson with [facility] who is a part of [employer] who is requesting to speak to Dr. Nichols and has some questions for him in regards to a disability form for this patient. Phone #[phone]      Eleanor Rodriges    [facility]

## 2018-06-18 NOTE — TELEPHONE ENCOUNTER
Routing to provider to review, please advise.     Denise DOVERN-RN Care Coordinator  [facility]

## 2018-06-19 ENCOUNTER — OFFICE VISIT (OUTPATIENT)
Dept: FAMILY MEDICINE | Facility: CLINIC | Age: 53
End: 2018-06-19
Payer: COMMERCIAL

## 2018-06-19 VITALS
DIASTOLIC BLOOD PRESSURE: 83 MMHG | SYSTOLIC BLOOD PRESSURE: 132 MMHG | HEIGHT: 61 IN | BODY MASS INDEX: 41.88 KG/M2 | WEIGHT: 221.8 LBS | HEART RATE: 99 BPM | OXYGEN SATURATION: 98 %

## 2018-06-19 DIAGNOSIS — E11.9 TYPE 2 DIABETES MELLITUS WITHOUT COMPLICATION, UNSPECIFIED LONG TERM INSULIN USE STATUS: Primary | ICD-10-CM

## 2018-06-19 DIAGNOSIS — Z02.9 ADMINISTRATIVE ENCOUNTER: ICD-10-CM

## 2018-06-19 LAB — HBA1C MFR BLD: 7.2 % (ref 0–5.6)

## 2018-06-19 PROCEDURE — 82043 UR ALBUMIN QUANTITATIVE: CPT | Performed by: FAMILY MEDICINE

## 2018-06-19 PROCEDURE — 99214 OFFICE O/P EST MOD 30 MIN: CPT | Performed by: FAMILY MEDICINE

## 2018-06-19 PROCEDURE — 83721 ASSAY OF BLOOD LIPOPROTEIN: CPT | Performed by: FAMILY MEDICINE

## 2018-06-19 PROCEDURE — 36415 COLL VENOUS BLD VENIPUNCTURE: CPT | Performed by: FAMILY MEDICINE

## 2018-06-19 PROCEDURE — 83036 HEMOGLOBIN GLYCOSYLATED A1C: CPT | Performed by: FAMILY MEDICINE

## 2018-06-19 NOTE — MR AVS SNAPSHOT
"              After Visit Summary   6/19/2018    Jazlyn Bartlett    MRN: [id number]           Patient Information     Date Of Birth          1965        Visit Information        Provider Department      6/19/2018 4:00 PM Lokesh Casanova MD [facility] [place]        Today's Diagnoses     Type 2 diabetes mellitus without complication, unspecified long term insulin use status (H)    -  1    Administrative encounter           Follow-ups after your visit        Who to contact     If you have questions or need follow up information about today's clinic visit or your schedule please contact [facility] [place] directly at [phone].  Normal or non-critical lab and imaging results will be communicated to you by NEXTA Mediahart, letter or phone within 4 business days after the clinic has received the results. If you do not hear from us within 7 days, please contact the clinic through NEXTA Mediahart or phone. If you have a critical or abnormal lab result, we will notify you by phone as soon as possible.  Submit refill requests through CO Everywhere or call your pharmacy and they will forward the refill request to us. Please allow 3 business days for your refill to be completed.          Additional Information About Your Visit        MyChart Information     CO Everywhere gives you secure access to your electronic health record. If you see a primary care provider, you can also send messages to your care team and make appointments. If you have questions, please call your primary care clinic.  If you do not have a primary care provider, please call [phone] and they will assist you.        Care EveryWhere ID     This is your Care EveryWhere ID. This could be used by other organizations to access your [facility] medical records  [id number]        Your Vitals Were     Pulse Height Pulse Oximetry Breastfeeding? BMI (Body Mass Index)       99 5' 1\" (1.549 m) 98% No 41.91 kg/m2        Blood Pressure from Last 3 Encounters: "   06/19/18 132/83   05/21/18 112/80   05/07/18 118/72    Weight from Last 3 Encounters:   06/19/18 221 lb 12.8 oz (100.6 kg)   05/21/18 219 lb (99.3 kg)   04/25/18 220 lb (99.8 kg)              We Performed the Following     Albumin Random Urine Quantitative with Creat Ratio     Hemoglobin A1c     LDL cholesterol direct        Primary Care Provider Office Phone # Fax #    Lokesh Casanova [phone]       [address]  [place] [address]        Goals        Result Component    A1C < 7.0     Related Problems    Type 2 diabetes mellitus without complication (H)      Equal Access to Services     SHIRLEY STOKES : Hadii christine Kwon, waaxda jenna, qaybta kaalmada fernanda, jenifer tovar. So [facility] [phone].    ATENCIÓN: Si habla español, tiene a tracey disposición servicios gratuitos de asistencia lingüística. Llame al [phone].    We comply with applicable federal civil rights laws and [place] laws. We do not discriminate on the basis of race, color, national origin, age, disability, sex, sexual orientation, or gender identity.            Thank you!     Thank you for choosing [facility] [place]  for your care. Our goal is always to provide you with excellent care. Hearing back from our patients is one way we can continue to improve our services. Please take a few minutes to complete the written survey that you may receive in the mail after your visit with us. Thank you!             Your Updated Medication List - Protect others around you: Learn how to safely use, store and throw away your medicines at www.disposemymeds.org.          This list is accurate as of 6/19/18 11:59 PM.  Always use your most recent med list.                   Brand Name Dispense Instructions for use Diagnosis    albuterol 108 (90 Base) MCG/ACT Inhaler    PROAIR HFA/PROVENTIL HFA/VENTOLIN HFA    3 Inhaler    Inhale 2 puffs into the lungs every 6 hours as needed for  shortness of breath / dyspnea    Chronic obstructive pulmonary disease, unspecified COPD type (H)       aspirin 81 MG EC tablet     100 tablet    Take 1 tablet (81 mg) by mouth daily    Hypertension goal BP (blood pressure) < 140/80       budesonide 180 MCG/ACT inhaler    PULMICORT FLEXHALER    3 Inhaler    Inhale 1 puff into the lungs 2 times daily    Chronic obstructive pulmonary disease, unspecified COPD type (H)       glipiZIDE 10 MG 24 hr tablet    GLUCOTROL XL    180 tablet    TAKE 1 TABLET (10 MG) BY MOUTH 2 TIMES DAILY (WITH MEALS)    Type 2 diabetes mellitus without complication, without long-term current use of insulin (H)       hydrochlorothiazide 25 MG tablet    HYDRODIURIL    90 tablet    Take 1 tablet (25 mg) by mouth daily    Hypertension goal BP (blood pressure) < 140/80       ipratropium - albuterol 0.5 mg/2.5 mg/3 mL 0.5-2.5 (3) MG/3ML neb solution    DUONEB    90 vial    Take 1 vial (3 mLs) by nebulization every 6 hours as needed for shortness of breath / dyspnea or wheezing    Chronic obstructive pulmonary disease, unspecified COPD type (H)       ipratropium 0.06 % spray    ATROVENT    1 Box    Spray 2 sprays into both nostrils 2 times daily as needed for rhinitis    Chronic obstructive pulmonary disease, unspecified COPD type (H)       ketoconazole 2 % cream    NIZORAL    15 g    Apply topically 2 times daily as needed for itching Apply to AA on the face bid when needed    Seborrheic dermatitis       levothyroxine 150 MCG tablet    SYNTHROID/LEVOTHROID    90 tablet    TAKE 1 TABLET (150 MCG) BY MOUTH DAILY    Hypothyroidism, unspecified type       losartan 50 MG tablet    COZAAR    90 tablet    Take 1 tablet (50 mg) by mouth daily    Hypertension goal BP (blood pressure) < 140/80       simvastatin 20 MG tablet    ZOCOR    90 tablet    Take 1 tablet (20 mg) by mouth At Bedtime    Hyperlipidemia LDL goal <100       tiotropium 18 MCG capsule    SPIRIVA    90 capsule    Inhale 1 capsule (18 mcg)  into the lungs daily    Chronic obstructive pulmonary disease, unspecified COPD type (H)       vitamin D 2000 units tablet     100 tablet    Take 2,000 Units by mouth daily    Vitamin D deficiency disease

## 2018-06-19 NOTE — PROGRESS NOTES
SUBJECTIVE:   Jazlyn Bartlett is a 52 year old female who presents to clinic today for the following health issues:    Forms: Disability paperwork   Also I need to call disability claim reviewing doctor.  Dr. Dotson had left me a message to call him back.  All the details of her days missed from work reviewed with the patient today.      Diabetes Follow-up      Patient is checking blood sugars: not at all    Diabetic concerns: None     Symptoms of hypoglycemia (low blood sugar): shaky, dizzy     Paresthesias (numbness or burning in feet) or sores: Yes      Date of last diabetic eye exam: 2017 - results were normal     BP Readings from Last 2 Encounters:   06/19/18 132/83   05/21/18 112/80     Hemoglobin A1C (%)   Date Value   06/19/2018 7.2 (H)   02/27/2018 7.3 (H)     LDL Cholesterol Calculated (mg/dL)   Date Value   05/18/2017 65   07/15/2016 58       Amount of exercise or physical activity: None    Problems taking medications regularly: No    Medication side effects: none    Diet: regular (no restrictions) and low salt            Problem list and histories reviewed & adjusted, as indicated.  Additional history: as documented    Patient Active Problem List   Diagnosis     Neck mass     Moderate major depression (H)     Anxiety     Vitamin D deficiency disease     CARDIOVASCULAR SCREENING; LDL GOAL LESS THAN 100     GERD (gastroesophageal reflux disease)     Hypertension goal BP (blood pressure) < 140/80     Type 2 diabetes mellitus without complication (H)     Chronic airway obstruction (H)     Advanced directives, counseling/discussion     Autoimmune gastritis- repeat UGI 1/16 with gastritis without eosinophils     Urinary incontinence     Osteoarthritis of patellofemoral joint     Morbid obesity (H)     Tobacco abuse     Lumbar radiculopathy     Hyperlipidemia LDL goal <100     Hypothyroidism, unspecified type     Chronic obstructive pulmonary disease, unspecified COPD type (H)     Past Surgical History:    Procedure Laterality Date     ABLATION, ENDOMETRIAL, THERMAL, W/O HYSTEROSCOPIC GUIDANCE       DILATION AND CURETTAGE, HYSTEROSCOPY, ABLATE ENDOMETRIUM NOVASURE, COMBINED  10/11/2012    Procedure: COMBINED DILATION AND CURETTAGE, HYSTEROSCOPY, ABLATE ENDOMETRIUM NOVASURE;  COMBINED DILATION AND CURETTAGE, HYSTEROSCOPY, ABLATE ENDOMETRIUM ,pelvic exam under anesthesia;  Surgeon: Shruti Barrios MD;  Location: [facility] OR     ESOPHAGOSCOPY, GASTROSCOPY, DUODENOSCOPY (EGD), COMBINED N/A 1/19/2016    Procedure: COMBINED ESOPHAGOSCOPY, GASTROSCOPY, DUODENOSCOPY (EGD), BIOPSY SINGLE OR MULTIPLE;  Surgeon: Kristofer Molina MD;  Location: [facility] GI     Removal of cancerous lump on neck       TONSILLECTOMY         Social History   Substance Use Topics     Smoking status: Current Every Day Smoker     Packs/day: 0.50     Types: Cigarettes     Smokeless tobacco: Never Used     Alcohol use No     Family History   Problem Relation Age of Onset     Diabetes Mother      Breast Cancer Mother      Diabetes Father      Lung Cancer Father      Rheumatoid Arthritis Father          Current Outpatient Prescriptions   Medication Sig Dispense Refill     aspirin 81 MG EC tablet Take 1 tablet (81 mg) by mouth daily 100 tablet 3     Cholecalciferol (VITAMIN D) 2000 UNITS tablet Take 2,000 Units by mouth daily 100 tablet 3     glipiZIDE (GLUCOTROL XL) 10 MG 24 hr tablet TAKE 1 TABLET (10 MG) BY MOUTH 2 TIMES DAILY (WITH MEALS) 180 tablet 0     hydrochlorothiazide (HYDRODIURIL) 25 MG tablet Take 1 tablet (25 mg) by mouth daily 90 tablet 3     ipratropium (ATROVENT) 0.06 % spray Spray 2 sprays into both nostrils 2 times daily as needed for rhinitis 1 Box 2     ipratropium - albuterol 0.5 mg/2.5 mg/3 mL (DUONEB) 0.5-2.5 (3) MG/3ML neb solution Take 1 vial (3 mLs) by nebulization every 6 hours as needed for shortness of breath / dyspnea or wheezing 90 vial 3     levothyroxine (SYNTHROID/LEVOTHROID) 150 MCG tablet TAKE 1 TABLET (150 MCG) BY MOUTH DAILY  "90 tablet 1     losartan (COZAAR) 50 MG tablet Take 1 tablet (50 mg) by mouth daily 90 tablet 3     simvastatin (ZOCOR) 20 MG tablet Take 1 tablet (20 mg) by mouth At Bedtime 90 tablet 3     tiotropium (SPIRIVA) 18 MCG capsule Inhale 1 capsule (18 mcg) into the lungs daily 90 capsule 1     albuterol (PROAIR HFA/PROVENTIL HFA/VENTOLIN HFA) 108 (90 Base) MCG/ACT Inhaler Inhale 2 puffs into the lungs every 6 hours as needed for shortness of breath / dyspnea 3 Inhaler 1     budesonide (PULMICORT FLEXHALER) 180 MCG/ACT inhaler Inhale 1 puff into the lungs 2 times daily 3 Inhaler 3     ketoconazole (NIZORAL) 2 % cream Apply topically 2 times daily as needed for itching Apply to AA on the face bid when needed 15 g 3     [DISCONTINUED] glyBURIDE-metFORMIN (GLUCOVANCE) 2.5-500 MG per tablet Take 1 tablet by mouth daily (with breakfast) 90 tablet 1     Allergies   Allergen Reactions     Ibuprofen Sodium Nausea and Vomiting     Vicodin [Hydrocodone-Acetaminophen] Nausea and Vomiting       Reviewed and updated as needed this visit by clinical staff  Tobacco  Allergies  Meds  Med Hx  Surg Hx  Fam Hx  Soc Hx      Reviewed and updated as needed this visit by Provider         ROS:  CONSTITUTIONAL: NEGATIVE for fever, chills, change in weight  ENT/MOUTH: NEGATIVE for ear, mouth and throat problems  RESP: NEGATIVE for significant cough or SOB  CV: NEGATIVE for chest pain, palpitations or peripheral edema    OBJECTIVE:                                                    /83 (BP Location: Left arm, Patient Position: Chair, Cuff Size: Adult Large)  Pulse 99  Ht 5' 1\" (1.549 m)  Wt 221 lb 12.8 oz (100.6 kg)  SpO2 98%  Breastfeeding? No  BMI 41.91 kg/m2  Body mass index is 41.91 kg/(m^2).   GENERAL: healthy, alert, well nourished, well hydrated, no distress  HENT: ear canals- normal; TMs- normal; Nose- normal; Mouth- no ulcers, no lesions  NECK: no tenderness, no adenopathy, no asymmetry, no masses, no stiffness; thyroid- " normal to palpation  RESP: lungs clear to auscultation - no rales, no rhonchi, no wheezes  CV: regular rates and rhythm, normal S1 S2, no S3 or S4 and no murmur, no click or rub -  ABDOMEN: soft, no tenderness, no  hepatosplenomegaly, no masses, normal bowel sounds      Lab Results   Component Value Date    A1C 7.2 06/19/2018    A1C 7.3 02/27/2018    A1C 6.7 11/07/2017    A1C 7.0 05/18/2017    A1C 7.1 11/03/2016          ASSESSMENT/PLAN:                                                        ICD-10-CM    1. Type 2 diabetes mellitus without complication, unspecified long term insulin use status (H) E11.9 Hemoglobin A1c     Albumin Random Urine Quantitative with Creat Ratio     LDL cholesterol direct   2. Administrative encounter Z02.9      Diabetic control is steady as compared to before.  Recommending to lose weight by portion control, low carbohydrate diet and exercise.  Resume current medications without any changes.    I would call disability reviewing physician tomorrow.  Forms filled out today.  Total time spent was 25 minutes, more than half the time was spent in counseling the patient about the disease pathogenesis, treatment plan and coordinating care.        Lokesh Casanova MD  [facility]

## 2018-06-19 NOTE — LETTER
[facility]          [address] [place] [address]                            [phone]  Fax: [phone]    Jazlyn Bartlett  [address] [place] [id number]    [id number]    June 19, 2018      To whom it may concern    Jazlyn Bartlett was seen at my clinic today for an office visit.       Sincerely,      Edilberto Campa M.D.

## 2018-06-20 ENCOUNTER — TELEPHONE (OUTPATIENT)
Dept: FAMILY MEDICINE | Facility: CLINIC | Age: 53
End: 2018-06-20

## 2018-06-20 LAB — LDLC SERPL DIRECT ASSAY-MCNC: 61 MG/DL

## 2018-06-20 NOTE — TELEPHONE ENCOUNTER
[employer] forms brought into appointment by patient  TC called patient to see if she wanted us to fax them  Will await call from patient  Forms in TC felipe ROJAS

## 2018-06-20 NOTE — TELEPHONE ENCOUNTER
Patient returned call  Would like forms faxed then mailed to her with Fax confirmation  Forms were faxed to [employer] [place] [phone]  Copy sent to stat abstracting and confirmation and set of forms mailed to patient  Shandra ROJAS

## 2018-06-20 NOTE — TELEPHONE ENCOUNTER
I called and spoke to Dr. Dotson. He asked about the sequence of visits and what was done.  They mentioned the first ER visit on 3/28/18 followed by a visit in our clinic on 4/4/18 with 1 of my colleagues.  It was followed by giving her pain clinic referral for epidural injection which was done 4/17/18.  I saw the patient on 4/25/18 and cleared her to return to work on 4/30/18.  Forms were filled and are scanned in the media.  I last saw the patient yesterday for an office visit for diabetes recheck and more disability forms needed to be filled out again.    He did not have any more questions for me.    Lokesh Casanova MD  [facility] [place]

## 2018-06-21 LAB
CREAT UR-MCNC: 254 MG/DL
MICROALBUMIN UR-MCNC: 16 MG/L
MICROALBUMIN/CREAT UR: 6.22 MG/G CR (ref 0–25)

## 2018-09-04 DIAGNOSIS — E03.9 HYPOTHYROIDISM, UNSPECIFIED TYPE: ICD-10-CM

## 2018-09-04 NOTE — TELEPHONE ENCOUNTER
"Requested Prescriptions   Pending Prescriptions Disp Refills     levothyroxine (SYNTHROID/LEVOTHROID) 150 MCG tablet [Pharmacy Med Name: LEVOTHYROXINE 150 MCG TABLET]  Last Written Prescription Date:  2/27/18  Last Fill Quantity: 90,  # refills: 1   Last office visit: 6/19/2018 with prescribing provider:  richa   Future Office Visit:     90 tablet 1     Sig: TAKE 1 TABLET (150 MCG) BY MOUTH DAILY    Thyroid Protocol Passed    9/4/2018  1:27 AM       Passed - Patient is 12 years or older       Passed - Recent (12 mo) or future (30 days) visit within the authorizing provider's specialty    Patient had office visit in the last 12 months or has a visit in the next 30 days with authorizing provider or within the authorizing provider's specialty.  See \"Patient Info\" tab in inbasket, or \"Choose Columns\" in Meds & Orders section of the refill encounter.           Passed - Normal TSH on file in past 12 months    Recent Labs   Lab Test  02/27/18   1616   TSH  0.55             Passed - No active pregnancy on record    If patient is pregnant or has had a positive pregnancy test, please check TSH.         Passed - No positive pregnancy test in past 12 months    If patient is pregnant or has had a positive pregnancy test, please check TSH.            "

## 2018-09-05 RX ORDER — LEVOTHYROXINE SODIUM 150 UG/1
TABLET ORAL
Qty: 90 TABLET | Refills: 1 | Status: SHIPPED | OUTPATIENT
Start: 2018-09-05 | End: 2019-03-06

## 2018-09-13 ENCOUNTER — ALLIED HEALTH/NURSE VISIT (OUTPATIENT)
Dept: NURSING | Facility: CLINIC | Age: 53
End: 2018-09-13
Payer: COMMERCIAL

## 2018-09-13 DIAGNOSIS — Z23 NEED FOR PROPHYLACTIC VACCINATION AND INOCULATION AGAINST INFLUENZA: Primary | ICD-10-CM

## 2018-09-13 PROCEDURE — 90471 IMMUNIZATION ADMIN: CPT

## 2018-09-13 PROCEDURE — 90682 RIV4 VACC RECOMBINANT DNA IM: CPT

## 2018-09-13 NOTE — MR AVS SNAPSHOT
After Visit Summary   9/13/2018    Jazlyn Bratlett    MRN: [id number]           Patient Information     Date Of Birth          1965        Visit Information        Provider Department      9/13/2018 10:00 AM CR FLU CLINIC [facility]        Today's Diagnoses     Need for prophylactic vaccination and inoculation against influenza    -  1       Follow-ups after your visit        Who to contact     If you have questions or need follow up information about today's clinic visit or your schedule please contact [facility] directly at [phone].  Normal or non-critical lab and imaging results will be communicated to you by Benchlinghart, letter or phone within 4 business days after the clinic has received the results. If you do not hear from us within 7 days, please contact the clinic through coramaze technologiest or phone. If you have a critical or abnormal lab result, we will notify you by phone as soon as possible.  Submit refill requests through PayDragon or call your pharmacy and they will forward the refill request to us. Please allow 3 business days for your refill to be completed.          Additional Information About Your Visit        MyChart Information     PayDragon gives you secure access to your electronic health record. If you see a primary care provider, you can also send messages to your care team and make appointments. If you have questions, please call your primary care clinic.  If you do not have a primary care provider, please call [phone] and they will assist you.        Care EveryWhere ID     This is your Care EveryWhere ID. This could be used by other organizations to access your [facility] medical records  [id number]         Blood Pressure from Last 3 Encounters:   06/19/18 132/83   05/21/18 112/80   05/07/18 118/72    Weight from Last 3 Encounters:   06/19/18 221 lb 12.8 oz (100.6 kg)   05/21/18 219 lb (99.3 kg)   04/25/18 220 lb (99.8 kg)              We  Performed the Following     FLU VACCINE, (RIV4) RECOMBINANT HA  , IM (FluBlok, egg free) [22706]- >18 YRS (FMG recommended  50-64 YRS)     Vaccine Administration, Initial [71158]        Primary Care Provider Office Phone # Fax #    Lokesh Casanova [phone]       [address]  [place] [address]        Goals        Result Component    A1C < 7.0     Related Problems    Type 2 diabetes mellitus without complication (H)      Equal Access to Services     SHIRLEY STOKES : Hadii aad ku hadasho Soomaali, waaxda luqadaha, qaybta kaalmada adeegyada, waxay idiin hayaan adealvin khdiane saavedra . So [facility] [phone].    ATENCIÓN: Si habla español, tiene a tracey disposición servicios gratuitos de asistencia lingüística. Llame al [phone].    We comply with applicable federal civil rights laws and [place] laws. We do not discriminate on the basis of race, color, national origin, age, disability, sex, sexual orientation, or gender identity.            Thank you!     Thank you for choosing [facility]  for your care. Our goal is always to provide you with excellent care. Hearing back from our patients is one way we can continue to improve our services. Please take a few minutes to complete the written survey that you may receive in the mail after your visit with us. Thank you!             Your Updated Medication List - Protect others around you: Learn how to safely use, store and throw away your medicines at www.disposemymeds.org.          This list is accurate as of 9/13/18 10:25 AM.  Always use your most recent med list.                   Brand Name Dispense Instructions for use Diagnosis    albuterol 108 (90 Base) MCG/ACT inhaler    PROAIR HFA/PROVENTIL HFA/VENTOLIN HFA    3 Inhaler    Inhale 2 puffs into the lungs every 6 hours as needed for shortness of breath / dyspnea    Chronic obstructive pulmonary disease, unspecified COPD type (H)       aspirin 81 MG EC tablet     100 tablet     Take 1 tablet (81 mg) by mouth daily    Hypertension goal BP (blood pressure) < 140/80       budesonide 180 MCG/ACT inhaler    PULMICORT FLEXHALER    3 Inhaler    Inhale 1 puff into the lungs 2 times daily    Chronic obstructive pulmonary disease, unspecified COPD type (H)       glipiZIDE 10 MG 24 hr tablet    GLUCOTROL XL    180 tablet    TAKE 1 TABLET (10 MG) BY MOUTH 2 TIMES DAILY (WITH MEALS)    Type 2 diabetes mellitus without complication, without long-term current use of insulin (H)       hydrochlorothiazide 25 MG tablet    HYDRODIURIL    90 tablet    Take 1 tablet (25 mg) by mouth daily    Hypertension goal BP (blood pressure) < 140/80       ipratropium - albuterol 0.5 mg/2.5 mg/3 mL 0.5-2.5 (3) MG/3ML neb solution    DUONEB    90 vial    Take 1 vial (3 mLs) by nebulization every 6 hours as needed for shortness of breath / dyspnea or wheezing    Chronic obstructive pulmonary disease, unspecified COPD type (H)       ipratropium 0.06 % spray    ATROVENT    1 Box    Spray 2 sprays into both nostrils 2 times daily as needed for rhinitis    Chronic obstructive pulmonary disease, unspecified COPD type (H)       ketoconazole 2 % cream    NIZORAL    15 g    Apply topically 2 times daily as needed for itching Apply to AA on the face bid when needed    Seborrheic dermatitis       levothyroxine 150 MCG tablet    SYNTHROID/LEVOTHROID    90 tablet    TAKE 1 TABLET (150 MCG) BY MOUTH DAILY    Hypothyroidism, unspecified type       losartan 50 MG tablet    COZAAR    90 tablet    Take 1 tablet (50 mg) by mouth daily    Hypertension goal BP (blood pressure) < 140/80       simvastatin 20 MG tablet    ZOCOR    90 tablet    Take 1 tablet (20 mg) by mouth At Bedtime    Hyperlipidemia LDL goal <100       tiotropium 18 MCG capsule    SPIRIVA    90 capsule    Inhale 1 capsule (18 mcg) into the lungs daily    Chronic obstructive pulmonary disease, unspecified COPD type (H)       vitamin D 2000 units tablet     100 tablet    Take  2,000 Units by mouth daily    Vitamin D deficiency disease

## 2018-09-13 NOTE — PROGRESS NOTES

## 2018-12-05 DIAGNOSIS — E78.5 HYPERLIPIDEMIA LDL GOAL <100: ICD-10-CM

## 2018-12-05 DIAGNOSIS — E11.9 TYPE 2 DIABETES MELLITUS WITHOUT COMPLICATION, WITHOUT LONG-TERM CURRENT USE OF INSULIN (H): ICD-10-CM

## 2018-12-05 DIAGNOSIS — I10 HYPERTENSION GOAL BP (BLOOD PRESSURE) < 140/80: ICD-10-CM

## 2018-12-05 RX ORDER — SIMVASTATIN 20 MG
TABLET ORAL
Qty: 90 TABLET | Refills: 1 | Status: SHIPPED | OUTPATIENT
Start: 2018-12-05 | End: 2019-05-03

## 2018-12-05 RX ORDER — GLIPIZIDE 10 MG/1
10 TABLET, FILM COATED, EXTENDED RELEASE ORAL DAILY
Qty: 30 TABLET | Refills: 0 | Status: SHIPPED | OUTPATIENT
Start: 2018-12-05 | End: 2019-03-06

## 2018-12-05 RX ORDER — LOSARTAN POTASSIUM 50 MG/1
TABLET ORAL
Qty: 90 TABLET | Refills: 0 | Status: SHIPPED | OUTPATIENT
Start: 2018-12-05 | End: 2019-03-06

## 2018-12-05 RX ORDER — GLIPIZIDE 10 MG/1
TABLET, FILM COATED, EXTENDED RELEASE ORAL
Qty: 90 TABLET | Refills: 2 | OUTPATIENT
Start: 2018-12-05

## 2018-12-05 RX ORDER — HYDROCHLOROTHIAZIDE 25 MG/1
TABLET ORAL
Qty: 90 TABLET | Refills: 0 | Status: SHIPPED | OUTPATIENT
Start: 2018-12-05 | End: 2019-03-08

## 2018-12-05 NOTE — TELEPHONE ENCOUNTER
"Requested Prescriptions   Pending Prescriptions Disp Refills     glipiZIDE (GLUCOTROL XL) 10 MG 24 hr tablet [Pharmacy Med Name: GLIPIZIDE ER 10 MG TABLET]  Last Written Prescription Date:  6/1/18  Last Fill Quantity: 180,  # refills: 0   Last office visit: 6/19/2018 with prescribing provider:  Edilberto   Future Office Visit:     90 tablet 2     Sig: TAKE 1 TABLET (10 MG) BY MOUTH DAILY    Sulfonylurea Agents Passed    12/5/2018  1:28 AM       Passed - Blood pressure less than 140/90 in past 6 months    BP Readings from Last 3 Encounters:   06/19/18 132/83   05/21/18 112/80   05/07/18 118/72                Passed - Patient has documented LDL within the past 12 mos.    Recent Labs   Lab Test  06/19/18   1634   LDL  61            Passed - Patient has had a Microalbumin in the past 12 mos.    Recent Labs   Lab Test  06/19/18   1654   MICROL  16   UMALCR  6.22            Passed - Patient has documented A1c within the specified period of time.    If HgbA1C is 8 or greater, it needs to be on file within the past 3 months.  If less than 8, must be on file within the past 6 months.     Recent Labs   Lab Test  06/19/18   1634   A1C  7.2*            Passed - Patient is age 18 or older       Passed - No active pregnancy on record       Passed - Patient has a recent creatinine (normal) within the past 12 mos.    Recent Labs   Lab Test  03/28/18   2211   CR  0.69            Passed - Patient has not had a positive pregnancy test within the past 12 mos.       Passed - Recent (6 mo) or future (30 days) visit within the authorizing provider's specialty    Patient had office visit in the last 6 months or has a visit in the next 30 days with authorizing provider or within the authorizing provider's specialty.  See \"Patient Info\" tab in inbasket, or \"Choose Columns\" in Meds & Orders section of the refill encounter.            simvastatin (ZOCOR) 20 MG tablet [Pharmacy Med Name: SIMVASTATIN 20 MG TABLET]  Last Written Prescription Date:  " "11/7/17  Last Fill Quantity: 90,  # refills: 3   Last office visit: 6/19/2018 with prescribing provider:  Edilberto   Future Office Visit:     90 tablet 3     Sig: TAKE 1 TABLET (20 MG) BY MOUTH AT BEDTIME    Statins Protocol Passed    12/5/2018  1:28 AM       Passed - LDL on file in past 12 months    Recent Labs   Lab Test  06/19/18   1634   LDL  61            Passed - No abnormal creatine kinase in past 12 months    No lab results found.            Passed - Recent (12 mo) or future (30 days) visit within the authorizing provider's specialty    Patient had office visit in the last 12 months or has a visit in the next 30 days with authorizing provider or within the authorizing provider's specialty.  See \"Patient Info\" tab in inbasket, or \"Choose Columns\" in Meds & Orders section of the refill encounter.             Passed - Patient is age 18 or older       Passed - No active pregnancy on record       Passed - No positive pregnancy test in past 12 months        hydrochlorothiazide (HYDRODIURIL) 25 MG tablet [Pharmacy Med Name: HYDROCHLOROTHIAZIDE 25 MG TAB]  Last Written Prescription Date:  11/7/17  Last Fill Quantity: 90,  # refills: 3   Last office visit: 6/19/2018 with prescribing provider:  Edilberto   Future Office Visit:     90 tablet 3     Sig: TAKE 1 TABLET (25 MG) BY MOUTH DAILY    Diuretics (Including Combos) Protocol Passed    12/5/2018  1:28 AM       Passed - Blood pressure under 140/90 in past 12 months    BP Readings from Last 3 Encounters:   06/19/18 132/83   05/21/18 112/80   05/07/18 118/72                Passed - Recent (12 mo) or future (30 days) visit within the authorizing provider's specialty    Patient had office visit in the last 12 months or has a visit in the next 30 days with authorizing provider or within the authorizing provider's specialty.  See \"Patient Info\" tab in inbasket, or \"Choose Columns\" in Meds & Orders section of the refill encounter.             Passed - Patient is age 18 or older    " "   Passed - No active pregancy on record       Passed - Normal serum creatinine on file in past 12 months    Recent Labs   Lab Test  03/28/18   2211   CR  0.69             Passed - Normal serum potassium on file in past 12 months    Recent Labs   Lab Test  03/28/18   2211   POTASSIUM  3.4                   Passed - Normal serum sodium on file in past 12 months    Recent Labs   Lab Test  03/28/18   2211   NA  140             Passed - No positive pregnancy test in past 12 months        losartan (COZAAR) 50 MG tablet [Pharmacy Med Name: LOSARTAN POTASSIUM 50 MG TAB]  Last Written Prescription Date:  11/7/17  Last Fill Quantity: 90,  # refills: 3   Last office visit: 6/19/2018 with prescribing provider:  Edilberto   Future Office Visit:     90 tablet 3     Sig: TAKE 1 TABLET (50 MG) BY MOUTH DAILY    Angiotensin-II Receptors Passed    12/5/2018  1:28 AM       Passed - Blood pressure under 140/90 in past 12 months    BP Readings from Last 3 Encounters:   06/19/18 132/83   05/21/18 112/80   05/07/18 118/72                Passed - Recent (12 mo) or future (30 days) visit within the authorizing provider's specialty    Patient had office visit in the last 12 months or has a visit in the next 30 days with authorizing provider or within the authorizing provider's specialty.  See \"Patient Info\" tab in inbasket, or \"Choose Columns\" in Meds & Orders section of the refill encounter.             Passed - Patient is age 18 or older       Passed - No active pregnancy on record       Passed - Normal serum creatinine on file in past 12 months    Recent Labs   Lab Test  03/28/18   2211   CR  0.69            Passed - Normal serum potassium on file in past 12 months    Recent Labs   Lab Test  03/28/18   2211   POTASSIUM  3.4                   Passed - No positive pregnancy test in past 12 months          "

## 2018-12-05 NOTE — TELEPHONE ENCOUNTER
Routing refill request to provider for review/approval because:  Glipizide Current med list states to take twice a day, but all previous prescriptions are once a day. Request from pharmacy is for once a day.    Patient needs to be seen because for 6 month diabetes follow up appt. Routed to team to schedule.    Simvastatin - Prescription approved per [facility] Refill Protocol.  Hydrochlorothiazide and losartan - Prescription approved per [facility] Refill Protocol. Due for relevant labs in 3/2019.  Ana Cortes RN

## 2018-12-05 NOTE — TELEPHONE ENCOUNTER
"  Last Written Prescription Date:  12/5/18  Last Fill Quantity: 30,  # refills: 0   Last office visit: 6/19/2018 with prescribing provider:     Future Office Visit:    Requested Prescriptions   Pending Prescriptions Disp Refills     glipiZIDE (GLUCOTROL XL) 10 MG 24 hr tablet [Pharmacy Med Name: GLIPIZIDE ER 10 MG TABLET] 90 tablet 2     Sig: TAKE 1 TABLET (10 MG) BY MOUTH DAILY    Sulfonylurea Agents Passed    12/5/2018  4:02 PM       Passed - Blood pressure less than 140/90 in past 6 months    BP Readings from Last 3 Encounters:   06/19/18 132/83   05/21/18 112/80   05/07/18 118/72                Passed - Patient has documented LDL within the past 12 mos.    Recent Labs   Lab Test  06/19/18   1634   LDL  61            Passed - Patient has had a Microalbumin in the past 12 mos.    Recent Labs   Lab Test  06/19/18   1654   MICROL  16   UMALCR  6.22            Passed - Patient has documented A1c within the specified period of time.    If HgbA1C is 8 or greater, it needs to be on file within the past 3 months.  If less than 8, must be on file within the past 6 months.     Recent Labs   Lab Test  06/19/18   1634   A1C  7.2*            Passed - Patient is age 18 or older       Passed - No active pregnancy on record       Passed - Patient has a recent creatinine (normal) within the past 12 mos.    Recent Labs   Lab Test  03/28/18   2211   CR  0.69            Passed - Patient has not had a positive pregnancy test within the past 12 mos.       Passed - Recent (6 mo) or future (30 days) visit within the authorizing provider's specialty    Patient had office visit in the last 6 months or has a visit in the next 30 days with authorizing provider or within the authorizing provider's specialty.  See \"Patient Info\" tab in inbasket, or \"Choose Columns\" in Meds & Orders section of the refill encounter.              "

## 2019-01-09 ENCOUNTER — ANCILLARY PROCEDURE (OUTPATIENT)
Dept: GENERAL RADIOLOGY | Facility: CLINIC | Age: 54
End: 2019-01-09
Payer: COMMERCIAL

## 2019-01-09 ENCOUNTER — OFFICE VISIT (OUTPATIENT)
Dept: URGENT CARE | Facility: URGENT CARE | Age: 54
End: 2019-01-09
Payer: COMMERCIAL

## 2019-01-09 VITALS
HEART RATE: 79 BPM | OXYGEN SATURATION: 95 % | TEMPERATURE: 96.8 F | SYSTOLIC BLOOD PRESSURE: 128 MMHG | DIASTOLIC BLOOD PRESSURE: 80 MMHG

## 2019-01-09 DIAGNOSIS — E11.9 TYPE 2 DIABETES MELLITUS WITHOUT COMPLICATION, UNSPECIFIED WHETHER LONG TERM INSULIN USE (H): ICD-10-CM

## 2019-01-09 DIAGNOSIS — B96.89 BV (BACTERIAL VAGINOSIS): Primary | ICD-10-CM

## 2019-01-09 DIAGNOSIS — R30.0 DYSURIA: ICD-10-CM

## 2019-01-09 DIAGNOSIS — N76.0 BV (BACTERIAL VAGINOSIS): Primary | ICD-10-CM

## 2019-01-09 DIAGNOSIS — J44.9 CHRONIC OBSTRUCTIVE PULMONARY DISEASE, UNSPECIFIED COPD TYPE (H): ICD-10-CM

## 2019-01-09 LAB
ALBUMIN UR-MCNC: NEGATIVE MG/DL
ANION GAP SERPL CALCULATED.3IONS-SCNC: 9 MMOL/L (ref 3–14)
APPEARANCE UR: CLEAR
BACTERIA #/AREA URNS HPF: ABNORMAL /HPF
BILIRUB UR QL STRIP: NEGATIVE
BUN SERPL-MCNC: 11 MG/DL (ref 7–30)
CALCIUM SERPL-MCNC: 9.7 MG/DL (ref 8.5–10.1)
CHLORIDE SERPL-SCNC: 98 MMOL/L (ref 94–109)
CO2 SERPL-SCNC: 31 MMOL/L (ref 20–32)
COLOR UR AUTO: YELLOW
CREAT SERPL-MCNC: 1.1 MG/DL (ref 0.52–1.04)
ERYTHROCYTE [DISTWIDTH] IN BLOOD BY AUTOMATED COUNT: 13.6 % (ref 10–15)
GFR SERPL CREATININE-BSD FRML MDRD: 56 ML/MIN/{1.73_M2}
GLUCOSE SERPL-MCNC: 156 MG/DL (ref 70–99)
GLUCOSE UR STRIP-MCNC: NEGATIVE MG/DL
HBA1C MFR BLD: 7.8 % (ref 0–5.6)
HCT VFR BLD AUTO: 45.1 % (ref 35–47)
HGB BLD-MCNC: 14.6 G/DL (ref 11.7–15.7)
HGB UR QL STRIP: ABNORMAL
KETONES UR STRIP-MCNC: ABNORMAL MG/DL
LEUKOCYTE ESTERASE UR QL STRIP: NEGATIVE
MCH RBC QN AUTO: 29.9 PG (ref 26.5–33)
MCHC RBC AUTO-ENTMCNC: 32.4 G/DL (ref 31.5–36.5)
MCV RBC AUTO: 92 FL (ref 78–100)
NITRATE UR QL: NEGATIVE
NON-SQ EPI CELLS #/AREA URNS LPF: ABNORMAL /LPF
PH UR STRIP: 5.5 PH (ref 5–7)
PLATELET # BLD AUTO: 332 10E9/L (ref 150–450)
POTASSIUM SERPL-SCNC: 3.7 MMOL/L (ref 3.4–5.3)
RBC # BLD AUTO: 4.88 10E12/L (ref 3.8–5.2)
RBC #/AREA URNS AUTO: ABNORMAL /HPF
SODIUM SERPL-SCNC: 138 MMOL/L (ref 133–144)
SOURCE: ABNORMAL
SP GR UR STRIP: >1.03 (ref 1–1.03)
SPECIMEN SOURCE: ABNORMAL
UROBILINOGEN UR STRIP-ACNC: 0.2 EU/DL (ref 0.2–1)
WBC # BLD AUTO: 10.5 10E9/L (ref 4–11)
WBC #/AREA URNS AUTO: ABNORMAL /HPF
WET PREP SPEC: ABNORMAL

## 2019-01-09 PROCEDURE — 81001 URINALYSIS AUTO W/SCOPE: CPT | Performed by: PHYSICIAN ASSISTANT

## 2019-01-09 PROCEDURE — 87086 URINE CULTURE/COLONY COUNT: CPT | Performed by: PHYSICIAN ASSISTANT

## 2019-01-09 PROCEDURE — 99214 OFFICE O/P EST MOD 30 MIN: CPT | Performed by: PHYSICIAN ASSISTANT

## 2019-01-09 PROCEDURE — 71046 X-RAY EXAM CHEST 2 VIEWS: CPT

## 2019-01-09 PROCEDURE — 36415 COLL VENOUS BLD VENIPUNCTURE: CPT | Performed by: PHYSICIAN ASSISTANT

## 2019-01-09 PROCEDURE — 80048 BASIC METABOLIC PNL TOTAL CA: CPT | Performed by: PHYSICIAN ASSISTANT

## 2019-01-09 PROCEDURE — 87210 SMEAR WET MOUNT SALINE/INK: CPT | Performed by: PHYSICIAN ASSISTANT

## 2019-01-09 PROCEDURE — 83036 HEMOGLOBIN GLYCOSYLATED A1C: CPT | Performed by: PHYSICIAN ASSISTANT

## 2019-01-09 PROCEDURE — 85027 COMPLETE CBC AUTOMATED: CPT | Performed by: PHYSICIAN ASSISTANT

## 2019-01-09 RX ORDER — AZITHROMYCIN 250 MG/1
TABLET, FILM COATED ORAL
Qty: 6 TABLET | Refills: 0 | Status: SHIPPED | OUTPATIENT
Start: 2019-01-09 | End: 2019-05-03

## 2019-01-09 RX ORDER — METRONIDAZOLE 7.5 MG/G
1 GEL VAGINAL DAILY
Qty: 35 G | Refills: 0 | Status: SHIPPED | OUTPATIENT
Start: 2019-01-09 | End: 2019-05-03

## 2019-01-09 NOTE — LETTER
[facility]  [address]  [address]  [place] [address]  Phone: [phone]  Fax: [phone]    January 9, 2019        Jazlyn Bartlett  [address] [facility] [address]          To whom it may concern:    RE: Jazlyn Bartlett    Patient was seen and treated today at our clinic and missed work.  Please excuse absence 1/9 - 1/11/19.    Please contact me for questions or concerns.      Sincerely,        Gokul Carey PA-C

## 2019-01-09 NOTE — PATIENT INSTRUCTIONS
Follow up in primary clinic tomorrow    Low threshold for ER follow up tonight    Push fluids - room temperature, small amounts consistently      Patient Education     COPD Flare    You have had a flare-up of your COPD.  COPD, or chronic obstructive pulmonary disease, is a common lung disease. It causes your airways to become irritated and narrower. This makes it harder for you to breathe. Emphysema and chronic bronchitis are both types of COPD. This is a chronic condition, which means you always have it. Sometimes it gets worse. When this happens, it is called a flare-up.  Symptoms of COPD  People with COPD may have symptoms most of the time. In a flare-up, your symptoms get worse. These symptoms may mean you are having a flare-up:    Shortness of breath, shallow or rapid breathing, or wheezing that gets worse    Lung infection    Cough that gets worse    More mucus, thicker mucus or mucus of a different color    Tiredness, decreased energy, or trouble doing your usual activities    Fever    Chest tightness    Your symptoms don t get better even when you use your usual medicines, inhalers, and nebulizer    Trouble talking    You feel confused  Causes of flare-ups  Unfortunately, a flare-up can happen even though you did everything right, and you followed your doctor s instructions. Some causes of flare-ups are:    Smoking or secondhand smoke    Colds, the flu, or respiratory infections    Air pollution    Sudden change in the weather    Dust, irritating chemicals, or strong fumes    Not taking your medicines as prescribed  Home care  Here are some things you can do at home to treat a flare-up:    Try not to panic. This makes it harder to breathe, and keeps you from doing the right things.    Don t smoke or be around others who are smoking.    Try to drink more fluids than usual during a flare-up, unless your doctor has told you not to because of heart and kidney problems. More fluids can help loosen the  mucus.    Use your inhalers and nebulizer, if you have one, as you have been told to.    If you were given antibiotics, take them until they are used up or your doctor tells you to stop. It s important to finish the antibiotics, even though you feel better. This will make sure the infection has cleared.    If you were given prednisone or another steroid, finish it even if you feel better.  Preventing a flare-up  Even though flare-ups happen, the best way to treat one is to prevent it before it starts. Here are some pointers:    Don t smoke or be around others who are smoking.    Take your medicines as you have been told.    Talk with your doctor about getting a flu shot every year. Also find out if you need a pneumonia shot.    If there is a weather advisory warning to stay indoors, try to stay inside when possible.    Try to eat healthy and get plenty of sleep.    Try to avoid things that usually set you off, like dust, chemical fumes, hairsprays, or strong perfumes.  Follow-up care  Follow up with your healthcare provider, or as advised.  If a culture was done, you will be told if your treatment needs to be changed. You can call as directed for the results.  If X-rays were done, you will be notified of any new findings that may affect your care.  Call 911  Call 911 if any of these occur:    You have trouble breathing    You feel confused or it s difficult to wake you up    You faint or lose consciousness    You have a rapid heart rate    You have new pain in your chest, arm, shoulder, neck or upper back  When to seek medical advice  Call your healthcare provider right away if any of these occur:    Wheezing or shortness of breath gets worse    You need to use your inhalers more often than usual without relief    Fever of 100.4 F (38 C) or higher, or as directed by your healthcare provider    Coughing up lots of dark-colored or bloody mucus (sputum)    Chest pain with each breath    You do not start to get better  within 24 hours    Swelling of your ankles gets worse    Dizziness or weakness  Date Last Reviewed: 2016-2018 [employer]. [address] [place] [address], [place] [address]. All rights reserved. This information is not intended as a substitute for professional medical care. Always follow your healthcare professional's instructions.           Patient Education     Bacterial Vaginosis    You have a vaginal infection called bacterial vaginosis (BV). Both good and bad bacteria are present in a healthy vagina. BV occurs when these bacteria get out of balance. The number of bad bacteria increase. And the number of good bacteria decrease. Although BV is associated with sexual activity, it is not a sexually transmitted disease.  BV may or may not cause symptoms. If symptoms do occur, they can include:    Thin, gray, milky-white, or sometimes green discharge    Unpleasant odor or  fishy  smell    Itching, burning, or pain in or around the vagina  It is not known what causes BV, but certain factors can make the problem more likely. This can include:    Douching    Having sex with a new partner    Having sex with more than one partner  BV will sometimes go away on its own. But treatment is usually recommended. This is because untreated BV can increase the risk of more serious health problems such as:    Pelvic inflammatory disease (PID)     delivery (giving birth to a baby early if you re pregnant)    HIV and certain other sexually transmitted diseases (STDs)    Infection after surgery on the reproductive organs  Home care  General care    BV is most often treated with medicines called antibiotics. These may be given as pills or as a vaginal cream. If antibiotics are prescribed, be sure to use them exactly as directed. Also, be sure to complete all of the medicine, even if your symptoms go away.    Don't douche or having sex during treatment.    If you have sex with a female partner, ask your  "healthcare provider if she should also be treated.  Prevention    Don't douche.    Don't have sex. If you do have sex, then take steps to lower your risk:  ? Use condoms when having sex.  ? Limit the number of sexual partners you have.  Follow-up care  Follow up with your healthcare provider, or as advised.  When to seek medical advice  Call your healthcare provider right away if:    You have a fever of 100.4 F (38 C) or higher, or as directed by your provider.    Your symptoms worsen, or they don t go away within a few days of starting treatment.    You have new pain in the lower belly or pelvic region.    You have side effects that bother you or a reaction to the pills or cream you re prescribed.    You or any partners you have sex with have new symptoms, such as a rash, joint pain, or sores.  Date Last Reviewed: 10/1/2017    1817-7871 [employer] [address] [place] [address]. All rights reserved. This information is not intended as a substitute for professional medical care. Always follow your healthcare professional's instructions.           Patient Education     Vomiting (Adult)  Vomiting is a common symptom that may be due to different causes. These include gastroenteritis (\"stomach flu\"), food poisoning and gastritis. There are other more serious causes of vomiting which may be hard to diagnose early in the illness. Therefore, it is important to watch for the warning signs listed below.  The main danger from repeated vomiting is dehydration. This is due to excess loss of water and minerals from the body. When this occurs, your body fluids must be replaced.  Home care    If symptoms are severe, rest at home for the next 24 hours.    Because your symptoms may be from an infection, wash your hands often and well. If soap and water are not available, use alcohol-based  to keep from spreading the infection to others.    Wash your hands for at least 20 seconds. Humming the happy " birthday song twice while you wash is an easy way to make sure you've washed for 20 seconds.    Wash your hands after using the toilet, before and after preparing food, before eating food, after changing a diaper, cleaning a wound, caring for a sick person, and blowing your nose, coughing, or sneezing. You should also wash your hands after caring for someone who is sick, touching pet food, or treats, and touching an animal, or animal waste.    You may use acetaminophen or NSAID medicines like ibuprofen or naproxen to control fever, unless another medicine was prescribed. If you have chronic liver or kidney disease or ever had a stomach ulcer or gastrointestinal bleeding, talk with your doctor before using these medicines. Aspirin should never be used in anyone under 18 years of age who is ill with a fever. It may cause severe liver damage. Don't use NSAID medicines if you are already taking one for another condition (like arthritis) or are on aspirin (such as for heart disease, or after a stroke)    Don't use tobacco and or drink alcohol, which may worsen your symptoms.    If medicines for vomiting were prescribed, take as directed.    Once vomiting stops, then follow these guidelines:  During the first 12 to 24 hours follow the diet below:    Fruit juices. Apple, grape juice, clear fruit drinks, and electrolyte replacement drinks.    Beverages. Soft drinks without caffeine; mineral water (plain or flavored), decaffeinated tea and coffee.    Soups. Clear broth and bouillon    Desserts. Plain gelatin, ice pops, and fruit juice bars. As you feel better, you may add 6 to 8 ounces of yogurt per day.  During the next 24 hours you may add the following to the above:    Hot cereal, plain toast, bread, rolls, crackers    Plain noodles, rice, mashed potatoes, chicken noodle or rice soup    Unsweetened canned fruit such as applesauce, bananas (avoid pineapple and citrus)    Limit caffeine and chocolate. No spices or  seasonings except salt.  During the next 24 hours:  Gradually resume a normal diet, as you feel better and your symptoms lessen.  Follow-up care  Follow up with your healthcare provider, or as advised.  When to seek medical advice  Call your healthcare provider right away if any of these occur:    Constant right-sided lower belly pain or increasing general belly pain    Continued vomiting (unable to keep liquids down) for 24 hours    Vomiting blood or coffee grounds    Swollen belly    Frequent diarrhea (more than 5 times a day); blood (red or black color) or mucus in diarrhea    Reduced urine output or extreme thirst    Weakness, dizziness or fainting    Unusually drowsy or confused    Fever of 100.4 F (38 C) oral or higher, or as directed    Yellow color of the eyes or skin  Date Last Reviewed: 3/1/2018    1711-9158 [employer]. [address] [place] [address], [place] [address]. All rights reserved. This information is not intended as a substitute for professional medical care. Always follow your healthcare professional's instructions.

## 2019-01-09 NOTE — PROGRESS NOTES
SUBJECTIVE:  Jazlyn Bartlett is a 53 year old female who presents to the clinic today with a chief complaint of feeling unwell the last few days.  Variety of complaints including flank pain, nausea, vomiting, cough, elevated blood pressure, and feeling foggy.  Associated symptoms include dysuria, vomiting, loss of appetitie. The patient's symptoms are mild and do not seem to be exacerbated by particular triggers.  Blood sugar at 230 today.  Takes medicine at bedtime.  Pt denies chest pain, headache, fever, shortness of breath, abdominal pain.     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 6 tablet 0     budesonide (PULMICORT FLEXHALER) 180 MCG/ACT inhaler Inhale 1 puff into the lungs 2 times daily 3 Inhaler 3     Cholecalciferol (VITAMIN D) 2000 UNITS tablet Take 2,000 Units by mouth daily 100 tablet 3     glipiZIDE (GLUCOTROL XL) 10 MG 24 hr tablet Take 1 tablet (10 mg) by mouth daily 30 tablet 0     glipiZIDE (GLUCOTROL XL) 10 MG 24 hr tablet TAKE 1 TABLET (10 MG) BY MOUTH 2 TIMES DAILY (WITH MEALS) 180 tablet 0     hydrochlorothiazide (HYDRODIURIL) 25 MG tablet TAKE 1 TABLET (25 MG) BY MOUTH DAILY 90 tablet 0     ipratropium (ATROVENT)  0.06 % spray Spray 2 sprays into both nostrils 2 times daily as needed for rhinitis 1 Box 2     ipratropium - albuterol 0.5 mg/2.5 mg/3 mL (DUONEB) 0.5-2.5 (3) MG/3ML neb solution Take 1 vial (3 mLs) by nebulization every 6 hours as needed for shortness of breath / dyspnea or wheezing 90 vial 3     ketoconazole (NIZORAL) 2 % cream Apply topically 2 times daily as needed for itching Apply to AA on the face bid when needed 15 g 3     levothyroxine (SYNTHROID/LEVOTHROID) 150 MCG tablet TAKE 1 TABLET (150 MCG) BY MOUTH DAILY 90 tablet 1     losartan (COZAAR) 50 MG tablet TAKE 1 TABLET (50 MG) BY MOUTH DAILY 90 tablet 0     metroNIDAZOLE (METROGEL) 0.75 % vaginal gel Place 1 applicator (5 g) vaginally daily for 7 days 35 g 0     simvastatin (ZOCOR) 20 MG tablet TAKE 1 TABLET (20 MG) BY MOUTH AT BEDTIME 90 tablet 1     tiotropium (SPIRIVA) 18 MCG capsule Inhale 1 capsule (18 mcg) into the lungs daily 90 capsule 1       Social History     Tobacco Use     Smoking status: Current Every Day Smoker     Packs/day: 0.50     Types: Cigarettes     Smokeless tobacco: Never Used   Substance Use Topics     Alcohol use: No     Alcohol/week: 0.0 oz       ROS  Review of systems negative except as stated above.    OBJECTIVE:  /80 (BP Location: Right arm, Patient Position: Chair, Cuff Size: Adult Large)   Pulse 79   Temp 96.8  F (36  C) (Tympanic)   SpO2 95%   GENERAL APPEARANCE: healthy, alert and no distress  EYES: conjunctiva clear  RESP: lungs clear to auscultation  CV: regular rates and rhythm, normal S1 S2, no murmur noted  ABD: right sided tenderness without guarding  Back: no CVA tenderness  NEURO: Normal strength and tone, sensory exam grossly normal,  normal speech and mentation      Results for orders placed or performed in visit on 01/09/19   UA reflex to Microscopic and Culture   Result Value Ref Range    Color Urine Yellow     Appearance Urine Clear     Glucose Urine Negative NEG^Negative mg/dL    Bilirubin Urine  Negative NEG^Negative    Ketones Urine Trace (A) NEG^Negative mg/dL    Specific Gravity Urine >1.030 1.003 - 1.035    Blood Urine Small (A) NEG^Negative    pH Urine 5.5 5.0 - 7.0 pH    Protein Albumin Urine Negative NEG^Negative mg/dL    Urobilinogen Urine 0.2 0.2 - 1.0 EU/dL    Nitrite Urine Negative NEG^Negative    Leukocyte Esterase Urine Negative NEG^Negative    Source Midstream Urine    Urine Microscopic   Result Value Ref Range    WBC Urine 0 - 5 OTO5^0 - 5 /HPF    RBC Urine O - 2 OTO2^O - 2 /HPF    Squamous Epithelial /LPF Urine Few FEW^Few /LPF    Bacteria Urine Few (A) NEG^Negative /HPF   CBC with platelets   Result Value Ref Range    WBC 10.5 4.0 - 11.0 10e9/L    RBC Count 4.88 3.8 - 5.2 10e12/L    Hemoglobin 14.6 11.7 - 15.7 g/dL    Hematocrit 45.1 35.0 - 47.0 %    MCV 92 78 - 100 fl    MCH 29.9 26.5 - 33.0 pg    MCHC 32.4 31.5 - 36.5 g/dL    RDW 13.6 10.0 - 15.0 %    Platelet Count 332 150 - 450 10e9/L   Basic metabolic panel   Result Value Ref Range    Sodium 138 133 - 144 mmol/L    Potassium 3.7 3.4 - 5.3 mmol/L    Chloride 98 94 - 109 mmol/L    Carbon Dioxide 31 20 - 32 mmol/L    Anion Gap 9 3 - 14 mmol/L    Glucose 156 (H) 70 - 99 mg/dL    Urea Nitrogen 11 7 - 30 mg/dL    Creatinine 1.10 (H) 0.52 - 1.04 mg/dL    GFR Estimate 56 (L) >60 mL/min/[1.73_m2]    GFR Estimate If Black 65 >60 mL/min/[1.73_m2]    Calcium 9.7 8.5 - 10.1 mg/dL   Hemoglobin A1c   Result Value Ref Range    Hemoglobin A1C 7.8 (H) 0 - 5.6 %   Wet prep   Result Value Ref Range    Specimen Description Vagina     Wet Prep Clue cells seen (A)     Wet Prep No Trichomonas seen     Wet Prep No yeast seen      CXR:       ASSESSMENT:    (N76.0,  B96.89) BV (bacterial vaginosis)  (primary encounter diagnosis)  Plan: metroNIDAZOLE (METROGEL) 0.75 % vaginal gel      (R30.0) Dysuria  Plan: UA reflex to Microscopic and Culture, Wet prep,        Urine Microscopic, CBC with platelets, Basic         metabolic panel, Hemoglobin A1c, Urine Culture          Aerobic Bacterial      (J44.9) Chronic obstructive pulmonary disease, unspecified COPD type (H)  Plan: XR Chest 2 Views, azithromycin (ZITHROMAX) 250         MG tablet         (E11.9) Type 2 diabetes mellitus without complication, unspecified whether long term insulin use (H)  Comment: BS in 150s on presentation, does not take medication until evening, A1C in high 7s, mildly higher than previous  Plan: follow up with PCP regarding diabetes management      Patient Instructions   Follow up in primary clinic tomorrow    Low threshold for ER follow up tonight    Push fluids - room temperature, small amounts consistently      Patient Education     COPD Flare    You have had a flare-up of your COPD.  COPD, or chronic obstructive pulmonary disease, is a common lung disease. It causes your airways to become irritated and narrower. This makes it harder for you to breathe. Emphysema and chronic bronchitis are both types of COPD. This is a chronic condition, which means you always have it. Sometimes it gets worse. When this happens, it is called a flare-up.  Symptoms of COPD  People with COPD may have symptoms most of the time. In a flare-up, your symptoms get worse. These symptoms may mean you are having a flare-up:    Shortness of breath, shallow or rapid breathing, or wheezing that gets worse    Lung infection    Cough that gets worse    More mucus, thicker mucus or mucus of a different color    Tiredness, decreased energy, or trouble doing your usual activities    Fever    Chest tightness    Your symptoms don t get better even when you use your usual medicines, inhalers, and nebulizer    Trouble talking    You feel confused  Causes of flare-ups  Unfortunately, a flare-up can happen even though you did everything right, and you followed your doctor s instructions. Some causes of flare-ups are:    Smoking or secondhand smoke    Colds, the flu, or respiratory infections    Air pollution    Sudden change in the  weather    Dust, irritating chemicals, or strong fumes    Not taking your medicines as prescribed  Home care  Here are some things you can do at home to treat a flare-up:    Try not to panic. This makes it harder to breathe, and keeps you from doing the right things.    Don t smoke or be around others who are smoking.    Try to drink more fluids than usual during a flare-up, unless your doctor has told you not to because of heart and kidney problems. More fluids can help loosen the mucus.    Use your inhalers and nebulizer, if you have one, as you have been told to.    If you were given antibiotics, take them until they are used up or your doctor tells you to stop. It s important to finish the antibiotics, even though you feel better. This will make sure the infection has cleared.    If you were given prednisone or another steroid, finish it even if you feel better.  Preventing a flare-up  Even though flare-ups happen, the best way to treat one is to prevent it before it starts. Here are some pointers:    Don t smoke or be around others who are smoking.    Take your medicines as you have been told.    Talk with your doctor about getting a flu shot every year. Also find out if you need a pneumonia shot.    If there is a weather advisory warning to stay indoors, try to stay inside when possible.    Try to eat healthy and get plenty of sleep.    Try to avoid things that usually set you off, like dust, chemical fumes, hairsprays, or strong perfumes.  Follow-up care  Follow up with your healthcare provider, or as advised.  If a culture was done, you will be told if your treatment needs to be changed. You can call as directed for the results.  If X-rays were done, you will be notified of any new findings that may affect your care.  Call 911  Call 911 if any of these occur:    You have trouble breathing    You feel confused or it s difficult to wake you up    You faint or lose consciousness    You have a rapid heart  rate    You have new pain in your chest, arm, shoulder, neck or upper back  When to seek medical advice  Call your healthcare provider right away if any of these occur:    Wheezing or shortness of breath gets worse    You need to use your inhalers more often than usual without relief    Fever of 100.4 F (38 C) or higher, or as directed by your healthcare provider    Coughing up lots of dark-colored or bloody mucus (sputum)    Chest pain with each breath    You do not start to get better within 24 hours    Swelling of your ankles gets worse    Dizziness or weakness  Date Last Reviewed: 2016-2018 [employer]. [address] [place] [address]. All rights reserved. This information is not intended as a substitute for professional medical care. Always follow your healthcare professional's instructions.           Patient Education     Bacterial Vaginosis    You have a vaginal infection called bacterial vaginosis (BV). Both good and bad bacteria are present in a healthy vagina. BV occurs when these bacteria get out of balance. The number of bad bacteria increase. And the number of good bacteria decrease. Although BV is associated with sexual activity, it is not a sexually transmitted disease.  BV may or may not cause symptoms. If symptoms do occur, they can include:    Thin, gray, milky-white, or sometimes green discharge    Unpleasant odor or  fishy  smell    Itching, burning, or pain in or around the vagina  It is not known what causes BV, but certain factors can make the problem more likely. This can include:    Douching    Having sex with a new partner    Having sex with more than one partner  BV will sometimes go away on its own. But treatment is usually recommended. This is because untreated BV can increase the risk of more serious health problems such as:    Pelvic inflammatory disease (PID)     delivery (giving birth to a baby early if you re pregnant)    HIV and certain  "other sexually transmitted diseases (STDs)    Infection after surgery on the reproductive organs  Home care  General care    BV is most often treated with medicines called antibiotics. These may be given as pills or as a vaginal cream. If antibiotics are prescribed, be sure to use them exactly as directed. Also, be sure to complete all of the medicine, even if your symptoms go away.    Don't douche or having sex during treatment.    If you have sex with a female partner, ask your healthcare provider if she should also be treated.  Prevention    Don't douche.    Don't have sex. If you do have sex, then take steps to lower your risk:  ? Use condoms when having sex.  ? Limit the number of sexual partners you have.  Follow-up care  Follow up with your healthcare provider, or as advised.  When to seek medical advice  Call your healthcare provider right away if:    You have a fever of 100.4 F (38 C) or higher, or as directed by your provider.    Your symptoms worsen, or they don t go away within a few days of starting treatment.    You have new pain in the lower belly or pelvic region.    You have side effects that bother you or a reaction to the pills or cream you re prescribed.    You or any partners you have sex with have new symptoms, such as a rash, joint pain, or sores.  Date Last Reviewed: 10/1/2017    8183-9702 [employer]. [address] [place] [address], [place] [id number]. All rights reserved. This information is not intended as a substitute for professional medical care. Always follow your healthcare professional's instructions.           Patient Education     Vomiting (Adult)  Vomiting is a common symptom that may be due to different causes. These include gastroenteritis (\"stomach flu\"), food poisoning and gastritis. There are other more serious causes of vomiting which may be hard to diagnose early in the illness. Therefore, it is important to watch for the warning signs listed below.  The main danger " from repeated vomiting is dehydration. This is due to excess loss of water and minerals from the body. When this occurs, your body fluids must be replaced.  Home care    If symptoms are severe, rest at home for the next 24 hours.    Because your symptoms may be from an infection, wash your hands often and well. If soap and water are not available, use alcohol-based  to keep from spreading the infection to others.    Wash your hands for at least 20 seconds. Humming the happy birthday song twice while you wash is an easy way to make sure you've washed for 20 seconds.    Wash your hands after using the toilet, before and after preparing food, before eating food, after changing a diaper, cleaning a wound, caring for a sick person, and blowing your nose, coughing, or sneezing. You should also wash your hands after caring for someone who is sick, touching pet food, or treats, and touching an animal, or animal waste.    You may use acetaminophen or NSAID medicines like ibuprofen or naproxen to control fever, unless another medicine was prescribed. If you have chronic liver or kidney disease or ever had a stomach ulcer or gastrointestinal bleeding, talk with your doctor before using these medicines. Aspirin should never be used in anyone under 18 years of age who is ill with a fever. It may cause severe liver damage. Don't use NSAID medicines if you are already taking one for another condition (like arthritis) or are on aspirin (such as for heart disease, or after a stroke)    Don't use tobacco and or drink alcohol, which may worsen your symptoms.    If medicines for vomiting were prescribed, take as directed.    Once vomiting stops, then follow these guidelines:  During the first 12 to 24 hours follow the diet below:    Fruit juices. Apple, grape juice, clear fruit drinks, and electrolyte replacement drinks.    Beverages. Soft drinks without caffeine; mineral water (plain or flavored), decaffeinated tea and  coffee.    Soups. Clear broth and bouillon    Desserts. Plain gelatin, ice pops, and fruit juice bars. As you feel better, you may add 6 to 8 ounces of yogurt per day.  During the next 24 hours you may add the following to the above:    Hot cereal, plain toast, bread, rolls, crackers    Plain noodles, rice, mashed potatoes, chicken noodle or rice soup    Unsweetened canned fruit such as applesauce, bananas (avoid pineapple and citrus)    Limit caffeine and chocolate. No spices or seasonings except salt.  During the next 24 hours:  Gradually resume a normal diet, as you feel better and your symptoms lessen.  Follow-up care  Follow up with your healthcare provider, or as advised.  When to seek medical advice  Call your healthcare provider right away if any of these occur:    Constant right-sided lower belly pain or increasing general belly pain    Continued vomiting (unable to keep liquids down) for 24 hours    Vomiting blood or coffee grounds    Swollen belly    Frequent diarrhea (more than 5 times a day); blood (red or black color) or mucus in diarrhea    Reduced urine output or extreme thirst    Weakness, dizziness or fainting    Unusually drowsy or confused    Fever of 100.4 F (38 C) oral or higher, or as directed    Yellow color of the eyes or skin  Date Last Reviewed: 3/1/2018    5582-8804 [employer]. [address] [place] [id number]. All rights reserved. This information is not intended as a substitute for professional medical care. Always follow your healthcare professional's instructions.

## 2019-01-10 ENCOUNTER — OFFICE VISIT (OUTPATIENT)
Dept: FAMILY MEDICINE | Facility: CLINIC | Age: 54
End: 2019-01-10
Payer: COMMERCIAL

## 2019-01-10 ENCOUNTER — HOSPITAL ENCOUNTER (OUTPATIENT)
Dept: CT IMAGING | Facility: CLINIC | Age: 54
Discharge: HOME | End: 2019-01-10
Attending: INTERNAL MEDICINE | Admitting: INTERNAL MEDICINE
Payer: COMMERCIAL

## 2019-01-10 VITALS
WEIGHT: 227 LBS | HEIGHT: 61 IN | OXYGEN SATURATION: 96 % | BODY MASS INDEX: 42.86 KG/M2 | HEART RATE: 68 BPM | DIASTOLIC BLOOD PRESSURE: 84 MMHG | SYSTOLIC BLOOD PRESSURE: 140 MMHG | TEMPERATURE: 97.7 F | RESPIRATION RATE: 16 BRPM

## 2019-01-10 DIAGNOSIS — R50.9 FEVER, UNSPECIFIED FEVER CAUSE: ICD-10-CM

## 2019-01-10 DIAGNOSIS — R10.31 RLQ ABDOMINAL PAIN: Primary | ICD-10-CM

## 2019-01-10 DIAGNOSIS — R10.31 RLQ ABDOMINAL PAIN: ICD-10-CM

## 2019-01-10 LAB
ALBUMIN SERPL-MCNC: 3.9 G/DL (ref 3.4–5)
ALP SERPL-CCNC: 80 U/L (ref 40–150)
ALT SERPL W P-5'-P-CCNC: 32 U/L (ref 0–50)
ANION GAP SERPL CALCULATED.3IONS-SCNC: 6 MMOL/L (ref 3–14)
AST SERPL W P-5'-P-CCNC: 22 U/L (ref 0–45)
BACTERIA SPEC CULT: NORMAL
BILIRUB SERPL-MCNC: 0.9 MG/DL (ref 0.2–1.3)
BUN SERPL-MCNC: 11 MG/DL (ref 7–30)
CALCIUM SERPL-MCNC: 9.8 MG/DL (ref 8.5–10.1)
CHLORIDE SERPL-SCNC: 102 MMOL/L (ref 94–109)
CO2 SERPL-SCNC: 29 MMOL/L (ref 20–32)
CREAT SERPL-MCNC: 0.7 MG/DL (ref 0.52–1.04)
GFR SERPL CREATININE-BSD FRML MDRD: >90 ML/MIN/{1.73_M2}
GLUCOSE SERPL-MCNC: 151 MG/DL (ref 70–99)
POTASSIUM SERPL-SCNC: 3.9 MMOL/L (ref 3.4–5.3)
PROT SERPL-MCNC: 7.4 G/DL (ref 6.8–8.8)
SODIUM SERPL-SCNC: 137 MMOL/L (ref 133–144)
SPECIMEN SOURCE: NORMAL

## 2019-01-10 PROCEDURE — 99214 OFFICE O/P EST MOD 30 MIN: CPT | Performed by: INTERNAL MEDICINE

## 2019-01-10 PROCEDURE — 74177 CT ABD & PELVIS W/CONTRAST: CPT

## 2019-01-10 PROCEDURE — 25000125 ZZHC RX 250: Performed by: INTERNAL MEDICINE

## 2019-01-10 PROCEDURE — 36415 COLL VENOUS BLD VENIPUNCTURE: CPT | Performed by: INTERNAL MEDICINE

## 2019-01-10 PROCEDURE — 25000128 H RX IP 250 OP 636: Performed by: INTERNAL MEDICINE

## 2019-01-10 PROCEDURE — 80053 COMPREHEN METABOLIC PANEL: CPT | Performed by: INTERNAL MEDICINE

## 2019-01-10 RX ORDER — IOPAMIDOL 755 MG/ML
111 INJECTION, SOLUTION INTRAVASCULAR ONCE
Status: COMPLETED | OUTPATIENT
Start: 2019-01-10 | End: 2019-01-10

## 2019-01-10 RX ORDER — ONDANSETRON 4 MG/1
4 TABLET, FILM COATED ORAL EVERY 8 HOURS PRN
Qty: 20 TABLET | Refills: 1 | Status: SHIPPED | OUTPATIENT
Start: 2019-01-10 | End: 2019-05-03

## 2019-01-10 RX ADMIN — SODIUM CHLORIDE 72 ML: 9 INJECTION, SOLUTION INTRAVENOUS at 13:56

## 2019-01-10 RX ADMIN — IOPAMIDOL 111 ML: 755 INJECTION, SOLUTION INTRAVENOUS at 13:55

## 2019-01-10 ASSESSMENT — ANXIETY QUESTIONNAIRES
2. NOT BEING ABLE TO STOP OR CONTROL WORRYING: MORE THAN HALF THE DAYS
5. BEING SO RESTLESS THAT IT IS HARD TO SIT STILL: MORE THAN HALF THE DAYS
1. FEELING NERVOUS, ANXIOUS, OR ON EDGE: NEARLY EVERY DAY
6. BECOMING EASILY ANNOYED OR IRRITABLE: NEARLY EVERY DAY
GAD7 TOTAL SCORE: 17
IF YOU CHECKED OFF ANY PROBLEMS ON THIS QUESTIONNAIRE, HOW DIFFICULT HAVE THESE PROBLEMS MADE IT FOR YOU TO DO YOUR WORK, TAKE CARE OF THINGS AT HOME, OR GET ALONG WITH OTHER PEOPLE: SOMEWHAT DIFFICULT
3. WORRYING TOO MUCH ABOUT DIFFERENT THINGS: NEARLY EVERY DAY
7. FEELING AFRAID AS IF SOMETHING AWFUL MIGHT HAPPEN: SEVERAL DAYS

## 2019-01-10 ASSESSMENT — PATIENT HEALTH QUESTIONNAIRE - PHQ9
5. POOR APPETITE OR OVEREATING: NEARLY EVERY DAY
SUM OF ALL RESPONSES TO PHQ QUESTIONS 1-9: 12

## 2019-01-10 ASSESSMENT — MIFFLIN-ST. JEOR: SCORE: 1572.05

## 2019-01-10 NOTE — PATIENT INSTRUCTIONS
CT Abdomen/Pelvis at [facility]  3:00 check in at 2:30  Fast 2 hours prior - nothing to eat or drink  [address] [place] [address]

## 2019-01-10 NOTE — PROGRESS NOTES
"  SUBJECTIVE:   Jazlyn Bartlett is a 53 year old female who presents to clinic today for the following health issues:      ED/UC Followup:    Facility:  [facility]   Date of visit: 1/9/19  Reason for visit: Diabetes, Urinary Problem, BV  Current Status: still has abdominal pain      Alma is here for follow-up after an urgent care visit yesterday.  She reports about a week of nausea and vomiting as well as fevers with temperatures at home up to 102.  Yesterday at work she felt a little foggy and her vision was blurry, she checked her blood sugar and it was 230 so she went to urgent care.  She was also complaining of a cough, but it sounds like this is somewhat her baseline with her COPD.    Evaluation at the urgent care included CBC, BMP, A1c, urinalysis, chest x-ray, and wet prep.  Results were notable for creatinine of 1.1, up from 0.7 previously, BV on the wet prep.  Her urinalysis showed trace ketones and small blood and spec grav of > 1.030.  She was treated with metronidazole gel and azithromycin for presumed COPD exacerbation.  She is not really feeling any better today.    She reports some right-sided flank pain.  Mild dysuria, no gross hematuria.  As mentioned above she has had nausea and vomiting.  She has been constipated.  Hard to keep up with fluids, not eating or drinking much.  Also has crampy abdominal pain worst in the right lower quadrant, but all along the right side.          Reviewed and updated as needed this visit by clinical staff  Tobacco  Allergies  Meds  Soc Hx      Reviewed and updated as needed this visit by Provider         ROS:  Constitutional, HEENT, cardiovascular, pulmonary, gi and gu systems are negative, except as otherwise noted.    OBJECTIVE:     /84   Pulse 68   Temp 97.7  F (36.5  C) (Tympanic)   Resp 16   Ht 1.549 m (5' 1\")   Wt 103 kg (227 lb)   SpO2 96%   BMI 42.89 kg/m    Body mass index is 42.89 kg/m .    Gen: well appearing, pleasant woman, no " distress  HEENT: PERRL, sclera nonicteric, MMM  Neck: supple, no LAD  Pulm: breathing comfortably, CTAB, no wheezes or rales  CV: RRR, normal S1 and S2, no murmurs  Abd: BS present, soft, tender in RUQ and RLQ, + CVA tenderness.         ASSESSMENT/PLAN:     One week of fever, nausea, vomiting, cramping right sided abdominal pain and constipation.  UA and CBC were unremarkable yesterday. Concerned for gallbladder pathology vs diverticulitis vs pyelonephritis vs other pathology.  Will get her in for CT scan today.       ICD-10-CM    1. RLQ abdominal pain R10.31 Comprehensive metabolic panel (BMP + Alb, Alk Phos, ALT, AST, Total. Bili, TP)     CT Abdomen Pelvis w Contrast     ondansetron (ZOFRAN) 4 MG tablet   2. Fever, unspecified fever cause R50.9 Comprehensive metabolic panel (BMP + Alb, Alk Phos, ALT, AST, Total. Bili, TP)     CT Abdomen Pelvis w Contrast     F/U pending imaging results     Lulu Dasilva MD  [facility] [place]        ** addendum - CT is unremarkable. CMP is normal as well. Left message on her voicemail with results. Recommended rest, symptomatic care. Possibly prolonged viral illness? Give update if no better next week.      Lulu Dasilva MD   3:14 PM

## 2019-01-11 ASSESSMENT — ANXIETY QUESTIONNAIRES: GAD7 TOTAL SCORE: 17

## 2019-01-11 ASSESSMENT — ASTHMA QUESTIONNAIRES: ACT_TOTALSCORE: 12

## 2019-01-15 DIAGNOSIS — J44.9 CHRONIC OBSTRUCTIVE PULMONARY DISEASE, UNSPECIFIED COPD TYPE (H): ICD-10-CM

## 2019-01-16 RX ORDER — IPRATROPIUM BROMIDE 42 UG/1
2 SPRAY, METERED NASAL 2 TIMES DAILY PRN
Qty: 15 ML | Refills: 3 | Status: SHIPPED | OUTPATIENT
Start: 2019-01-16 | End: 2019-05-03

## 2019-01-16 NOTE — TELEPHONE ENCOUNTER
Routing refill request to provider for review/approval because:  Drug not on the [facility] refill protocol   Dx is COPD

## 2019-01-16 NOTE — TELEPHONE ENCOUNTER
Requested Prescriptions   Pending Prescriptions Disp Refills     ipratropium (ATROVENT) 0.06 % nasal spray [Pharmacy Med  Last Written Prescription Date:  11-7-2017  Last Fill Quantity: 1 box,  # refills: 2   Last office visit: 1/10/2019 with prescribing provider:     Future Office Visit:       IPRATROPIUM 0.06% SPRAY]  0     Sig: SPRAY 2 SPRAYS INTO BOTH NOSTRILS 2 TIMES DAILY AS NEEDED FOR RHINITIS    There is no refill protocol information for this order

## 2019-01-21 ENCOUNTER — OFFICE VISIT (OUTPATIENT)
Dept: FAMILY MEDICINE | Facility: CLINIC | Age: 54
End: 2019-01-21
Payer: COMMERCIAL

## 2019-01-21 VITALS
DIASTOLIC BLOOD PRESSURE: 80 MMHG | BODY MASS INDEX: 42.89 KG/M2 | SYSTOLIC BLOOD PRESSURE: 132 MMHG | TEMPERATURE: 97.8 F | OXYGEN SATURATION: 96 % | WEIGHT: 227 LBS | HEART RATE: 66 BPM

## 2019-01-21 DIAGNOSIS — J01.90 ACUTE SINUSITIS WITH SYMPTOMS > 10 DAYS: ICD-10-CM

## 2019-01-21 DIAGNOSIS — J44.1 COPD EXACERBATION (H): Primary | ICD-10-CM

## 2019-01-21 DIAGNOSIS — E11.9 TYPE 2 DIABETES MELLITUS WITHOUT COMPLICATION, UNSPECIFIED WHETHER LONG TERM INSULIN USE (H): ICD-10-CM

## 2019-01-21 PROCEDURE — 99214 OFFICE O/P EST MOD 30 MIN: CPT | Performed by: FAMILY MEDICINE

## 2019-01-21 RX ORDER — PREDNISONE 50 MG/1
50 TABLET ORAL DAILY
Qty: 5 TABLET | Refills: 0 | Status: SHIPPED | OUTPATIENT
Start: 2019-01-21 | End: 2019-05-03

## 2019-01-21 RX ORDER — AZITHROMYCIN 250 MG/1
TABLET, FILM COATED ORAL
Qty: 6 TABLET | Refills: 0 | Status: SHIPPED | OUTPATIENT
Start: 2019-01-21 | End: 2019-05-03

## 2019-01-21 ASSESSMENT — PATIENT HEALTH QUESTIONNAIRE - PHQ9
SUM OF ALL RESPONSES TO PHQ QUESTIONS 1-9: 17
5. POOR APPETITE OR OVEREATING: MORE THAN HALF THE DAYS

## 2019-01-21 ASSESSMENT — ANXIETY QUESTIONNAIRES
1. FEELING NERVOUS, ANXIOUS, OR ON EDGE: NEARLY EVERY DAY
GAD7 TOTAL SCORE: 13
7. FEELING AFRAID AS IF SOMETHING AWFUL MIGHT HAPPEN: NOT AT ALL
5. BEING SO RESTLESS THAT IT IS HARD TO SIT STILL: NEARLY EVERY DAY
2. NOT BEING ABLE TO STOP OR CONTROL WORRYING: MORE THAN HALF THE DAYS
IF YOU CHECKED OFF ANY PROBLEMS ON THIS QUESTIONNAIRE, HOW DIFFICULT HAVE THESE PROBLEMS MADE IT FOR YOU TO DO YOUR WORK, TAKE CARE OF THINGS AT HOME, OR GET ALONG WITH OTHER PEOPLE: EXTREMELY DIFFICULT
3. WORRYING TOO MUCH ABOUT DIFFERENT THINGS: MORE THAN HALF THE DAYS
6. BECOMING EASILY ANNOYED OR IRRITABLE: SEVERAL DAYS

## 2019-01-21 NOTE — PROGRESS NOTES
SUBJECTIVE:   Jazlyn Bartlett is a 53 year old female who presents to clinic today for the following health issues:      Acute Illness   Acute illness concerns: cough   Onset: since Friday    Fever: YES    Chills/Sweats: YES    Headache (location?): YES    Sinus Pressure:YES    Conjunctivitis:  no    Ear Pain: no    Rhinorrhea: YES    Congestion: YES    Sore Throat: no     Cough: YES    Wheeze: YES    Decreased Appetite: no    Nausea: YES    Vomiting: YES    Diarrhea:  YES    Dysuria/Freq.: YES    Fatigue/Achiness: YES    Sick/Strep Exposure: no     Therapies Tried and outcome: nyquil and dayquil mucinex inhaler and neb      Patient has COPD.  She reports that she is using all her medications as they are prescribed.  She continues to have a lot of shortness of breath.    Problem list and histories reviewed & adjusted, as indicated.  Additional history: as documented    Patient Active Problem List   Diagnosis     Neck mass     Moderate major depression (H)     Anxiety     Vitamin D deficiency disease     CARDIOVASCULAR SCREENING; LDL GOAL LESS THAN 100     GERD (gastroesophageal reflux disease)     Hypertension goal BP (blood pressure) < 140/80     Type 2 diabetes mellitus without complication (H)     Chronic airway obstruction (H)     Advanced directives, counseling/discussion     Autoimmune gastritis- repeat UGI 1/16 with gastritis without eosinophils     Urinary incontinence     Osteoarthritis of patellofemoral joint     Morbid obesity (H)     Tobacco abuse     Lumbar radiculopathy     Hyperlipidemia LDL goal <100     Hypothyroidism, unspecified type     Chronic obstructive pulmonary disease, unspecified COPD type (H)     Past Surgical History:   Procedure Laterality Date     ABLATION, ENDOMETRIAL, THERMAL, W/O HYSTEROSCOPIC GUIDANCE       DILATION AND CURETTAGE, HYSTEROSCOPY, ABLATE ENDOMETRIUM NOVASURE, COMBINED  10/11/2012    Procedure: COMBINED DILATION AND CURETTAGE, HYSTEROSCOPY, ABLATE ENDOMETRIUM  NOVASURE;  COMBINED DILATION AND CURETTAGE, HYSTEROSCOPY, ABLATE ENDOMETRIUM ,pelvic exam under anesthesia;  Surgeon: Shruti Barrios MD;  Location: [facility] OR     ESOPHAGOSCOPY, GASTROSCOPY, DUODENOSCOPY (EGD), COMBINED N/A 1/19/2016    Procedure: COMBINED ESOPHAGOSCOPY, GASTROSCOPY, DUODENOSCOPY (EGD), BIOPSY SINGLE OR MULTIPLE;  Surgeon: Kristofer Molina MD;  Location: [facility]     Removal of cancerous lump on neck       TONSILLECTOMY         Social History     Tobacco Use     Smoking status: Current Every Day Smoker     Packs/day: 0.50     Types: Cigarettes     Smokeless tobacco: Never Used   Substance Use Topics     Alcohol use: No     Alcohol/week: 0.0 oz     Family History   Problem Relation Age of Onset     Diabetes Mother      Breast Cancer Mother      Diabetes Father      Lung Cancer Father      Rheumatoid Arthritis Father          Current Outpatient Medications   Medication Sig Dispense Refill     albuterol (PROAIR HFA/PROVENTIL HFA/VENTOLIN HFA) 108 (90 Base) MCG/ACT Inhaler Inhale 2 puffs into the lungs every 6 hours as needed for shortness of breath / dyspnea 3 Inhaler 1     aspirin 81 MG EC tablet Take 1 tablet (81 mg) by mouth daily 100 tablet 3     azithromycin (ZITHROMAX) 250 MG tablet Two tablets first day, then one tablet daily for four days. 6 tablet 0     blood glucose (CONTOUR NEXT TEST) test strip Use to test blood sugar 1-2 times daily or as directed.  Ok to substitute alternative if insurance prefers. 100 strip 3     blood glucose monitoring (NIURKA MICROLET) lancets Use to test blood sugar 1-2 times daily or as directed.  Ok to substitute alternative if insurance prefers. 100 each 3     blood glucose monitoring (CONTOUR NEXT MONITOR W/DEVICE KIT) meter device kit Use to test blood sugar 1-2 times daily or as directed.  Ok to substitute alternative if insurance prefers. 1 kit 0     budesonide (PULMICORT FLEXHALER) 180 MCG/ACT inhaler Inhale 1 puff into the lungs 2 times daily 3 Inhaler 3      Cholecalciferol (VITAMIN D) 2000 UNITS tablet Take 2,000 Units by mouth daily 100 tablet 3     glipiZIDE (GLUCOTROL XL) 10 MG 24 hr tablet Take 1 tablet (10 mg) by mouth daily 30 tablet 0     hydrochlorothiazide (HYDRODIURIL) 25 MG tablet TAKE 1 TABLET (25 MG) BY MOUTH DAILY 90 tablet 0     ipratropium (ATROVENT) 0.06 % nasal spray SPRAY 2 SPRAYS INTO BOTH NOSTRILS 2 TIMES DAILY AS NEEDED FOR RHINITIS 15 mL 3     ipratropium - albuterol 0.5 mg/2.5 mg/3 mL (DUONEB) 0.5-2.5 (3) MG/3ML neb solution Take 1 vial (3 mLs) by nebulization every 6 hours as needed for shortness of breath / dyspnea or wheezing 90 vial 3     ketoconazole (NIZORAL) 2 % cream Apply topically 2 times daily as needed for itching Apply to AA on the face bid when needed 15 g 3     levothyroxine (SYNTHROID/LEVOTHROID) 150 MCG tablet TAKE 1 TABLET (150 MCG) BY MOUTH DAILY 90 tablet 1     losartan (COZAAR) 50 MG tablet TAKE 1 TABLET (50 MG) BY MOUTH DAILY 90 tablet 0     predniSONE (DELTASONE) 50 MG tablet Take 50 mg by mouth daily for 5 days. 5 tablet 0     simvastatin (ZOCOR) 20 MG tablet TAKE 1 TABLET (20 MG) BY MOUTH AT BEDTIME 90 tablet 1     tiotropium (SPIRIVA) 18 MCG capsule Inhale 1 capsule (18 mcg) into the lungs daily 90 capsule 1     Allergies   Allergen Reactions     Ibuprofen Sodium Nausea and Vomiting     Vicodin [Hydrocodone-Acetaminophen] Nausea and Vomiting       Reviewed and updated as needed this visit by clinical staff       Reviewed and updated as needed this visit by Provider         ROS:  INTEGUMENTARY/SKIN: NEGATIVE for worrisome rashes, moles or lesions  GI: NEGATIVE for nausea, abdominal pain, heartburn, or change in bowel habits    OBJECTIVE:                                                    /80   Pulse 66   Temp 97.8  F (36.6  C) (Tympanic)   Wt 103 kg (227 lb)   SpO2 96%   BMI 42.89 kg/m    Body mass index is 42.89 kg/m .   GENERAL: healthy, alert, well nourished, well hydrated, no distress  HENT: ear canals-  normal; TMs- normal; Nose- normal; maxillary sinus tenderness noted on exam.  Mouth- no ulcers, no lesions  NECK: no tenderness, no adenopathy, no asymmetry, no masses, no stiffness; thyroid- normal to palpation  RESP: Bilateral wheezes noted  CV: regular rates and rhythm, normal S1 S2, no S3 or S4 and no murmur, no click or rub -  ABDOMEN: soft, no tenderness, no  hepatosplenomegaly, no masses, normal bowel sounds         ASSESSMENT/PLAN:                                                        ICD-10-CM    1. COPD exacerbation (H) J44.1 predniSONE (DELTASONE) 50 MG tablet   2. Acute sinusitis with symptoms > 10 days J01.90 azithromycin (ZITHROMAX) 250 MG tablet   3. Type 2 diabetes mellitus without complication, unspecified whether long term insulin use (H) E11.9      Patient appears to have developed COPD exacerbation due to acute sinus infection.  Recommended to start prednisone to help with the COPD exacerbation.  Stay well-hydrated.  Starting patient on azithromycin for sinus infection.  Resume all COPD medications as before.  If in the next few days symptoms are not improving or if any worsening noted, instructed to notify us back.      Lokesh Casanova MD  [facility] [place]

## 2019-01-22 ASSESSMENT — ANXIETY QUESTIONNAIRES: GAD7 TOTAL SCORE: 13

## 2019-01-25 ENCOUNTER — OFFICE VISIT (OUTPATIENT)
Dept: FAMILY MEDICINE | Facility: CLINIC | Age: 54
End: 2019-01-25
Payer: COMMERCIAL

## 2019-01-25 VITALS
BODY MASS INDEX: 42.86 KG/M2 | TEMPERATURE: 98.4 F | WEIGHT: 227 LBS | SYSTOLIC BLOOD PRESSURE: 132 MMHG | DIASTOLIC BLOOD PRESSURE: 80 MMHG | OXYGEN SATURATION: 96 % | HEIGHT: 61 IN | HEART RATE: 87 BPM

## 2019-01-25 DIAGNOSIS — J44.1 COPD EXACERBATION (H): Primary | ICD-10-CM

## 2019-01-25 DIAGNOSIS — Z02.9 ADMINISTRATIVE ENCOUNTER: ICD-10-CM

## 2019-01-25 PROCEDURE — 99213 OFFICE O/P EST LOW 20 MIN: CPT | Performed by: FAMILY MEDICINE

## 2019-01-25 ASSESSMENT — MIFFLIN-ST. JEOR: SCORE: 1572.05

## 2019-01-25 NOTE — PROGRESS NOTES
SUBJECTIVE:   Jazlyn Bartlett is a 53 year old female who presents to clinic today for the following health issues:    1. Needs papers to be able to return to work  COPD exacerbation follow-up-   She is feeling much better  Onset: ongoing    Fever: no    Chills/Sweats: no    Headache (location?): no    Sinus Pressure:no    Conjunctivitis:  no    Ear Pain: no    Rhinorrhea: no    Congestion: YES    Sore Throat: no     Cough: YES    Wheeze: no    Decreased Appetite: no    Nausea: no    Vomiting: no    Diarrhea:  no    Dysuria/Freq.: no    Fatigue/Achiness: no    Sick/Strep Exposure: no     Would like to return back to work on Monday.          Problem list and histories reviewed & adjusted, as indicated.  Additional history: as documented    Patient Active Problem List   Diagnosis     Neck mass     Moderate major depression (H)     Anxiety     Vitamin D deficiency disease     CARDIOVASCULAR SCREENING; LDL GOAL LESS THAN 100     GERD (gastroesophageal reflux disease)     Hypertension goal BP (blood pressure) < 140/80     Type 2 diabetes mellitus without complication (H)     Chronic airway obstruction (H)     Advanced directives, counseling/discussion     Autoimmune gastritis- repeat UGI 1/16 with gastritis without eosinophils     Urinary incontinence     Osteoarthritis of patellofemoral joint     Morbid obesity (H)     Tobacco abuse     Lumbar radiculopathy     Hyperlipidemia LDL goal <100     Hypothyroidism, unspecified type     Chronic obstructive pulmonary disease, unspecified COPD type (H)     Past Surgical History:   Procedure Laterality Date     ABLATION, ENDOMETRIAL, THERMAL, W/O HYSTEROSCOPIC GUIDANCE       DILATION AND CURETTAGE, HYSTEROSCOPY, ABLATE ENDOMETRIUM NOVASURE, COMBINED  10/11/2012    Procedure: COMBINED DILATION AND CURETTAGE, HYSTEROSCOPY, ABLATE ENDOMETRIUM NOVASURE;  COMBINED DILATION AND CURETTAGE, HYSTEROSCOPY, ABLATE ENDOMETRIUM ,pelvic exam under anesthesia;  Surgeon: Shruti Barrios MD;   Location: [facility]     ESOPHAGOSCOPY, GASTROSCOPY, DUODENOSCOPY (EGD), COMBINED N/A 1/19/2016    Procedure: COMBINED ESOPHAGOSCOPY, GASTROSCOPY, DUODENOSCOPY (EGD), BIOPSY SINGLE OR MULTIPLE;  Surgeon: Kristofer Molina MD;  Location: [facility]     Removal of cancerous lump on neck       TONSILLECTOMY         Social History     Tobacco Use     Smoking status: Current Every Day Smoker     Packs/day: 0.50     Types: Cigarettes     Smokeless tobacco: Never Used   Substance Use Topics     Alcohol use: No     Alcohol/week: 0.0 oz     Family History   Problem Relation Age of Onset     Diabetes Mother      Breast Cancer Mother      Diabetes Father      Lung Cancer Father      Rheumatoid Arthritis Father          Current Outpatient Medications   Medication Sig Dispense Refill     albuterol (PROAIR HFA/PROVENTIL HFA/VENTOLIN HFA) 108 (90 Base) MCG/ACT Inhaler Inhale 2 puffs into the lungs every 6 hours as needed for shortness of breath / dyspnea 3 Inhaler 1     aspirin 81 MG EC tablet Take 1 tablet (81 mg) by mouth daily 100 tablet 3     azithromycin (ZITHROMAX) 250 MG tablet Two tablets first day, then one tablet daily for four days. 6 tablet 0     blood glucose (CONTOUR NEXT TEST) test strip Use to test blood sugar 1-2 times daily or as directed.  Ok to substitute alternative if insurance prefers. 100 strip 3     blood glucose monitoring (CoreTrace MICROLET) lancets Use to test blood sugar 1-2 times daily or as directed.  Ok to substitute alternative if insurance prefers. 100 each 3     blood glucose monitoring (CONTOUR NEXT MONITOR W/DEVICE KIT) meter device kit Use to test blood sugar 1-2 times daily or as directed.  Ok to substitute alternative if insurance prefers. 1 kit 0     budesonide (PULMICORT FLEXHALER) 180 MCG/ACT inhaler Inhale 1 puff into the lungs 2 times daily 3 Inhaler 3     Cholecalciferol (VITAMIN D) 2000 UNITS tablet Take 2,000 Units by mouth daily 100 tablet 3     glipiZIDE (GLUCOTROL XL) 10 MG 24 hr tablet Take  "1 tablet (10 mg) by mouth daily 30 tablet 0     hydrochlorothiazide (HYDRODIURIL) 25 MG tablet TAKE 1 TABLET (25 MG) BY MOUTH DAILY 90 tablet 0     ipratropium (ATROVENT) 0.06 % nasal spray SPRAY 2 SPRAYS INTO BOTH NOSTRILS 2 TIMES DAILY AS NEEDED FOR RHINITIS 15 mL 3     ipratropium - albuterol 0.5 mg/2.5 mg/3 mL (DUONEB) 0.5-2.5 (3) MG/3ML neb solution Take 1 vial (3 mLs) by nebulization every 6 hours as needed for shortness of breath / dyspnea or wheezing 90 vial 3     ketoconazole (NIZORAL) 2 % cream Apply topically 2 times daily as needed for itching Apply to AA on the face bid when needed 15 g 3     levothyroxine (SYNTHROID/LEVOTHROID) 150 MCG tablet TAKE 1 TABLET (150 MCG) BY MOUTH DAILY 90 tablet 1     losartan (COZAAR) 50 MG tablet TAKE 1 TABLET (50 MG) BY MOUTH DAILY 90 tablet 0     predniSONE (DELTASONE) 50 MG tablet Take 50 mg by mouth daily for 5 days. 5 tablet 0     simvastatin (ZOCOR) 20 MG tablet TAKE 1 TABLET (20 MG) BY MOUTH AT BEDTIME 90 tablet 1     tiotropium (SPIRIVA) 18 MCG capsule Inhale 1 capsule (18 mcg) into the lungs daily 90 capsule 1     Allergies   Allergen Reactions     Ibuprofen Sodium Nausea and Vomiting     Vicodin [Hydrocodone-Acetaminophen] Nausea and Vomiting       Reviewed and updated as needed this visit by clinical staff       Reviewed and updated as needed this visit by Provider         ROS:  CONSTITUTIONAL: NEGATIVE for fever, chills, change in weight  ENT/MOUTH: NEGATIVE for ear, mouth and throat problems  RESP: NEGATIVE for significant cough or SOB  CV: NEGATIVE for chest pain, palpitations or peripheral edema    OBJECTIVE:                                                    /80   Pulse 87   Temp 98.4  F (36.9  C) (Tympanic)   Ht 1.549 m (5' 1\")   Wt 103 kg (227 lb)   SpO2 96%   BMI 42.89 kg/m    Body mass index is 42.89 kg/m .   GENERAL: healthy, alert, well nourished, well hydrated, no distress  HENT: ear canals- normal; TMs- normal; Nose- normal; Mouth- no " ulcers, no lesions  NECK: no tenderness, no adenopathy, no asymmetry, no masses, no stiffness; thyroid- normal to palpation  RESP: lungs clear to auscultation - no rales, no rhonchi, no wheezes  CV: regular rates and rhythm, normal S1 S2, no S3 or S4 and no murmur, no click or rub -         ASSESSMENT/PLAN:                                                        ICD-10-CM    1. COPD exacerbation (H) J44.1    2. Administrative encounter Z02.9      COPD exacerbation is resolved.  Patient is feeling much better today.  Vitals are stable.  Recommending to resume COPD medications as before.  Stay well-hydrated.  [place] for [place] absence from January 10th to 25th filled.  Return date is January 28th, 2019      Lokesh Casanova MD  [facility] [place]

## 2019-01-29 ENCOUNTER — TELEPHONE (OUTPATIENT)
Dept: FAMILY MEDICINE | Facility: CLINIC | Age: 54
End: 2019-01-29

## 2019-01-29 NOTE — TELEPHONE ENCOUNTER
Disability form has been placed in Dr. Casanova's in basket for completion.      .Gina LEWIS    [facility]

## 2019-03-06 DIAGNOSIS — E11.9 TYPE 2 DIABETES MELLITUS WITHOUT COMPLICATION, WITHOUT LONG-TERM CURRENT USE OF INSULIN (H): ICD-10-CM

## 2019-03-06 DIAGNOSIS — I10 HYPERTENSION GOAL BP (BLOOD PRESSURE) < 140/80: ICD-10-CM

## 2019-03-07 RX ORDER — GLIPIZIDE 10 MG/1
TABLET, FILM COATED, EXTENDED RELEASE ORAL
Qty: 90 TABLET | Refills: 0 | Status: SHIPPED | OUTPATIENT
Start: 2019-03-07 | End: 2019-05-03

## 2019-03-07 RX ORDER — LOSARTAN POTASSIUM 50 MG/1
TABLET ORAL
Qty: 90 TABLET | Refills: 0 | Status: SHIPPED | OUTPATIENT
Start: 2019-03-07 | End: 2019-05-03

## 2019-03-07 NOTE — TELEPHONE ENCOUNTER
Requested Prescriptions   Pending Prescriptions Disp Refills     glipiZIDE (GLUCOTROL XL) 10 MG 24 hr tablet [Pharmacy Med Name: GLIPIZIDE ER 10 MG TABLET]  Last Written Prescription Date:  12-5-2018  Last Fill Quantity: 30 tablet,  # refills: 0   Last office visit: 1/25/2019 with prescribing provider:     Future Office Visit:   Next 5 appointments (look out 90 days)    Apr 15, 2019  8:20 AM CDT  PHYSICAL with Lokesh Casanova MD  [facility] [place] ([address] [address]  [phone]          30 tablet 0     Sig: TAKE 1 TABLET BY MOUTH EVERY DAY    Sulfonylurea Agents Passed - 3/6/2019  7:11 PM       Passed - Blood pressure less than 140/90 in past 6 months    BP Readings from Last 3 Encounters:   01/25/19 132/80   01/21/19 132/80   01/10/19 140/84                Passed - Patient has documented LDL within the past 12 mos.    Recent Labs   Lab Test 06/19/18  1634   LDL 61            Passed - Patient has had a Microalbumin in the past 12 mos.    Recent Labs   Lab Test 06/19/18  1654   MICROL 16   UMALCR 6.22            Passed - Patient has documented A1c within the specified period of time.    If HgbA1C is 8 or greater, it needs to be on file within the past 3 months.  If less than 8, must be on file within the past 6 months.     Recent Labs   Lab Test 01/09/19  1505   A1C 7.8*            Passed - Medication is active on med list       Passed - Patient is age 18 or older       Passed - No active pregnancy on record       Passed - Patient has a recent creatinine (normal) within the past 12 mos.    Recent Labs   Lab Test 01/10/19  0925   CR 0.70            Passed - Patient has not had a positive pregnancy test within the past 12 mos.       Passed - Recent (6 mo) or future (30 days) visit within the authorizing provider's specialty    Patient had office visit in the last 6 months or has a visit in the next 30 days with authorizing provider or within  "the authorizing provider's specialty.  See \"Patient Info\" tab in inbasket, or \"Choose Columns\" in Meds & Orders section of the refill encounter.                    losartan (COZAAR) 50 MG tablet [Pharmacy Med Name: LOSARTAN POTASSIUM 50 MG TAB]  Last Written Prescription Date:  12-5-2018  Last Fill Quantity: 90 tablet,  # refills: 0   Last office visit: 1/25/2019 with prescribing provider:     Future Office Visit:   Next 5 appointments (look out 90 days)    Apr 15, 2019  8:20 AM CDT  PHYSICAL with Lokesh Casanova MD  [facility] ([address] [address] [phone]          90 tablet 0     Sig: TAKE 1 TABLET (50 MG) BY MOUTH DAILY    Angiotensin-II Receptors Passed - 3/6/2019  7:11 PM       Passed - Blood pressure under 140/90 in past 12 months    BP Readings from Last 3 Encounters:   01/25/19 132/80   01/21/19 132/80   01/10/19 140/84                Passed - Recent (12 mo) or future (30 days) visit within the authorizing provider's specialty    Patient had office visit in the last 12 months or has a visit in the next 30 days with authorizing provider or within the authorizing provider's specialty.  See \"Patient Info\" tab in inbasket, or \"Choose Columns\" in Meds & Orders section of the refill encounter.             Passed - Medication is active on med list       Passed - Patient is age 18 or older       Passed - No active pregnancy on record       Passed - Normal serum creatinine on file in past 12 months    Recent Labs   Lab Test 01/10/19  0925   CR 0.70            Passed - Normal serum potassium on file in past 12 months    Recent Labs   Lab Test 01/10/19  0925   POTASSIUM 3.9                   Passed - No positive pregnancy test in past 12 months          "

## 2019-03-07 NOTE — TELEPHONE ENCOUNTER
Prescription approved per [facility] Refill Protocol.  Ana Cortes RN

## 2019-03-08 DIAGNOSIS — I10 HYPERTENSION GOAL BP (BLOOD PRESSURE) < 140/80: ICD-10-CM

## 2019-03-08 RX ORDER — HYDROCHLOROTHIAZIDE 25 MG/1
TABLET ORAL
Qty: 90 TABLET | Refills: 0 | Status: SHIPPED | OUTPATIENT
Start: 2019-03-08 | End: 2019-05-03

## 2019-03-08 NOTE — TELEPHONE ENCOUNTER
"Requested Prescriptions   Pending Prescriptions Disp Refills     hydrochlorothiazide (HYDRODIURIL) 25 MG tablet [Pharmacy Med Name: HYDROCHLOROTHIAZIDE 25 MG TAB]  Last Written Prescription Date:  12/5/18  Last Fill Quantity: 90,  # refills: 0   Last office visit: 1/25/2019 with prescribing provider:  Edilberto   Future Office Visit:   Next 5 appointments (look out 90 days)    Apr 15, 2019  8:20 AM CDT  PHYSICAL with Lokesh Casanova MD  [facility] [place] ([address] [id number] [phone]          90 tablet 0     Sig: TAKE 1 TABLET (25 MG) BY MOUTH DAILY    Diuretics (Including Combos) Protocol Passed - 3/8/2019  1:29 AM       Passed - Blood pressure under 140/90 in past 12 months    BP Readings from Last 3 Encounters:   01/25/19 132/80   01/21/19 132/80   01/10/19 140/84                Passed - Recent (12 mo) or future (30 days) visit within the authorizing provider's specialty    Patient had office visit in the last 12 months or has a visit in the next 30 days with authorizing provider or within the authorizing provider's specialty.  See \"Patient Info\" tab in inbasket, or \"Choose Columns\" in Meds & Orders section of the refill encounter.             Passed - Medication is active on med list       Passed - Patient is age 18 or older       Passed - No active pregancy on record       Passed - Normal serum creatinine on file in past 12 months    Recent Labs   Lab Test 01/10/19  0925   CR 0.70             Passed - Normal serum potassium on file in past 12 months    Recent Labs   Lab Test 01/10/19  0925   POTASSIUM 3.9                   Passed - Normal serum sodium on file in past 12 months    Recent Labs   Lab Test 01/10/19  0925                Passed - No positive pregnancy test in past 12 months          "

## 2019-03-08 NOTE — TELEPHONE ENCOUNTER
Prescription approved per [facility] Refill Protocol.    Gosia BOTELLO RN  EP Triage

## 2019-05-03 ENCOUNTER — TRANSFERRED RECORDS (OUTPATIENT)
Dept: HEALTH INFORMATION MANAGEMENT | Facility: CLINIC | Age: 54
End: 2019-05-03

## 2019-05-03 ENCOUNTER — OFFICE VISIT (OUTPATIENT)
Dept: FAMILY MEDICINE | Facility: CLINIC | Age: 54
End: 2019-05-03
Payer: COMMERCIAL

## 2019-05-03 VITALS
HEART RATE: 80 BPM | TEMPERATURE: 97.4 F | SYSTOLIC BLOOD PRESSURE: 123 MMHG | BODY MASS INDEX: 42.89 KG/M2 | WEIGHT: 227 LBS | DIASTOLIC BLOOD PRESSURE: 81 MMHG | OXYGEN SATURATION: 100 %

## 2019-05-03 DIAGNOSIS — Z00.00 ANNUAL PHYSICAL EXAM: Primary | ICD-10-CM

## 2019-05-03 DIAGNOSIS — Z12.31 ENCOUNTER FOR SCREENING MAMMOGRAM FOR BREAST CANCER: ICD-10-CM

## 2019-05-03 DIAGNOSIS — Z11.4 SCREENING FOR HIV (HUMAN IMMUNODEFICIENCY VIRUS): ICD-10-CM

## 2019-05-03 DIAGNOSIS — F32.1 MODERATE MAJOR DEPRESSION (H): ICD-10-CM

## 2019-05-03 DIAGNOSIS — Z01.84 IMMUNITY STATUS TESTING: ICD-10-CM

## 2019-05-03 DIAGNOSIS — F41.9 ANXIETY: ICD-10-CM

## 2019-05-03 DIAGNOSIS — J44.9 CHRONIC OBSTRUCTIVE PULMONARY DISEASE, UNSPECIFIED COPD TYPE (H): ICD-10-CM

## 2019-05-03 DIAGNOSIS — I10 HYPERTENSION GOAL BP (BLOOD PRESSURE) < 140/80: ICD-10-CM

## 2019-05-03 DIAGNOSIS — E03.9 HYPOTHYROIDISM, UNSPECIFIED TYPE: ICD-10-CM

## 2019-05-03 DIAGNOSIS — E11.9 TYPE 2 DIABETES MELLITUS WITHOUT COMPLICATION, WITHOUT LONG-TERM CURRENT USE OF INSULIN (H): ICD-10-CM

## 2019-05-03 DIAGNOSIS — E78.5 HYPERLIPIDEMIA LDL GOAL <100: ICD-10-CM

## 2019-05-03 LAB
ALBUMIN SERPL-MCNC: 3.9 G/DL (ref 3.4–5)
ALP SERPL-CCNC: 96 U/L (ref 40–150)
ALT SERPL W P-5'-P-CCNC: 29 U/L (ref 0–50)
ANION GAP SERPL CALCULATED.3IONS-SCNC: 7 MMOL/L (ref 3–14)
AST SERPL W P-5'-P-CCNC: 25 U/L (ref 0–45)
BILIRUB SERPL-MCNC: 1 MG/DL (ref 0.2–1.3)
BUN SERPL-MCNC: 11 MG/DL (ref 7–30)
CALCIUM SERPL-MCNC: 9.7 MG/DL (ref 8.5–10.1)
CHLORIDE SERPL-SCNC: 105 MMOL/L (ref 94–109)
CHOLEST SERPL-MCNC: 126 MG/DL
CO2 SERPL-SCNC: 28 MMOL/L (ref 20–32)
CREAT SERPL-MCNC: 0.62 MG/DL (ref 0.52–1.04)
CREAT UR-MCNC: 210 MG/DL
GFR SERPL CREATININE-BSD FRML MDRD: >90 ML/MIN/{1.73_M2}
GLUCOSE SERPL-MCNC: 123 MG/DL (ref 70–99)
HBA1C MFR BLD: 7.8 % (ref 0–5.6)
HDLC SERPL-MCNC: 49 MG/DL
HGB BLD-MCNC: 15.7 G/DL (ref 11.7–15.7)
LDLC SERPL CALC-MCNC: 40 MG/DL
MICROALBUMIN UR-MCNC: 11 MG/L
MICROALBUMIN/CREAT UR: 5.29 MG/G CR (ref 0–25)
NONHDLC SERPL-MCNC: 77 MG/DL
POTASSIUM SERPL-SCNC: 3.5 MMOL/L (ref 3.4–5.3)
PROT SERPL-MCNC: 7.7 G/DL (ref 6.8–8.8)
SODIUM SERPL-SCNC: 140 MMOL/L (ref 133–144)
TRIGL SERPL-MCNC: 184 MG/DL
TSH SERPL DL<=0.005 MIU/L-ACNC: 1.11 MU/L (ref 0.4–4)

## 2019-05-03 PROCEDURE — 84443 ASSAY THYROID STIM HORMONE: CPT | Performed by: FAMILY MEDICINE

## 2019-05-03 PROCEDURE — 85018 HEMOGLOBIN: CPT | Performed by: FAMILY MEDICINE

## 2019-05-03 PROCEDURE — 99207 C FOOT EXAM  NO CHARGE: CPT | Mod: 25 | Performed by: FAMILY MEDICINE

## 2019-05-03 PROCEDURE — 83036 HEMOGLOBIN GLYCOSYLATED A1C: CPT | Performed by: FAMILY MEDICINE

## 2019-05-03 PROCEDURE — 87389 HIV-1 AG W/HIV-1&-2 AB AG IA: CPT | Performed by: FAMILY MEDICINE

## 2019-05-03 PROCEDURE — 86765 RUBEOLA ANTIBODY: CPT | Performed by: FAMILY MEDICINE

## 2019-05-03 PROCEDURE — 36415 COLL VENOUS BLD VENIPUNCTURE: CPT | Performed by: FAMILY MEDICINE

## 2019-05-03 PROCEDURE — 80061 LIPID PANEL: CPT | Performed by: FAMILY MEDICINE

## 2019-05-03 PROCEDURE — 99396 PREV VISIT EST AGE 40-64: CPT | Performed by: FAMILY MEDICINE

## 2019-05-03 PROCEDURE — 80053 COMPREHEN METABOLIC PANEL: CPT | Performed by: FAMILY MEDICINE

## 2019-05-03 PROCEDURE — 99214 OFFICE O/P EST MOD 30 MIN: CPT | Mod: 25 | Performed by: FAMILY MEDICINE

## 2019-05-03 PROCEDURE — 82043 UR ALBUMIN QUANTITATIVE: CPT | Performed by: FAMILY MEDICINE

## 2019-05-03 RX ORDER — HYDROCHLOROTHIAZIDE 25 MG/1
TABLET ORAL
Qty: 90 TABLET | Refills: 3 | Status: SHIPPED | OUTPATIENT
Start: 2019-05-03 | End: 2020-05-07

## 2019-05-03 RX ORDER — SIMVASTATIN 20 MG
TABLET ORAL
Qty: 90 TABLET | Refills: 3 | Status: SHIPPED | OUTPATIENT
Start: 2019-05-03 | End: 2020-05-07

## 2019-05-03 RX ORDER — IPRATROPIUM BROMIDE AND ALBUTEROL SULFATE 2.5; .5 MG/3ML; MG/3ML
1 SOLUTION RESPIRATORY (INHALATION) EVERY 6 HOURS PRN
Qty: 90 VIAL | Refills: 3 | Status: SHIPPED | OUTPATIENT
Start: 2019-05-03 | End: 2019-12-07

## 2019-05-03 RX ORDER — TIOTROPIUM BROMIDE 18 UG/1
18 CAPSULE ORAL; RESPIRATORY (INHALATION) DAILY
Qty: 90 CAPSULE | Refills: 3 | Status: SHIPPED | OUTPATIENT
Start: 2019-05-03 | End: 2020-05-07

## 2019-05-03 RX ORDER — LEVOTHYROXINE SODIUM 150 UG/1
TABLET ORAL
Qty: 90 TABLET | Refills: 3 | Status: SHIPPED | OUTPATIENT
Start: 2019-05-03 | End: 2020-05-07

## 2019-05-03 RX ORDER — ESCITALOPRAM OXALATE 5 MG/1
TABLET ORAL
Qty: 60 TABLET | Refills: 1 | Status: SHIPPED | OUTPATIENT
Start: 2019-05-03 | End: 2019-07-02

## 2019-05-03 RX ORDER — IPRATROPIUM BROMIDE 42 UG/1
2 SPRAY, METERED NASAL 2 TIMES DAILY PRN
Qty: 15 ML | Refills: 3 | Status: SHIPPED | OUTPATIENT
Start: 2019-05-03 | End: 2020-08-03

## 2019-05-03 RX ORDER — ALBUTEROL SULFATE 90 UG/1
2 AEROSOL, METERED RESPIRATORY (INHALATION) EVERY 6 HOURS PRN
Qty: 18 G | Refills: 5 | Status: SHIPPED | OUTPATIENT
Start: 2019-05-03 | End: 2020-08-03

## 2019-05-03 RX ORDER — LOSARTAN POTASSIUM 50 MG/1
50 TABLET ORAL DAILY
Qty: 90 TABLET | Refills: 3 | Status: SHIPPED | OUTPATIENT
Start: 2019-05-03 | End: 2020-05-07

## 2019-05-03 RX ORDER — GLIPIZIDE 10 MG/1
10 TABLET, FILM COATED, EXTENDED RELEASE ORAL DAILY
Qty: 90 TABLET | Refills: 3 | Status: SHIPPED | OUTPATIENT
Start: 2019-05-03 | End: 2019-05-07

## 2019-05-03 ASSESSMENT — ANXIETY QUESTIONNAIRES
5. BEING SO RESTLESS THAT IT IS HARD TO SIT STILL: NOT AT ALL
1. FEELING NERVOUS, ANXIOUS, OR ON EDGE: NEARLY EVERY DAY
3. WORRYING TOO MUCH ABOUT DIFFERENT THINGS: NEARLY EVERY DAY
2. NOT BEING ABLE TO STOP OR CONTROL WORRYING: SEVERAL DAYS
GAD7 TOTAL SCORE: 10
7. FEELING AFRAID AS IF SOMETHING AWFUL MIGHT HAPPEN: NOT AT ALL
6. BECOMING EASILY ANNOYED OR IRRITABLE: MORE THAN HALF THE DAYS
IF YOU CHECKED OFF ANY PROBLEMS ON THIS QUESTIONNAIRE, HOW DIFFICULT HAVE THESE PROBLEMS MADE IT FOR YOU TO DO YOUR WORK, TAKE CARE OF THINGS AT HOME, OR GET ALONG WITH OTHER PEOPLE: NOT DIFFICULT AT ALL

## 2019-05-03 ASSESSMENT — PATIENT HEALTH QUESTIONNAIRE - PHQ9
SUM OF ALL RESPONSES TO PHQ QUESTIONS 1-9: 13
5. POOR APPETITE OR OVEREATING: SEVERAL DAYS

## 2019-05-03 NOTE — PROGRESS NOTES
SUBJECTIVE:   CC: Jazlyn Bartlett is an 53 year old woman who presents for preventive health visit.     Healthy Habits:    Do you get at least three servings of calcium containing foods daily (dairy, green leafy vegetables, etc.)? no, taking calcium and/or vitamin D supplement: yes -     Amount of exercise or daily activities, outside of work: none     Problems taking medications regularly No    Medication side effects: dry skin     Have you had an eye exam in the past two years? yes    Do you see a dentist twice per year? yes    Do you have sleep apnea, excessive snoring or daytime drowsiness?yes snoring   Eye exam with ophthalmology on this date: 5/3/2019 [facility] no diabetic retinopathy         Diabetes Follow-up      Diabetic concerns: None     Symptoms of hypoglycemia (low blood sugar): none     Paresthesias (numbness or burning in feet) or sores: No      BP Readings from Last 2 Encounters:   05/03/19 123/81   01/25/19 132/80     Hemoglobin A1C (%)   Date Value   05/03/2019 7.8 (H)   01/09/2019 7.8 (H)     LDL Cholesterol Calculated (mg/dL)   Date Value   05/03/2019 40   05/18/2017 65     LDL Cholesterol Direct (mg/dL)   Date Value   06/19/2018 61       Diabetes Management Resources  Hyperlipidemia Follow-Up      Rate your low fat/cholesterol diet?: good    Taking statin?  Yes, no muscle aches from statin    Other lipid medications/supplements?:  none    Hypertension Follow-up      Outpatient blood pressures are not being checked.    Low Salt Diet: no added salt    Depression and Anxiety Follow-Up    Status since last visit: Worsened     Other associated symptoms:None    Complicating factors:     Significant life event: No     Current substance abuse: None    PHQ 1/10/2019 1/21/2019 5/3/2019   PHQ-9 Total Score 12 17 13   Q9: Thoughts of better off dead/self-harm past 2 weeks Not at all Not at all Not at all     MIGUELITO-7 SCORE 1/10/2019 1/21/2019 5/3/2019   Total Score - - -   Total Score 17 13 10        PHQ-9  English  PHQ-9   Any Language  MIGUELITO-7  Suicide Assessment Five-step Evaluation and Treatment (SAFE-T)  COPD Follow-Up    Symptoms are currently: stable    Current fatigue or dyspnea with ambulation: none    Shortness of breath: stable    Increased or change in Cough/Sputum: No    Fever(s): No      History   Smoking Status     Current Every Day Smoker     Packs/day: 0.50     Types: Cigarettes   Smokeless Tobacco     Never Used     Lab Results   Component Value Date    FEV1 1.75 10/03/2013    [id number] 67% 10/03/2013       Hypothyroidism Follow-up      Since last visit, patient describes the following symptoms: Weight stable, no hair loss, no skin changes, no constipation, no loose stools      Today's PHQ-2 Score:   PHQ-2 ( 1999 [employer]) 1/10/2019 11/6/2017   Q1: Little interest or pleasure in doing things 0 3   Q2: Feeling down, depressed or hopeless 0 3   PHQ-2 Score 0 6   Q1: Little interest or pleasure in doing things - Nearly every day   Q2: Feeling down, depressed or hopeless - Nearly every day   PHQ-2 Score - 6       Abuse: Current or Past(Physical, Sexual or Emotional)- No  Do you feel safe in your environment? Yes    Social History     Tobacco Use     Smoking status: Current Every Day Smoker     Packs/day: 0.50     Types: Cigarettes     Smokeless tobacco: Never Used   Substance Use Topics     Alcohol use: No     Alcohol/week: 0.0 oz     If you drink alcohol do you typically have >3 drinks per day or >7 drinks per week? No                     Reviewed orders with patient.  Reviewed health maintenance and updated orders accordingly - Yes  Labs reviewed in EPIC  BP Readings from Last 3 Encounters:   05/03/19 123/81   01/25/19 132/80   01/21/19 132/80    Wt Readings from Last 3 Encounters:   05/03/19 103 kg (227 lb)   01/25/19 103 kg (227 lb)   01/21/19 103 kg (227 lb)                  Patient Active Problem List   Diagnosis     Neck mass     Moderate major depression (H)     Anxiety     Vitamin D  deficiency disease     CARDIOVASCULAR SCREENING; LDL GOAL LESS THAN 100     GERD (gastroesophageal reflux disease)     Hypertension goal BP (blood pressure) < 140/80     Type 2 diabetes mellitus without complication (H)     Chronic airway obstruction (H)     Advanced directives, counseling/discussion     Autoimmune gastritis- repeat UGI 1/16 with gastritis without eosinophils     Urinary incontinence     Osteoarthritis of patellofemoral joint     Morbid obesity (H)     Tobacco abuse     Lumbar radiculopathy     Hyperlipidemia LDL goal <100     Hypothyroidism, unspecified type     Chronic obstructive pulmonary disease, unspecified COPD type (H)     Past Surgical History:   Procedure Laterality Date     ABLATION, ENDOMETRIAL, THERMAL, W/O HYSTEROSCOPIC GUIDANCE       DILATION AND CURETTAGE, HYSTEROSCOPY, ABLATE ENDOMETRIUM NOVASURE, COMBINED  10/11/2012    Procedure: COMBINED DILATION AND CURETTAGE, HYSTEROSCOPY, ABLATE ENDOMETRIUM NOVASURE;  COMBINED DILATION AND CURETTAGE, HYSTEROSCOPY, ABLATE ENDOMETRIUM ,pelvic exam under anesthesia;  Surgeon: Shruti Barrios MD;  Location: [facility] OR     ESOPHAGOSCOPY, GASTROSCOPY, DUODENOSCOPY (EGD), COMBINED N/A 1/19/2016    Procedure: COMBINED ESOPHAGOSCOPY, GASTROSCOPY, DUODENOSCOPY (EGD), BIOPSY SINGLE OR MULTIPLE;  Surgeon: Kristofer Molina MD;  Location: [facility] GI     Removal of cancerous lump on neck       TONSILLECTOMY         Social History     Tobacco Use     Smoking status: Current Every Day Smoker     Packs/day: 0.50     Types: Cigarettes     Smokeless tobacco: Never Used   Substance Use Topics     Alcohol use: No     Alcohol/week: 0.0 oz     Family History   Problem Relation Age of Onset     Diabetes Mother      Breast Cancer Mother      Diabetes Father      Lung Cancer Father      Rheumatoid Arthritis Father          Current Outpatient Medications   Medication Sig Dispense Refill     albuterol (PROAIR HFA/PROVENTIL HFA/VENTOLIN HFA) 108 (90 Base) MCG/ACT inhaler Inhale  2 puffs into the lungs every 6 hours as needed for shortness of breath / dyspnea 18 g 5     aspirin 81 MG EC tablet Take 1 tablet (81 mg) by mouth daily 100 tablet 3     blood glucose (CONTOUR NEXT TEST) test strip Use to test blood sugar 1-2 times daily or as directed.  Ok to substitute alternative if insurance prefers. 100 strip 3     blood glucose monitoring (NIURKA MICROLET) lancets Use to test blood sugar 1-2 times daily or as directed.  Ok to substitute alternative if insurance prefers. 100 each 3     blood glucose monitoring (CONTOUR NEXT MONITOR W/DEVICE KIT) meter device kit Use to test blood sugar 1-2 times daily or as directed.  Ok to substitute alternative if insurance prefers. 1 kit 0     budesonide (PULMICORT FLEXHALER) 180 MCG/ACT inhaler Inhale 1 puff into the lungs 2 times daily 3 Inhaler 3     Cholecalciferol (VITAMIN D) 2000 UNITS tablet Take 2,000 Units by mouth daily 100 tablet 3     escitalopram (LEXAPRO) 5 MG tablet Take 1 tablet daily for 2 weeks and then increase to 2 tablets daily. 60 tablet 1     hydrochlorothiazide (HYDRODIURIL) 25 MG tablet TAKE 1 TABLET (25 MG) BY MOUTH DAILY 90 tablet 3     ipratropium (ATROVENT) 0.06 % nasal spray Spray 2 sprays into both nostrils 2 times daily as needed for rhinitis 15 mL 3     ipratropium - albuterol 0.5 mg/2.5 mg/3 mL (DUONEB) 0.5-2.5 (3) MG/3ML neb solution Take 1 vial (3 mLs) by nebulization every 6 hours as needed for shortness of breath / dyspnea or wheezing 90 vial 3     ketoconazole (NIZORAL) 2 % cream Apply topically 2 times daily as needed for itching Apply to AA on the face bid when needed 15 g 3     levothyroxine (SYNTHROID/LEVOTHROID) 150 MCG tablet TAKE 1 TABLET (150 MCG) BY MOUTH DAILY 90 tablet 3     losartan (COZAAR) 50 MG tablet Take 1 tablet (50 mg) by mouth daily 90 tablet 3     simvastatin (ZOCOR) 20 MG tablet TAKE 1 TABLET (20 MG) BY MOUTH AT BEDTIME 90 tablet 3     tiotropium (SPIRIVA) 18 MCG inhaled capsule Inhale 1 capsule (18  mcg) into the lungs daily 90 capsule 3     glipiZIDE (GLUCOTROL XL) 5 MG 24 hr tablet Take 3 tablets (15 mg) by mouth daily 90 tablet 1     Allergies   Allergen Reactions     Ibuprofen Sodium Nausea and Vomiting     Vicodin [Hydrocodone-Acetaminophen] Nausea and Vomiting     Recent Labs   Lab Test 05/03/19  1152 01/10/19  0925 01/09/19  1505 06/19/18  1634  02/27/18  1616  05/18/17  1710  07/15/16  1037   A1C 7.8*  --  7.8* 7.2*  --  7.3*   < > 7.0*   < > 7.9*   LDL 40  --   --  61  --   --   --  65  --  58   HDL 49*  --   --   --   --   --   --  44*  --  46*   TRIG 184*  --   --   --   --   --   --  160*  --  141   ALT 29 32  --   --   --   --   --  21   < >  --    CR 0.62 0.70 1.10*  --    < >  --    < > 0.71   < >  --    GFRESTIMATED >90 >90 56*  --    < >  --    < > 87   < >  --    GFRESTBLACK >90 >90 65  --    < >  --    < > >90   GFR Calc     < >  --    POTASSIUM 3.5 3.9 3.7  --    < >  --    < > 3.3*   < >  --    TSH 1.11  --   --   --   --  0.55   < > 10.29*  --   --     < > = values in this interval not displayed.        Mammogram Screening: Patient over age 50, mutual decision to screen reflected in health maintenance.    Pertinent mammograms are reviewed under the imaging tab.  History of abnormal Pap smear: NO - age 30-65 PAP every 5 years with negative HPV co-testing recommended  PAP / HPV Latest Ref Rng & Units 11/7/2017 12/15/2014 9/5/2013   PAP - NIL NIL NIL   HPV 16 DNA NEG:Negative Negative - -   HPV 18 DNA NEG:Negative Negative - -   OTHER HR HPV NEG:Negative Negative - -     Reviewed and updated as needed this visit by clinical staff  Tobacco  Allergies         Reviewed and updated as needed this visit by Provider  Allergies            ROS:  CONSTITUTIONAL: NEGATIVE for fever, chills, change in weight  INTEGUMENTARY/SKIN: NEGATIVE for worrisome rashes, moles or lesions  EYES: NEGATIVE for vision changes or irritation  ENT: NEGATIVE for ear, mouth and throat problems  RESP:  NEGATIVE for significant cough or SOB  BREAST: NEGATIVE for masses, tenderness or discharge  CV: NEGATIVE for chest pain, palpitations or peripheral edema  GI: NEGATIVE for nausea, abdominal pain, heartburn, or change in bowel habits  : NEGATIVE for unusual urinary or vaginal symptoms. No vaginal bleeding.  MUSCULOSKELETAL: NEGATIVE for significant arthralgias or myalgia  NEURO: NEGATIVE for weakness, dizziness or paresthesias  PSYCHIATRIC: NEGATIVE for changes in mood or affect     OBJECTIVE:   /81   Pulse 80   Temp 97.4  F (36.3  C) (Tympanic)   Wt 103 kg (227 lb)   SpO2 100%   BMI 42.89 kg/m    EXAM:  GENERAL APPEARANCE: healthy, alert and no distress  EYES: Eyes grossly normal to inspection, PERRL and conjunctivae and sclerae normal  HENT: ear canals and TM's normal, nose and mouth without ulcers or lesions, oropharynx clear and oral mucous membranes moist  NECK: no adenopathy, no asymmetry, masses, or scars and thyroid normal to palpation  RESP: lungs clear to auscultation - no rales, rhonchi or wheezes  BREAST: normal without masses, tenderness or nipple discharge and no palpable axillary masses or adenopathy  CV: regular rate and rhythm, normal S1 S2, no S3 or S4, no murmur, click or rub, no peripheral edema and peripheral pulses strong  ABDOMEN: soft, nontender, no hepatosplenomegaly, no masses and bowel sounds normal  MS: no musculoskeletal defects are noted and gait is age appropriate without ataxia  SKIN: no suspicious lesions or rashes  NEURO: Normal strength and tone, sensory exam grossly normal, mentation intact and speech normal  PSYCH: mentation appears normal and affect normal/bright        ASSESSMENT/PLAN:   1. Annual physical exam    - Hemoglobin    2. Screening for HIV (human immunodeficiency virus)    - HIV Antigen Antibody Combo    3. Chronic obstructive pulmonary disease, unspecified COPD type (H)  Symptoms are fairly well managed.  Resume current medications without any  changes.  - albuterol (PROAIR HFA/PROVENTIL HFA/VENTOLIN HFA) 108 (90 Base) MCG/ACT inhaler; Inhale 2 puffs into the lungs every 6 hours as needed for shortness of breath / dyspnea  Dispense: 18 g; Refill: 5  - budesonide (PULMICORT FLEXHALER) 180 MCG/ACT inhaler; Inhale 1 puff into the lungs 2 times daily  Dispense: 3 Inhaler; Refill: 3  - ipratropium - albuterol 0.5 mg/2.5 mg/3 mL (DUONEB) 0.5-2.5 (3) MG/3ML neb solution; Take 1 vial (3 mLs) by nebulization every 6 hours as needed for shortness of breath / dyspnea or wheezing  Dispense: 90 vial; Refill: 3  - ipratropium (ATROVENT) 0.06 % nasal spray; Spray 2 sprays into both nostrils 2 times daily as needed for rhinitis  Dispense: 15 mL; Refill: 3  - tiotropium (SPIRIVA) 18 MCG inhaled capsule; Inhale 1 capsule (18 mcg) into the lungs daily  Dispense: 90 capsule; Refill: 3    4. Type 2 diabetes mellitus without complication, without long-term current use of insulin (H)  Not well controlled.  Recommending to increase glipizide XL from 10mg to 15 mg daily.  Follow-up in 3 months for recheck    - FOOT EXAM  NO CHARGE [55785.739]  - blood glucose (CONTOUR NEXT TEST) test strip; Use to test blood sugar 1-2 times daily or as directed.  Ok to substitute alternative if insurance prefers.  Dispense: 100 strip; Refill: 3  - blood glucose monitoring (NIURKA MICROLET) lancets; Use to test blood sugar 1-2 times daily or as directed.  Ok to substitute alternative if insurance prefers.  Dispense: 100 each; Refill: 3  - Hemoglobin A1c  - Albumin Random Urine Quantitative with Creat Ratio  - Comprehensive metabolic panel    5. Hypertension goal BP (blood pressure) < 140/80  Well controlled  - hydrochlorothiazide (HYDRODIURIL) 25 MG tablet; TAKE 1 TABLET (25 MG) BY MOUTH DAILY  Dispense: 90 tablet; Refill: 3  - losartan (COZAAR) 50 MG tablet; Take 1 tablet (50 mg) by mouth daily  Dispense: 90 tablet; Refill: 3  - Comprehensive metabolic panel    6. Hypothyroidism, unspecified type    "  Symptomatically stable.  - levothyroxine (SYNTHROID/LEVOTHROID) 150 MCG tablet; TAKE 1 TABLET (150 MCG) BY MOUTH DAILY  Dispense: 90 tablet; Refill: 3  - TSH    7. Hyperlipidemia LDL goal <100  Well-controlled    - simvastatin (ZOCOR) 20 MG tablet; TAKE 1 TABLET (20 MG) BY MOUTH AT BEDTIME  Dispense: 90 tablet; Refill: 3  - Lipid panel reflex to direct LDL Fasting    8. Encounter for screening mammogram for breast cancer    - *MA Screening Digital Bilateral; Future    9. Immunity status testing  Patient requested immunity status testing for measles  - Rubeola Antibody IgG    10. Anxiety  Recommending to start Lexapro for uncontrolled anxiety  - escitalopram (LEXAPRO) 5 MG tablet; Take 1 tablet daily for 2 weeks and then increase to 2 tablets daily.  Dispense: 60 tablet; Refill: 1    11. Moderate major depression (H)  Recommending start Lexapro for uncontrolled depression.  Follow-up in 1 month for recheck  - escitalopram (LEXAPRO) 5 MG tablet; Take 1 tablet daily for 2 weeks and then increase to 2 tablets daily.  Dispense: 60 tablet; Refill: 1    COUNSELING:   Reviewed preventive health counseling, as reflected in patient instructions       Regular exercise       Healthy diet/nutrition    BP Readings from Last 1 Encounters:   05/03/19 123/81     Estimated body mass index is 42.89 kg/m  as calculated from the following:    Height as of 1/25/19: 1.549 m (5' 1\").    Weight as of this encounter: 103 kg (227 lb).      Weight management plan: Discussed healthy diet and exercise guidelines     reports that she has been smoking cigarettes.  She has been smoking about 0.50 packs per day. She has never used smokeless tobacco.      Counseling Resources:  ATP IV Guidelines  Pooled Cohorts Equation Calculator  Breast Cancer Risk Calculator  FRAX Risk Assessment  ICSI Preventive Guidelines  Dietary Guidelines for Americans, 2010  USDA's MyPlate  ASA Prophylaxis  Lung CA Screening    Lokesh Casanova MD  [facility]

## 2019-05-04 LAB — MEV IGG SER QL IA: 2.9 AI (ref 0–0.8)

## 2019-05-04 ASSESSMENT — ANXIETY QUESTIONNAIRES: GAD7 TOTAL SCORE: 10

## 2019-05-06 LAB — HIV 1+2 AB+HIV1 P24 AG SERPL QL IA: NONREACTIVE

## 2019-05-07 ENCOUNTER — TELEPHONE (OUTPATIENT)
Dept: FAMILY MEDICINE | Facility: CLINIC | Age: 54
End: 2019-05-07

## 2019-05-07 DIAGNOSIS — E11.9 TYPE 2 DIABETES MELLITUS WITHOUT COMPLICATION, WITHOUT LONG-TERM CURRENT USE OF INSULIN (H): ICD-10-CM

## 2019-05-07 RX ORDER — GLIPIZIDE 5 MG/1
15 TABLET, FILM COATED, EXTENDED RELEASE ORAL DAILY
Qty: 90 TABLET | Refills: 1 | Status: SHIPPED | OUTPATIENT
Start: 2019-05-07 | End: 2019-07-06

## 2019-05-07 NOTE — TELEPHONE ENCOUNTER
Called patient and gave test results- states she will start new prescription today- notified of prescription being sent to the pharmacy- she would [facility] in the future. Advised that anytime she wants to change a med to the mail  Order she will need to request this until we get them all switched over.  patient verbalized understanding and will schedule a 3 month f/u    Leah Sandoval RN BSN  [facility] [place]  [phone]

## 2019-05-13 ENCOUNTER — TELEPHONE (OUTPATIENT)
Dept: FAMILY MEDICINE | Facility: CLINIC | Age: 54
End: 2019-05-13

## 2019-05-13 NOTE — TELEPHONE ENCOUNTER
[facility] pharmacy calling for verifying dosing of glipizide.   Patient glipizide 10 mg was discontinued at OV on 5/7/2019 and a new dose of 15 mg daily was ordered.    OSMANY Laws, RN  [employer] [facility]

## 2019-06-03 DIAGNOSIS — I10 HYPERTENSION GOAL BP (BLOOD PRESSURE) < 140/80: ICD-10-CM

## 2019-06-03 RX ORDER — LOSARTAN POTASSIUM 50 MG/1
TABLET ORAL
Qty: 90 TABLET | Refills: 0 | OUTPATIENT
Start: 2019-06-03

## 2019-06-03 NOTE — TELEPHONE ENCOUNTER
"Requested Prescriptions   Pending Prescriptions Disp Refills     losartan (COZAAR) 50 MG tablet [Pharmacy Med Name: LOSARTAN POTASSIUM 50 MG TAB] 90 tablet 0     Sig: TAKE 1 TABLET (50 MG) BY MOUTH DAILY       Angiotensin-II Receptors Passed - 6/3/2019  1:09 AM        Passed - Blood pressure under 140/90 in past 12 months     BP Readings from Last 3 Encounters:   05/03/19 123/81   01/25/19 132/80   01/21/19 132/80                 Passed - Recent (12 mo) or future (30 days) visit within the authorizing provider's specialty     Patient had office visit in the last 12 months or has a visit in the next 30 days with authorizing provider or within the authorizing provider's specialty.  See \"Patient Info\" tab in inbasket, or \"Choose Columns\" in Meds & Orders section of the refill encounter.              Passed - Medication is active on med list        Passed - Patient is age 18 or older        Passed - No active pregnancy on record        Passed - Normal serum creatinine on file in past 12 months     Recent Labs   Lab Test 05/03/19  1152   CR 0.62             Passed - Normal serum potassium on file in past 12 months     Recent Labs   Lab Test 05/03/19  1152   POTASSIUM 3.5                    Passed - No positive pregnancy test in past 12 months        losartan (COZAAR) 50 MG tablet 90 tablet 3 5/3/2019        Last Written Prescription Date:  05/03/19  Last Fill Quantity: 90,  # refills: 3   Last office visit: 5/3/2019 with prescribing provider:  Dr. Casanova   Future Office Visit:  Unknown     "

## 2019-07-01 DIAGNOSIS — F32.1 MODERATE MAJOR DEPRESSION (H): ICD-10-CM

## 2019-07-01 DIAGNOSIS — F41.9 ANXIETY: ICD-10-CM

## 2019-07-01 NOTE — TELEPHONE ENCOUNTER
"Requested Prescriptions   Pending Prescriptions Disp Refills     escitalopram (LEXAPRO) 5 MG tablet 60 tablet 1     Sig: Take 1 tablet daily for 2 weeks and then increase to 2 tablets daily.  Last Written Prescription Date:  5/3/19  Last Fill Quantity: 60,  # refills: 1   Last office visit: 5/3/2019 with prescribing provider:  Edilberto   Future Office Visit:           SSRIs Protocol Failed - 7/1/2019 12:05 PM        Failed - PHQ-9 score less than 5 in past 6 months     Please review last PHQ-9 score.   PHQ-9 SCORE 1/10/2019 1/21/2019 5/3/2019   PHQ-9 Total Score - - -   PHQ-9 Total Score MyChart - - -   PHQ-9 Total Score 12 17 13               Passed - Medication is active on med list        Passed - Patient is age 18 or older        Passed - No active pregnancy on record        Passed - No positive pregnancy test in last 12 months        Passed - Recent (6 mo) or future (30 days) visit within the authorizing provider's specialty     Patient had office visit in the last 6 months or has a visit in the next 30 days with authorizing provider or within the authorizing provider's specialty.  See \"Patient Info\" tab in inbasket, or \"Choose Columns\" in Meds & Orders section of the refill encounter.              "

## 2019-07-02 ENCOUNTER — HOSPITAL ENCOUNTER (OUTPATIENT)
Dept: MAMMOGRAPHY | Facility: CLINIC | Age: 54
Discharge: HOME | End: 2019-07-02
Attending: FAMILY MEDICINE | Admitting: FAMILY MEDICINE
Payer: COMMERCIAL

## 2019-07-02 DIAGNOSIS — Z12.31 ENCOUNTER FOR SCREENING MAMMOGRAM FOR BREAST CANCER: ICD-10-CM

## 2019-07-02 PROCEDURE — 77063 BREAST TOMOSYNTHESIS BI: CPT

## 2019-07-02 RX ORDER — ESCITALOPRAM OXALATE 10 MG/1
TABLET ORAL
Qty: 30 TABLET | Refills: 0 | Status: SHIPPED | OUTPATIENT
Start: 2019-07-02 | End: 2020-02-25

## 2019-07-02 NOTE — TELEPHONE ENCOUNTER
Routing to team to inform and assist in scheduling.   Rosa Maria Daniel RN   [facility] - Triage

## 2019-07-02 NOTE — TELEPHONE ENCOUNTER
30-day supply ordered.  Please have the patient schedule an appointment for a recheck in the next couple of weeks.

## 2019-07-03 DIAGNOSIS — F41.9 ANXIETY: ICD-10-CM

## 2019-07-03 DIAGNOSIS — F32.1 MODERATE MAJOR DEPRESSION (H): ICD-10-CM

## 2019-07-03 RX ORDER — ESCITALOPRAM OXALATE 10 MG/1
TABLET ORAL
Qty: 30 TABLET | Refills: 0 | OUTPATIENT
Start: 2019-07-03

## 2019-07-03 NOTE — TELEPHONE ENCOUNTER
Refill request for escitalopram (LEXAPRO) 5 MG tablet (Discontinued) [facility] [phone]

## 2019-07-03 NOTE — TELEPHONE ENCOUNTER
Duplicate RX script for Lexapro 10mg filled on 7/2/19 for 30 with 0 refills.    Eleanor Aguilar CMA

## 2019-07-03 NOTE — TELEPHONE ENCOUNTER
Left message for pt to call back : Please have the patient schedule an appointment for a recheck in the next couple of weeks  Lenka Urban,

## 2019-07-06 DIAGNOSIS — E11.9 TYPE 2 DIABETES MELLITUS WITHOUT COMPLICATION, WITHOUT LONG-TERM CURRENT USE OF INSULIN (H): ICD-10-CM

## 2019-07-08 RX ORDER — GLIPIZIDE 5 MG/1
15 TABLET, FILM COATED, EXTENDED RELEASE ORAL DAILY
Qty: 90 TABLET | Refills: 0 | Status: SHIPPED | OUTPATIENT
Start: 2019-07-08 | End: 2019-07-10

## 2019-07-08 NOTE — TELEPHONE ENCOUNTER
"Requested Prescriptions   Pending Prescriptions Disp Refills     glipiZIDE (GLUCOTROL XL) 5 MG 24 hr tablet [Pharmacy Med Name: GLIPIZIDE ER 5 MG TABLET] 90 tablet 1     Sig: TAKE 3 TABLETS (15 MG) BY MOUTH DAILY       Sulfonylurea Agents Passed - 7/6/2019  8:25 AM        Passed - Blood pressure less than 140/90 in past 6 months     BP Readings from Last 3 Encounters:   05/03/19 123/81   01/25/19 132/80   01/21/19 132/80                 Passed - Patient has documented LDL within the past 12 mos.     Recent Labs   Lab Test 05/03/19  1152   LDL 40             Passed - Patient has had a Microalbumin in the past 15 mos.     Recent Labs   Lab Test 05/03/19  1150   MICROL 11   UMALCR 5.29             Passed - Patient has documented A1c within the specified period of time.     If HgbA1C is 8 or greater, it needs to be on file within the past 3 months.  If less than 8, must be on file within the past 6 months.     Recent Labs   Lab Test 05/03/19  1152   A1C 7.8*             Passed - Medication is active on med list        Passed - Patient is age 18 or older        Passed - No active pregnancy on record        Passed - Patient has a recent creatinine (normal) within the past 12 mos.     Recent Labs   Lab Test 05/03/19  1152   CR 0.62             Passed - Patient has not had a positive pregnancy test within the past 12 mos.        Passed - Recent (6 mo) or future (30 days) visit within the authorizing provider's specialty     Patient had office visit in the last 6 months or has a visit in the next 30 days with authorizing provider or within the authorizing provider's specialty.  See \"Patient Info\" tab in inbasket, or \"Choose Columns\" in Meds & Orders section of the refill encounter.            glipiZIDE (GLUCOTROL XL) 5 MG 24 hr tablet 90 tablet 1 5/7/2019       Last Written Prescription Date:  05/7/2019  Last Fill Quantity: 90,  # refills: 1   Last office visit: 5/3/2019 with prescribing provider:  Dr. Casanova   Future Office " Visit:  Unknown

## 2019-07-10 RX ORDER — GLIPIZIDE 5 MG/1
15 TABLET, FILM COATED, EXTENDED RELEASE ORAL DAILY
Qty: 270 TABLET | Refills: 0 | Status: SHIPPED | OUTPATIENT
Start: 2019-07-10 | End: 2019-09-17

## 2019-07-10 NOTE — TELEPHONE ENCOUNTER
[facility] calling to inform that the patient's insurance does not cover 1 month supply. Requesting 90 day prescription.  Prescription approved per [facility] Refill Protocol.  Ana Cortes RN

## 2019-07-17 ENCOUNTER — TELEPHONE (OUTPATIENT)
Dept: FAMILY MEDICINE | Facility: CLINIC | Age: 54
End: 2019-07-17

## 2019-07-17 NOTE — TELEPHONE ENCOUNTER
Pt is due now to update PHQ9.  brittany message sent to pt. Follow up end date 9/8/19.   PHQ-9 SCORE 1/10/2019 1/21/2019 5/3/2019   PHQ-9 Total Score - - -   PHQ-9 Total Score MyChart - - -   PHQ-9 Total Score 12 17 13     Kali BARROW CMA

## 2019-07-24 ENCOUNTER — APPOINTMENT (OUTPATIENT)
Dept: GENERAL RADIOLOGY | Facility: CLINIC | Age: 54
End: 2019-07-24
Attending: EMERGENCY MEDICINE
Payer: COMMERCIAL

## 2019-07-24 ENCOUNTER — HOSPITAL ENCOUNTER (EMERGENCY)
Facility: CLINIC | Age: 54
Discharge: HOME | End: 2019-07-25
Attending: EMERGENCY MEDICINE | Admitting: EMERGENCY MEDICINE
Payer: COMMERCIAL

## 2019-07-24 ENCOUNTER — HOSPITAL ENCOUNTER (EMERGENCY)
Facility: CLINIC | Age: 54
Discharge: HOME | End: 2019-07-24
Attending: EMERGENCY MEDICINE | Admitting: EMERGENCY MEDICINE
Payer: COMMERCIAL

## 2019-07-24 VITALS
HEART RATE: 68 BPM | SYSTOLIC BLOOD PRESSURE: 112 MMHG | RESPIRATION RATE: 24 BRPM | TEMPERATURE: 97 F | DIASTOLIC BLOOD PRESSURE: 80 MMHG | OXYGEN SATURATION: 97 % | BODY MASS INDEX: 42.89 KG/M2 | WEIGHT: 227 LBS

## 2019-07-24 DIAGNOSIS — R07.89 CHEST WALL PAIN: ICD-10-CM

## 2019-07-24 DIAGNOSIS — S29.9XXA INJURY OF CHEST WALL, INITIAL ENCOUNTER: ICD-10-CM

## 2019-07-24 DIAGNOSIS — J44.1 COPD EXACERBATION (H): ICD-10-CM

## 2019-07-24 LAB
ANION GAP SERPL CALCULATED.3IONS-SCNC: 7 MMOL/L (ref 3–14)
BASOPHILS # BLD AUTO: 0 10E9/L (ref 0–0.2)
BASOPHILS NFR BLD AUTO: 0.3 %
BUN SERPL-MCNC: 12 MG/DL (ref 7–30)
CALCIUM SERPL-MCNC: 9.6 MG/DL (ref 8.5–10.1)
CHLORIDE SERPL-SCNC: 105 MMOL/L (ref 94–109)
CO2 SERPL-SCNC: 27 MMOL/L (ref 20–32)
CREAT SERPL-MCNC: 0.63 MG/DL (ref 0.52–1.04)
DIFFERENTIAL METHOD BLD: NORMAL
EOSINOPHIL # BLD AUTO: 0.3 10E9/L (ref 0–0.7)
EOSINOPHIL NFR BLD AUTO: 2.6 %
ERYTHROCYTE [DISTWIDTH] IN BLOOD BY AUTOMATED COUNT: 13.2 % (ref 10–15)
GFR SERPL CREATININE-BSD FRML MDRD: >90 ML/MIN/{1.73_M2}
GLUCOSE SERPL-MCNC: 148 MG/DL (ref 70–99)
HCT VFR BLD AUTO: 46.2 % (ref 35–47)
HGB BLD-MCNC: 15.2 G/DL (ref 11.7–15.7)
IMM GRANULOCYTES # BLD: 0 10E9/L (ref 0–0.4)
IMM GRANULOCYTES NFR BLD: 0.4 %
LYMPHOCYTES # BLD AUTO: 3.3 10E9/L (ref 0.8–5.3)
LYMPHOCYTES NFR BLD AUTO: 31.3 %
MCH RBC QN AUTO: 30.1 PG (ref 26.5–33)
MCHC RBC AUTO-ENTMCNC: 32.9 G/DL (ref 31.5–36.5)
MCV RBC AUTO: 92 FL (ref 78–100)
MONOCYTES # BLD AUTO: 0.8 10E9/L (ref 0–1.3)
MONOCYTES NFR BLD AUTO: 7.3 %
NEUTROPHILS # BLD AUTO: 6.2 10E9/L (ref 1.6–8.3)
NEUTROPHILS NFR BLD AUTO: 58.1 %
NRBC # BLD AUTO: 0 10*3/UL
NRBC BLD AUTO-RTO: 0 /100
PLATELET # BLD AUTO: 326 10E9/L (ref 150–450)
POTASSIUM SERPL-SCNC: 3.6 MMOL/L (ref 3.4–5.3)
RBC # BLD AUTO: 5.05 10E12/L (ref 3.8–5.2)
SODIUM SERPL-SCNC: 139 MMOL/L (ref 133–144)
TROPONIN I SERPL-MCNC: <0.015 UG/L (ref 0–0.04)
WBC # BLD AUTO: 10.6 10E9/L (ref 4–11)

## 2019-07-24 PROCEDURE — 71101 X-RAY EXAM UNILAT RIBS/CHEST: CPT | Mod: LT

## 2019-07-24 PROCEDURE — 99285 EMERGENCY DEPT VISIT HI MDM: CPT | Mod: 25

## 2019-07-24 PROCEDURE — 84484 ASSAY OF TROPONIN QUANT: CPT | Performed by: EMERGENCY MEDICINE

## 2019-07-24 PROCEDURE — 25000132 ZZH RX MED GY IP 250 OP 250 PS 637: Performed by: EMERGENCY MEDICINE

## 2019-07-24 PROCEDURE — 94640 AIRWAY INHALATION TREATMENT: CPT

## 2019-07-24 PROCEDURE — 93005 ELECTROCARDIOGRAM TRACING: CPT

## 2019-07-24 PROCEDURE — 85025 COMPLETE CBC W/AUTO DIFF WBC: CPT | Performed by: EMERGENCY MEDICINE

## 2019-07-24 PROCEDURE — 96374 THER/PROPH/DIAG INJ IV PUSH: CPT | Mod: 59

## 2019-07-24 PROCEDURE — 80048 BASIC METABOLIC PNL TOTAL CA: CPT | Performed by: EMERGENCY MEDICINE

## 2019-07-24 RX ORDER — OXYCODONE HYDROCHLORIDE 5 MG/1
10 TABLET ORAL ONCE
Status: COMPLETED | OUTPATIENT
Start: 2019-07-24 | End: 2019-07-24

## 2019-07-24 RX ORDER — LIDOCAINE 50 MG/G
1 PATCH TOPICAL EVERY 24 HOURS
Qty: 10 PATCH | Refills: 0 | Status: SHIPPED | OUTPATIENT
Start: 2019-07-24 | End: 2019-07-26

## 2019-07-24 RX ORDER — LORAZEPAM 1 MG/1
1 TABLET ORAL ONCE
Status: COMPLETED | OUTPATIENT
Start: 2019-07-24 | End: 2019-07-24

## 2019-07-24 RX ORDER — OXYCODONE HYDROCHLORIDE 5 MG/1
5-10 TABLET ORAL EVERY 4 HOURS PRN
Qty: 15 TABLET | Refills: 0 | Status: SHIPPED | OUTPATIENT
Start: 2019-07-24 | End: 2019-07-25

## 2019-07-24 RX ADMIN — OXYCODONE HYDROCHLORIDE 10 MG: 5 TABLET ORAL at 04:29

## 2019-07-24 RX ADMIN — LORAZEPAM 1 MG: 1 TABLET ORAL at 04:29

## 2019-07-24 ASSESSMENT — ENCOUNTER SYMPTOMS
FEVER: 0
ABDOMINAL PAIN: 0
SHORTNESS OF BREATH: 0

## 2019-07-24 ASSESSMENT — MIFFLIN-ST. JEOR: SCORE: 1556.17

## 2019-07-24 NOTE — LETTER
July 24, 2019      To Whom It May Concern:      Jazlyn Bartlett was seen in our Emergency Department today, 07/24/19.  I expect her condition to improve over the next 1-2 days.  She may return to work/school when improved.    Sincerely,        Anika Way RN

## 2019-07-24 NOTE — ED AVS SNAPSHOT
[facility] Emergency Department  [address]  [facility] [address]  Phone:  [phone]  Fax:  [phone]                                    Jazlyn Bartlett   MRN: [id number]    Department:  [facility] Emergency Department   Date of Visit:  7/24/2019           After Visit Summary Signature Page    I have received my discharge instructions, and my questions have been answered. I have discussed any challenges I see with this plan with the nurse or doctor.    ..........................................................................................................................................  Patient/Patient Representative Signature      ..........................................................................................................................................  Patient Representative Print Name and Relationship to Patient    ..................................................               ................................................  Date                                   Time    ..........................................................................................................................................  Reviewed by Signature/Title    ...................................................              ..............................................  Date                                               Time          22EPIC Rev 08/18

## 2019-07-24 NOTE — ED PROVIDER NOTES
"  History     Chief Complaint:  Chest Pain    HPI   Jazlyn Bartlett is a 53 year old female who presents with chest pain. The patient says that on Saturday 7/20 she was bending over a deep freezer with the edge of the freezer resting under her left breast. She says that there heard a \"pop\" and felt pain in her rib cage and base of her breast. She says that the pain has been constant since then with minimal relief from Tylenol. She says that the pain worsens with breathing and moving.    Allergies:  Ibuprofen   Vicodin     Medications:    Albuterol  Aspirin  Pulmicort  Lexapro  Glucotrol  Hydrodiuril  Atrovent  Duoneb  Nizoral  Levothyroxine  Cozaar  Zocor  Spiriva     Past Medical History:    Chronic obstructive pulmonary disease   Diabetes   GERD  Hypertension  Hypothyroidism  Kidney stone  Mucoepidermoid carcinoma of parotid gland    Past Surgical History:    Endometrial ablation  Dilation and curettage  Removal of cancerous lump on neck  Tonsillectomy     Family History:    Diabetes  Breast cancer  Lung cancer  Rheumatoid arthritis    Social History:  The patient was accompanied to the ED by .  Smoking Status: Current Smoker  Smokeless Tobacco: Never Used  Alcohol Use: Negative  Drug Use: Negative  PCP: Lokesh Casanova   Marital Status:         Review of Systems   Constitutional: Negative for fever.   Respiratory: Negative for shortness of breath.    Cardiovascular: Positive for chest pain.   Gastrointestinal: Negative for abdominal pain.   All other systems reviewed and are negative.      Physical Exam     Patient Vitals for the past 24 hrs:   BP Temp Temp src Heart Rate Resp SpO2 Weight   07/24/19 0430 -- -- -- 59 -- 97 % --   07/24/19 0415 112/80 -- -- -- -- 95 % --   07/24/19 0345 129/86 -- -- 68 -- 97 % --   07/24/19 0340 145/86 97  F (36.1  C) Temporal 69 24 96 % 103 kg (227 lb)        Physical Exam  Nursing note and vitals reviewed.  Constitutional: Cooperative.   HENT:   Mouth/Throat: Mucous " membranes are normal.   Cardiovascular: Tachycardic rate, regular rhythm and normal heart sounds.  No murmur.  Pulmonary/Chest: Breath sounds normal. No respiratory distress. No wheezes. No rales. Hyperventilating.  Abdominal: Soft. Normal appearance and bowel sounds are normal. No distension. There is no tenderness. There is no rigidity and no guarding.   Musculoskeletal: No LE edema. Tenderness in left rib cage   Neurological: Alert.   Skin: Skin is warm. No rash noted. Diaphoretic  Psychiatric: Markedly anxious.     Emergency Department Course     ECG:  ECG taken at 0329, ECG read at 0335  Normal sinus rhythm  Nonspecific T wave abnormality  Rate 68 bpm. KS interval 144 ms. QRS duration 86 ms. QT/QTc 412/438 ms. P-R-T axes 33 17 41.    Imaging:  Radiology findings were communicated with the patient who voiced understanding of the findings.    Ribs XR, unilat 3 views + PA chest,  left  A few small patchy opacities, mostly linear in the left lower lung, may be atelectasis, scarring or minimal infiltrate. Lungs  otherwise clear. Normal heart size. No displaced left rib fracture is demonstrated. No pneumothorax.  Reading per radiology     Laboratory:  Laboratory findings were communicated with the patient who voiced understanding of the findings.    CBC: WBC 10.6, HGB 15.2,   BMP: glucose 148 (H) o/w WNL (Creatinine 0.63)    Troponin (Collected 0353): <0.015     Interventions:  0429 Ativan 1 mg  0429 oxycodone 10 mg     Emergency Department Course:    0353 IV was inserted and blood was drawn for laboratory testing, results above.     0408 Nursing notes and vitals reviewed.    0418 I performed an exam of the patient as documented above.     0441 The patient was sent for a XR while in the emergency department, results above.      0535 The patient is discharged to home.     Impression & Plan      Medical Decision Making:  Jazlyn Bartlett is a 53 year old female who presents to the emergency department today for  evaluation of left chest wall pain that is reproducible with movements and palpations. Her pain started while leaning on a chest freezer and is most consistent with a musculoskeletal source. chest xray is negative for pneumothorax or obvious rib fracture though cartilaginous injury is certainly still possible. She has no tenderness in her abdomen to be concerned for intraabdominal process. EKD and cardiac enzymes sent from triage were negative. After Ativan, her anxiety has improved.  Plan of care will be supportive with pain medications both transdermally and with opioids as well as primary care follow up.     Diagnosis:    ICD-10-CM    1. Injury of chest wall, initial encounter S29.9XXA      Disposition:   Findings and plan explained to the Patient. Patient discharged home with instructions regarding supportive care, medications, and reasons to return. The importance of close follow-up was reviewed.      Discharge Medications:     Review of your medicines         START taking      Dose / Directions   lidocaine 5 % patch  Commonly known as:  LIDODERM      Dose:  1 patch  Place 1 patch onto the skin every 24 hours for 10 days  Quantity:  10 patch  Refills:  0     oxyCODONE 5 MG tablet  Commonly known as:  ROXICODONE      Dose:  5-10 mg  Take 1-2 tablets (5-10 mg) by mouth every 4 hours as needed for pain  Quantity:  15 tablet  Refills:  0           Where to get your medicines      Some of these will need a paper prescription and others can be bought over the counter. Ask your nurse if you have questions.    Bring a paper prescription for each of these medications    lidocaine 5 % patch    oxyCODONE 5 MG tablet         Scribe Disclosure:  Alek MARTIN, am serving as a scribe at 4:13 AM on 7/24/2019 to document services personally performed by Lopez Trujillo MD based on my observations and the provider's statements to me.      [facility] EMERGENCY DEPARTMENT       Lopez Trujillo MD  07/24/19  [id number]

## 2019-07-24 NOTE — ED AVS SNAPSHOT
[facility] Emergency Department  [address]  [facility] [address]  Phone:  [phone]  Fax:  [phone]                                    Jazlyn Bartlett   MRN: [id number]    Department:  [facility] Emergency Department   Date of Visit:  7/24/2019           After Visit Summary Signature Page    I have received my discharge instructions, and my questions have been answered. I have discussed any challenges I see with this plan with the nurse or doctor.    ..........................................................................................................................................  Patient/Patient Representative Signature      ..........................................................................................................................................  Patient Representative Print Name and Relationship to Patient    ..................................................               ................................................  Date                                   Time    ..........................................................................................................................................  Reviewed by Signature/Title    ...................................................              ..............................................  Date                                               Time          22EPIC Rev 08/18

## 2019-07-24 NOTE — LETTER
July 25, 2019      To Whom It May Concern:      Jazlyn Bartlett was seen in our Emergency Department today, 07/25/19.  I expect her condition to improve over the next 2-3 days.  She may return to work when improved.    Sincerely,        Marianne Garcia, RN, BSN, PHN

## 2019-07-24 NOTE — ED TRIAGE NOTES
Sharp pain in left breast , heard a pop when leaning into freezer to get something, pain has continued to increase over last few days, tachypneic and tearful with pain ABC intact

## 2019-07-25 ENCOUNTER — APPOINTMENT (OUTPATIENT)
Dept: CT IMAGING | Facility: CLINIC | Age: 54
End: 2019-07-25
Attending: EMERGENCY MEDICINE
Payer: COMMERCIAL

## 2019-07-25 VITALS
HEART RATE: 68 BPM | DIASTOLIC BLOOD PRESSURE: 76 MMHG | HEIGHT: 60 IN | WEIGHT: 227 LBS | OXYGEN SATURATION: 94 % | TEMPERATURE: 96.8 F | BODY MASS INDEX: 44.57 KG/M2 | RESPIRATION RATE: 15 BRPM | SYSTOLIC BLOOD PRESSURE: 111 MMHG

## 2019-07-25 LAB
ALBUMIN SERPL-MCNC: 3.3 G/DL (ref 3.4–5)
ALP SERPL-CCNC: 92 U/L (ref 40–150)
ALT SERPL W P-5'-P-CCNC: 26 U/L (ref 0–50)
ANION GAP SERPL CALCULATED.3IONS-SCNC: 7 MMOL/L (ref 3–14)
AST SERPL W P-5'-P-CCNC: 22 U/L (ref 0–45)
BASOPHILS # BLD AUTO: 0 10E9/L (ref 0–0.2)
BASOPHILS NFR BLD AUTO: 0.2 %
BILIRUB SERPL-MCNC: 0.6 MG/DL (ref 0.2–1.3)
BUN SERPL-MCNC: 12 MG/DL (ref 7–30)
CALCIUM SERPL-MCNC: 8.9 MG/DL (ref 8.5–10.1)
CHLORIDE SERPL-SCNC: 105 MMOL/L (ref 94–109)
CO2 SERPL-SCNC: 26 MMOL/L (ref 20–32)
CREAT SERPL-MCNC: 0.72 MG/DL (ref 0.52–1.04)
DIFFERENTIAL METHOD BLD: NORMAL
EOSINOPHIL # BLD AUTO: 0.2 10E9/L (ref 0–0.7)
EOSINOPHIL NFR BLD AUTO: 2.4 %
ERYTHROCYTE [DISTWIDTH] IN BLOOD BY AUTOMATED COUNT: 13.2 % (ref 10–15)
GFR SERPL CREATININE-BSD FRML MDRD: >90 ML/MIN/{1.73_M2}
GLUCOSE SERPL-MCNC: 210 MG/DL (ref 70–99)
HCG SERPL QL: NEGATIVE
HCT VFR BLD AUTO: 44.6 % (ref 35–47)
HGB BLD-MCNC: 14.4 G/DL (ref 11.7–15.7)
IMM GRANULOCYTES # BLD: 0.1 10E9/L (ref 0–0.4)
IMM GRANULOCYTES NFR BLD: 0.6 %
INTERPRETATION ECG - MUSE: NORMAL
INTERPRETATION ECG - MUSE: NORMAL
LIPASE SERPL-CCNC: 145 U/L (ref 73–393)
LYMPHOCYTES # BLD AUTO: 2.2 10E9/L (ref 0.8–5.3)
LYMPHOCYTES NFR BLD AUTO: 22.3 %
MCH RBC QN AUTO: 30.1 PG (ref 26.5–33)
MCHC RBC AUTO-ENTMCNC: 32.3 G/DL (ref 31.5–36.5)
MCV RBC AUTO: 93 FL (ref 78–100)
MONOCYTES # BLD AUTO: 0.6 10E9/L (ref 0–1.3)
MONOCYTES NFR BLD AUTO: 6.3 %
NEUTROPHILS # BLD AUTO: 6.8 10E9/L (ref 1.6–8.3)
NEUTROPHILS NFR BLD AUTO: 68.2 %
NRBC # BLD AUTO: 0 10*3/UL
NRBC BLD AUTO-RTO: 0 /100
PLATELET # BLD AUTO: 299 10E9/L (ref 150–450)
POTASSIUM SERPL-SCNC: 3.4 MMOL/L (ref 3.4–5.3)
PROT SERPL-MCNC: 6.8 G/DL (ref 6.8–8.8)
RBC # BLD AUTO: 4.79 10E12/L (ref 3.8–5.2)
SODIUM SERPL-SCNC: 138 MMOL/L (ref 133–144)
TROPONIN I SERPL-MCNC: <0.015 UG/L (ref 0–0.04)
WBC # BLD AUTO: 9.9 10E9/L (ref 4–11)

## 2019-07-25 PROCEDURE — 25000125 ZZHC RX 250: Performed by: EMERGENCY MEDICINE

## 2019-07-25 PROCEDURE — 96374 THER/PROPH/DIAG INJ IV PUSH: CPT | Mod: 59

## 2019-07-25 PROCEDURE — 84703 CHORIONIC GONADOTROPIN ASSAY: CPT | Performed by: EMERGENCY MEDICINE

## 2019-07-25 PROCEDURE — 25000132 ZZH RX MED GY IP 250 OP 250 PS 637: Performed by: EMERGENCY MEDICINE

## 2019-07-25 PROCEDURE — 83690 ASSAY OF LIPASE: CPT | Performed by: EMERGENCY MEDICINE

## 2019-07-25 PROCEDURE — 71260 CT THORAX DX C+: CPT

## 2019-07-25 PROCEDURE — 84484 ASSAY OF TROPONIN QUANT: CPT | Performed by: EMERGENCY MEDICINE

## 2019-07-25 PROCEDURE — 80053 COMPREHEN METABOLIC PANEL: CPT | Performed by: EMERGENCY MEDICINE

## 2019-07-25 PROCEDURE — 25000128 H RX IP 250 OP 636: Performed by: EMERGENCY MEDICINE

## 2019-07-25 PROCEDURE — 74177 CT ABD & PELVIS W/CONTRAST: CPT

## 2019-07-25 PROCEDURE — 85025 COMPLETE CBC W/AUTO DIFF WBC: CPT | Performed by: EMERGENCY MEDICINE

## 2019-07-25 RX ORDER — IBUPROFEN 600 MG/1
600 TABLET, FILM COATED ORAL EVERY 6 HOURS PRN
Qty: 20 TABLET | Refills: 0 | Status: SHIPPED | OUTPATIENT
Start: 2019-07-25 | End: 2020-02-25

## 2019-07-25 RX ORDER — DIAZEPAM 2 MG
2 TABLET ORAL ONCE
Status: COMPLETED | OUTPATIENT
Start: 2019-07-25 | End: 2019-07-25

## 2019-07-25 RX ORDER — PREDNISONE 20 MG/1
40 TABLET ORAL DAILY
Qty: 8 TABLET | Refills: 0 | Status: SHIPPED | OUTPATIENT
Start: 2019-07-25 | End: 2019-09-06

## 2019-07-25 RX ORDER — KETOROLAC TROMETHAMINE 15 MG/ML
15 INJECTION, SOLUTION INTRAMUSCULAR; INTRAVENOUS ONCE
Status: COMPLETED | OUTPATIENT
Start: 2019-07-25 | End: 2019-07-25

## 2019-07-25 RX ORDER — DIAZEPAM 2 MG
2 TABLET ORAL EVERY 6 HOURS PRN
Qty: 6 TABLET | Refills: 0 | Status: SHIPPED | OUTPATIENT
Start: 2019-07-25 | End: 2019-09-06

## 2019-07-25 RX ORDER — IPRATROPIUM BROMIDE AND ALBUTEROL SULFATE 2.5; .5 MG/3ML; MG/3ML
3 SOLUTION RESPIRATORY (INHALATION)
Status: DISPENSED | OUTPATIENT
Start: 2019-07-25 | End: 2019-07-25

## 2019-07-25 RX ORDER — IOPAMIDOL 755 MG/ML
100 INJECTION, SOLUTION INTRAVASCULAR ONCE
Status: COMPLETED | OUTPATIENT
Start: 2019-07-25 | End: 2019-07-25

## 2019-07-25 RX ADMIN — SODIUM CHLORIDE 65 ML: 9 INJECTION, SOLUTION INTRAVENOUS at 00:49

## 2019-07-25 RX ADMIN — IOPAMIDOL 99 ML: 755 INJECTION, SOLUTION INTRAVENOUS at 00:49

## 2019-07-25 RX ADMIN — IPRATROPIUM BROMIDE AND ALBUTEROL SULFATE 3 ML: .5; 3 SOLUTION RESPIRATORY (INHALATION) at 00:15

## 2019-07-25 RX ADMIN — DIAZEPAM 2 MG: 2 TABLET ORAL at 00:18

## 2019-07-25 RX ADMIN — KETOROLAC TROMETHAMINE 15 MG: 15 INJECTION, SOLUTION INTRAMUSCULAR; INTRAVENOUS at 00:16

## 2019-07-25 RX ADMIN — IPRATROPIUM BROMIDE AND ALBUTEROL SULFATE 3 ML: .5; 3 SOLUTION RESPIRATORY (INHALATION) at 00:35

## 2019-07-25 ASSESSMENT — ENCOUNTER SYMPTOMS
NAUSEA: 0
COUGH: 0
VOMITING: 0
SHORTNESS OF BREATH: 1

## 2019-07-25 NOTE — TELEPHONE ENCOUNTER
After reviewing patients chart, patient is currently IP.  Will postpone this call.    STANISLAW LawsN, RN  [employer] [facility]

## 2019-07-25 NOTE — ED TRIAGE NOTES
Pt to ER with c/o pain under left breast pt had a known injury and was seen last night here for it no dx made , pt sent home with pain pills and lid patch pt states no relief from pain

## 2019-07-25 NOTE — TELEPHONE ENCOUNTER
Pt has not responded to [employer] message, will you please call pt and update phq 9.Kali BARROW, ROMERO

## 2019-07-25 NOTE — ED PROVIDER NOTES
"  History     Chief Complaint:    Rib Pain    HPI   Jazlyn Bartlett is a 53 year old female with history of chronic pain, COPD and DM who presents with rib pain. The patient reports that 4 days ago (7/20) she was reaching into a deep freezer when she heard a pop and a \"big pain\" under her left breast and ribcage. She notes that since then she has been experiencing increasing constant amounts of pain in his ribs that takes her breath away. She notes that every time she takes a breath or lays down she has shooting pain. The patient was evaluated earlier today with negative xray's for rib fracture and was discharged with an Oxycodone prescription. Tonight she took 1 Oxycodone 4 hours ago without significant relief of her pain. She denies nausea, vomiting, fever, or cough.     Allergies:  Ibuprofen   Vicodin      Medications:    Albuterol  Aspirin  Pulmicort Flexhaler  Lexapro  Glucotrol  Hydrodiuril  Atrovent  Duoneb  Nizoral  Synthroid/Levothroid  Lidoderm  Cozaar  Roxicodone  Zocor  Spiriva     Past Medical History:    Chronic pain   COPD    Diabetes mellitus - type 2    Hypertension   Hypothyroidism   Osteoarthritis  Hyperlipidemia   Lumbar radiculopathy  Kidney stone   Mucoepidermoid carcinoma of parotid gland   Anxiety  Neck mass  Depression  Chronic airway obstruction   Autoimmune gastritis   Urinary incontinence   Obesity   GERD    Past Surgical History:    Ablation endometrial thermal   Dilation and curettage hysteroscopy ablate endometrium novasure  EGD  Tonsillectomy  Removal of cancerous lump on neck    Family History:    Mother: diabetes, breast cancer  Father: diabetes, lung cancer, RA     Social History:  The patient was accompanied to the ED by .  Smoking Status: Current Every Day Smoker  Smokeless Tobacco: Never Used  Alcohol Use: Negative   Drug Use: Negative  PCP: Lokesh Casanova   Marital Status:        Review of Systems   Respiratory: Positive for shortness of breath (takes breath away " from pain). Negative for cough.    Cardiovascular: Positive for chest pain (rib pain).   Gastrointestinal: Negative for nausea and vomiting.   All other systems reviewed and are negative.    Physical Exam     Patient Vitals for the past 24 hrs:   BP Temp Temp src Pulse Heart Rate Resp SpO2 Height Weight   07/25/19 0145 111/76 -- -- 68 71 15 94 % -- --   07/25/19 0130 113/78 -- -- 77 76 19 92 % -- --   07/25/19 0115 121/82 -- -- 71 75 15 95 % -- --   07/25/19 0100 121/73 -- -- 69 71 16 90 % -- --   07/25/19 0045 -- -- -- -- 69 22 94 % -- --   07/25/19 0035 -- -- -- -- 67 22 94 % -- --   07/25/19 0030 -- -- -- 60 60 15 94 % -- --   07/25/19 0025 107/66 -- -- -- -- -- 95 % -- --   07/24/19 2305 (!) 109/93 96.8  F (36  C) Temporal 79 -- 22 98 % 1.524 m (5') 103 kg (227 lb)      Physical Exam  Nursing note and vitals reviewed.  Constitutional: Well nourished.   Eyes: Conjunctiva normal.  Pupils are equal, round, and reactive to light.   ENT: Nose normal. Mucous membranes pink and moist.    Neck: Normal range of motion.  CVS: Normal rate, regular rhythm.  Normal heart sounds.  No murmur.  Chest wall: Reproducible tenderness under L. Breast along anterior rib; no ecchymosis/crepitance  Pulmonary: Lungs with expiratory wheezing. Mild tachypnea  GI: Abdomen soft. Mild LUQ pain. No rigidity or guarding.    MSK: No calf tenderness or swelling.  Neuro: Alert. Follows simple commands.  Skin: Skin is warm and dry. No rash noted.   Psychiatric: Normal affect.       Emergency Department Course     ECG:  ECG taken at 2335, ECG read at 2335  Normal sinus rhythm  Normal ECG  Rate 66 bpm. SC interval 140 ms. QRS duration 92 ms. QT/QTc 414/434 ms. P-R-T axes 56 8 59.    Imaging:  Radiology findings were communicated with the patient who voiced understanding of the findings.    CT Chest/Abdomen/Pelvis w Contrast   Preliminary Result   IMPRESSION:   1. Minimal focal likely atelectasis in the right upper lobe   posteriorly. No other acute  abnormality.   2. Probable old fracture deformity of a right lower lateral rib. No   acute fracture or destructive bone lesion.   3. Stable left adrenal nodule compatible with a benign adenoma.       Reading per radiology    Laboratory:  Laboratory findings were communicated with the patient who voiced understanding of the findings.    CBC: WBC 9.9., HGB 14.4,   CMP: Glucose 210 (H), Albumin 3.3 (L) o/w WNL (Creatinine 0.72)  Lipase: 145  Troponin (Collected 0014): <0.015  HCG Qualitative: Negative      Interventions:  Medications   ipratropium - albuterol 0.5 mg/2.5 mg/3 mL (DUONEB) neb solution 3 mL (3 mLs Nebulization Given 7/25/19 0035)   diazepam (VALIUM) tablet 2 mg (2 mg Oral Given 7/25/19 0018)   ketorolac (TORADOL) injection 15 mg (15 mg Intravenous Given 7/25/19 0016)   0.9% sodium chloride BOLUS (0 mLs Intravenous Stopped 7/25/19 0050)   iopamidol (ISOVUE-370) solution 100 mL (99 mLs Intravenous Given 7/25/19 0049)      Emergency Department Course:    2335 IV was inserted and blood was drawn for laboratory testing, results above.     2359 Nursing notes and vitals reviewed. I performed an exam of the patient as documented above.     0014 IV was inserted and blood was drawn for laboratory testing, results above.     0048 The patient was sent for a CT while in the emergency department, results above.      0015  Patient reports feeling improvements    0126 Prior to discharge, I personally reviewed the imaging, lab, and EKG results with the patient and all related questions were answered. The patient verbalized understanding and is amenable to plan.     Impression & Plan      Medical Decision Making:  Jazlyn Bartlett is a 53 year old female who presents to the emergency department today for evaluation of rib pain. She was recently seen in the ED earlier today for similar complaint. I reviewed the patient's lab work as well as rib xray. She did undergo a formal CT chest and abdomen given her reproducible  pain on exam. There was no obvious rib fracture on the left where patient's pain was. Incidental older R. rib fracture as well as left adrenal nodule identified which was discussed with the patient and need for outpatient follow up. No obvious pneumonia or acute process. The patient's labs were reassuring. EKG without focal ischemia or underlying arrhythmia. She had a screening troponin drawn given presentation which was negative. I have a lower suspicion for ACS given chronicity of symptoms. Moreover I doubt PE given time line of patient's complaint that pain began after reported trauma to rib. The patient did have audible wheezing on exam. She denies any significant fever or purulent cough though I do suspect she is experiencing a mild COPD exacerbation as well at this point any time. She reported symptoms improvement after breathing treatment and valium. I have recommended given that she reported no relief after the Oxycodone stopping this medication and will discharge home with ibuprofen as well as Valium for breakthrough pain as I did discuss she has a possible rib contusion. She will also be discharged home with steroid burst given my concern for concomitant mild COPD exacerbation; she has albuterol at home. The patient is not hypoxic with ambulation and she reported symptom improvement during her time in the ED. Plan for close outpatient follow up, return precautions given.    Diagnosis:    ICD-10-CM    1. COPD exacerbation (H) J44.1    2. Chest wall pain R07.89      Disposition:   The patient is discharged to home.     Discharge Medications:    START taking      Dose / Directions   diazepam 2 MG tablet  Commonly known as:  VALIUM      Dose:  2 mg  Take 1 tablet (2 mg) by mouth every 6 hours as needed for pain  Quantity:  6 tablet  Refills:  0     ibuprofen 600 MG tablet  Commonly known as:  ADVIL/MOTRIN      Dose:  600 mg  Take 1 tablet (600 mg) by mouth every 6 hours as needed for moderate pain  Quantity:   20 tablet  Refills:  0     predniSONE 20 MG tablet  Commonly known as:  DELTASONE      Dose:  40 mg  Take 2 tablets (40 mg) by mouth daily for 4 days  Quantity:  8 tablet  Refills:  0        STOP taking    oxyCODONE 5 MG tablet  Commonly known as:  ROXICODONE              Where to get your medicines      Some of these will need a paper prescription and others can be bought over the counter. Ask your nurse if you have questions.    Bring a paper prescription for each of these medications    diazepam 2 MG tablet    ibuprofen 600 MG tablet    predniSONE 20 MG tablet       Scribe Disclosure:  I, Orla Severson, am serving as a scribe at 12:05 AM on 7/25/2019 to document services personally performed by Tatyana Sylvester DO based on my observations and the provider's statements to me.   [facility] EMERGENCY DEPARTMENT       Tatyana Sylvester DO  07/25/19 0319

## 2019-07-26 ENCOUNTER — OFFICE VISIT (OUTPATIENT)
Dept: FAMILY MEDICINE | Facility: CLINIC | Age: 54
End: 2019-07-26
Payer: COMMERCIAL

## 2019-07-26 VITALS
RESPIRATION RATE: 25 BRPM | HEART RATE: 96 BPM | DIASTOLIC BLOOD PRESSURE: 82 MMHG | WEIGHT: 221 LBS | SYSTOLIC BLOOD PRESSURE: 124 MMHG | BODY MASS INDEX: 43.39 KG/M2 | TEMPERATURE: 100.1 F | OXYGEN SATURATION: 93 % | HEIGHT: 60 IN

## 2019-07-26 DIAGNOSIS — R07.89 CHEST WALL PAIN: Primary | ICD-10-CM

## 2019-07-26 DIAGNOSIS — E11.9 TYPE 2 DIABETES MELLITUS WITHOUT COMPLICATION, WITHOUT LONG-TERM CURRENT USE OF INSULIN (H): ICD-10-CM

## 2019-07-26 DIAGNOSIS — J44.1 COPD EXACERBATION (H): ICD-10-CM

## 2019-07-26 LAB — HBA1C MFR BLD: 7.9 % (ref 0–5.6)

## 2019-07-26 PROCEDURE — 99214 OFFICE O/P EST MOD 30 MIN: CPT | Performed by: FAMILY MEDICINE

## 2019-07-26 PROCEDURE — 36415 COLL VENOUS BLD VENIPUNCTURE: CPT | Performed by: FAMILY MEDICINE

## 2019-07-26 PROCEDURE — 83036 HEMOGLOBIN GLYCOSYLATED A1C: CPT | Performed by: FAMILY MEDICINE

## 2019-07-26 RX ORDER — LIDOCAINE 50 MG/G
1 PATCH TOPICAL EVERY 24 HOURS
Qty: 10 PATCH | Refills: 0 | Status: SHIPPED | OUTPATIENT
Start: 2019-07-26 | End: 2019-09-06

## 2019-07-26 ASSESSMENT — MIFFLIN-ST. JEOR: SCORE: 1528.95

## 2019-07-26 ASSESSMENT — ANXIETY QUESTIONNAIRES
1. FEELING NERVOUS, ANXIOUS, OR ON EDGE: MORE THAN HALF THE DAYS
5. BEING SO RESTLESS THAT IT IS HARD TO SIT STILL: NOT AT ALL
6. BECOMING EASILY ANNOYED OR IRRITABLE: MORE THAN HALF THE DAYS
7. FEELING AFRAID AS IF SOMETHING AWFUL MIGHT HAPPEN: NOT AT ALL
IF YOU CHECKED OFF ANY PROBLEMS ON THIS QUESTIONNAIRE, HOW DIFFICULT HAVE THESE PROBLEMS MADE IT FOR YOU TO DO YOUR WORK, TAKE CARE OF THINGS AT HOME, OR GET ALONG WITH OTHER PEOPLE: NOT DIFFICULT AT ALL
3. WORRYING TOO MUCH ABOUT DIFFERENT THINGS: SEVERAL DAYS
2. NOT BEING ABLE TO STOP OR CONTROL WORRYING: NOT AT ALL
GAD7 TOTAL SCORE: 6

## 2019-07-26 ASSESSMENT — PATIENT HEALTH QUESTIONNAIRE - PHQ9
5. POOR APPETITE OR OVEREATING: SEVERAL DAYS
SUM OF ALL RESPONSES TO PHQ QUESTIONS 1-9: 5

## 2019-07-26 NOTE — LETTER
July 31, 2019      Jazlyn Bartlett  [address] [facility] [address]        Dear ,    I have tried several times to reach you by phone and left messages but have had no luck.  I am writing to inform you of your test results.    Your blood work shows a poor control of diabetes.  Hemoglobin A1c is 7.9.  I would like to see you back to discuss medication options.  Please  schedule an office visit with me in the next 1 to 3 weeks.    Resulted Orders   Hemoglobin A1c   Result Value Ref Range    Hemoglobin A1C 7.9 (H) 0 - 5.6 %      Comment:      Normal <5.7% Prediabetes 5.7-6.4%  Diabetes 6.5% or higher - adopted from ADA   consensus guidelines.         If you have any questions or concerns, please call the clinic at the number listed above.       Sincerely,        Lokesh Casanova MD

## 2019-07-26 NOTE — PROGRESS NOTES
Subjective     Jazlyn Bartlett is a 53 year old female who presents to clinic today for the following health issues:    HPI   ED/UC Followup:    Facility:  [facility] ED (x 2 visit in the same day)  Date of visit: 7/24/2019  Reason for visit: Chest pain and Rib pain, COPD exacerbation.  Current Status: feeling better      Patient reports that she had a chest wall contusion after she fell on the edge of the deep freezer, mall trying to pull something out of there.  ER records reviewed.  She reports significant improvement now.  She has missed a few days of work.  Denies any wheezing.  No shortness of breath now.  Reports of fatigue.  Overall feeling much better than when she went to the ER.  She tells that  certain movements are bothersome than others.  She is trying to take it easy as much as possible.      Patient is a history of type 2 diabetes.  Not been very compliant with checking blood glucose regularly.  Reports of compliance with medication use.  She is due for recheck on that.      Patient Active Problem List   Diagnosis     Neck mass     Moderate major depression (H)     Anxiety     Vitamin D deficiency disease     CARDIOVASCULAR SCREENING; LDL GOAL LESS THAN 100     GERD (gastroesophageal reflux disease)     Hypertension goal BP (blood pressure) < 140/80     Type 2 diabetes mellitus without complication (H)     Chronic airway obstruction (H)     Advanced directives, counseling/discussion     Autoimmune gastritis- repeat UGI 1/16 with gastritis without eosinophils     Urinary incontinence     Osteoarthritis of patellofemoral joint     Morbid obesity (H)     Tobacco abuse     Lumbar radiculopathy     Hyperlipidemia LDL goal <100     Hypothyroidism, unspecified type     Chronic obstructive pulmonary disease, unspecified COPD type (H)     Past Surgical History:   Procedure Laterality Date     ABLATION, ENDOMETRIAL, THERMAL, W/O HYSTEROSCOPIC GUIDANCE       DILATION AND CURETTAGE, HYSTEROSCOPY, ABLATE  ENDOMETRIUM NOVASURE, COMBINED  10/11/2012    Procedure: COMBINED DILATION AND CURETTAGE, HYSTEROSCOPY, ABLATE ENDOMETRIUM NOVASURE;  COMBINED DILATION AND CURETTAGE, HYSTEROSCOPY, ABLATE ENDOMETRIUM ,pelvic exam under anesthesia;  Surgeon: Shruti Barrios MD;  Location: [facility] OR     ESOPHAGOSCOPY, GASTROSCOPY, DUODENOSCOPY (EGD), COMBINED N/A 1/19/2016    Procedure: COMBINED ESOPHAGOSCOPY, GASTROSCOPY, DUODENOSCOPY (EGD), BIOPSY SINGLE OR MULTIPLE;  Surgeon: Kristofer Molina MD;  Location: [facility] GI     Removal of cancerous lump on neck       TONSILLECTOMY         Social History     Tobacco Use     Smoking status: Current Every Day Smoker     Packs/day: 0.50     Types: Cigarettes     Smokeless tobacco: Never Used   Substance Use Topics     Alcohol use: No     Alcohol/week: 0.0 oz     Family History   Problem Relation Age of Onset     Diabetes Mother      Breast Cancer Mother      Diabetes Father      Lung Cancer Father      Rheumatoid Arthritis Father          Current Outpatient Medications   Medication Sig Dispense Refill     albuterol (PROAIR HFA/PROVENTIL HFA/VENTOLIN HFA) 108 (90 Base) MCG/ACT inhaler Inhale 2 puffs into the lungs every 6 hours as needed for shortness of breath / dyspnea 18 g 5     aspirin 81 MG EC tablet Take 1 tablet (81 mg) by mouth daily 100 tablet 3     budesonide (PULMICORT FLEXHALER) 180 MCG/ACT inhaler Inhale 1 puff into the lungs 2 times daily 3 Inhaler 3     Cholecalciferol (VITAMIN D) 2000 UNITS tablet Take 2,000 Units by mouth daily 100 tablet 3     diazepam (VALIUM) 2 MG tablet Take 1 tablet (2 mg) by mouth every 6 hours as needed for pain 6 tablet 0     escitalopram (LEXAPRO) 10 MG tablet Take 1 tablet daily 30 tablet 0     glipiZIDE (GLUCOTROL XL) 5 MG 24 hr tablet Take 3 tablets (15 mg) by mouth daily 270 tablet 0     hydrochlorothiazide (HYDRODIURIL) 25 MG tablet TAKE 1 TABLET (25 MG) BY MOUTH DAILY 90 tablet 3     ibuprofen (ADVIL/MOTRIN) 600 MG tablet Take 1 tablet (600 mg) by  mouth every 6 hours as needed for moderate pain 20 tablet 0     ipratropium (ATROVENT) 0.06 % nasal spray Spray 2 sprays into both nostrils 2 times daily as needed for rhinitis 15 mL 3     ipratropium - albuterol 0.5 mg/2.5 mg/3 mL (DUONEB) 0.5-2.5 (3) MG/3ML neb solution Take 1 vial (3 mLs) by nebulization every 6 hours as needed for shortness of breath / dyspnea or wheezing 90 vial 3     ketoconazole (NIZORAL) 2 % cream Apply topically 2 times daily as needed for itching Apply to AA on the face bid when needed 15 g 3     levothyroxine (SYNTHROID/LEVOTHROID) 150 MCG tablet TAKE 1 TABLET (150 MCG) BY MOUTH DAILY 90 tablet 3     lidocaine (LIDODERM) 5 % patch Place 1 patch onto the skin every 24 hours 10 patch 0     losartan (COZAAR) 50 MG tablet Take 1 tablet (50 mg) by mouth daily 90 tablet 3     simvastatin (ZOCOR) 20 MG tablet TAKE 1 TABLET (20 MG) BY MOUTH AT BEDTIME 90 tablet 3     tiotropium (SPIRIVA) 18 MCG inhaled capsule Inhale 1 capsule (18 mcg) into the lungs daily 90 capsule 3     Allergies   Allergen Reactions     Ibuprofen Sodium Nausea and Vomiting     Vicodin [Hydrocodone-Acetaminophen] Nausea and Vomiting         Reviewed and updated as needed this visit by Provider         Review of Systems   ROS COMP: Constitutional, HEENT, cardiovascular, pulmonary, gi and gu systems are negative, except as otherwise noted.      Objective    /82   Pulse 96   Temp 100.1  F (37.8  C) (Tympanic)   Resp 25   Ht 1.524 m (5')   Wt 100.2 kg (221 lb)   SpO2 93%   BMI 43.16 kg/m    Body mass index is 43.16 kg/m .  Physical Exam   GENERAL: healthy, alert and no distress  NECK: no adenopathy, no asymmetry, masses, or scars and thyroid normal to palpation  RESP: lungs clear to auscultation - no rales, rhonchi or wheezes  CV: regular rate and rhythm, normal S1 S2, no S3 or S4, no murmur, click or rub, no peripheral edema and peripheral pulses strong  ABDOMEN: soft, nontender, no hepatosplenomegaly, no masses and  bowel sounds normal  MS: no gross musculoskeletal defects noted, no edema            Assessment & Plan     1. Chest wall pain  Recommending to use Lidoderm patch to the affected area on chest wall.  Hopefully will in the next few weeks symptoms should slowly resolve.  - lidocaine (LIDODERM) 5 % patch; Place 1 patch onto the skin every 24 hours  Dispense: 10 patch; Refill: 0    2. COPD exacerbation (H)  Resolved completely.    3. Type 2 diabetes mellitus without complication, without long-term current use of insulin (H)  Lab Results   Component Value Date    A1C 7.9 07/26/2019    A1C 7.8 05/03/2019    A1C 7.8 01/09/2019    A1C 7.2 06/19/2018    A1C 7.3 02/27/2018     Diabetic control is gradually getting worse.  I would recommend at this point to start Trulicity.  Nursing staff called the patient to have her schedule another appointment to review side effect profile and demonstrate the use of Trulicity.  It would be useful with her weight loss as well.  - Hemoglobin A1c     Tobacco Cessation:   reports that she has been smoking cigarettes.  She has been smoking about 0.50 packs per day. She has never used smokeless tobacco.      BMI:   Estimated body mass index is 43.16 kg/m  as calculated from the following:    Height as of this encounter: 1.524 m (5').    Weight as of this encounter: 100.2 kg (221 lb).     Lokesh Casanova MD  [facility] [place]

## 2019-07-27 ASSESSMENT — ANXIETY QUESTIONNAIRES: GAD7 TOTAL SCORE: 6

## 2019-07-29 ENCOUNTER — MYC MEDICAL ADVICE (OUTPATIENT)
Dept: FAMILY MEDICINE | Facility: CLINIC | Age: 54
End: 2019-07-29

## 2019-07-29 NOTE — RESULT ENCOUNTER NOTE
Please call the patient to notify patient that the blood work shows a poor control of diabetes.  Hemoglobin A1c is 7.9.  I would like to see her back to discuss medication options.  Please have her schedule an office visit with me in the next 1 to 3 weeks.    Lokesh Casanova MD

## 2019-07-29 NOTE — TELEPHONE ENCOUNTER
Pt called back and states she already signed the form before we called her.    Form is signed and printed off and put on Dr. Casanova's desk for completion.    Gosia BOTELLO RN  EP Triage

## 2019-07-29 NOTE — TELEPHONE ENCOUNTER
Please review [employer] message and advise. Thank you very much.     Piedad Drummond RN, BSN  [facility] [place]

## 2019-07-29 NOTE — TELEPHONE ENCOUNTER
Please see [employer] message and advise. Thank you.    Piedad Drummond RN, BSN  [facility] [place]

## 2019-07-30 NOTE — TELEPHONE ENCOUNTER
Form has been faxed to patient, mailed to home address and scanned to medical records. Mychart sent to patient.      .Gina LEWIS    [facility] [place]

## 2019-08-01 NOTE — TELEPHONE ENCOUNTER
PHQ-9 SCORE 1/21/2019 5/3/2019 7/26/2019   PHQ-9 Total Score - - -   PHQ-9 Total Score MyChart - - -   PHQ-9 Total Score 17 13 5     PHQ updated at OV.   Rosa Maria Daniel RN   [facility] - Triage

## 2019-08-08 ENCOUNTER — TELEPHONE (OUTPATIENT)
Dept: FAMILY MEDICINE | Facility: CLINIC | Age: 54
End: 2019-08-08

## 2019-08-08 NOTE — TELEPHONE ENCOUNTER
Reason for Call:  Other pt said she was told to call clinic when she received her 2nd dose of Shingrix. Pt got this done today at her [facility] where she picks up her refills.    Detailed comments: none    Phone Number Patient can be reached at: Cell number on file:    Telephone Information:   Mobile [phone]       Best Time: NA    Can we leave a detailed message on this number? YES    Call taken on 8/8/2019 at 5:59 PM by Akosua Galloway

## 2019-09-06 ENCOUNTER — OFFICE VISIT (OUTPATIENT)
Dept: FAMILY MEDICINE | Facility: CLINIC | Age: 54
End: 2019-09-06
Payer: COMMERCIAL

## 2019-09-06 VITALS
DIASTOLIC BLOOD PRESSURE: 80 MMHG | OXYGEN SATURATION: 96 % | SYSTOLIC BLOOD PRESSURE: 128 MMHG | BODY MASS INDEX: 42.58 KG/M2 | HEART RATE: 94 BPM | TEMPERATURE: 99.2 F | WEIGHT: 218 LBS

## 2019-09-06 DIAGNOSIS — J44.1 COPD EXACERBATION (H): Primary | ICD-10-CM

## 2019-09-06 PROCEDURE — 99214 OFFICE O/P EST MOD 30 MIN: CPT | Performed by: FAMILY MEDICINE

## 2019-09-06 RX ORDER — AZITHROMYCIN 250 MG/1
TABLET, FILM COATED ORAL
Qty: 6 TABLET | Refills: 0 | Status: SHIPPED | OUTPATIENT
Start: 2019-09-06 | End: 2019-09-11

## 2019-09-06 RX ORDER — PREDNISONE 20 MG/1
TABLET ORAL
Qty: 20 TABLET | Refills: 0 | Status: SHIPPED | OUTPATIENT
Start: 2019-09-06 | End: 2019-09-17

## 2019-09-06 ASSESSMENT — PATIENT HEALTH QUESTIONNAIRE - PHQ9
SUM OF ALL RESPONSES TO PHQ QUESTIONS 1-9: 10
5. POOR APPETITE OR OVEREATING: NOT AT ALL

## 2019-09-06 ASSESSMENT — ANXIETY QUESTIONNAIRES
2. NOT BEING ABLE TO STOP OR CONTROL WORRYING: NOT AT ALL
7. FEELING AFRAID AS IF SOMETHING AWFUL MIGHT HAPPEN: NOT AT ALL
6. BECOMING EASILY ANNOYED OR IRRITABLE: MORE THAN HALF THE DAYS
1. FEELING NERVOUS, ANXIOUS, OR ON EDGE: NOT AT ALL
GAD7 TOTAL SCORE: 2
3. WORRYING TOO MUCH ABOUT DIFFERENT THINGS: NOT AT ALL
5. BEING SO RESTLESS THAT IT IS HARD TO SIT STILL: NOT AT ALL

## 2019-09-06 NOTE — PROGRESS NOTES
Subjective     Jazlyn Bartlett is a 53 year old female who presents to clinic today for the following health issues:    HPI   Acute Illness   Acute illness concerns: cough  Onset: x one week    Fever: no    Chills/Sweats: YES    Headache (location?): YES    Sinus Pressure:YES    Conjunctivitis:  no    Ear Pain: no    Rhinorrhea: YES    Congestion: YES    Sore Throat: YES     Cough: YES    Wheeze: YES    Decreased Appetite: no    Nausea: no    Vomiting: YES    Diarrhea:  no    Dysuria/Freq.: no    Fatigue/Achiness: YES    Sick/Strep Exposure: no     Therapies Tried and outcome: nyquil, dayquil, halls, cough med, spirivia, inhaler and nebs 4 times per day    Patient has COPD.  Using all her medications compliantly.    Patient Active Problem List   Diagnosis     Neck mass     Moderate major depression (H)     Anxiety     Vitamin D deficiency disease     CARDIOVASCULAR SCREENING; LDL GOAL LESS THAN 100     GERD (gastroesophageal reflux disease)     Hypertension goal BP (blood pressure) < 140/80     Type 2 diabetes mellitus without complication (H)     Chronic airway obstruction (H)     Advanced directives, counseling/discussion     Autoimmune gastritis- repeat UGI 1/16 with gastritis without eosinophils     Urinary incontinence     Osteoarthritis of patellofemoral joint     Morbid obesity (H)     Tobacco abuse     Lumbar radiculopathy     Hyperlipidemia LDL goal <100     Hypothyroidism, unspecified type     Chronic obstructive pulmonary disease, unspecified COPD type (H)     Past Surgical History:   Procedure Laterality Date     ABLATION, ENDOMETRIAL, THERMAL, W/O HYSTEROSCOPIC GUIDANCE       DILATION AND CURETTAGE, HYSTEROSCOPY, ABLATE ENDOMETRIUM NOVASURE, COMBINED  10/11/2012    Procedure: COMBINED DILATION AND CURETTAGE, HYSTEROSCOPY, ABLATE ENDOMETRIUM NOVASURE;  COMBINED DILATION AND CURETTAGE, HYSTEROSCOPY, ABLATE ENDOMETRIUM ,pelvic exam under anesthesia;  Surgeon: Shruti Barrios MD;  Location:  OR      ESOPHAGOSCOPY, GASTROSCOPY, DUODENOSCOPY (EGD), COMBINED N/A 1/19/2016    Procedure: COMBINED ESOPHAGOSCOPY, GASTROSCOPY, DUODENOSCOPY (EGD), BIOPSY SINGLE OR MULTIPLE;  Surgeon: Kristofer Molina MD;  Location: [facility] GI     Removal of cancerous lump on neck       TONSILLECTOMY         Social History     Tobacco Use     Smoking status: Current Every Day Smoker     Packs/day: 0.50     Types: Cigarettes     Smokeless tobacco: Never Used   Substance Use Topics     Alcohol use: No     Alcohol/week: 0.0 oz     Family History   Problem Relation Age of Onset     Diabetes Mother      Breast Cancer Mother      Diabetes Father      Lung Cancer Father      Rheumatoid Arthritis Father          Current Outpatient Medications   Medication Sig Dispense Refill     albuterol (PROAIR HFA/PROVENTIL HFA/VENTOLIN HFA) 108 (90 Base) MCG/ACT inhaler Inhale 2 puffs into the lungs every 6 hours as needed for shortness of breath / dyspnea 18 g 5     aspirin 81 MG EC tablet Take 1 tablet (81 mg) by mouth daily 100 tablet 3     azithromycin (ZITHROMAX) 250 MG tablet Two tablets first day, then one tablet daily for four days. 6 tablet 0     budesonide (PULMICORT FLEXHALER) 180 MCG/ACT inhaler Inhale 1 puff into the lungs 2 times daily 3 Inhaler 3     Cholecalciferol (VITAMIN D) 2000 UNITS tablet Take 2,000 Units by mouth daily 100 tablet 3     escitalopram (LEXAPRO) 10 MG tablet Take 1 tablet daily 30 tablet 0     glipiZIDE (GLUCOTROL XL) 5 MG 24 hr tablet Take 3 tablets (15 mg) by mouth daily 270 tablet 0     hydrochlorothiazide (HYDRODIURIL) 25 MG tablet TAKE 1 TABLET (25 MG) BY MOUTH DAILY 90 tablet 3     ibuprofen (ADVIL/MOTRIN) 600 MG tablet Take 1 tablet (600 mg) by mouth every 6 hours as needed for moderate pain 20 tablet 0     ipratropium (ATROVENT) 0.06 % nasal spray Spray 2 sprays into both nostrils 2 times daily as needed for rhinitis 15 mL 3     ipratropium - albuterol 0.5 mg/2.5 mg/3 mL (DUONEB) 0.5-2.5 (3) MG/3ML neb solution Take 1  vial (3 mLs) by nebulization every 6 hours as needed for shortness of breath / dyspnea or wheezing 90 vial 3     ketoconazole (NIZORAL) 2 % cream Apply topically 2 times daily as needed for itching Apply to AA on the face bid when needed 15 g 3     levothyroxine (SYNTHROID/LEVOTHROID) 150 MCG tablet TAKE 1 TABLET (150 MCG) BY MOUTH DAILY 90 tablet 3     losartan (COZAAR) 50 MG tablet Take 1 tablet (50 mg) by mouth daily 90 tablet 3     order for DME Equipment being ordered: Nebulizer for home use 1 each 0     predniSONE (DELTASONE) 20 MG tablet Take 3 tabs by mouth daily x 3 days, then 2 tabs daily x 3 days, then 1 tab daily x 3 days, then 1/2 tab daily x 3 days. 20 tablet 0     simvastatin (ZOCOR) 20 MG tablet TAKE 1 TABLET (20 MG) BY MOUTH AT BEDTIME 90 tablet 3     tiotropium (SPIRIVA) 18 MCG inhaled capsule Inhale 1 capsule (18 mcg) into the lungs daily 90 capsule 3     Allergies   Allergen Reactions     Ibuprofen Sodium Nausea and Vomiting     Vicodin [Hydrocodone-Acetaminophen] Nausea and Vomiting         Reviewed and updated as needed this visit by Provider         Review of Systems   ROS COMP: INTEGUMENTARY/SKIN: NEGATIVE for worrisome rashes, moles or lesions  GI: NEGATIVE for nausea, abdominal pain, heartburn, or change in bowel habits      Objective    /80   Pulse 94   Temp 99.2  F (37.3  C) (Tympanic)   Wt 98.9 kg (218 lb)   SpO2 96%   BMI 42.58 kg/m    Body mass index is 42.58 kg/m .  Physical Exam   GENERAL: healthy, alert and no distress  HENT: ear canals and TM's normal, nose and mouth without ulcers or lesions  NECK: no adenopathy, no asymmetry, masses, or scars and thyroid normal to palpation  RESP: Bilateral diffuse rhonchi and wheezes.  CV: regular rate and rhythm, normal S1 S2  ABDOMEN: soft, nontender, no hepatosplenomegaly, no masses and bowel sounds normal            Assessment & Plan     1. COPD exacerbation (H)  Patient has symptoms of COPD exacerbation.  No hypoxia.   Recommending to resume all COPD medications as before.  She has a nebulizer machine at home but that is old and big in size.  Recommending to get a new one which is smaller in size that she could take at work to use as needed.  Tapering dose of prednisone and antibiotic ordered.  Recommending to use cough drops over-the-counter.  Increase hydration.  If in the next 3 or 4 days symptoms are not improving, instructed to notify us back.  If any worsening noted over the weekend, instructed to go to the emergency room.  - predniSONE (DELTASONE) 20 MG tablet; Take 3 tabs by mouth daily x 3 days, then 2 tabs daily x 3 days, then 1 tab daily x 3 days, then 1/2 tab daily x 3 days.  Dispense: 20 tablet; Refill: 0  - azithromycin (ZITHROMAX) 250 MG tablet; Two tablets first day, then one tablet daily for four days.  Dispense: 6 tablet; Refill: 0  - order for DME; Equipment being ordered: Nebulizer for home use  Dispense: 1 each; Refill: 0     Tobacco Cessation:   reports that she has been smoking cigarettes.  She has been smoking about 0.50 packs per day. She has never used smokeless tobacco.  Tobacco Cessation Action Plan: Information offered: Patient not interested at this time      BMI:   Estimated body mass index is 42.58 kg/m  as calculated from the following:    Height as of 7/26/19: 1.524 m (5').    Weight as of this encounter: 98.9 kg (218 lb).         Return in about 5 days (around 9/11/2019) for If symptoms are not improving.    Lokesh Casanova MD  [facility] [place]

## 2019-09-07 ASSESSMENT — ANXIETY QUESTIONNAIRES: GAD7 TOTAL SCORE: 2

## 2019-09-10 ENCOUNTER — TELEPHONE (OUTPATIENT)
Dept: FAMILY MEDICINE | Facility: CLINIC | Age: 54
End: 2019-09-10

## 2019-09-10 NOTE — TELEPHONE ENCOUNTER
Patient calling, states she was seen 9/6/19 for COPD exacerbation and told to call if not feeling better. States her congestion improved but she is still SOB especially with activity. She completed zpak, is taking duoneb QID, Spiriva, Flonase, albuterol. She is on the prednisone 40 mg daily part of her taper. She is wondering what the next steps are? Should she be seen again? Pharmacy pended, please advise.     9/6/19 OV note:     1. COPD exacerbation (H)  Patient has symptoms of COPD exacerbation.  No hypoxia.  Recommending to resume all COPD medications as before.  She has a nebulizer machine at home but that is old and big in size.  Recommending to get a new one which is smaller in size that she could take at work to use as needed.  Tapering dose of prednisone and antibiotic ordered.  Recommending to use cough drops over-the-counter.  Increase hydration.  If in the next 3 or 4 days symptoms are not improving, instructed to notify us back.  If any worsening noted over the weekend, instructed to go to the emergency room.  - predniSONE (DELTASONE) 20 MG tablet; Take 3 tabs by mouth daily x 3 days, then 2 tabs daily x 3 days, then 1 tab daily x 3 days, then 1/2 tab daily x 3 days.  Dispense: 20 tablet; Refill: 0  - azithromycin (ZITHROMAX) 250 MG tablet; Two tablets first day, then one tablet daily for four days.  Dispense: 6 tablet; Refill: 0  - order for DME; Equipment being ordered: Nebulizer for home use  Dispense: 1 each; Refill: 0    Triage to call with response [phone] okay to leave detailed message.     Rosa Maria Daniel RN   [facility] - Triage

## 2019-09-10 NOTE — TELEPHONE ENCOUNTER
Resume current treatment plan. Have her see me tomorrow. Add her to one of the 2 same day slots please.   I do not have any openings today.  If symptoms are worsening, she may need to see another provider or go to the urgent care center.    Lokesh Casanova MD,MD  [facility], [place]

## 2019-09-10 NOTE — TELEPHONE ENCOUNTER
Left detailed message informing of below. Advised there are two openings she can be scheduled in - 11am or 2:20pm tomorrow. Asked patient to call back and schedule in one of these spots.   Rosa Maria Daniel RN   [facility] - Triage

## 2019-09-11 ENCOUNTER — OFFICE VISIT (OUTPATIENT)
Dept: FAMILY MEDICINE | Facility: CLINIC | Age: 54
End: 2019-09-11
Payer: COMMERCIAL

## 2019-09-11 ENCOUNTER — ANCILLARY PROCEDURE (OUTPATIENT)
Dept: GENERAL RADIOLOGY | Facility: CLINIC | Age: 54
End: 2019-09-11
Attending: FAMILY MEDICINE
Payer: COMMERCIAL

## 2019-09-11 VITALS
TEMPERATURE: 97.9 F | HEIGHT: 60 IN | HEART RATE: 80 BPM | WEIGHT: 227 LBS | DIASTOLIC BLOOD PRESSURE: 80 MMHG | BODY MASS INDEX: 44.57 KG/M2 | OXYGEN SATURATION: 96 % | SYSTOLIC BLOOD PRESSURE: 124 MMHG

## 2019-09-11 DIAGNOSIS — R06.09 DOE (DYSPNEA ON EXERTION): ICD-10-CM

## 2019-09-11 DIAGNOSIS — J44.1 COPD EXACERBATION (H): ICD-10-CM

## 2019-09-11 DIAGNOSIS — J20.8 ACUTE BRONCHITIS DUE TO OTHER SPECIFIED ORGANISMS: ICD-10-CM

## 2019-09-11 DIAGNOSIS — E66.01 MORBID OBESITY (H): ICD-10-CM

## 2019-09-11 DIAGNOSIS — F17.200 SMOKER: ICD-10-CM

## 2019-09-11 DIAGNOSIS — E87.6 HYPOKALEMIA: ICD-10-CM

## 2019-09-11 DIAGNOSIS — J44.1 COPD EXACERBATION (H): Primary | ICD-10-CM

## 2019-09-11 LAB
ALBUMIN SERPL-MCNC: 3.6 G/DL (ref 3.4–5)
ALP SERPL-CCNC: 104 U/L (ref 40–150)
ALT SERPL W P-5'-P-CCNC: 25 U/L (ref 0–50)
ANION GAP SERPL CALCULATED.3IONS-SCNC: 8 MMOL/L (ref 3–14)
AST SERPL W P-5'-P-CCNC: 14 U/L (ref 0–45)
BASOPHILS # BLD AUTO: 0 10E9/L (ref 0–0.2)
BASOPHILS NFR BLD AUTO: 0.1 %
BILIRUB SERPL-MCNC: 0.6 MG/DL (ref 0.2–1.3)
BUN SERPL-MCNC: 15 MG/DL (ref 7–30)
CALCIUM SERPL-MCNC: 9.6 MG/DL (ref 8.5–10.1)
CHLORIDE SERPL-SCNC: 99 MMOL/L (ref 94–109)
CO2 SERPL-SCNC: 29 MMOL/L (ref 20–32)
CREAT SERPL-MCNC: 0.67 MG/DL (ref 0.52–1.04)
D DIMER PPP FEU-MCNC: 0.3 UG/ML FEU (ref 0–0.5)
DIFFERENTIAL METHOD BLD: ABNORMAL
EOSINOPHIL # BLD AUTO: 0 10E9/L (ref 0–0.7)
EOSINOPHIL NFR BLD AUTO: 0.2 %
ERYTHROCYTE [DISTWIDTH] IN BLOOD BY AUTOMATED COUNT: 13.8 % (ref 10–15)
GFR SERPL CREATININE-BSD FRML MDRD: >90 ML/MIN/{1.73_M2}
GLUCOSE SERPL-MCNC: 177 MG/DL (ref 70–99)
HCT VFR BLD AUTO: 44.2 % (ref 35–47)
HGB BLD-MCNC: 15.1 G/DL (ref 11.7–15.7)
LYMPHOCYTES # BLD AUTO: 2.7 10E9/L (ref 0.8–5.3)
LYMPHOCYTES NFR BLD AUTO: 16.4 %
MCH RBC QN AUTO: 30.7 PG (ref 26.5–33)
MCHC RBC AUTO-ENTMCNC: 34.2 G/DL (ref 31.5–36.5)
MCV RBC AUTO: 90 FL (ref 78–100)
MONOCYTES # BLD AUTO: 0.7 10E9/L (ref 0–1.3)
MONOCYTES NFR BLD AUTO: 4.3 %
NEUTROPHILS # BLD AUTO: 12.8 10E9/L (ref 1.6–8.3)
NEUTROPHILS NFR BLD AUTO: 79 %
NT-PROBNP SERPL-MCNC: 88 PG/ML (ref 0–125)
PLATELET # BLD AUTO: 368 10E9/L (ref 150–450)
POTASSIUM SERPL-SCNC: 3.2 MMOL/L (ref 3.4–5.3)
PROT SERPL-MCNC: 7.2 G/DL (ref 6.8–8.8)
RBC # BLD AUTO: 4.92 10E12/L (ref 3.8–5.2)
SODIUM SERPL-SCNC: 136 MMOL/L (ref 133–144)
WBC # BLD AUTO: 16.2 10E9/L (ref 4–11)

## 2019-09-11 PROCEDURE — 83880 ASSAY OF NATRIURETIC PEPTIDE: CPT | Performed by: FAMILY MEDICINE

## 2019-09-11 PROCEDURE — 71046 X-RAY EXAM CHEST 2 VIEWS: CPT | Mod: FY

## 2019-09-11 PROCEDURE — 99215 OFFICE O/P EST HI 40 MIN: CPT | Performed by: FAMILY MEDICINE

## 2019-09-11 PROCEDURE — 85025 COMPLETE CBC W/AUTO DIFF WBC: CPT | Performed by: FAMILY MEDICINE

## 2019-09-11 PROCEDURE — 85379 FIBRIN DEGRADATION QUANT: CPT | Performed by: FAMILY MEDICINE

## 2019-09-11 PROCEDURE — 80053 COMPREHEN METABOLIC PANEL: CPT | Performed by: FAMILY MEDICINE

## 2019-09-11 PROCEDURE — 36415 COLL VENOUS BLD VENIPUNCTURE: CPT | Performed by: FAMILY MEDICINE

## 2019-09-11 RX ORDER — DOXYCYCLINE 100 MG/1
100 CAPSULE ORAL 2 TIMES DAILY
Qty: 14 CAPSULE | Refills: 0 | Status: SHIPPED | OUTPATIENT
Start: 2019-09-11 | End: 2020-01-28

## 2019-09-11 ASSESSMENT — MIFFLIN-ST. JEOR: SCORE: 1556.17

## 2019-09-11 NOTE — PROGRESS NOTES
Subjective     Jazlyn Bartlett is a 53 year old female who presents to clinic today for the following health issues:    HPI   Acute Illness   Acute illness concerns: cough shortness of breath  Onset: 3 weeks    Fever: YES    Chills/Sweats: no    Headache (location?): YES    Sinus Pressure:no    Conjunctivitis:  no    Ear Pain: no    Rhinorrhea: no    Congestion: no    Sore Throat: no     Cough: YES - short of breath is winded very easily    Wheeze: YES    Decreased Appetite: no    Nausea: no    Vomiting: no    Diarrhea:  no    Dysuria/Freq.: no    Fatigue/Achiness: no    Sick/Strep Exposure: no     Therapies Tried and outcome: nebs, spirivia, pulmicort, albuterol as needed, tapering dose of steroids.  Already finished Z-Reynaldo.    Reports that shortness of breath worsens with exertion.  Denies any chest pain.  No diaphoresis.  Has not gone back to work due to her symptoms.        Patient Active Problem List   Diagnosis     Neck mass     Moderate major depression (H)     Anxiety     Vitamin D deficiency disease     CARDIOVASCULAR SCREENING; LDL GOAL LESS THAN 100     GERD (gastroesophageal reflux disease)     Hypertension goal BP (blood pressure) < 140/80     Type 2 diabetes mellitus without complication (H)     Chronic airway obstruction (H)     Advanced directives, counseling/discussion     Autoimmune gastritis- repeat UGI 1/16 with gastritis without eosinophils     Urinary incontinence     Osteoarthritis of patellofemoral joint     Morbid obesity (H)     Tobacco abuse     Lumbar radiculopathy     Hyperlipidemia LDL goal <100     Hypothyroidism, unspecified type     Chronic obstructive pulmonary disease, unspecified COPD type (H)     Past Surgical History:   Procedure Laterality Date     ABLATION, ENDOMETRIAL, THERMAL, W/O HYSTEROSCOPIC GUIDANCE       DILATION AND CURETTAGE, HYSTEROSCOPY, ABLATE ENDOMETRIUM NOVASURE, COMBINED  10/11/2012    Procedure: COMBINED DILATION AND CURETTAGE, HYSTEROSCOPY, ABLATE ENDOMETRIUM  NOVASURE;  COMBINED DILATION AND CURETTAGE, HYSTEROSCOPY, ABLATE ENDOMETRIUM ,pelvic exam under anesthesia;  Surgeon: Shruti Barrios MD;  Location: [facility] OR     ESOPHAGOSCOPY, GASTROSCOPY, DUODENOSCOPY (EGD), COMBINED N/A 1/19/2016    Procedure: COMBINED ESOPHAGOSCOPY, GASTROSCOPY, DUODENOSCOPY (EGD), BIOPSY SINGLE OR MULTIPLE;  Surgeon: Kristofer Molina MD;  Location: [facility]     Removal of cancerous lump on neck       TONSILLECTOMY         Social History     Tobacco Use     Smoking status: Current Every Day Smoker     Packs/day: 0.50     Types: Cigarettes     Smokeless tobacco: Never Used   Substance Use Topics     Alcohol use: No     Alcohol/week: 0.0 oz     Family History   Problem Relation Age of Onset     Diabetes Mother      Breast Cancer Mother      Diabetes Father      Lung Cancer Father      Rheumatoid Arthritis Father          Current Outpatient Medications   Medication Sig Dispense Refill     albuterol (PROAIR HFA/PROVENTIL HFA/VENTOLIN HFA) 108 (90 Base) MCG/ACT inhaler Inhale 2 puffs into the lungs every 6 hours as needed for shortness of breath / dyspnea 18 g 5     aspirin 81 MG EC tablet Take 1 tablet (81 mg) by mouth daily 100 tablet 3     budesonide (PULMICORT FLEXHALER) 180 MCG/ACT inhaler Inhale 1 puff into the lungs 2 times daily 3 Inhaler 3     Cholecalciferol (VITAMIN D) 2000 UNITS tablet Take 2,000 Units by mouth daily 100 tablet 3     doxycycline monohydrate (MONODOX) 100 MG capsule Take 1 capsule (100 mg) by mouth 2 times daily for 7 days 14 capsule 0     escitalopram (LEXAPRO) 10 MG tablet Take 1 tablet daily 30 tablet 0     glipiZIDE (GLUCOTROL XL) 5 MG 24 hr tablet Take 3 tablets (15 mg) by mouth daily 270 tablet 0     hydrochlorothiazide (HYDRODIURIL) 25 MG tablet TAKE 1 TABLET (25 MG) BY MOUTH DAILY 90 tablet 3     ibuprofen (ADVIL/MOTRIN) 600 MG tablet Take 1 tablet (600 mg) by mouth every 6 hours as needed for moderate pain 20 tablet 0     ipratropium (ATROVENT) 0.06 % nasal spray  Spray 2 sprays into both nostrils 2 times daily as needed for rhinitis 15 mL 3     ipratropium - albuterol 0.5 mg/2.5 mg/3 mL (DUONEB) 0.5-2.5 (3) MG/3ML neb solution Take 1 vial (3 mLs) by nebulization every 6 hours as needed for shortness of breath / dyspnea or wheezing 90 vial 3     ketoconazole (NIZORAL) 2 % cream Apply topically 2 times daily as needed for itching Apply to AA on the face bid when needed 15 g 3     levothyroxine (SYNTHROID/LEVOTHROID) 150 MCG tablet TAKE 1 TABLET (150 MCG) BY MOUTH DAILY 90 tablet 3     losartan (COZAAR) 50 MG tablet Take 1 tablet (50 mg) by mouth daily 90 tablet 3     order for DME Equipment being ordered: Nebulizer for home use 1 each 0     predniSONE (DELTASONE) 20 MG tablet Take 3 tabs by mouth daily x 3 days, then 2 tabs daily x 3 days, then 1 tab daily x 3 days, then 1/2 tab daily x 3 days. 20 tablet 0     simvastatin (ZOCOR) 20 MG tablet TAKE 1 TABLET (20 MG) BY MOUTH AT BEDTIME 90 tablet 3     tiotropium (SPIRIVA) 18 MCG inhaled capsule Inhale 1 capsule (18 mcg) into the lungs daily 90 capsule 3     Allergies   Allergen Reactions     Ibuprofen Sodium Nausea and Vomiting     Vicodin [Hydrocodone-Acetaminophen] Nausea and Vomiting         Reviewed and updated as needed this visit by Provider         Review of Systems   ROS COMP: CONSTITUTIONAL: NEGATIVE for fever, chills, change in weight  INTEGUMENTARY/SKIN: NEGATIVE for worrisome rashes, moles or lesions  GI: NEGATIVE for nausea, abdominal pain, heartburn, or change in bowel habits      Objective    /80   Pulse 80   Temp 97.9  F (36.6  C) (Tympanic)   Ht 1.524 m (5')   Wt 103 kg (227 lb)   SpO2 96%   BMI 44.33 kg/m    Body mass index is 44.33 kg/m .  Physical Exam   GENERAL: healthy, alert and no distress  HENT: ear canals and TM's normal, nose and mouth without ulcers or lesions  NECK: no adenopathy, no asymmetry, masses, or scars and thyroid normal to palpation  RESP: Bilateral rhonchi and shallow  breathing.  Off-and-on wheezing.  CV: regular rate and rhythm, normal S1 S2, no S3 or S4, no murmur, click or rub, no peripheral edema and peripheral pulses strong  ABDOMEN: soft, nontender, no hepatosplenomegaly, no masses and bowel sounds normal    Results for orders placed or performed in visit on 09/11/19   BNP-N terminal pro   Result Value Ref Range    N-Terminal Pro Bnp 88 0 - 125 pg/mL   D dimer, quantitative   Result Value Ref Range    D Dimer 0.3 0.0 - 0.50 ug/ml FEU   CBC with platelets differential   Result Value Ref Range    WBC 16.2 (H) 4.0 - 11.0 10e9/L    RBC Count 4.92 3.8 - 5.2 10e12/L    Hemoglobin 15.1 11.7 - 15.7 g/dL    Hematocrit 44.2 35.0 - 47.0 %    MCV 90 78 - 100 fl    MCH 30.7 26.5 - 33.0 pg    MCHC 34.2 31.5 - 36.5 g/dL    RDW 13.8 10.0 - 15.0 %    Platelet Count 368 150 - 450 10e9/L    Diff Method Automated Method     % Neutrophils 79.0 %    % Lymphocytes 16.4 %    % Monocytes 4.3 %    % Eosinophils 0.2 %    % Basophils 0.1 %    Absolute Neutrophil 12.8 (H) 1.6 - 8.3 10e9/L    Absolute Lymphocytes 2.7 0.8 - 5.3 10e9/L    Absolute Monocytes 0.7 0.0 - 1.3 10e9/L    Absolute Eosinophils 0.0 0.0 - 0.7 10e9/L    Absolute Basophils 0.0 0.0 - 0.2 10e9/L   Comprehensive metabolic panel   Result Value Ref Range    Sodium 136 133 - 144 mmol/L    Potassium 3.2 (L) 3.4 - 5.3 mmol/L    Chloride 99 94 - 109 mmol/L    Carbon Dioxide 29 20 - 32 mmol/L    Anion Gap 8 3 - 14 mmol/L    Glucose 177 (H) 70 - 99 mg/dL    Urea Nitrogen 15 7 - 30 mg/dL    Creatinine 0.67 0.52 - 1.04 mg/dL    GFR Estimate >90 >60 mL/min/[1.73_m2]    GFR Estimate If Black >90 >60 mL/min/[1.73_m2]    Calcium 9.6 8.5 - 10.1 mg/dL    Bilirubin Total 0.6 0.2 - 1.3 mg/dL    Albumin 3.6 3.4 - 5.0 g/dL    Protein Total 7.2 6.8 - 8.8 g/dL    Alkaline Phosphatase 104 40 - 150 U/L    ALT 25 0 - 50 U/L    AST 14 0 - 45 U/L             Assessment & Plan        ICD-10-CM    1. COPD exacerbation (H) J44.1 XR Chest 2 Views     doxycycline  monohydrate (MONODOX) 100 MG capsule   2. CALDWELL (dyspnea on exertion) R06.09 BNP-N terminal pro     D dimer, quantitative     CBC with platelets differential     Comprehensive metabolic panel   3. Acute bronchitis due to other specified organisms J20.8    4. Hypokalemia E87.6    5. Smoker F17.200    6. Morbid obesity (H) E66.01          1. COPD exacerbation (H)    - XR Chest 2 Views; ordered and reviewed.  Some signs of atelectasis noted.  Symptoms are likely due to bronchitis causing COPD exacerbation.  Patient recently finished a Z-Reynaldo course, starting the patient on doxycycline now.  Recommended to stay hydrated.  Finish the course of oral steroids.  Resume all COPD medications.  She is not hypoxic at this time.  If any worsening signs or symptoms noted, instructed to go to the ER.  Patient verbalized understanding and agreement  - doxycycline monohydrate (MONODOX) 100 MG capsule; Take 1 capsule (100 mg) by mouth 2 times daily for 7 days  Dispense: 14 capsule; Refill: 0    2. CALDWELL (dyspnea on exertion)  Patient reported of dyspnea on exertion.  BNP and d-dimer were ordered stat.  Both test were negative which rules out heart failure and blood clot.  Patient was provided with reassurance.  White cell count is elevated which could be a result of steroids.  Anyways the patient was put on doxycycline to cover for infections.  Hypokalemia noted as well for which she is instructed to increase diet rich in potassium and follow-up on that.  - BNP-N terminal pro  - D dimer, quantitative  - CBC with platelets differential  - Comprehensive metabolic panel     Tobacco Cessation:   reports that she has been smoking cigarettes.  She has been smoking about 0.50 packs per day. She has never used smokeless tobacco.        BMI:   Estimated body mass index is 44.33 kg/m  as calculated from the following:    Height as of this encounter: 1.524 m (5').    Weight as of this encounter: 103 kg (227 lb).       Total time spent was 40   minutes, more than half the time was spent in counseling the patient about the disease pathogenesis, treatment plan and coordinating care.    Lokesh Casanova MD  [facility] [place]

## 2019-09-11 NOTE — RESULT ENCOUNTER NOTE
Please call the patient to notify patient that the blood work is negative for d-dimer and BNP which ruled out blood clot and heart failure.    Potassium is low.  I want her to eat diet rich in potassium, so things like banana etc.  Her white cell count is high, which is a sign of infection, but while she is using steroids, oral steroids can also cause white cell count to go high.    She is put on doxycycline for her respiratory symptoms today.  Stay hydrated and finish the course of antibiotic.  If in the next few days symptoms are not improving, please contact us back.      Lokesh Casanova MD

## 2019-09-13 ENCOUNTER — TELEPHONE (OUTPATIENT)
Dept: FAMILY MEDICINE | Facility: CLINIC | Age: 54
End: 2019-09-13

## 2019-09-13 NOTE — TELEPHONE ENCOUNTER
Per Lenka a lab result letter from July 31st was sent to pt certified and we received the letter back.  Dr. Casanova wanted triage to call pt to see how she is doing with her diabetes.  Pt has been seen in clinic a couple of times but not for diabetes.  Per letter pt is to schedule an appt with Dr. Casanova to discuss medication options.    Non detailed message left for pt to return call to clinic and ask to speak with a triage nurse.    Gosia BOTELLO RN  EP Triage

## 2019-09-13 NOTE — TELEPHONE ENCOUNTER
Patient called back, per 7/26/19 lab result for A1C patient was to follow up in the clinic to discuss medication options with MD Casanova. Scheduled patient for this upcoming Tuesday with MD Casanova. Patient/parent verbalized understanding and agrees with plan.     Piedad Drummond RN, BSN  [facility] [place]

## 2019-09-16 ENCOUNTER — NURSE TRIAGE (OUTPATIENT)
Dept: FAMILY MEDICINE | Facility: CLINIC | Age: 54
End: 2019-09-16

## 2019-09-16 ENCOUNTER — MYC MEDICAL ADVICE (OUTPATIENT)
Dept: FAMILY MEDICINE | Facility: CLINIC | Age: 54
End: 2019-09-16

## 2019-09-16 NOTE — TELEPHONE ENCOUNTER
Please give me the form when they are faxed over to us.    Lokesh Casanova MD,MD  [facility], [place]

## 2019-09-16 NOTE — TELEPHONE ENCOUNTER
Please see [employer] message and advise. Thank you very much.    Routed to both Team 1 and PCP. Thanks.     Piedad Drummond RN, BSN  [facility] [place]

## 2019-09-16 NOTE — TELEPHONE ENCOUNTER
"Patient calling to report that she went back to work today and has been using her neb at work. This afternoon she had a coughing fit twice while nebbing and coughed up a quarter sized amount of mucous and about 1/4 was blood tinged. She denies any other symptoms at this time. She has been diagnosed with bronchitis and COPD exacerbation at most recent OV. Advised that she may have some irritation from coughing persistently and that some blood tinged mucous may be normal. Advised to push fluids, use a humidifier and continue to monitor. If > 1 tablespoon of blood or her symptoms worsen or persist she should be evaluated. Patient/ parent verbalized understanding and agrees with plan.      Additional Information    Negative: Severe difficulty breathing (e.g., struggling for each breath, speaks in single words)    Negative: [1] Chest pain AND [2] difficulty breathing    Negative: Bluish (or gray) lips or face now    Negative: Passed out (i.e., lost consciousness, collapsed and was not responding)    Negative: Shock suspected (e.g., cold/pale/clammy skin, too weak to stand, low BP, rapid pulse)    Negative: Difficult to awaken or acting confused (e.g., disoriented, slurred speech)    Negative: Recent chest injury (i.e., past 24 hours)    Negative: [1] Coughed up blood AND [2] large amount (example: \"a cup of blood\")    Negative: Sounds like a life-threatening emergency to the triager    Negative: Difficulty breathing    Negative: Chest pain    Negative: Unclear to triager if the patient is coughing up blood or vomiting blood    Negative: History of prior \"blood clot\" in leg or lungs (i.e., deep vein thrombosis, pulmonary embolism)    Negative: History of inherited increased risk of blood clots (e.g., Factor 5 Leiden, Anti-thrombin 3, Protein C or Protein S deficiency, Prothrombin mutation)    Negative: Pregnant or pregnant in past month    Negative: Hip or leg fracture in past 2 months (e.g., or had cast on leg or " "ankle)    Negative: Prolonged travel within the last month  (e.g., long bus trip or plane trip)    Negative: Bedridden (e.g., nursing home patient, CVA, chronic illness, recovering from surgery)    Negative: Patient sounds very sick or weak to the triager    Negative: [1] Coughed up blood AND [2] > 1 tablespoon (15 ml) (Exception: blood-tinged sputum)    Negative: Fever > 103 F (39.4 C)    Negative: [1] Fever > 101 F (38.3 C) AND [2] age > 60    Negative: [1] Fever > 100.0 F (37.8 C) AND [2] diabetes mellitus or weak immune system (e.g., HIV positive, cancer chemo, splenectomy, chronic steroids)    Few streaks of blood mixed in with yellow or green sputum (all other triage questions negative)    Negative: [1] Has underlying lung disease (e.g., COPD, chronic bronchitis or emphysema) AND    [2] sputum has turned yellow or green in color    Negative: Coughing up lorri-colored sputum    Negative: [1] Nasal discharge AND [2] present > 10 days    Negative: [1] Coughing up yellow or green sputum AND [2] present > 5 days    Negative: Fever present > 3 days (72 hours)    Negative: Taking Coumadin (warfarin) or other strong blood thinner, or known bleeding disorder (e.g., thrombocytopenia)    Negative: Cough has been present for > 3 weeks    Negative: [1] More than one episode of coughing up blood AND [2] < 1 tablespoon (15 ml) of pure red blood    Answer Assessment - Initial Assessment Questions  1. ONSET: \"When did you start coughing up blood?\"      This afternoon  2. SEVERITY: \"How many times?\" \"How much blood?\" (e.g., flecks, streaks, tablespoons, etc)      Twice when coughing up mucous after using her neb at work she had a small amount of red mucous present  3. COUGHING SPASMS: \"Did the blood appear after a coughing spell?\"        yes  4. RESPIRATORY DISTRESS: \"Describe your breathing.\"       Breathing is normal, speech not affected  5. FEVER: \"Do you have a fever?\" If so, ask: \"What is your temperature, how was it " "measured, and when did it start?\"      no  6. SPUTUM: \"Describe the color of your sputum\" (clear, white, yellow, green), \"Has there been any change recently?\"      Yellow/ clear with some blood streaking  8. LUNG HISTORY: \"Do you have any history of lung disease?\"  (e.g., pulmonary embolus, asthma, emphysema)      COPD  9. PE RISK FACTORS: \"Do you have a history of blood clots?\" (or: recent major surgery, recent prolonged travel, bedridden )      no  10. OTHER SYMPTOMS: \"Do you have any other symptoms?\" (e.g., nosebleed, chest pain, abdominal pain, vomiting)        no  11. PREGNANCY: \"Is there any chance you are pregnant?\" \"When was your last menstrual period?\"        no  12. TRAVEL: \"Have you traveled out of the country in the last month?\" (e.g., travel history, exposures)        *No Answer*    Protocols used: COUGHING UP BLOOD-INDIA Daniel RN   [facility]3] - Triage     "

## 2019-09-17 ENCOUNTER — OFFICE VISIT (OUTPATIENT)
Dept: FAMILY MEDICINE | Facility: CLINIC | Age: 54
End: 2019-09-17
Payer: COMMERCIAL

## 2019-09-17 VITALS
TEMPERATURE: 97.9 F | HEIGHT: 60 IN | DIASTOLIC BLOOD PRESSURE: 74 MMHG | WEIGHT: 222 LBS | OXYGEN SATURATION: 95 % | HEART RATE: 97 BPM | BODY MASS INDEX: 43.59 KG/M2 | SYSTOLIC BLOOD PRESSURE: 124 MMHG

## 2019-09-17 DIAGNOSIS — J44.1 COPD EXACERBATION (H): ICD-10-CM

## 2019-09-17 DIAGNOSIS — Z72.0 TOBACCO ABUSE: ICD-10-CM

## 2019-09-17 DIAGNOSIS — E11.9 TYPE 2 DIABETES MELLITUS WITHOUT COMPLICATION, WITHOUT LONG-TERM CURRENT USE OF INSULIN (H): Primary | ICD-10-CM

## 2019-09-17 PROCEDURE — 99214 OFFICE O/P EST MOD 30 MIN: CPT | Performed by: FAMILY MEDICINE

## 2019-09-17 RX ORDER — NICOTINE 21 MG/24HR
1 PATCH, TRANSDERMAL 24 HOURS TRANSDERMAL EVERY 24 HOURS
Qty: 30 PATCH | Refills: 0 | Status: SHIPPED | OUTPATIENT
Start: 2019-09-17 | End: 2020-01-28

## 2019-09-17 RX ORDER — GLIPIZIDE 10 MG/1
10 TABLET, FILM COATED, EXTENDED RELEASE ORAL 2 TIMES DAILY
Qty: 60 TABLET | Refills: 3 | Status: SHIPPED | OUTPATIENT
Start: 2019-09-17 | End: 2019-09-20

## 2019-09-17 ASSESSMENT — MIFFLIN-ST. JEOR: SCORE: 1528.49

## 2019-09-17 NOTE — PROGRESS NOTES
Subjective     Jazlyn Bartlett is a 54 year old female who presents to clinic today for the following health issues:    HPI   Test results  Diabetes Follow-up      How often are you checking your blood sugar? A few times a week    What time of day are you checking your blood sugars (select all that apply)?  After meals    Have you had any blood sugars above 200?  No    Have you had any blood sugars below 70?  No    What symptoms do you notice when your blood sugar is low?  None    What concerns do you have today about your diabetes? Other: not managed     Do you have any of these symptoms? (Select all that apply)  Excessive thirst and Blurry vision     Have you had a diabetic eye exam in the last 12 months? Yes- Date of last eye exam: 5/2019    BP Readings from Last 2 Encounters:   09/17/19 124/74   09/11/19 124/80     Hemoglobin A1C (%)   Date Value   07/26/2019 7.9 (H)   05/03/2019 7.8 (H)     LDL Cholesterol Calculated (mg/dL)   Date Value   05/03/2019 40   05/18/2017 65     LDL Cholesterol Direct (mg/dL)   Date Value   06/19/2018 61       Diabetes Management Resources      How many servings of fruits and vegetables do you eat daily?  2-3    On average, how many sweetened beverages do you drink each day (soda, juice, sweet tea, etc)?   3    How many days per week do you miss taking your medication? 0        Patient was recently seen twice for COPD exacerbation.  She still has breathing difficulties but is gradually improving.  She has finished oral steroids.  Now she is on inhaled steroids, which is Pulmicort twice a day, Spiriva once daily.  She is using duo nebs and albuterol.  She is finishing antibiotics which is doxycycline tomorrow.    She continues to smoke about 8 cigarettes a day.  Has not seen a pulmonologist in the past.    Patient Active Problem List   Diagnosis     Neck mass     Moderate major depression (H)     Anxiety     Vitamin D deficiency disease     CARDIOVASCULAR SCREENING; LDL GOAL LESS THAN  100     GERD (gastroesophageal reflux disease)     Hypertension goal BP (blood pressure) < 140/80     Type 2 diabetes mellitus without complication (H)     Chronic airway obstruction (H)     Advanced directives, counseling/discussion     Autoimmune gastritis- repeat UGI 1/16 with gastritis without eosinophils     Urinary incontinence     Osteoarthritis of patellofemoral joint     Morbid obesity (H)     Tobacco abuse     Lumbar radiculopathy     Hyperlipidemia LDL goal <100     Hypothyroidism, unspecified type     Chronic obstructive pulmonary disease, unspecified COPD type (H)     Past Surgical History:   Procedure Laterality Date     ABLATION, ENDOMETRIAL, THERMAL, W/O HYSTEROSCOPIC GUIDANCE       DILATION AND CURETTAGE, HYSTEROSCOPY, ABLATE ENDOMETRIUM NOVASURE, COMBINED  10/11/2012    Procedure: COMBINED DILATION AND CURETTAGE, HYSTEROSCOPY, ABLATE ENDOMETRIUM NOVASURE;  COMBINED DILATION AND CURETTAGE, HYSTEROSCOPY, ABLATE ENDOMETRIUM ,pelvic exam under anesthesia;  Surgeon: Shruti Barrios MD;  Location: [facility] OR     ESOPHAGOSCOPY, GASTROSCOPY, DUODENOSCOPY (EGD), COMBINED N/A 1/19/2016    Procedure: COMBINED ESOPHAGOSCOPY, GASTROSCOPY, DUODENOSCOPY (EGD), BIOPSY SINGLE OR MULTIPLE;  Surgeon: Kristofer Molina MD;  Location: [facility] GI     Removal of cancerous lump on neck       TONSILLECTOMY         Social History     Tobacco Use     Smoking status: Current Every Day Smoker     Packs/day: 0.50     Types: Cigarettes     Smokeless tobacco: Never Used   Substance Use Topics     Alcohol use: No     Alcohol/week: 0.0 oz     Family History   Problem Relation Age of Onset     Diabetes Mother      Breast Cancer Mother      Diabetes Father      Lung Cancer Father      Rheumatoid Arthritis Father          Current Outpatient Medications   Medication Sig Dispense Refill     albuterol (PROAIR HFA/PROVENTIL HFA/VENTOLIN HFA) 108 (90 Base) MCG/ACT inhaler Inhale 2 puffs into the lungs every 6 hours as needed for shortness of  breath / dyspnea 18 g 5     aspirin 81 MG EC tablet Take 1 tablet (81 mg) by mouth daily 100 tablet 3     budesonide (PULMICORT FLEXHALER) 180 MCG/ACT inhaler Inhale 1 puff into the lungs 2 times daily 3 Inhaler 3     Cholecalciferol (VITAMIN D) 2000 UNITS tablet Take 2,000 Units by mouth daily 100 tablet 3     doxycycline monohydrate (MONODOX) 100 MG capsule Take 1 capsule (100 mg) by mouth 2 times daily for 7 days 14 capsule 0     escitalopram (LEXAPRO) 10 MG tablet Take 1 tablet daily 30 tablet 0     glipiZIDE (GLUCOTROL XL) 10 MG 24 hr tablet Take 1 tablet (10 mg) by mouth 2 times daily 60 tablet 3     hydrochlorothiazide (HYDRODIURIL) 25 MG tablet TAKE 1 TABLET (25 MG) BY MOUTH DAILY 90 tablet 3     ibuprofen (ADVIL/MOTRIN) 600 MG tablet Take 1 tablet (600 mg) by mouth every 6 hours as needed for moderate pain 20 tablet 0     ipratropium (ATROVENT) 0.06 % nasal spray Spray 2 sprays into both nostrils 2 times daily as needed for rhinitis 15 mL 3     ipratropium - albuterol 0.5 mg/2.5 mg/3 mL (DUONEB) 0.5-2.5 (3) MG/3ML neb solution Take 1 vial (3 mLs) by nebulization every 6 hours as needed for shortness of breath / dyspnea or wheezing 90 vial 3     ketoconazole (NIZORAL) 2 % cream Apply topically 2 times daily as needed for itching Apply to AA on the face bid when needed 15 g 3     levothyroxine (SYNTHROID/LEVOTHROID) 150 MCG tablet TAKE 1 TABLET (150 MCG) BY MOUTH DAILY 90 tablet 3     losartan (COZAAR) 50 MG tablet Take 1 tablet (50 mg) by mouth daily 90 tablet 3     nicotine (NICODERM CQ) 14 MG/24HR 24 hr patch Place 1 patch onto the skin every 24 hours 30 patch 0     order for DME Equipment being ordered: Nebulizer for home use 1 each 0     simvastatin (ZOCOR) 20 MG tablet TAKE 1 TABLET (20 MG) BY MOUTH AT BEDTIME 90 tablet 3     tiotropium (SPIRIVA) 18 MCG inhaled capsule Inhale 1 capsule (18 mcg) into the lungs daily 90 capsule 3     Allergies   Allergen Reactions     Ibuprofen Sodium Nausea and Vomiting      Vicodin [Hydrocodone-Acetaminophen] Nausea and Vomiting         Reviewed and updated as needed this visit by Provider         Review of Systems   ROS COMP: CONSTITUTIONAL: NEGATIVE for fever, chills, change in weight  INTEGUMENTARY/SKIN: NEGATIVE for worrisome rashes, moles or lesions  ENT/MOUTH: NEGATIVE for ear, mouth and throat problems  GI: NEGATIVE for nausea, abdominal pain, heartburn, or change in bowel habits      Objective    /74   Pulse 97   Temp 97.9  F (36.6  C) (Tympanic)   Ht 1.524 m (5')   Wt 100.7 kg (222 lb)   SpO2 95%   BMI 43.36 kg/m    Body mass index is 43.36 kg/m .  Physical Exam   GENERAL: healthy, alert and no distress  NECK: no adenopathy, no asymmetry, masses, or scars and thyroid normal to palpation  RESP: Diffuse occasional wheeze and rhonchi.  CV: regular rate and rhythm, normal S1 S2,  ABDOMEN: soft, nontender, no hepatosplenomegaly, no masses and bowel sounds normal  MS: no gross musculoskeletal defects noted, no edema            Assessment & Plan     1. Type 2 diabetes mellitus without complication, without long-term current use of insulin (H)    Diabetes not well controlled.  Recommending to increase the dose of glipizide from 5 mg in the morning and 10 mg at p.m. to 10 mg twice daily.  Recheck hemoglobin A1c in 2 months.    - glipiZIDE (GLUCOTROL XL) 10 MG 24 hr tablet; Take 1 tablet (10 mg) by mouth 2 times daily  Dispense: 60 tablet; Refill: 3    2. COPD exacerbation (H)  Patient is not hypoxic.  She is maxed out on medication management at this time.  Recommending to quit smoking immediately.  Nicotine patch is ordered for her.  Recommending to see pulmonology.  Her disability paperwork were filled earlier today for the work leave she took due to her COPD exacerbation.  - PULMONARY MEDICINE REFERRAL    3. Tobacco abuse    - nicotine (NICODERM CQ) 14 MG/24HR 24 hr patch; Place 1 patch onto the skin every 24 hours  Dispense: 30 patch; Refill: 0     Tobacco  Cessation:   reports that she has been smoking cigarettes.  She has been smoking about 0.50 packs per day. She has never used smokeless tobacco.  Tobacco Cessation Action Plan: Pharmacotherapies : Nicotine patch      BMI:   Estimated body mass index is 43.36 kg/m  as calculated from the following:    Height as of this encounter: 1.524 m (5').    Weight as of this encounter: 100.7 kg (222 lb).   Weight management plan: Discussed healthy diet and exercise guidelines        Return in about 2 months (around 11/17/2019) for Diabetes Recheck.    Lokesh Casanova MD  [facility] [place]

## 2019-09-17 NOTE — TELEPHONE ENCOUNTER
Printed from [employer] and placed in provider in-basket.      .Gina LEWIS    [facility] [place]

## 2019-09-17 NOTE — LETTER
[facility] [place]          [address] [place] [address]                            [phone]  Fax: [phone]    Jazlyn Bartlett  [address] [place] [id number]    [id number]    September 17, 2019      To whom it may concern    Jazlyn Bartlett was seen for evaluation today.      If you have any other questions or concerns please feel free to contact me at anytime.        Sincerely,        Edilberto Campa M.D.

## 2019-09-17 NOTE — TELEPHONE ENCOUNTER
Patients form has been completed, faxed to number on form and emailed. Form has been scanned to medical records.      .Gina LEWIS    [facility] [place]

## 2019-09-20 DIAGNOSIS — E11.9 TYPE 2 DIABETES MELLITUS WITHOUT COMPLICATION, WITHOUT LONG-TERM CURRENT USE OF INSULIN (H): ICD-10-CM

## 2019-09-20 RX ORDER — GLIPIZIDE 10 MG/1
10 TABLET, FILM COATED, EXTENDED RELEASE ORAL 2 TIMES DAILY
Qty: 90 TABLET | Refills: 0 | Status: SHIPPED | OUTPATIENT
Start: 2019-09-20 | End: 2019-11-20

## 2019-09-20 NOTE — TELEPHONE ENCOUNTER
"Requested Prescriptions   Pending Prescriptions Disp Refills     glipiZIDE (GLUCOTROL XL) 10 MG 24 hr tablet 60 tablet 3     Sig: Take 1 tablet (10 mg) by mouth 2 times daily  Requesting a 90-day Supply.  Last Written Prescription Date:  09/17/2019  Last Fill Quantity: 60 tablet,  # refills: 3   Last Office Visit: 9/17/2019 Edilberto  Future Office Visit:            Sulfonylurea Agents Passed - 9/20/2019  3:48 PM        Passed - Blood pressure less than 140/90 in past 6 months     BP Readings from Last 3 Encounters:   09/17/19 124/74   09/11/19 124/80   09/06/19 128/80                 Passed - Patient has documented LDL within the past 12 mos.     Recent Labs   Lab Test 05/03/19  1152   LDL 40             Passed - Patient has had a Microalbumin in the past 15 mos.     Recent Labs   Lab Test 05/03/19  1150   MICROL 11   UMALCR 5.29             Passed - Patient has documented A1c within the specified period of time.     If HgbA1C is 8 or greater, it needs to be on file within the past 3 months.  If less than 8, must be on file within the past 6 months.     Recent Labs   Lab Test 07/26/19  1525   A1C 7.9*             Passed - Medication is active on med list        Passed - Patient is age 18 or older        Passed - No active pregnancy on record        Passed - Patient has a recent creatinine (normal) within the past 12 mos.     Recent Labs   Lab Test 09/11/19  1137   CR 0.67             Passed - Patient has not had a positive pregnancy test within the past 12 mos.        Passed - Recent (6 mo) or future (30 days) visit within the authorizing provider's specialty     Patient had office visit in the last 6 months or has a visit in the next 30 days with authorizing provider or within the authorizing provider's specialty.  See \"Patient Info\" tab in inbasket, or \"Choose Columns\" in Meds & Orders section of the refill encounter.            "

## 2019-09-20 NOTE — TELEPHONE ENCOUNTER
Prescription approved per [facility] [place] Protocol.    Piedad Drummond RN, BSN  [facility] [place]

## 2019-09-27 ENCOUNTER — MYC MEDICAL ADVICE (OUTPATIENT)
Dept: FAMILY MEDICINE | Facility: CLINIC | Age: 54
End: 2019-09-27

## 2019-09-27 NOTE — TELEPHONE ENCOUNTER
Please see [employer] message and advise. Thank you very much.    Piedad Drummond RN, BSN  [place]

## 2019-09-27 NOTE — TELEPHONE ENCOUNTER
New Disability form printed from patients chart and previous form. Forms have been placed in pcp inbasket.    .Gina LEWIS    [facility] [place]

## 2019-10-14 DIAGNOSIS — E11.9 TYPE 2 DIABETES MELLITUS WITHOUT COMPLICATION, WITHOUT LONG-TERM CURRENT USE OF INSULIN (H): ICD-10-CM

## 2019-10-14 RX ORDER — GLIPIZIDE 5 MG/1
15 TABLET, FILM COATED, EXTENDED RELEASE ORAL DAILY
Qty: 270 TABLET | Refills: 0 | OUTPATIENT
Start: 2019-10-14

## 2019-10-14 NOTE — TELEPHONE ENCOUNTER
"Requested Prescriptions   Pending Prescriptions Disp Refills     glipiZIDE (GLUCOTROL XL) 5 MG 24 hr tablet [Pharmacy Med Name: GLIPIZIDE ER 5 MG TABLET] 270 tablet 0     Sig: TAKE 3 TABLETS (15 MG) BY MOUTH DAILY  Last Written Prescription Date:  9/20/19  Last Fill Quantity: 90,  # refills: 0   Last office visit: 9/17/2019 with prescribing provider:  Edilberto   Future Office Visit:           Sulfonylurea Agents Passed - 10/14/2019 10:32 AM        Passed - Blood pressure less than 140/90 in past 6 months     BP Readings from Last 3 Encounters:   09/17/19 124/74   09/11/19 124/80   09/06/19 128/80                 Passed - Patient has documented LDL within the past 12 mos.     Recent Labs   Lab Test 05/03/19  1152   LDL 40             Passed - Patient has had a Microalbumin in the past 15 mos.     Recent Labs   Lab Test 05/03/19  1150   MICROL 11   UMALCR 5.29             Passed - Patient has documented A1c within the specified period of time.     If HgbA1C is 8 or greater, it needs to be on file within the past 3 months.  If less than 8, must be on file within the past 6 months.     Recent Labs   Lab Test 07/26/19  1525   A1C 7.9*             Passed - Medication is active on med list        Passed - Patient is age 18 or older        Passed - No active pregnancy on record        Passed - Patient has a recent creatinine (normal) within the past 12 mos.     Recent Labs   Lab Test 09/11/19  1137   CR 0.67             Passed - Patient has not had a positive pregnancy test within the past 12 mos.        Passed - Recent (6 mo) or future (30 days) visit within the authorizing provider's specialty     Patient had office visit in the last 6 months or has a visit in the next 30 days with authorizing provider or within the authorizing provider's specialty.  See \"Patient Info\" tab in inbasket, or \"Choose Columns\" in Meds & Orders section of the refill encounter.              "

## 2019-11-04 ENCOUNTER — HEALTH MAINTENANCE LETTER (OUTPATIENT)
Age: 54
End: 2019-11-04

## 2019-11-06 ENCOUNTER — TELEPHONE (OUTPATIENT)
Dept: FAMILY MEDICINE | Facility: CLINIC | Age: 54
End: 2019-11-06

## 2019-11-06 NOTE — TELEPHONE ENCOUNTER
[facility] calling to clarify current dose of glipizide XL.  Patient states that she was taking 2 in the morning and 1 in the evening.  Reviewed LOV note:  See dosage change below. [facility] states that she will clarify with the patient that her glipizide dose was increased to 10 mg twice a day. Will inform that tablet size was increased from 5 mg to 10 mg tablets, so only 1 tablet twice a day.  Ana Cortes RN    1. Type 2 diabetes mellitus without complication, without long-term current use of insulin (H)     Diabetes not well controlled.  Recommending to increase the dose of glipizide from 5 mg in the morning and 10 mg at p.m. to 10 mg twice daily.  Recheck hemoglobin A1c in 2 months.     - glipiZIDE (GLUCOTROL XL) 10 MG 24 hr tablet; Take 1 tablet (10 mg) by mouth 2 times daily  Dispense: 60 tablet; Refill: 3

## 2019-11-19 DIAGNOSIS — E11.9 TYPE 2 DIABETES MELLITUS WITHOUT COMPLICATION, WITHOUT LONG-TERM CURRENT USE OF INSULIN (H): ICD-10-CM

## 2019-11-19 NOTE — LETTER
November 22, 2019      Jazlyn Bartlett  [address] [facility] [id number]        Dear Jazlyn,         Our records indicates that it is time for you to be seen for an office visit with Dr. Casanova.    Please call our office at [phone] to schedule an appointment for a diabetes follow up .     Please disregard this notice if you have already made an appointment.    If you are no longer a [facility] patient; Please contact us and let us know that as well. You will also need to the let the pharmacy know the name of your current Provider so that they can send future request to them.       Sincerely,    [facility] [place] [facility]

## 2019-11-19 NOTE — TELEPHONE ENCOUNTER
"Requested Prescriptions   Pending Prescriptions Disp Refills     glipiZIDE (GLUCOTROL XL) 10 MG 24 hr tablet 90 tablet 0     Sig: Take 1 tablet (10 mg) by mouth 2 times daily  Last Written Prescription Date:  9/20/19  Last Fill Quantity: 90,  # refills: 0   Last office visit: 9/17/2019 with prescribing provider:  Edilberto   Future Office Visit:           Sulfonylurea Agents Passed - 11/19/2019  5:33 PM        Passed - Blood pressure less than 140/90 in past 6 months     BP Readings from Last 3 Encounters:   09/17/19 124/74   09/11/19 124/80   09/06/19 128/80                 Passed - Patient has documented LDL within the past 12 mos.     Recent Labs   Lab Test 05/03/19  1152   LDL 40             Passed - Patient has had a Microalbumin in the past 15 mos.     Recent Labs   Lab Test 05/03/19  1150   MICROL 11   UMALCR 5.29             Passed - Patient has documented A1c within the specified period of time.     If HgbA1C is 8 or greater, it needs to be on file within the past 3 months.  If less than 8, must be on file within the past 6 months.     Recent Labs   Lab Test 07/26/19  1525   A1C 7.9*             Passed - Medication is active on med list        Passed - Patient is age 18 or older        Passed - No active pregnancy on record        Passed - Patient has a recent creatinine (normal) within the past 12 mos.     Recent Labs   Lab Test 09/11/19  1137   CR 0.67             Passed - Patient has not had a positive pregnancy test within the past 12 mos.        Passed - Recent (6 mo) or future (30 days) visit within the authorizing provider's specialty     Patient had office visit in the last 6 months or has a visit in the next 30 days with authorizing provider or within the authorizing provider's specialty.  See \"Patient Info\" tab in inbasket, or \"Choose Columns\" in Meds & Orders section of the refill encounter.              "

## 2019-11-20 RX ORDER — GLIPIZIDE 10 MG/1
10 TABLET, FILM COATED, EXTENDED RELEASE ORAL 2 TIMES DAILY
Qty: 60 TABLET | Refills: 0 | Status: SHIPPED | OUTPATIENT
Start: 2019-11-20 | End: 2019-12-06

## 2019-11-20 NOTE — TELEPHONE ENCOUNTER
30 day supply given.  Patient is due for an OV.  Please call and assist with scheduling appointment prior to next refill     Gosia BOTELLO RN  EP Triage

## 2019-12-04 ENCOUNTER — TELEPHONE (OUTPATIENT)
Dept: FAMILY MEDICINE | Facility: CLINIC | Age: 54
End: 2019-12-04

## 2019-12-04 DIAGNOSIS — E11.9 TYPE 2 DIABETES MELLITUS WITHOUT COMPLICATION, WITHOUT LONG-TERM CURRENT USE OF INSULIN (H): ICD-10-CM

## 2019-12-04 NOTE — TELEPHONE ENCOUNTER
Insurance only covers 90 day supply pt. Is out of her med for glipiZIDE (GLUCOTROL XL) 10 MG 24 hr tablet

## 2019-12-05 DIAGNOSIS — E11.9 TYPE 2 DIABETES MELLITUS WITHOUT COMPLICATION, WITHOUT LONG-TERM CURRENT USE OF INSULIN (H): ICD-10-CM

## 2019-12-06 RX ORDER — GLIPIZIDE 10 MG/1
10 TABLET, FILM COATED, EXTENDED RELEASE ORAL 2 TIMES DAILY
Qty: 180 TABLET | Refills: 0 | Status: SHIPPED | OUTPATIENT
Start: 2019-12-06 | End: 2020-02-25

## 2019-12-06 NOTE — TELEPHONE ENCOUNTER
Routing refill request to provider for review/approval because:  Sandra given x1 and patient did not follow up, please advise   Patient needs to be seen because:  Per LOV notes from 9/17/19: Return in about 2 months (around 11/17/2019) for Diabetes Recheck.     STANISLAW LawsN, RN  [facility] T[facility]

## 2019-12-06 NOTE — TELEPHONE ENCOUNTER
"Requested Prescriptions   Pending Prescriptions Disp Refills     glipiZIDE (GLUCOTROL XL) 10 MG 24 hr tablet 60 tablet 0     Sig: Take 1 tablet (10 mg) by mouth 2 times daily   Last Written Prescription Date:  11/20/19  Last Fill Quantity: 60,  # refills: 0   Last office visit: 9/17/2019 with prescribing provider:  Edilberto    Future Office Visit:        Sulfonylurea Agents Passed - 12/5/2019  2:50 PM        Passed - Blood pressure less than 140/90 in past 6 months     BP Readings from Last 3 Encounters:   09/17/19 124/74   09/11/19 124/80   09/06/19 128/80                 Passed - Patient has documented LDL within the past 12 mos.     Recent Labs   Lab Test 05/03/19  1152   LDL 40             Passed - Patient has had a Microalbumin in the past 15 mos.     Recent Labs   Lab Test 05/03/19  1150   MICROL 11   UMALCR 5.29             Passed - Patient has documented A1c within the specified period of time.     If HgbA1C is 8 or greater, it needs to be on file within the past 3 months.  If less than 8, must be on file within the past 6 months.     Recent Labs   Lab Test 07/26/19  1525   A1C 7.9*             Passed - Medication is active on med list        Passed - Patient is age 18 or older        Passed - No active pregnancy on record        Passed - Patient has a recent creatinine (normal) within the past 12 mos.     Recent Labs   Lab Test 09/11/19  1137   CR 0.67             Passed - Patient has not had a positive pregnancy test within the past 12 mos.        Passed - Recent (6 mo) or future (30 days) visit within the authorizing provider's specialty     Patient had office visit in the last 6 months or has a visit in the next 30 days with authorizing provider or within the authorizing provider's specialty.  See \"Patient Info\" tab in inbasket, or \"Choose Columns\" in Meds & Orders section of the refill encounter.              "

## 2019-12-07 ENCOUNTER — ANCILLARY PROCEDURE (OUTPATIENT)
Dept: GENERAL RADIOLOGY | Facility: CLINIC | Age: 54
End: 2019-12-07
Attending: FAMILY MEDICINE
Payer: COMMERCIAL

## 2019-12-07 ENCOUNTER — OFFICE VISIT (OUTPATIENT)
Dept: URGENT CARE | Facility: URGENT CARE | Age: 54
End: 2019-12-07
Payer: COMMERCIAL

## 2019-12-07 VITALS
HEART RATE: 85 BPM | DIASTOLIC BLOOD PRESSURE: 68 MMHG | SYSTOLIC BLOOD PRESSURE: 104 MMHG | TEMPERATURE: 98.2 F | OXYGEN SATURATION: 97 % | RESPIRATION RATE: 16 BRPM

## 2019-12-07 DIAGNOSIS — J44.1 COPD EXACERBATION (H): Primary | ICD-10-CM

## 2019-12-07 DIAGNOSIS — R05.9 COUGH: ICD-10-CM

## 2019-12-07 PROCEDURE — 99214 OFFICE O/P EST MOD 30 MIN: CPT | Performed by: FAMILY MEDICINE

## 2019-12-07 PROCEDURE — 71046 X-RAY EXAM CHEST 2 VIEWS: CPT

## 2019-12-07 RX ORDER — PREDNISONE 20 MG/1
40 TABLET ORAL DAILY
Qty: 10 TABLET | Refills: 0 | Status: SHIPPED | OUTPATIENT
Start: 2019-12-07 | End: 2020-01-28

## 2019-12-07 RX ORDER — AZITHROMYCIN 250 MG/1
TABLET, FILM COATED ORAL
Qty: 6 TABLET | Refills: 0 | Status: SHIPPED | OUTPATIENT
Start: 2019-12-07 | End: 2020-01-28

## 2019-12-07 RX ORDER — IPRATROPIUM BROMIDE AND ALBUTEROL SULFATE 2.5; .5 MG/3ML; MG/3ML
1 SOLUTION RESPIRATORY (INHALATION) EVERY 6 HOURS PRN
Qty: 90 VIAL | Refills: 3 | Status: SHIPPED | OUTPATIENT
Start: 2019-12-07 | End: 2020-08-03

## 2019-12-07 NOTE — NURSING NOTE
Chief Complaint   Patient presents with     Urgent Care     Cough     for 2 weeks. SOB      Fever     Sinus Problem     pressure and drainage for 2 weeks        KEVIN HAYS CMA

## 2019-12-07 NOTE — PATIENT INSTRUCTIONS
Patient Education     COPD Flare    You have had a flare-up of your COPD.  COPD, or chronic obstructive pulmonary disease, is a common lung disease. It causes your airways to become irritated and narrower. This makes it harder for you to breathe. Emphysema and chronic bronchitis are both types of COPD. This is a chronic condition, which means you always have it. Sometimes it gets worse. When this happens, it is called a flare-up.  Symptoms of COPD  People with COPD may have symptoms most of the time. In a flare-up, your symptoms get worse. These symptoms may mean you are having a flare-up:    Shortness of breath, shallow or rapid breathing, or wheezing that gets worse    Lung infection    Cough that gets worse    More mucus, thicker mucus or mucus of a different color    Tiredness, decreased energy, or trouble doing your usual activities    Fever    Chest tightness    Your symptoms don t get better even when you use your usual medicines, inhalers, and nebulizer    Trouble talking    You feel confused  Causes of flare-ups  Unfortunately, a flare-up can happen even though you did everything right, and you followed your doctor s instructions. Some causes of flare-ups are:    Smoking or secondhand smoke    Colds, the flu, or respiratory infections    Air pollution    Sudden change in the weather    Dust, irritating chemicals, or strong fumes    Not taking your medicines as prescribed  Home care  Here are some things you can do at home to treat a flare-up:    Try not to panic. This makes it harder to breathe, and keeps you from doing the right things.    Don t smoke or be around others who are smoking.    Try to drink more fluids than usual during a flare-up, unless your doctor has told you not to because of heart and kidney problems. More fluids can help loosen the mucus.    Use your inhalers and nebulizer, if you have one, as you have been told to.    If you were given antibiotics, take them until they are used up or  your doctor tells you to stop. It s important to finish the antibiotics, even though you feel better. This will make sure the infection has cleared.    If you were given prednisone or another steroid, finish it even if you feel better.  Preventing a flare-up  Even though flare-ups happen, the best way to treat one is to prevent it before it starts. Here are some pointers:    Don t smoke or be around others who are smoking.    Take your medicines as you have been told.    Talk with your doctor about getting a flu shot every year. Also find out if you need a pneumonia shot.    If there is a weather advisory warning to stay indoors, try to stay inside when possible.    Try to eat healthy and get plenty of sleep.    Try to avoid things that usually set you off, like dust, chemical fumes, hairsprays, or strong perfumes.  Follow-up care  Follow up with your healthcare provider, or as advised.  If a culture was done, you will be told if your treatment needs to be changed. You can call as directed for the results.  If X-rays were done, you will be notified of any new findings that may affect your care.  Call 911  Call 911 if any of these occur:    You have trouble breathing    You feel confused or it s difficult to wake you up    You faint or lose consciousness    You have a rapid heart rate    You have new pain in your chest, arm, shoulder, neck or upper back  When to seek medical advice  Call your healthcare provider right away if any of these occur:    Wheezing or shortness of breath gets worse    You need to use your inhalers more often than usual without relief    Fever of 100.4 F (38 C) or higher, or as directed by your healthcare provider    Coughing up lots of dark-colored or bloody mucus (sputum)    Chest pain with each breath    You do not start to get better within 24 hours    Swelling of your ankles gets worse    Dizziness or weakness  Date Last Reviewed: 9/1/2016 2000-2018 [employer] [address]  [place] [address]. All rights reserved. This information is not intended as a substitute for professional medical care. Always follow your healthcare professional's instructions.

## 2019-12-07 NOTE — PROGRESS NOTES
Subjective     Jazlyn Bartlett is a 54 year old female who presents to clinic today for the following health issues:    HPI   Chief Complaint   Patient presents with     Urgent Care     Cough     for 2 weeks. SOB        Duration: 2 weeks     Description (location/character/radiation): productive cough, chest congestion, wheezing, fever, chills fatigue    Intensity:  moderate    Accompanying signs and symptoms: none     History (similar episodes/previous evaluation): COPD, tobacco abuse, DM type II    Precipitating or alleviating factors: None    Therapies tried and outcome: MUCINEX dm, nyquil, dayquil, inhalers, nebs          Patient Active Problem List   Diagnosis     Neck mass     Moderate major depression (H)     Anxiety     Vitamin D deficiency disease     CARDIOVASCULAR SCREENING; LDL GOAL LESS THAN 100     GERD (gastroesophageal reflux disease)     Hypertension goal BP (blood pressure) < 140/80     Type 2 diabetes mellitus without complication (H)     Chronic airway obstruction (H)     Advanced directives, counseling/discussion     Autoimmune gastritis- repeat UGI 1/16 with gastritis without eosinophils     Urinary incontinence     Osteoarthritis of patellofemoral joint     Morbid obesity (H)     Tobacco abuse     Lumbar radiculopathy     Hyperlipidemia LDL goal <100     Hypothyroidism, unspecified type     Chronic obstructive pulmonary disease, unspecified COPD type (H)     Past Surgical History:   Procedure Laterality Date     ABLATION, ENDOMETRIAL, THERMAL, W/O HYSTEROSCOPIC GUIDANCE       DILATION AND CURETTAGE, HYSTEROSCOPY, ABLATE ENDOMETRIUM NOVASURE, COMBINED  10/11/2012    Procedure: COMBINED DILATION AND CURETTAGE, HYSTEROSCOPY, ABLATE ENDOMETRIUM NOVASURE;  COMBINED DILATION AND CURETTAGE, HYSTEROSCOPY, ABLATE ENDOMETRIUM ,pelvic exam under anesthesia;  Surgeon: Shruti Barrios MD;  Location:  OR     ESOPHAGOSCOPY, GASTROSCOPY, DUODENOSCOPY (EGD), COMBINED N/A 1/19/2016    Procedure: COMBINED  ESOPHAGOSCOPY, GASTROSCOPY, DUODENOSCOPY (EGD), BIOPSY SINGLE OR MULTIPLE;  Surgeon: Kristofer Molina MD;  Location: [facility]     Removal of cancerous lump on neck       TONSILLECTOMY         Social History     Tobacco Use     Smoking status: Current Every Day Smoker     Packs/day: 0.50     Types: Cigarettes     Smokeless tobacco: Never Used   Substance Use Topics     Alcohol use: No     Alcohol/week: 0.0 standard drinks     Family History   Problem Relation Age of Onset     Diabetes Mother      Breast Cancer Mother      Diabetes Father      Lung Cancer Father      Rheumatoid Arthritis Father          Current Outpatient Medications   Medication Sig Dispense Refill     albuterol (PROAIR HFA/PROVENTIL HFA/VENTOLIN HFA) 108 (90 Base) MCG/ACT inhaler Inhale 2 puffs into the lungs every 6 hours as needed for shortness of breath / dyspnea 18 g 5     aspirin 81 MG EC tablet Take 1 tablet (81 mg) by mouth daily 100 tablet 3     budesonide (PULMICORT FLEXHALER) 180 MCG/ACT inhaler Inhale 1 puff into the lungs 2 times daily 3 Inhaler 3     Cholecalciferol (VITAMIN D) 2000 UNITS tablet Take 2,000 Units by mouth daily 100 tablet 3     escitalopram (LEXAPRO) 10 MG tablet Take 1 tablet daily 30 tablet 0     glipiZIDE (GLUCOTROL XL) 10 MG 24 hr tablet Take 1 tablet (10 mg) by mouth 2 times daily 180 tablet 0     glipiZIDE (GLUCOTROL XL) 10 MG 24 hr tablet Take 1 tablet (10 mg) by mouth 2 times daily 180 tablet 0     hydrochlorothiazide (HYDRODIURIL) 25 MG tablet TAKE 1 TABLET (25 MG) BY MOUTH DAILY 90 tablet 3     ipratropium (ATROVENT) 0.06 % nasal spray Spray 2 sprays into both nostrils 2 times daily as needed for rhinitis 15 mL 3     ipratropium - albuterol 0.5 mg/2.5 mg/3 mL (DUONEB) 0.5-2.5 (3) MG/3ML neb solution Take 1 vial (3 mLs) by nebulization every 6 hours as needed for shortness of breath / dyspnea or wheezing 90 vial 3     ketoconazole (NIZORAL) 2 % cream Apply topically 2 times daily as needed for itching Apply to AA  on the face bid when needed 15 g 3     levothyroxine (SYNTHROID/LEVOTHROID) 150 MCG tablet TAKE 1 TABLET (150 MCG) BY MOUTH DAILY 90 tablet 3     losartan (COZAAR) 50 MG tablet Take 1 tablet (50 mg) by mouth daily 90 tablet 3     order for DME Equipment being ordered: Nebulizer for home use 1 each 0     simvastatin (ZOCOR) 20 MG tablet TAKE 1 TABLET (20 MG) BY MOUTH AT BEDTIME 90 tablet 3     tiotropium (SPIRIVA) 18 MCG inhaled capsule Inhale 1 capsule (18 mcg) into the lungs daily 90 capsule 3     ibuprofen (ADVIL/MOTRIN) 600 MG tablet Take 1 tablet (600 mg) by mouth every 6 hours as needed for moderate pain (Patient not taking: Reported on 12/7/2019) 20 tablet 0     nicotine (NICODERM CQ) 14 MG/24HR 24 hr patch Place 1 patch onto the skin every 24 hours (Patient not taking: Reported on 12/7/2019) 30 patch 0     Allergies   Allergen Reactions     Ibuprofen Sodium Nausea and Vomiting     Vicodin [Hydrocodone-Acetaminophen] Nausea and Vomiting     Recent Labs   Lab Test 09/11/19  1137 07/26/19  1525 07/25/19  0014  05/03/19  1152  01/09/19  1505 06/19/18  1634  02/27/18  1616  05/18/17  1710  07/15/16  1037   A1C  --  7.9*  --   --  7.8*  --  7.8* 7.2*  --  7.3*   < > 7.0*   < > 7.9*   LDL  --   --   --   --  40  --   --  61  --   --   --  65  --  58   HDL  --   --   --   --  49*  --   --   --   --   --   --  44*  --  46*   TRIG  --   --   --   --  184*  --   --   --   --   --   --  160*  --  141   ALT 25  --  26  --  29   < >  --   --   --   --   --  21   < >  --    CR 0.67  --  0.72   < > 0.62   < > 1.10*  --    < >  --    < > 0.71   < >  --    GFRESTIMATED >90  --  >90   < > >90   < > 56*  --    < >  --    < > 87   < >  --    GFRESTBLACK >90  --  >90   < > >90   < > 65  --    < >  --    < > >90   GFR Calc     < >  --    POTASSIUM 3.2*  --  3.4   < > 3.5   < > 3.7  --    < >  --    < > 3.3*   < >  --    TSH  --   --   --   --  1.11  --   --   --   --  0.55   < > 10.29*  --   --     < > = values in  this interval not displayed.      BP Readings from Last 3 Encounters:   12/07/19 104/68   09/17/19 124/74   09/11/19 124/80    Wt Readings from Last 3 Encounters:   09/17/19 100.7 kg (222 lb)   09/11/19 103 kg (227 lb)   09/06/19 98.9 kg (218 lb)               Reviewed and updated as needed this visit by Provider         Review of Systems    ROS: 10 point ROS neg other than the symptoms noted above in the HPI.      Objective    /68 (Cuff Size: Adult Large)   Pulse 85   Temp 98.2  F (36.8  C) (Oral)   Resp 16   SpO2 97%   There is no height or weight on file to calculate BMI.  Physical Exam   GENERAL: alert, no distress and obese  EYES: Eyes grossly normal to inspection, PERRL and conjunctivae and sclerae normal  HENT: normal cephalic/atraumatic, ear canals and TM's normal, nose and mouth without ulcers or lesions, oropharynx clear and oral mucous membranes moist  NECK: no adenopathy, no asymmetry, masses, or scars and thyroid normal to palpation  RESP: few bibasilar crackles with occasional wheeze, no rhonchi auscultated  CV: regular rates and rhythm, normal S1 S2, no S3 or S4 and no murmur, click or rub  ABDOMEN: soft, nontender  MS: no gross musculoskeletal defects noted, no edema  NEURO: Normal strength and tone, mentation intact and speech normal    CHEST TWO VIEWS  12/7/2019 10:22 AM      HISTORY: Cough.     COMPARISON: 9/11/2019.                                                                      IMPRESSION: Small calcified granuloma in the left lower lobe of the  lung. Chest otherwise negative. No pleural effusions. Heart size and  pulmonary vascularity are within normal limits.      Assessment & Plan     (J44.1) COPD exacerbation (H)  (primary encounter diagnosis)  Comment: 54-year-old female presents with worsening productive cough, wheezing and chest tightness.  Patient is known to have COPD, type 2 diabetes mellitus and history of tobacco abuse.  Physical examination as described above.   Suspect symptoms secondary to COPD exacerbation.  Chest x-ray finding explained which came back unremarkable.  Azithromycin and prednisone prescribed, common side effects discussed.  Suggested to continue DuoNeb as needed.  Follow-up with PCP next week,  Instructed to go ER if symptoms worsen over the weekend.  Patient understood and in agreement with above plan.  All questions were.  Plan: azithromycin (ZITHROMAX) 250 MG tablet,         predniSONE (DELTASONE) 20 MG tablet,         ipratropium - albuterol 0.5 mg/2.5 mg/3 mL         (DUONEB) 0.5-2.5 (3) MG/3ML neb solution          (R05) Cough  Comment:   Plan: XR Chest 2 Views             Tobacco Cessation:   reports that she has been smoking cigarettes. She has been smoking about 0.50 packs per day. She has never used smokeless tobacco.  Tobacco Cessation Action Plan: Information offered: Patient not interested at this time        Patient Instructions     Patient Education     COPD Flare    You have had a flare-up of your COPD.  COPD, or chronic obstructive pulmonary disease, is a common lung disease. It causes your airways to become irritated and narrower. This makes it harder for you to breathe. Emphysema and chronic bronchitis are both types of COPD. This is a chronic condition, which means you always have it. Sometimes it gets worse. When this happens, it is called a flare-up.  Symptoms of COPD  People with COPD may have symptoms most of the time. In a flare-up, your symptoms get worse. These symptoms may mean you are having a flare-up:    Shortness of breath, shallow or rapid breathing, or wheezing that gets worse    Lung infection    Cough that gets worse    More mucus, thicker mucus or mucus of a different color    Tiredness, decreased energy, or trouble doing your usual activities    Fever    Chest tightness    Your symptoms don t get better even when you use your usual medicines, inhalers, and nebulizer    Trouble talking    You feel confused  Causes of  flare-ups  Unfortunately, a flare-up can happen even though you did everything right, and you followed your doctor s instructions. Some causes of flare-ups are:    Smoking or secondhand smoke    Colds, the flu, or respiratory infections    Air pollution    Sudden change in the weather    Dust, irritating chemicals, or strong fumes    Not taking your medicines as prescribed  Home care  Here are some things you can do at home to treat a flare-up:    Try not to panic. This makes it harder to breathe, and keeps you from doing the right things.    Don t smoke or be around others who are smoking.    Try to drink more fluids than usual during a flare-up, unless your doctor has told you not to because of heart and kidney problems. More fluids can help loosen the mucus.    Use your inhalers and nebulizer, if you have one, as you have been told to.    If you were given antibiotics, take them until they are used up or your doctor tells you to stop. It s important to finish the antibiotics, even though you feel better. This will make sure the infection has cleared.    If you were given prednisone or another steroid, finish it even if you feel better.  Preventing a flare-up  Even though flare-ups happen, the best way to treat one is to prevent it before it starts. Here are some pointers:    Don t smoke or be around others who are smoking.    Take your medicines as you have been told.    Talk with your doctor about getting a flu shot every year. Also find out if you need a pneumonia shot.    If there is a weather advisory warning to stay indoors, try to stay inside when possible.    Try to eat healthy and get plenty of sleep.    Try to avoid things that usually set you off, like dust, chemical fumes, hairsprays, or strong perfumes.  Follow-up care  Follow up with your healthcare provider, or as advised.  If a culture was done, you will be told if your treatment needs to be changed. You can call as directed for the results.  If  X-rays were done, you will be notified of any new findings that may affect your care.  Call 911  Call 911 if any of these occur:    You have trouble breathing    You feel confused or it s difficult to wake you up    You faint or lose consciousness    You have a rapid heart rate    You have new pain in your chest, arm, shoulder, neck or upper back  When to seek medical advice  Call your healthcare provider right away if any of these occur:    Wheezing or shortness of breath gets worse    You need to use your inhalers more often than usual without relief    Fever of 100.4 F (38 C) or higher, or as directed by your healthcare provider    Coughing up lots of dark-colored or bloody mucus (sputum)    Chest pain with each breath    You do not start to get better within 24 hours    Swelling of your ankles gets worse    Dizziness or weakness  Date Last Reviewed: 9/1/2016 2000-2018 [employer]. [address] [place] [address], [place] [id number]. All rights reserved. This information is not intended as a substitute for professional medical care. Always follow your healthcare professional's instructions.               Francis Correa MD  [facility]

## 2020-01-20 ENCOUNTER — NURSE TRIAGE (OUTPATIENT)
Dept: FAMILY MEDICINE | Facility: CLINIC | Age: 55
End: 2020-01-20

## 2020-01-20 NOTE — TELEPHONE ENCOUNTER
"Pt in [facility], Pt is complaining of chest congestion, trouble breathing. Pt leaving at 6 pm coming back to [place] requesting z-purnima. Pt advised a z-purnima will not be effective for helping to breath right now. Pt advised to go to urgent care in [facility]. Patient stated an understanding and agreed with plan.      Reason for Disposition    MODERATE difficulty breathing (e.g., speaks in phrases, SOB even at rest, pulse 100-120) of new onset or worse than normal    Additional Information    Negative: Breathing stopped and hasn't returned    Negative: Choking on something    Negative: SEVERE difficulty breathing (e.g., struggling for each breath, speaks in single words, pulse > 120)    Negative: Bluish (or gray) lips or face    Negative: Difficult to awaken or acting confused (e.g., disoriented, slurred speech)    Negative: Passed out (i.e., fainted, collapsed and was not responding)    Negative: Wheezing started suddenly after medicine, an allergic food, or bee sting    Negative: Stridor    Negative: Slow, shallow and weak breathing    Negative: Sounds like a life-threatening emergency to the triager    Negative: Chest pain    Negative: Wheezing (high pitched whistling sound) and previous asthma attacks or use of asthma medicines    Negative: Difficulty breathing and only present when coughing    Negative: Difficulty breathing and only from stuffy or runny nose    Answer Assessment - Initial Assessment Questions  1. RESPIRATORY STATUS: \"Describe your breathing?\" (e.g., wheezing, shortness of breath, unable to speak, severe coughing)       SOB, wheezing    2. ONSET: \"When did this breathing problem begin?\"       Yesterday    3. PATTERN \"Does the difficult breathing come and go, or has it been constant since it started?\"       Constant    4. SEVERITY: \"How bad is your breathing?\" (e.g., mild, moderate, severe)     - MILD: No SOB at rest, mild SOB with walking, speaks normally in sentences, can lay down, no retractions, pulse < 100.    " " - MODERATE: SOB at rest, SOB with minimal exertion and prefers to sit, cannot lie down flat, speaks in phrases, mild retractions, audible wheezing, pulse 100-120.     - SEVERE: Very SOB at rest, speaks in single words, struggling to breathe, sitting hunched forward, retractions, pulse > 120       Unable to check HR    5. RECURRENT SYMPTOM: \"Have you had difficulty breathing before?\" If so, ask: \"When was the last time?\" and \"What happened that time?\"       Yes happened before, given z purnima and neb    6. CARDIAC HISTORY: \"Do you have any history of heart disease?\" (e.g., heart attack, angina, bypass surgery, angioplasty)       Not answered    7. LUNG HISTORY: \"Do you have any history of lung disease?\"  (e.g., pulmonary embolus, asthma, emphysema)      COPD    8. CAUSE: \"What do you think is causing the breathing problem?\"       Unknown    9. OTHER SYMPTOMS: \"Do you have any other symptoms? (e.g., dizziness, runny nose, cough, chest pain, fever)      Dizzy yesterday  10. PREGNANCY: \"Is there any chance you are pregnant?\" \"When was your last menstrual period?\"        No    11. TRAVEL: \"Have you traveled out of the country in the last month?\" (e.g., travel history, exposures)        no    Protocols used: BREATHING DIFFICULTY-A-OH    Gregg Benavidez RN   [facility]      "

## 2020-01-28 ENCOUNTER — OFFICE VISIT (OUTPATIENT)
Dept: FAMILY MEDICINE | Facility: CLINIC | Age: 55
End: 2020-01-28
Payer: COMMERCIAL

## 2020-01-28 VITALS
TEMPERATURE: 98 F | WEIGHT: 215 LBS | HEART RATE: 83 BPM | BODY MASS INDEX: 42.21 KG/M2 | DIASTOLIC BLOOD PRESSURE: 78 MMHG | OXYGEN SATURATION: 95 % | SYSTOLIC BLOOD PRESSURE: 124 MMHG | HEIGHT: 60 IN

## 2020-01-28 DIAGNOSIS — J44.1 COPD EXACERBATION (H): ICD-10-CM

## 2020-01-28 DIAGNOSIS — J20.9 ACUTE BRONCHITIS WITH SYMPTOMS > 10 DAYS: Primary | ICD-10-CM

## 2020-01-28 PROCEDURE — 96372 THER/PROPH/DIAG INJ SC/IM: CPT | Performed by: FAMILY MEDICINE

## 2020-01-28 PROCEDURE — 99214 OFFICE O/P EST MOD 30 MIN: CPT | Mod: 25 | Performed by: FAMILY MEDICINE

## 2020-01-28 RX ORDER — PREDNISONE 20 MG/1
TABLET ORAL
Qty: 20 TABLET | Refills: 0 | Status: SHIPPED | OUTPATIENT
Start: 2020-01-28 | End: 2020-02-25

## 2020-01-28 RX ORDER — DOXYCYCLINE 100 MG/1
100 CAPSULE ORAL 2 TIMES DAILY
Qty: 14 CAPSULE | Refills: 0 | Status: SHIPPED | OUTPATIENT
Start: 2020-01-28 | End: 2020-02-25

## 2020-01-28 RX ORDER — METHYLPREDNISOLONE SOD SUCC 125 MG
125 VIAL (EA) INJECTION ONCE
Status: COMPLETED | OUTPATIENT
Start: 2020-01-28 | End: 2020-01-28

## 2020-01-28 RX ADMIN — Medication 125 MG: at 18:11

## 2020-01-28 ASSESSMENT — ANXIETY QUESTIONNAIRES
7. FEELING AFRAID AS IF SOMETHING AWFUL MIGHT HAPPEN: NOT AT ALL
3. WORRYING TOO MUCH ABOUT DIFFERENT THINGS: NOT AT ALL
6. BECOMING EASILY ANNOYED OR IRRITABLE: NOT AT ALL
1. FEELING NERVOUS, ANXIOUS, OR ON EDGE: NEARLY EVERY DAY
GAD7 TOTAL SCORE: 3
5. BEING SO RESTLESS THAT IT IS HARD TO SIT STILL: NOT AT ALL
2. NOT BEING ABLE TO STOP OR CONTROL WORRYING: NOT AT ALL

## 2020-01-28 ASSESSMENT — MIFFLIN-ST. JEOR: SCORE: 1496.73

## 2020-01-28 ASSESSMENT — PATIENT HEALTH QUESTIONNAIRE - PHQ9
SUM OF ALL RESPONSES TO PHQ QUESTIONS 1-9: 10
5. POOR APPETITE OR OVEREATING: NOT AT ALL

## 2020-01-28 NOTE — PROGRESS NOTES
Subjective     Jazlyn Bartlett is a 54 year old female who presents to clinic today for the following health issues:    HPI   Acute Illness   Acute illness concerns: breathing problems  Onset: x 9 days    Fever: YES    Chills/Sweats: YES    Headache (location?): no    Sinus Pressure:no    Conjunctivitis:  no    Ear Pain: no    Rhinorrhea: no    Congestion: YES- in her chest    Sore Throat: YES     Cough: YES    Wheeze: YES    Decreased Appetite: YES    Nausea: YES    Vomiting: YES    Diarrhea:  YES    Dysuria/Freq.: no    Fatigue/Achiness: YES    Sick/Strep Exposure: no     Therapies Tried and outcome: was seen in the ER in [place] and was given z-purnima and prednisone.  Patient finished both the medications but symptoms did not improve.  She is unable to go to work.        Patient Active Problem List   Diagnosis     Neck mass     Moderate major depression (H)     Anxiety     Vitamin D deficiency disease     CARDIOVASCULAR SCREENING; LDL GOAL LESS THAN 100     GERD (gastroesophageal reflux disease)     Hypertension goal BP (blood pressure) < 140/80     Type 2 diabetes mellitus without complication (H)     Chronic airway obstruction (H)     Advanced directives, counseling/discussion     Autoimmune gastritis- repeat UGI 1/16 with gastritis without eosinophils     Urinary incontinence     Osteoarthritis of patellofemoral joint     Morbid obesity (H)     Tobacco abuse     Lumbar radiculopathy     Hyperlipidemia LDL goal <100     Hypothyroidism, unspecified type     Chronic obstructive pulmonary disease, unspecified COPD type (H)     Past Surgical History:   Procedure Laterality Date     ABLATION, ENDOMETRIAL, THERMAL, W/O HYSTEROSCOPIC GUIDANCE       DILATION AND CURETTAGE, HYSTEROSCOPY, ABLATE ENDOMETRIUM NOVASURE, COMBINED  10/11/2012    Procedure: COMBINED DILATION AND CURETTAGE, HYSTEROSCOPY, ABLATE ENDOMETRIUM NOVASURE;  COMBINED DILATION AND CURETTAGE, HYSTEROSCOPY, ABLATE ENDOMETRIUM ,pelvic exam under anesthesia;   Surgeon: Shruti Barrios MD;  Location: [facility]     ESOPHAGOSCOPY, GASTROSCOPY, DUODENOSCOPY (EGD), COMBINED N/A 1/19/2016    Procedure: COMBINED ESOPHAGOSCOPY, GASTROSCOPY, DUODENOSCOPY (EGD), BIOPSY SINGLE OR MULTIPLE;  Surgeon: Kristofer Molina MD;  Location: [facility] GI     Removal of cancerous lump on neck       TONSILLECTOMY         Social History     Tobacco Use     Smoking status: Current Every Day Smoker     Packs/day: 0.50     Types: Cigarettes     Smokeless tobacco: Never Used   Substance Use Topics     Alcohol use: No     Alcohol/week: 0.0 standard drinks     Family History   Problem Relation Age of Onset     Diabetes Mother      Breast Cancer Mother      Diabetes Father      Lung Cancer Father      Rheumatoid Arthritis Father          Current Outpatient Medications   Medication Sig Dispense Refill     albuterol (PROAIR HFA/PROVENTIL HFA/VENTOLIN HFA) 108 (90 Base) MCG/ACT inhaler Inhale 2 puffs into the lungs every 6 hours as needed for shortness of breath / dyspnea 18 g 5     aspirin 81 MG EC tablet Take 1 tablet (81 mg) by mouth daily 100 tablet 3     budesonide (PULMICORT FLEXHALER) 180 MCG/ACT inhaler Inhale 1 puff into the lungs 2 times daily 3 Inhaler 3     Cholecalciferol (VITAMIN D) 2000 UNITS tablet Take 2,000 Units by mouth daily 100 tablet 3     doxycycline monohydrate (MONODOX) 100 MG capsule Take 1 capsule (100 mg) by mouth 2 times daily for 7 days 14 capsule 0     escitalopram (LEXAPRO) 10 MG tablet Take 1 tablet daily 30 tablet 0     glipiZIDE (GLUCOTROL XL) 10 MG 24 hr tablet Take 1 tablet (10 mg) by mouth 2 times daily 180 tablet 0     glipiZIDE (GLUCOTROL XL) 10 MG 24 hr tablet Take 1 tablet (10 mg) by mouth 2 times daily 180 tablet 0     hydrochlorothiazide (HYDRODIURIL) 25 MG tablet TAKE 1 TABLET (25 MG) BY MOUTH DAILY 90 tablet 3     ibuprofen (ADVIL/MOTRIN) 600 MG tablet Take 1 tablet (600 mg) by mouth every 6 hours as needed for moderate pain 20 tablet 0     ipratropium (ATROVENT)  0.06 % nasal spray Spray 2 sprays into both nostrils 2 times daily as needed for rhinitis 15 mL 3     ipratropium - albuterol 0.5 mg/2.5 mg/3 mL (DUONEB) 0.5-2.5 (3) MG/3ML neb solution Take 1 vial (3 mLs) by nebulization every 6 hours as needed for shortness of breath / dyspnea or wheezing 90 vial 3     ketoconazole (NIZORAL) 2 % cream Apply topically 2 times daily as needed for itching Apply to AA on the face bid when needed 15 g 3     levothyroxine (SYNTHROID/LEVOTHROID) 150 MCG tablet TAKE 1 TABLET (150 MCG) BY MOUTH DAILY 90 tablet 3     losartan (COZAAR) 50 MG tablet Take 1 tablet (50 mg) by mouth daily 90 tablet 3     order for DME Equipment being ordered: Nebulizer for home use 1 each 0     predniSONE (DELTASONE) 20 MG tablet Take 3 tabs by mouth daily x 3 days, then 2 tabs daily x 3 days, then 1 tab daily x 3 days, then 1/2 tab daily x 3 days. 20 tablet 0     simvastatin (ZOCOR) 20 MG tablet TAKE 1 TABLET (20 MG) BY MOUTH AT BEDTIME 90 tablet 3     tiotropium (SPIRIVA) 18 MCG inhaled capsule Inhale 1 capsule (18 mcg) into the lungs daily 90 capsule 3     Allergies   Allergen Reactions     Ibuprofen Sodium Nausea and Vomiting     Vicodin [Hydrocodone-Acetaminophen] Nausea and Vomiting         Reviewed and updated as needed this visit by Provider         Review of Systems   ROS COMP: INTEGUMENTARY/SKIN: NEGATIVE for worrisome rashes, moles or lesions  CV: NEGATIVE for chest pain, palpitations or peripheral edema  GI: NEGATIVE for nausea, abdominal pain, heartburn, or change in bowel habits      Objective    /78   Pulse 83   Temp 98  F (36.7  C) (Tympanic)   Ht 1.524 m (5')   Wt 97.5 kg (215 lb)   SpO2 95%   BMI 41.99 kg/m    Body mass index is 41.99 kg/m .  Physical Exam   GENERAL: Mild distress.  Patient has excessive wheezing.  HENT: ear canals and TM's normal, nose and mouth without ulcers or lesions  NECK: no adenopathy, no asymmetry, masses, or scars and thyroid normal to palpation  RESP:  Bilateral wheezing and rhonchi diffusely present.  CV: regular rate and rhythm, normal S1 S2, no S3 or S4, no murmur, click or rub, no peripheral edema and peripheral pulses strong  ABDOMEN: soft, nontender, no hepatosplenomegaly, no masses and bowel sounds normal            Assessment & Plan     1. Acute bronchitis with symptoms > 10 days  Starting the patient on doxycycline.  Increase hydration.  - doxycycline monohydrate (MONODOX) 100 MG capsule; Take 1 capsule (100 mg) by mouth 2 times daily for 7 days  Dispense: 14 capsule; Refill: 0    2. COPD exacerbation (H)  Patient is given 1 dose of intramuscular injection of Solu-Medrol.  Starting tomorrow she can start oral steroid tapering dose.  - methylPREDNISolone sodium succinate (solu-MEDROL) injection 125 mg  - predniSONE (DELTASONE) 20 MG tablet; Take 3 tabs by mouth daily x 3 days, then 2 tabs daily x 3 days, then 1 tab daily x 3 days, then 1/2 tab daily x 3 days.  Dispense: 20 tablet; Refill: 0     Note for work excuse/FMLA filled out.      Return in about 3 days (around 1/31/2020) for If symptoms are not improving.    Lokesh Casanova MD  [facility] [place]

## 2020-01-29 ENCOUNTER — TELEPHONE (OUTPATIENT)
Dept: FAMILY MEDICINE | Facility: CLINIC | Age: 55
End: 2020-01-29

## 2020-01-29 ASSESSMENT — ANXIETY QUESTIONNAIRES: GAD7 TOTAL SCORE: 3

## 2020-01-29 NOTE — TELEPHONE ENCOUNTER
Form completed, scanned to chart.      .Gina LEWIS    [facility] [place]

## 2020-01-31 ENCOUNTER — TELEPHONE (OUTPATIENT)
Dept: FAMILY MEDICINE | Facility: CLINIC | Age: 55
End: 2020-01-31

## 2020-01-31 NOTE — TELEPHONE ENCOUNTER
Attending Physicians Statement form received and given to Dr Casanova to complete.  Lenka Urban,

## 2020-02-03 NOTE — TELEPHONE ENCOUNTER
Completed, scanned to chart and faxed.      .Gina LEWIS    [facility] [place]

## 2020-02-12 ENCOUNTER — TELEPHONE (OUTPATIENT)
Dept: FAMILY MEDICINE | Facility: CLINIC | Age: 55
End: 2020-02-12

## 2020-02-12 NOTE — TELEPHONE ENCOUNTER
Reason for Call:   call back    Detailed comments: Jan 2020 paperwork was to be sent for disability  and the office is calling pt - has not reveived it yet. Can you check into this and call the PT back.      Phone Number Patient can be reached at: Cell number on file:    Telephone Information:   Mobile [phone]       Best Time: any    Can we leave a detailed message on this number? Yes      Call taken on 2/12/2020 at 12:51 PM by Ilana Acosta

## 2020-02-12 NOTE — TELEPHONE ENCOUNTER
Called patient and left a vm stated we will fax the form again and if she had any questions she can call the clinic back and ask to speak with TC for T1.    .Gina LEWIS    [facility] [place]

## 2020-02-13 NOTE — TELEPHONE ENCOUNTER
Pt called and stated that she faxed all 13 pages of paper which need to go to disparity, the fax # for that will be in the paper work.    For any question contact pt at     [phone] okay to leave a detailed message     Dex Boo

## 2020-02-13 NOTE — TELEPHONE ENCOUNTER
Received paperwork and placed in pcp inbasket. Called and spoke with patient to update.    .Gina LEWIS    [facility] [place]

## 2020-02-16 ENCOUNTER — HEALTH MAINTENANCE LETTER (OUTPATIENT)
Age: 55
End: 2020-02-16

## 2020-02-17 ENCOUNTER — DOCUMENTATION ONLY (OUTPATIENT)
Dept: LAB | Facility: CLINIC | Age: 55
End: 2020-02-17

## 2020-02-17 NOTE — PROGRESS NOTES
Patient has lab appointment on 02/19/20  Need orders ASAP.  Scheduling notes say that she needs an A1C .Thank you!

## 2020-02-18 ENCOUNTER — TELEPHONE (OUTPATIENT)
Dept: FAMILY MEDICINE | Facility: CLINIC | Age: 55
End: 2020-02-18

## 2020-02-18 DIAGNOSIS — E11.9 TYPE 2 DIABETES MELLITUS WITHOUT COMPLICATION, WITHOUT LONG-TERM CURRENT USE OF INSULIN (H): Primary | ICD-10-CM

## 2020-02-18 NOTE — TELEPHONE ENCOUNTER
Patient is calling back on this paperwork because she is saying that it would need to get sent out today. Please call [phone] to update patient

## 2020-02-18 NOTE — TELEPHONE ENCOUNTER
Routing to PCP to advise on note below.     Patient requesting lab for Hemoglobin A1C. Patient last office visit for Diabetes was 9/17/19, do you want patient to schedule office visit and have lab done at that time?    Piedad Drummond RN, BSN  [facility]

## 2020-02-18 NOTE — TELEPHONE ENCOUNTER
Patient calling again to check on status of paperwork - states this is due today.  Advised patient I am not at that location but I will look into this and have someone contact her today.  Spoke with CRYSTAL Og to check on status of paperwork.      Please call patient at [phone] to let her know the status.    Lala Olvera

## 2020-02-18 NOTE — TELEPHONE ENCOUNTER
Left message for pt to call back. Original form was faxed on 1/31 and is scanned into pt's chart. The form we just received, looks like a duplicate or did something change and she needs another form filled out? Form in Team 3 TC shelf on the wall. Waiting for pt to call back.  Lenka Urbna,

## 2020-02-18 NOTE — TELEPHONE ENCOUNTER
Future labs ordered.  Please have her schedule an appointment for lab work.    Lokesh Casanova MD  [facility] [place]

## 2020-02-18 NOTE — PROGRESS NOTES
Pt returned call and states is needing orders for her lab work, informed her she needs to have her PCP place orders, she will notify them    Ciara Perales/ROMERO  [place]

## 2020-02-18 NOTE — TELEPHONE ENCOUNTER
Reason for Call: Request for an order or referral:    Order or referral being requested: A1C lab    Date needed: as soon as possible    Has the patient been seen by the PCP for this problem? YES    Additional comments:     Phone number Patient can be reached at:  Home number on file [phone] (home)    Best Time:  Anytime     Can we leave a detailed message on this number?  YES    Call taken on 2/18/2020 at 10:30 AM by Kiley Mejia

## 2020-02-18 NOTE — TELEPHONE ENCOUNTER
Left a non-detailed message and call back number [phone].     Piedad Drummond RN, BSN  [facility]

## 2020-02-18 NOTE — PROGRESS NOTES
This pt is not an AV pt  Lab should come from her PCP, unless she is planning on changing  Seen once in AV 2 years ago  Rogelio Ferguson MD

## 2020-02-19 DIAGNOSIS — E11.9 TYPE 2 DIABETES MELLITUS WITHOUT COMPLICATION, WITHOUT LONG-TERM CURRENT USE OF INSULIN (H): ICD-10-CM

## 2020-02-19 LAB — HBA1C MFR BLD: 9.6 % (ref 0–5.6)

## 2020-02-19 PROCEDURE — 83036 HEMOGLOBIN GLYCOSYLATED A1C: CPT | Performed by: FAMILY MEDICINE

## 2020-02-19 PROCEDURE — 80048 BASIC METABOLIC PNL TOTAL CA: CPT | Performed by: FAMILY MEDICINE

## 2020-02-19 PROCEDURE — 36415 COLL VENOUS BLD VENIPUNCTURE: CPT | Performed by: FAMILY MEDICINE

## 2020-02-19 NOTE — TELEPHONE ENCOUNTER
Pt called back and requested that her forms get sent to her by email. Forms sent via email per pt request.  Lenka Urban,

## 2020-02-19 NOTE — RESULT ENCOUNTER NOTE
Please call the patient to notify patient that the blood work shows poorly controlled diabetes.  Hemoglobin A1c is 9.6.  I need to see her back early next week.    Lokesh Casanova MD

## 2020-02-20 LAB
ANION GAP SERPL CALCULATED.3IONS-SCNC: 10 MMOL/L (ref 3–14)
BUN SERPL-MCNC: 9 MG/DL (ref 7–30)
CALCIUM SERPL-MCNC: 9.7 MG/DL (ref 8.5–10.1)
CHLORIDE SERPL-SCNC: 101 MMOL/L (ref 94–109)
CO2 SERPL-SCNC: 24 MMOL/L (ref 20–32)
CREAT SERPL-MCNC: 0.58 MG/DL (ref 0.52–1.04)
GFR SERPL CREATININE-BSD FRML MDRD: >90 ML/MIN/{1.73_M2}
GLUCOSE SERPL-MCNC: 140 MG/DL (ref 70–99)
POTASSIUM SERPL-SCNC: 3.5 MMOL/L (ref 3.4–5.3)
SODIUM SERPL-SCNC: 135 MMOL/L (ref 133–144)

## 2020-02-25 ENCOUNTER — OFFICE VISIT (OUTPATIENT)
Dept: FAMILY MEDICINE | Facility: CLINIC | Age: 55
End: 2020-02-25
Payer: COMMERCIAL

## 2020-02-25 VITALS
HEART RATE: 102 BPM | HEIGHT: 60 IN | DIASTOLIC BLOOD PRESSURE: 78 MMHG | OXYGEN SATURATION: 100 % | WEIGHT: 216 LBS | SYSTOLIC BLOOD PRESSURE: 124 MMHG | TEMPERATURE: 97.5 F | BODY MASS INDEX: 42.41 KG/M2

## 2020-02-25 DIAGNOSIS — J44.9 CHRONIC OBSTRUCTIVE PULMONARY DISEASE, UNSPECIFIED COPD TYPE (H): ICD-10-CM

## 2020-02-25 DIAGNOSIS — E11.9 TYPE 2 DIABETES MELLITUS WITHOUT COMPLICATION, WITHOUT LONG-TERM CURRENT USE OF INSULIN (H): Primary | ICD-10-CM

## 2020-02-25 PROCEDURE — 99213 OFFICE O/P EST LOW 20 MIN: CPT | Performed by: FAMILY MEDICINE

## 2020-02-25 RX ORDER — GLIPIZIDE 10 MG/1
TABLET, FILM COATED, EXTENDED RELEASE ORAL
Qty: 270 TABLET | Refills: 0 | Status: SHIPPED | OUTPATIENT
Start: 2020-02-25 | End: 2020-05-07

## 2020-02-25 RX ORDER — FLUTICASONE PROPIONATE 220 UG/1
1 AEROSOL, METERED RESPIRATORY (INHALATION) 2 TIMES DAILY
Qty: 1 INHALER | Refills: 4 | Status: SHIPPED | OUTPATIENT
Start: 2020-02-25 | End: 2020-04-16

## 2020-02-25 ASSESSMENT — PATIENT HEALTH QUESTIONNAIRE - PHQ9: SUM OF ALL RESPONSES TO PHQ QUESTIONS 1-9: 4

## 2020-02-25 ASSESSMENT — MIFFLIN-ST. JEOR: SCORE: 1501.27

## 2020-02-25 NOTE — LETTER
[facility]          [address] [place] [address]                            [phone]  Fax: [phone]    Jazlyn Bartlett  [address] [place] [id number]    [id number]    February 25, 2020      To whom it may concern     Jazlyn Bartlett was seen in the clinic for lab work on 2/19/2020 and then again for an office visit on 2/25/2020 with me.        If you have any other questions or concerns please feel free to contact me at anytime.        Sincerely,    Edilberto Campa M.D.

## 2020-02-25 NOTE — PROGRESS NOTES
Subjective     Jazlyn Bartlett is a 54 year old female who presents to clinic today for the following health issues:    HPI   Diabetes Follow-up    How often are you checking your blood sugar? One time daily  What time of day are you checking your blood sugars (select all that apply)?  After meals  Have you had any blood sugars above 200?  Yes   Have you had any blood sugars below 70?  No    What symptoms do you notice when your blood sugar is low?  Shaky, Dizzy, Weak and Lethargy last week     What concerns do you have today about your diabetes? Other: elevated blood sugar, LAB      Do you have any of these symptoms? (Select all that apply)  Excessive thirst     Patient tells that recently she was ill and had to use prednisone.  She thinks that is the cause of her high blood sugar readings.  Previously her numbers were better.  She has been eating much healthier than before.  She would like to continue same medications and give it a little bit more time.      Reports that COPD medication Pulmicort does not work well for her.  She does not feel that the medication gets into her lungs.  Would like to change the steroid inhaler to something different.  She quit smoking about 3 days ago.  She has been using a nicotine patch now.      BP Readings from Last 2 Encounters:   02/25/20 124/78   01/28/20 124/78     Hemoglobin A1C (%)   Date Value   02/19/2020 9.6 (H)   07/26/2019 7.9 (H)     LDL Cholesterol Calculated (mg/dL)   Date Value   05/03/2019 40   05/18/2017 65     LDL Cholesterol Direct (mg/dL)   Date Value   06/19/2018 61                 How many servings of fruits and vegetables do you eat daily?  2-3    On average, how many sweetened beverages do you drink each day (Examples: soda, juice, sweet tea, etc.  Do NOT count diet or artificially sweetened beverages)?   2    How many days per week do you exercise enough to make your heart beat faster? 3 or less    How many days per week do you miss taking your  medication? 0        Patient Active Problem List   Diagnosis     Neck mass     Moderate major depression (H)     Anxiety     Vitamin D deficiency disease     CARDIOVASCULAR SCREENING; LDL GOAL LESS THAN 100     GERD (gastroesophageal reflux disease)     Hypertension goal BP (blood pressure) < 140/80     Type 2 diabetes mellitus without complication (H)     Chronic airway obstruction (H)     Advanced directives, counseling/discussion     Autoimmune gastritis- repeat UGI 1/16 with gastritis without eosinophils     Urinary incontinence     Osteoarthritis of patellofemoral joint     Morbid obesity (H)     Tobacco abuse     Lumbar radiculopathy     Hyperlipidemia LDL goal <100     Hypothyroidism, unspecified type     Chronic obstructive pulmonary disease, unspecified COPD type (H)     Past Surgical History:   Procedure Laterality Date     ABLATION, ENDOMETRIAL, THERMAL, W/O HYSTEROSCOPIC GUIDANCE       DILATION AND CURETTAGE, HYSTEROSCOPY, ABLATE ENDOMETRIUM NOVASURE, COMBINED  10/11/2012    Procedure: COMBINED DILATION AND CURETTAGE, HYSTEROSCOPY, ABLATE ENDOMETRIUM NOVASURE;  COMBINED DILATION AND CURETTAGE, HYSTEROSCOPY, ABLATE ENDOMETRIUM ,pelvic exam under anesthesia;  Surgeon: Shruti Barrios MD;  Location: [facility] OR     ESOPHAGOSCOPY, GASTROSCOPY, DUODENOSCOPY (EGD), COMBINED N/A 1/19/2016    Procedure: COMBINED ESOPHAGOSCOPY, GASTROSCOPY, DUODENOSCOPY (EGD), BIOPSY SINGLE OR MULTIPLE;  Surgeon: Kristofer Molina MD;  Location: [facility] GI     Removal of cancerous lump on neck       TONSILLECTOMY         Social History     Tobacco Use     Smoking status: Current Every Day Smoker     Packs/day: 0.50     Types: Cigarettes     Smokeless tobacco: Never Used   Substance Use Topics     Alcohol use: No     Alcohol/week: 0.0 standard drinks     Family History   Problem Relation Age of Onset     Diabetes Mother      Breast Cancer Mother      Diabetes Father      Lung Cancer Father      Rheumatoid Arthritis Father          Current  Outpatient Medications   Medication Sig Dispense Refill     albuterol (PROAIR HFA/PROVENTIL HFA/VENTOLIN HFA) 108 (90 Base) MCG/ACT inhaler Inhale 2 puffs into the lungs every 6 hours as needed for shortness of breath / dyspnea 18 g 5     aspirin 81 MG EC tablet Take 1 tablet (81 mg) by mouth daily 100 tablet 3     Cholecalciferol (VITAMIN D) 2000 UNITS tablet Take 2,000 Units by mouth daily 100 tablet 3     fluticasone (FLOVENT HFA) 220 MCG/ACT inhaler Inhale 1 puff into the lungs 2 times daily 1 Inhaler 4     glipiZIDE (GLUCOTROL XL) 10 MG 24 hr tablet 1 tab in am and 2 tabs in  tablet 0     hydrochlorothiazide (HYDRODIURIL) 25 MG tablet TAKE 1 TABLET (25 MG) BY MOUTH DAILY 90 tablet 3     ipratropium (ATROVENT) 0.06 % nasal spray Spray 2 sprays into both nostrils 2 times daily as needed for rhinitis 15 mL 3     ipratropium - albuterol 0.5 mg/2.5 mg/3 mL (DUONEB) 0.5-2.5 (3) MG/3ML neb solution Take 1 vial (3 mLs) by nebulization every 6 hours as needed for shortness of breath / dyspnea or wheezing 90 vial 3     ketoconazole (NIZORAL) 2 % cream Apply topically 2 times daily as needed for itching Apply to AA on the face bid when needed 15 g 3     levothyroxine (SYNTHROID/LEVOTHROID) 150 MCG tablet TAKE 1 TABLET (150 MCG) BY MOUTH DAILY 90 tablet 3     losartan (COZAAR) 50 MG tablet Take 1 tablet (50 mg) by mouth daily 90 tablet 3     simvastatin (ZOCOR) 20 MG tablet TAKE 1 TABLET (20 MG) BY MOUTH AT BEDTIME 90 tablet 3     tiotropium (SPIRIVA) 18 MCG inhaled capsule Inhale 1 capsule (18 mcg) into the lungs daily 90 capsule 3     Allergies   Allergen Reactions     Ibuprofen Sodium Nausea and Vomiting     Vicodin [Hydrocodone-Acetaminophen] Nausea and Vomiting       Reviewed and updated as needed this visit by Provider  Tobacco  Soc Hx        Review of Systems   ROS COMP: CONSTITUTIONAL: NEGATIVE for fever, chills, change in weight  ENT/MOUTH: NEGATIVE for ear, mouth and throat problems  RESP: NEGATIVE for  significant cough or SOB  CV: NEGATIVE for chest pain, palpitations or peripheral edema      Objective    /78   Pulse 102   Temp 97.5  F (36.4  C) (Tympanic)   Ht 1.524 m (5')   Wt 98 kg (216 lb)   SpO2 100%   BMI 42.18 kg/m    Body mass index is 42.18 kg/m .  Physical Exam   GENERAL: healthy, alert and no distress  NECK: no adenopathy, no asymmetry, masses, or scars and thyroid normal to palpation  RESP: lungs clear to auscultation - no rales, rhonchi or wheezes  CV: regular rate and rhythm, normal S1 S2, no S3 or S4, no murmur, click or rub, no peripheral edema and peripheral pulses strong  ABDOMEN: soft, nontender, no hepatosplenomegaly, no masses and bowel sounds normal  MS: no gross musculoskeletal defects noted, no edema            Assessment & Plan     1. Type 2 diabetes mellitus without complication, without long-term current use of insulin (H)  Not well controlled.  Patient recently on prednisone and had  Infections.  Recommending to continue same medications for now as before.  Refill on glipizide ordered.  Low carbohydrate diet and regular exercise discussed.  Follow-up in 3 months for recheck  - glipiZIDE (GLUCOTROL XL) 10 MG 24 hr tablet; 1 tab in am and 2 tabs in PM  Dispense: 270 tablet; Refill: 0    2. Chronic obstructive pulmonary disease, unspecified COPD type (H)  Flovent ordered instead of Pulmicort.  Instructed to notify me back if any concerns regarding that switch noted  - fluticasone (FLOVENT HFA) 220 MCG/ACT inhaler; Inhale 1 puff into the lungs 2 times daily  Dispense: 1 Inhaler; Refill: 4    Return in about 10 weeks (around 5/5/2020) for Annual Physical Exam.    Lokesh Casanova MD  [facility] [place]

## 2020-02-28 ENCOUNTER — TELEPHONE (OUTPATIENT)
Dept: FAMILY MEDICINE | Facility: CLINIC | Age: 55
End: 2020-02-28

## 2020-02-28 NOTE — TELEPHONE ENCOUNTER
Diabetes Education Scheduling Outreach #1:    Call to patient to schedule. Left message with phone number to call to schedule.    Plan for 2nd outreach attempt within 1 week.    Ml Lopez  [place] [facility]  Diabetes and Nutrition Scheduling

## 2020-03-04 NOTE — TELEPHONE ENCOUNTER
Patient called back. She is going to check with insurance on visit coverage and CGM coverage, then call us back to schedule.    Ml JULIEN  Central Scheduler

## 2020-03-19 ENCOUNTER — ALLIED HEALTH/NURSE VISIT (OUTPATIENT)
Dept: EDUCATION SERVICES | Facility: CLINIC | Age: 55
End: 2020-03-19
Payer: COMMERCIAL

## 2020-03-19 DIAGNOSIS — E11.9 TYPE 2 DIABETES MELLITUS WITHOUT COMPLICATION, UNSPECIFIED WHETHER LONG TERM INSULIN USE (H): Primary | ICD-10-CM

## 2020-03-19 NOTE — LETTER
3/19/2020         RE: Jazlyn Bartlett  [address]  [facility] [address]        Dear Colleague,    Thank you for referring your patient, Jazlyn Bartlett, to the [facility]. Please see a copy of my visit note below.    Unable to reach. Left message.     Ilana Bowen RD, CDE  Diabetes

## 2020-04-28 ENCOUNTER — DOCUMENTATION ONLY (OUTPATIENT)
Dept: FAMILY MEDICINE | Facility: CLINIC | Age: 55
End: 2020-04-28

## 2020-04-28 ENCOUNTER — MYC MEDICAL ADVICE (OUTPATIENT)
Dept: FAMILY MEDICINE | Facility: CLINIC | Age: 55
End: 2020-04-28

## 2020-04-28 NOTE — PROGRESS NOTES
Depression is not well controlled based on the last PHQ 9 scores.  Please offer the patient a video visit for first week of May for depression recheck.  Send PHQ9 to be filled on [employer].    Lokesh Casanova MD  [facility] [place]

## 2020-04-28 NOTE — PROGRESS NOTES
[email or web address] message sent to patient.    .Gina LEWIS    [facility] [place]

## 2020-05-05 NOTE — TELEPHONE ENCOUNTER
I spoke to the patient.  She would like to set up a virtual visit with me on 5/7/2020 at 5:20 PM slot.  It is to follow-up on diabetes and depression.  Please book it.    Lokesh Casanova MD  [facility] [place]

## 2020-05-05 NOTE — TELEPHONE ENCOUNTER
Patient has been scheduled.  .    .Gina LEWIS    [facility] [place]

## 2020-07-21 ENCOUNTER — NURSE TRIAGE (OUTPATIENT)
Dept: FAMILY MEDICINE | Facility: CLINIC | Age: 55
End: 2020-07-21

## 2020-07-21 NOTE — TELEPHONE ENCOUNTER
"    Reason for Disposition    [1] MODERATE difficulty breathing (e.g., speaks in phrases, SOB even at rest, pulse 100-120) AND [2] NEW-onset or WORSE than normal    Additional Information    Negative: [1] Breathing stopped AND [2] hasn't returned    Negative: Choking on something    Negative: Severe difficulty breathing (e.g., struggling for each breath, speaks in single words)    Negative: Bluish (or gray) lips or face now    Negative: Difficult to awaken or acting confused (e.g., disoriented, slurred speech)    Negative: Passed out (i.e., lost consciousness, collapsed and was not responding)    Negative: Wheezing started suddenly after medicine, an allergic food or bee sting    Negative: Stridor    Negative: Slow, shallow and weak breathing    Negative: Sounds like a life-threatening emergency to the triager    Negative: Chest pain    Negative: [1] Wheezing (high pitched whistling sound) AND [2] previous asthma attacks or use of asthma medicines    Negative: [1] Difficulty breathing AND [2] only present when coughing    Negative: [1] Difficulty breathing AND [2] only from stuffy or runny nose    Answer Assessment - Initial Assessment Questions  1. RESPIRATORY STATUS: \"Describe your breathing?\" (e.g., wheezing, shortness of breath, unable to speak, severe coughing)       Shortness of breath,   2. ONSET: \"When did this breathing problem begin?\"       3 days ago  3. PATTERN \"Does the difficult breathing come and go, or has it been constant since it started?\"       constant  4. SEVERITY: \"How bad is your breathing?\" (e.g., mild, moderate, severe)     - MILD: No SOB at rest, mild SOB with walking, speaks normally in sentences, can lay down, no retractions, pulse < 100.     - MODERATE: SOB at rest, SOB with minimal exertion and prefers to sit, cannot lie down flat, speaks in phrases, mild retractions, audible wheezing, pulse 100-120.     - SEVERE: Very SOB at rest, speaks in single words, struggling to breathe, sitting " "hunched forward, retractions, pulse > 120       moderate  5. RECURRENT SYMPTOM: \"Have you had difficulty breathing before?\" If so, ask: \"When was the last time?\" and \"What happened that time?\"       Yes   6. CARDIAC HISTORY: \"Do you have any history of heart disease?\" (e.g., heart attack, angina, bypass surgery, angioplasty)       no  7. LUNG HISTORY: \"Do you have any history of lung disease?\"  (e.g., pulmonary embolus, asthma, emphysema)      COPD  8. CAUSE: \"What do you think is causing the breathing problem?\"       I think I caught something  9. OTHER SYMPTOMS: \"Do you have any other symptoms? (e.g., dizziness, runny nose, cough, chest pain, fever)      Runny nose, sore throat, coughing so hard passed out for a couple of minutes yesterday  10. PREGNANCY: \"Is there any chance you are pregnant?\" \"When was your last menstrual period?\"        no  11. TRAVEL: \"Have you traveled out of the country in the last month?\" (e.g., travel history, exposures)        no    Protocols used: BREATHING DIFFICULTY-A-AH    GO TO ED NOW:   * You need to be seen in the Emergency Department.  * Go to the ED at [facility].  * Leave now. Drive carefully.      Patient/Caregiver understands and will follow care advice? Yes, plans to follow advice        Gosia BOTELLO RN  EP Triage    "

## 2020-07-27 ENCOUNTER — VIRTUAL VISIT (OUTPATIENT)
Dept: FAMILY MEDICINE | Facility: CLINIC | Age: 55
End: 2020-07-27
Payer: COMMERCIAL

## 2020-07-27 DIAGNOSIS — J44.9 CHRONIC OBSTRUCTIVE PULMONARY DISEASE, UNSPECIFIED COPD TYPE (H): ICD-10-CM

## 2020-07-27 DIAGNOSIS — J01.90 ACUTE SINUSITIS WITH SYMPTOMS > 10 DAYS: ICD-10-CM

## 2020-07-27 DIAGNOSIS — J20.8 ACUTE BRONCHITIS DUE TO OTHER SPECIFIED ORGANISMS: Primary | ICD-10-CM

## 2020-07-27 PROCEDURE — 99214 OFFICE O/P EST MOD 30 MIN: CPT | Mod: 95 | Performed by: FAMILY MEDICINE

## 2020-07-27 RX ORDER — DOXYCYCLINE 100 MG/1
100 CAPSULE ORAL 2 TIMES DAILY
Qty: 14 CAPSULE | Refills: 0 | Status: SHIPPED | OUTPATIENT
Start: 2020-07-27 | End: 2020-08-03

## 2020-07-27 RX ORDER — FLUTICASONE PROPIONATE 220 UG/1
1 AEROSOL, METERED RESPIRATORY (INHALATION) 2 TIMES DAILY
Qty: 1 INHALER | Refills: 4 | Status: SHIPPED | OUTPATIENT
Start: 2020-07-27 | End: 2021-02-24

## 2020-07-27 RX ORDER — PREDNISONE 50 MG/1
50 TABLET ORAL DAILY
Qty: 5 TABLET | Refills: 0 | Status: SHIPPED | OUTPATIENT
Start: 2020-07-27 | End: 2020-08-03

## 2020-07-27 NOTE — PROGRESS NOTES
"Jazlyn Bartlett is a 54 year old female who is being evaluated via a billable video visit.      The patient has been notified of following:     \"This video visit will be conducted via a call between you and your physician/provider. We have found that certain health care needs can be provided without the need for an in-person physical exam.  This service lets us provide the care you need with a video conversation.  If a prescription is necessary we can send it directly to your pharmacy.  If lab work is needed we can place an order for that and you can then stop by our lab to have the test done at a later time.    Video visits are billed at different rates depending on your insurance coverage.  Please reach out to your insurance provider with any questions.    If during the course of the call the physician/provider feels a video visit is not appropriate, you will not be charged for this service.\"    Patient has given verbal consent for Video visit? Yes, Mile Mancuso MA  How would you like to obtain your AVS? MyChart  If you are dropped from the video visit, the video invite should be resent to: Cell [phone]  Will anyone else be joining your video visit? No    Subjective     Jazlyn Bartlett is a 54 year old female who presents today via video visit for the following health issues:    HPI    She has symptoms of cough and nasal congestion.  Symptoms ongoing for the last 10 days.  Reports that her sinuses hurt.  She reports a postnasal drainage.  Also reports of ongoing wheezing.  Not able to breathe or talk a lot.  Denies any sick contacts.  Denies any fever or chills.  Reports of fatigue.  History of COPD.  Using all inhalers.  Needs a refill on Flovent.       Video Start Time: 10:45 AM        Patient Active Problem List   Diagnosis     Neck mass     Moderate major depression (H)     Anxiety     Vitamin D deficiency disease     CARDIOVASCULAR SCREENING; LDL GOAL LESS THAN 100     GERD (gastroesophageal reflux " disease)     Hypertension goal BP (blood pressure) < 140/80     Type 2 diabetes mellitus without complication (H)     Chronic airway obstruction (H)     Advanced directives, counseling/discussion     Autoimmune gastritis- repeat UGI 1/16 with gastritis without eosinophils     Urinary incontinence     Osteoarthritis of patellofemoral joint     Morbid obesity (H)     Tobacco abuse     Lumbar radiculopathy     Hyperlipidemia LDL goal <100     Hypothyroidism, unspecified type     Chronic obstructive pulmonary disease, unspecified COPD type (H)     Past Surgical History:   Procedure Laterality Date     ABLATION, ENDOMETRIAL, THERMAL, W/O HYSTEROSCOPIC GUIDANCE       DILATION AND CURETTAGE, HYSTEROSCOPY, ABLATE ENDOMETRIUM NOVASURE, COMBINED  10/11/2012    Procedure: COMBINED DILATION AND CURETTAGE, HYSTEROSCOPY, ABLATE ENDOMETRIUM NOVASURE;  COMBINED DILATION AND CURETTAGE, HYSTEROSCOPY, ABLATE ENDOMETRIUM ,pelvic exam under anesthesia;  Surgeon: Shruti Barrios MD;  Location: [facility] OR     ESOPHAGOSCOPY, GASTROSCOPY, DUODENOSCOPY (EGD), COMBINED N/A 1/19/2016    Procedure: COMBINED ESOPHAGOSCOPY, GASTROSCOPY, DUODENOSCOPY (EGD), BIOPSY SINGLE OR MULTIPLE;  Surgeon: Kristofer Molina MD;  Location:  GI     Removal of cancerous lump on neck       TONSILLECTOMY         Social History     Tobacco Use     Smoking status: Former Smoker     Packs/day: 0.50     Types: Cigarettes     Smokeless tobacco: Former User     Quit date: 2/22/2020   Substance Use Topics     Alcohol use: No     Alcohol/week: 0.0 standard drinks     Family History   Problem Relation Age of Onset     Diabetes Mother      Breast Cancer Mother      Diabetes Father      Lung Cancer Father      Rheumatoid Arthritis Father          Current Outpatient Medications   Medication Sig Dispense Refill     albuterol (PROAIR HFA/PROVENTIL HFA/VENTOLIN HFA) 108 (90 Base) MCG/ACT inhaler Inhale 2 puffs into the lungs every 6 hours as needed for shortness of breath /  dyspnea 18 g 5     aspirin 81 MG EC tablet Take 1 tablet (81 mg) by mouth daily 100 tablet 3     Cholecalciferol (VITAMIN D) 2000 UNITS tablet Take 2,000 Units by mouth daily 100 tablet 3     doxycycline monohydrate (MONODOX) 100 MG capsule Take 1 capsule (100 mg) by mouth 2 times daily for 7 days 14 capsule 0     fluticasone (FLOVENT HFA) 220 MCG/ACT inhaler Inhale 1 puff into the lungs 2 times daily 1 Inhaler 4     glipiZIDE (GLUCOTROL XL) 10 MG 24 hr tablet 1 tab in am and 2 tabs in  tablet 0     hydrochlorothiazide (HYDRODIURIL) 25 MG tablet TAKE 1 TABLET (25 MG) BY MOUTH DAILY 90 tablet 3     ipratropium (ATROVENT) 0.06 % nasal spray Spray 2 sprays into both nostrils 2 times daily as needed for rhinitis 15 mL 3     ipratropium - albuterol 0.5 mg/2.5 mg/3 mL (DUONEB) 0.5-2.5 (3) MG/3ML neb solution Take 1 vial (3 mLs) by nebulization every 6 hours as needed for shortness of breath / dyspnea or wheezing 90 vial 3     levothyroxine (SYNTHROID/LEVOTHROID) 150 MCG tablet TAKE 1 TABLET (150 MCG) BY MOUTH DAILY 90 tablet 0     losartan (COZAAR) 50 MG tablet Take 1 tablet (50 mg) by mouth daily 90 tablet 3     simvastatin (ZOCOR) 20 MG tablet TAKE 1 TABLET (20 MG) BY MOUTH AT BEDTIME 90 tablet 0     Allergies   Allergen Reactions     Ibuprofen Sodium Nausea and Vomiting     Vicodin [Hydrocodone-Acetaminophen] Nausea and Vomiting       Reviewed and updated as needed this visit by Provider         Review of Systems   INTEGUMENTARY/SKIN: NEGATIVE for worrisome rashes, moles or lesions  GI: NEGATIVE for nausea, abdominal pain, heartburn, or change in bowel habits      Objective             Physical Exam     GENERAL: Healthy, alert and no distress  EYES: Eyes grossly normal to inspection.  No discharge or erythema, or obvious scleral/conjunctival abnormalities.  RESP: No audible wheeze, cough, or visible cyanosis.  No visible retractions or increased work of breathing.    SKIN: Visible skin clear. No significant rash,  abnormal pigmentation or lesions.  NEURO: Cranial nerves grossly intact.  Mentation and speech appropriate for age.  PSYCH: Mentation appears normal, affect normal/bright, judgement and insight intact, normal speech and appearance well-groomed.              Assessment & Plan     1. Acute bronchitis due to other specified organisms      Patient has symptoms of acute bronchitis.  Starting patient on a course of doxycycline.  Recommending to stay well-hydrated.  - doxycycline monohydrate (MONODOX) 100 MG capsule; Take 1 capsule (100 mg) by mouth 2 times daily for 7 days  Dispense: 14 capsule; Refill: 0  - predniSONE (DELTASONE) 50 MG tablet; Take 1 tablet (50 mg) by mouth daily for 5 days  Dispense: 5 tablet; Refill: 0    2. Acute sinusitis with symptoms > 10 days  Patient also has symptoms of acute sinus infection.  Doxycycline should cover for that as well.  - doxycycline monohydrate (MONODOX) 100 MG capsule; Take 1 capsule (100 mg) by mouth 2 times daily for 7 days  Dispense: 14 capsule; Refill: 0    3. Chronic obstructive pulmonary disease, unspecified COPD type (H)  Patient has mild COPD exacerbation.  Recommending to start a course of oral steroid.  Refill on Flovent inhaler ordered.  Resume all previous medications.  Symptoms are not improving, she is instructed to notify me back.  - fluticasone (FLOVENT HFA) 220 MCG/ACT inhaler; Inhale 1 puff into the lungs 2 times daily  Dispense: 1 Inhaler; Refill: 4  - predniSONE (DELTASONE) 50 MG tablet; Take 1 tablet (50 mg) by mouth daily for 5 days  Dispense: 5 tablet; Refill: 0     BMI:   Estimated body mass index is 42.18 kg/m  as calculated from the following:    Height as of 2/25/20: 1.524 m (5').    Weight as of 2/25/20: 98 kg (216 lb).           Return in about 1 week (around 8/3/2020) for Annual Physical Exam.    Lokesh Casanova MD  [facility]      Video-Visit Details    Type of service:  Video Visit    Video End Time:10:58 AM    Originating  Location (pt. Location): Home    Distant Location (provider location):  [facility]     Platform used for Video Visit: Doximity    Return in about 1 week (around 8/3/2020) for Annual Physical Exam.       Lokesh Casanova MD

## 2020-07-30 ENCOUNTER — MYC MEDICAL ADVICE (OUTPATIENT)
Dept: FAMILY MEDICINE | Facility: CLINIC | Age: 55
End: 2020-07-30

## 2020-07-30 NOTE — TELEPHONE ENCOUNTER
Please see [employer] message - routing to  for forms      Thank you,  Leah MURILLORN BSN  [facility]  [phone]

## 2020-07-30 NOTE — TELEPHONE ENCOUNTER
Form has been printed and placed in provider in-basket.    .Gina LEWIS    [facility] [place]

## 2020-08-03 ENCOUNTER — OFFICE VISIT (OUTPATIENT)
Dept: FAMILY MEDICINE | Facility: CLINIC | Age: 55
End: 2020-08-03
Payer: COMMERCIAL

## 2020-08-03 VITALS
HEIGHT: 61 IN | HEART RATE: 83 BPM | SYSTOLIC BLOOD PRESSURE: 122 MMHG | OXYGEN SATURATION: 98 % | BODY MASS INDEX: 38.21 KG/M2 | WEIGHT: 202.4 LBS | TEMPERATURE: 97.9 F | RESPIRATION RATE: 18 BRPM | DIASTOLIC BLOOD PRESSURE: 70 MMHG

## 2020-08-03 DIAGNOSIS — J44.9 CHRONIC OBSTRUCTIVE PULMONARY DISEASE, UNSPECIFIED COPD TYPE (H): ICD-10-CM

## 2020-08-03 DIAGNOSIS — Z20.822 SUSPECTED COVID-19 VIRUS INFECTION: ICD-10-CM

## 2020-08-03 DIAGNOSIS — E03.9 HYPOTHYROIDISM, UNSPECIFIED TYPE: ICD-10-CM

## 2020-08-03 DIAGNOSIS — E78.5 HYPERLIPIDEMIA LDL GOAL <100: ICD-10-CM

## 2020-08-03 DIAGNOSIS — E11.9 TYPE 2 DIABETES MELLITUS WITHOUT COMPLICATION, WITHOUT LONG-TERM CURRENT USE OF INSULIN (H): ICD-10-CM

## 2020-08-03 DIAGNOSIS — Z02.9 ADMINISTRATIVE ENCOUNTER: ICD-10-CM

## 2020-08-03 DIAGNOSIS — Z00.00 ROUTINE GENERAL MEDICAL EXAMINATION AT A HEALTH CARE FACILITY: Primary | ICD-10-CM

## 2020-08-03 DIAGNOSIS — Z12.31 ENCOUNTER FOR SCREENING MAMMOGRAM FOR BREAST CANCER: ICD-10-CM

## 2020-08-03 LAB — HBA1C MFR BLD: 7.2 % (ref 0–5.6)

## 2020-08-03 PROCEDURE — 99214 OFFICE O/P EST MOD 30 MIN: CPT | Mod: 25 | Performed by: FAMILY MEDICINE

## 2020-08-03 PROCEDURE — 36415 COLL VENOUS BLD VENIPUNCTURE: CPT | Performed by: FAMILY MEDICINE

## 2020-08-03 PROCEDURE — 80061 LIPID PANEL: CPT | Performed by: FAMILY MEDICINE

## 2020-08-03 PROCEDURE — 80053 COMPREHEN METABOLIC PANEL: CPT | Performed by: FAMILY MEDICINE

## 2020-08-03 PROCEDURE — 84443 ASSAY THYROID STIM HORMONE: CPT | Performed by: FAMILY MEDICINE

## 2020-08-03 PROCEDURE — 82043 UR ALBUMIN QUANTITATIVE: CPT | Performed by: FAMILY MEDICINE

## 2020-08-03 PROCEDURE — 86769 SARS-COV-2 COVID-19 ANTIBODY: CPT | Performed by: FAMILY MEDICINE

## 2020-08-03 PROCEDURE — 99396 PREV VISIT EST AGE 40-64: CPT | Performed by: FAMILY MEDICINE

## 2020-08-03 PROCEDURE — 83036 HEMOGLOBIN GLYCOSYLATED A1C: CPT | Performed by: FAMILY MEDICINE

## 2020-08-03 RX ORDER — ALBUTEROL SULFATE 90 UG/1
2 AEROSOL, METERED RESPIRATORY (INHALATION) EVERY 6 HOURS PRN
Qty: 18 G | Refills: 5 | Status: SHIPPED | OUTPATIENT
Start: 2020-08-03 | End: 2021-06-15

## 2020-08-03 RX ORDER — LEVOTHYROXINE SODIUM 150 UG/1
TABLET ORAL
Qty: 90 TABLET | Refills: 3 | Status: SHIPPED | OUTPATIENT
Start: 2020-08-03 | End: 2021-06-15

## 2020-08-03 RX ORDER — GLIPIZIDE 10 MG/1
TABLET, FILM COATED, EXTENDED RELEASE ORAL
Qty: 270 TABLET | Refills: 3 | Status: SHIPPED | OUTPATIENT
Start: 2020-08-03 | End: 2021-06-15

## 2020-08-03 RX ORDER — IPRATROPIUM BROMIDE 42 UG/1
2 SPRAY, METERED NASAL 2 TIMES DAILY PRN
Qty: 15 ML | Refills: 3 | Status: SHIPPED | OUTPATIENT
Start: 2020-08-03 | End: 2021-06-15

## 2020-08-03 RX ORDER — IPRATROPIUM BROMIDE AND ALBUTEROL SULFATE 2.5; .5 MG/3ML; MG/3ML
1 SOLUTION RESPIRATORY (INHALATION) EVERY 6 HOURS PRN
Qty: 90 VIAL | Refills: 3 | Status: SHIPPED | OUTPATIENT
Start: 2020-08-03 | End: 2022-07-05

## 2020-08-03 RX ORDER — SIMVASTATIN 20 MG
20 TABLET ORAL AT BEDTIME
Qty: 90 TABLET | Refills: 3 | Status: SHIPPED | OUTPATIENT
Start: 2020-08-03 | End: 2021-06-15

## 2020-08-03 ASSESSMENT — MIFFLIN-ST. JEOR: SCORE: 1447.52

## 2020-08-03 NOTE — PROGRESS NOTES
SUBJECTIVE:   CC: Jazlyn Bartlett is an 54 year old woman who presents for preventive health visit.     Healthy Habits:    Do you get at least three servings of calcium containing foods daily (dairy, green leafy vegetables, etc.)? No    Amount of exercise or daily activities, outside of work: No    Problems taking medications regularly Yes, lately the pills are hard to swallow because the pills are big.    Medication side effects: No    Have you had an eye exam in the past two years? no    Do you see a dentist twice per year? yes    Do you have sleep apnea, excessive snoring or daytime drowsiness?yes      Diabetes Follow-up    How often are you checking your blood sugar? One time daily  What time of day are you checking your blood sugars (select all that apply)?  Before meals  Have you had any blood sugars above 200?  No  Have you had any blood sugars below 70?  No    What symptoms do you notice when your blood sugar is low?  None    What concerns do you have today about your diabetes? None     Do you have any of these symptoms? (Select all that apply)  No numbness or tingling in feet.  No redness, sores or blisters on feet.  No complaints of excessive thirst.  No reports of blurry vision.  No significant changes to weight.      BP Readings from Last 2 Encounters:   08/03/20 122/70   02/25/20 124/78     Hemoglobin A1C (%)   Date Value   08/03/2020 7.2 (H)   02/19/2020 9.6 (H)     LDL Cholesterol Calculated (mg/dL)   Date Value   05/03/2019 40   05/18/2017 65     LDL Cholesterol Direct (mg/dL)   Date Value   06/19/2018 61               Hyperlipidemia Follow-Up      Are you regularly taking any medication or supplement to lower your cholesterol?   Yes- statin    Are you having muscle aches or other side effects that you think could be caused by your cholesterol lowering medication?  No    COPD Follow-Up    Overall, how are your COPD symptoms since your last clinic visit?  Slightly improved.  She is done with a  course of antibiotic and steroids.  She is still not able to continue to talk in full sentences.  She is taking time off from work.  Need FMLA filled out for the time off.  Hoping that she could return to work next Monday.        History   Smoking Status     Current Every Day Smoker     Packs/day: 0.50     Types: Cigarettes   Smokeless Tobacco     Former User     Quit date: 2/22/2020     Lab Results   Component Value Date    FEV1 1.75 10/03/2013    APV5PHZ 67% 10/03/2013       Hypothyroidism Follow-up      Since last visit, patient describes the following symptoms: Weight stable, no hair loss, no skin changes, no constipation, no loose stools      Patient would like to be tested for covid antibodies.  She tells that she got really sick in January during her trip to [place] [facility].  And wonders if that was covid infection back then.    Today's PHQ-2 Score:   PHQ-2 ( 1999 Pfizer) 8/3/2020 7/27/2020   Q1: Little interest or pleasure in doing things 0 0   Q2: Feeling down, depressed or hopeless 0 0   PHQ-2 Score 0 0   Q1: Little interest or pleasure in doing things - -   Q2: Feeling down, depressed or hopeless - -   PHQ-2 Score - -       Abuse: Current or Past(Physical, Sexual or Emotional)- No  Do you feel safe in your environment? Yes        Social History     Tobacco Use     Smoking status: Current Every Day Smoker     Packs/day: 0.50     Types: Cigarettes     Smokeless tobacco: Former User     Quit date: 2/22/2020   Substance Use Topics     Alcohol use: No     Alcohol/week: 0.0 standard drinks     If you drink alcohol do you typically have >3 drinks per day or >7 drinks per week? No                     Reviewed orders with patient.  Reviewed health maintenance and updated orders accordingly - Yes  Patient Active Problem List   Diagnosis     Neck mass     Moderate major depression (H)     Anxiety     Vitamin D deficiency disease     CARDIOVASCULAR SCREENING; LDL GOAL LESS THAN 100     GERD (gastroesophageal reflux disease)      Hypertension goal BP (blood pressure) < 140/80     Type 2 diabetes mellitus without complication (H)     Chronic airway obstruction (H)     Advanced directives, counseling/discussion     Autoimmune gastritis- repeat UGI 1/16 with gastritis without eosinophils     Urinary incontinence     Osteoarthritis of patellofemoral joint     Morbid obesity (H)     Tobacco abuse     Lumbar radiculopathy     Hyperlipidemia LDL goal <100     Hypothyroidism, unspecified type     Chronic obstructive pulmonary disease, unspecified COPD type (H)     Past Surgical History:   Procedure Laterality Date     ABLATION, ENDOMETRIAL, THERMAL, W/O HYSTEROSCOPIC GUIDANCE       DILATION AND CURETTAGE, HYSTEROSCOPY, ABLATE ENDOMETRIUM NOVASURE, COMBINED  10/11/2012    Procedure: COMBINED DILATION AND CURETTAGE, HYSTEROSCOPY, ABLATE ENDOMETRIUM NOVASURE;  COMBINED DILATION AND CURETTAGE, HYSTEROSCOPY, ABLATE ENDOMETRIUM ,pelvic exam under anesthesia;  Surgeon: Shruti Barrios MD;  Location:  OR     ESOPHAGOSCOPY, GASTROSCOPY, DUODENOSCOPY (EGD), COMBINED N/A 1/19/2016    Procedure: COMBINED ESOPHAGOSCOPY, GASTROSCOPY, DUODENOSCOPY (EGD), BIOPSY SINGLE OR MULTIPLE;  Surgeon: Kristofer Molina MD;  Location:  GI     Removal of cancerous lump on neck       TONSILLECTOMY         Social History     Tobacco Use     Smoking status: Current Every Day Smoker     Packs/day: 0.50     Types: Cigarettes     Smokeless tobacco: Former User     Quit date: 2/22/2020   Substance Use Topics     Alcohol use: No     Alcohol/week: 0.0 standard drinks     Family History   Problem Relation Age of Onset     Diabetes Mother      Breast Cancer Mother      Diabetes Father      Lung Cancer Father      Rheumatoid Arthritis Father          Current Outpatient Medications   Medication Sig Dispense Refill     albuterol (PROAIR HFA/PROVENTIL HFA/VENTOLIN HFA) 108 (90 Base) MCG/ACT inhaler Inhale 2 puffs into the lungs every 6 hours as needed for shortness of breath /  dyspnea 18 g 5     aspirin 81 MG EC tablet Take 1 tablet (81 mg) by mouth daily 100 tablet 3     Cholecalciferol (VITAMIN D) 2000 UNITS tablet Take 2,000 Units by mouth daily 100 tablet 3     fluticasone (FLOVENT HFA) 220 MCG/ACT inhaler Inhale 1 puff into the lungs 2 times daily 1 Inhaler 4     glipiZIDE (GLUCOTROL XL) 10 MG 24 hr tablet 1 tab in am and 2 tabs in  tablet 3     hydrochlorothiazide (HYDRODIURIL) 25 MG tablet TAKE 1 TABLET (25 MG) BY MOUTH DAILY 90 tablet 3     ipratropium (ATROVENT) 0.06 % nasal spray Spray 2 sprays into both nostrils 2 times daily as needed for rhinitis 15 mL 3     ipratropium - albuterol 0.5 mg/2.5 mg/3 mL (DUONEB) 0.5-2.5 (3) MG/3ML neb solution Take 1 vial (3 mLs) by nebulization every 6 hours as needed for shortness of breath / dyspnea or wheezing 90 vial 3     levothyroxine (SYNTHROID/LEVOTHROID) 150 MCG tablet TAKE 1 TABLET (150 MCG) BY MOUTH DAILY 90 tablet 3     losartan (COZAAR) 50 MG tablet Take 1 tablet (50 mg) by mouth daily 90 tablet 3     salmeterol (SEREVENT) 50 MCG/DOSE inhaler Inhale 1 puff into the lungs 2 times daily 1 Inhaler 3     simvastatin (ZOCOR) 20 MG tablet Take 1 tablet (20 mg) by mouth At Bedtime 90 tablet 3     Allergies   Allergen Reactions     Hydrocodone-Acetaminophen Nausea and Vomiting     Ibuprofen Sodium Nausea and Vomiting     Lisinopril Cough         Mammogram done 7/2/2019.    Pertinent mammograms are reviewed under the imaging tab.  History of abnormal Pap smear: NO - age 30- 65 PAP every 5 years recommended  PAP / HPV Latest Ref Rng & Units 11/7/2017 12/15/2014 9/5/2013   PAP - NIL NIL NIL   HPV 16 DNA NEG:Negative Negative - -   HPV 18 DNA NEG:Negative Negative - -   OTHER HR HPV NEG:Negative Negative - -     Reviewed and updated as needed this visit by clinical staff  Tobacco  Allergies  Meds  Med Hx  Surg Hx  Fam Hx  Soc Hx        Reviewed and updated as needed this visit by Provider            ROS:  CONSTITUTIONAL: NEGATIVE  "for fever, chills, change in weight  INTEGUMENTARY/SKIN: NEGATIVE for worrisome rashes, moles or lesions  EYES: NEGATIVE for vision changes or irritation  ENT: NEGATIVE for ear, mouth and throat problems  RESP: Wheezing and cough.  BREAST: NEGATIVE for masses, tenderness or discharge  CV: NEGATIVE for chest pain, palpitations or peripheral edema  GI: NEGATIVE for nausea, abdominal pain, heartburn, or change in bowel habits  : NEGATIVE for unusual urinary or vaginal symptoms. No vaginal bleeding.  MUSCULOSKELETAL: NEGATIVE for significant arthralgias or myalgia  NEURO: NEGATIVE for weakness, dizziness or paresthesias  PSYCHIATRIC: NEGATIVE for changes in mood or affect     OBJECTIVE:   /70 (BP Location: Left arm, Patient Position: Sitting, Cuff Size: Adult Large)   Pulse 83   Temp 97.9  F (36.6  C) (Oral)   Resp 18   Ht 1.537 m (5' 0.5\")   Wt 91.8 kg (202 lb 6.4 oz)   SpO2 98%   BMI 38.88 kg/m    EXAM:  GENERAL APPEARANCE: healthy, alert and no distress  EYES: Eyes grossly normal to inspection, PERRL and conjunctivae and sclerae normal  HENT: ear canals and TM's normal, nose and mouth without ulcers or lesions, oropharynx clear and oral mucous membranes moist  NECK: no adenopathy, no asymmetry, masses, or scars and thyroid normal to palpation  RESP: Bilateral expiratory wheezes  BREAST: normal without masses, tenderness or nipple discharge and no palpable axillary masses or adenopathy  CV: regular rate and rhythm, normal S1 S2, no S3 or S4, no murmur, click or rub, no peripheral edema and peripheral pulses strong  ABDOMEN: soft, nontender, no hepatosplenomegaly, no masses and bowel sounds normal  MS: no musculoskeletal defects are noted and gait is age appropriate without ataxia  SKIN: no suspicious lesions or rashes  NEURO: Normal strength and tone, sensory exam grossly normal, mentation intact and speech normal  PSYCH: mentation appears normal and affect normal/bright        ASSESSMENT/PLAN:   1. " Routine general medical examination at a health care facility      2. Chronic obstructive pulmonary disease, unspecified COPD type (H)  Symptoms are not well controlled.  Patient is finished a course of antibiotics and oral steroids.  Resume previous medications.  Adding on salmeterol inhaler 1 puff twice daily to the regimen.  She is instructed to notify me if symptoms are not improving or any worsening noted by the end of this week.  - albuterol (PROAIR HFA/PROVENTIL HFA/VENTOLIN HFA) 108 (90 Base) MCG/ACT inhaler; Inhale 2 puffs into the lungs every 6 hours as needed for shortness of breath / dyspnea  Dispense: 18 g; Refill: 5  - ipratropium - albuterol 0.5 mg/2.5 mg/3 mL (DUONEB) 0.5-2.5 (3) MG/3ML neb solution; Take 1 vial (3 mLs) by nebulization every 6 hours as needed for shortness of breath / dyspnea or wheezing  Dispense: 90 vial; Refill: 3  - ipratropium (ATROVENT) 0.06 % nasal spray; Spray 2 sprays into both nostrils 2 times daily as needed for rhinitis  Dispense: 15 mL; Refill: 3  - salmeterol (SEREVENT) 50 MCG/DOSE inhaler; Inhale 1 puff into the lungs 2 times daily  Dispense: 1 Inhaler; Refill: 3    3. Type 2 diabetes mellitus without complication, without long-term current use of insulin (H)  Improved diabetic control.  Await the blood work results.  - glipiZIDE (GLUCOTROL XL) 10 MG 24 hr tablet; 1 tab in am and 2 tabs in PM  Dispense: 270 tablet; Refill: 3  - Albumin Random Urine Quantitative with Creat Ratio  - Comprehensive metabolic panel  - Hemoglobin A1c    4. Hypothyroidism, unspecified type  Overdue for recheck.  Questionable control at this time.  - levothyroxine (SYNTHROID/LEVOTHROID) 150 MCG tablet; TAKE 1 TABLET (150 MCG) BY MOUTH DAILY  Dispense: 90 tablet; Refill: 3  - TSH    5. Hyperlipidemia LDL goal <100  Overdue for recheck.  Previously well controlled.  Await the test results    - simvastatin (ZOCOR) 20 MG tablet; Take 1 tablet (20 mg) by mouth At Bedtime  Dispense: 90 tablet;  "Refill: 3  - Comprehensive metabolic panel  - Lipid panel reflex to direct LDL Fasting    6. Encounter for screening mammogram for breast cancer    - MA Screen Bilateral w/Pasquale; Future    7. Suspected COVID-19 virus infection  Patient requested COVID antibody screening  - COVID-19 Virus (Coronavirus) Antibody Screen, IgG    8. Administrative encounter  FMLA filled out.      COUNSELING:   Reviewed preventive health counseling, as reflected in patient instructions       Regular exercise       Healthy diet/nutrition    Estimated body mass index is 38.88 kg/m  as calculated from the following:    Height as of this encounter: 1.537 m (5' 0.5\").    Weight as of this encounter: 91.8 kg (202 lb 6.4 oz).    Weight management plan: Discussed healthy diet and exercise guidelines     reports that she has been smoking cigarettes. She has been smoking about 0.50 packs per day. She quit smokeless tobacco use about 5 months ago.  Tobacco Cessation Action Plan: Information offered: Patient not interested at this time    Counseling Resources:  ATP IV Guidelines  Pooled Cohorts Equation Calculator  Breast Cancer Risk Calculator  FRAX Risk Assessment  ICSI Preventive Guidelines  Dietary Guidelines for Americans, 2010  USDA's MyPlate  ASA Prophylaxis  Lung CA Screening    Lokesh Casanova MD  [facility] [place]  "

## 2020-08-04 LAB
ALBUMIN SERPL-MCNC: 3.5 G/DL (ref 3.4–5)
ALP SERPL-CCNC: 96 U/L (ref 40–150)
ALT SERPL W P-5'-P-CCNC: 53 U/L (ref 0–50)
ANION GAP SERPL CALCULATED.3IONS-SCNC: 5 MMOL/L (ref 3–14)
AST SERPL W P-5'-P-CCNC: 35 U/L (ref 0–45)
BILIRUB SERPL-MCNC: 0.9 MG/DL (ref 0.2–1.3)
BUN SERPL-MCNC: 15 MG/DL (ref 7–30)
CALCIUM SERPL-MCNC: 9.2 MG/DL (ref 8.5–10.1)
CHLORIDE SERPL-SCNC: 103 MMOL/L (ref 94–109)
CHOLEST SERPL-MCNC: 128 MG/DL
CO2 SERPL-SCNC: 29 MMOL/L (ref 20–32)
COVID-19 ANTIBODY IGG: NEGATIVE
CREAT SERPL-MCNC: 0.63 MG/DL (ref 0.52–1.04)
CREAT UR-MCNC: 86 MG/DL
GFR SERPL CREATININE-BSD FRML MDRD: >90 ML/MIN/{1.73_M2}
GLUCOSE SERPL-MCNC: 96 MG/DL (ref 70–99)
HDLC SERPL-MCNC: 49 MG/DL
LAB TEST METHOD: NORMAL
LDLC SERPL CALC-MCNC: 56 MG/DL
MICROALBUMIN UR-MCNC: 7 MG/L
MICROALBUMIN/CREAT UR: 8.14 MG/G CR (ref 0–25)
NONHDLC SERPL-MCNC: 79 MG/DL
POTASSIUM SERPL-SCNC: 4 MMOL/L (ref 3.4–5.3)
PROT SERPL-MCNC: 7 G/DL (ref 6.8–8.8)
SODIUM SERPL-SCNC: 138 MMOL/L (ref 133–144)
TRIGL SERPL-MCNC: 115 MG/DL
TSH SERPL DL<=0.005 MIU/L-ACNC: 0.41 MU/L (ref 0.4–4)

## 2020-08-05 ENCOUNTER — MYC MEDICAL ADVICE (OUTPATIENT)
Dept: FAMILY MEDICINE | Facility: CLINIC | Age: 55
End: 2020-08-05

## 2020-08-05 NOTE — TELEPHONE ENCOUNTER
Waiting to receive.    .Gina LEWIS    [facility] [place]

## 2020-08-05 NOTE — TELEPHONE ENCOUNTER
Please see [employer] message and advise. Thank you very much.    Routing to  to advise. Thank you.     Piedad Drummond RN, BSN  [facility] [place]

## 2020-08-06 ENCOUNTER — AMBULATORY - HEALTHEAST (OUTPATIENT)
Dept: FAMILY MEDICINE | Facility: CLINIC | Age: 55
End: 2020-08-06

## 2020-08-06 DIAGNOSIS — R06.02 SHORTNESS OF BREATH: ICD-10-CM

## 2020-08-08 ENCOUNTER — COMMUNICATION - HEALTHEAST (OUTPATIENT)
Dept: SCHEDULING | Facility: CLINIC | Age: 55
End: 2020-08-08

## 2020-08-13 NOTE — TELEPHONE ENCOUNTER
Patient returned call to clinic regarding message left by Dr. Casanova that she has further questions regarding patients paperwork for disability.    patient states she has been informed by her work all the necessary paperwork has been received so patient is not sure why provider was calling or if there are further needs?    Routing to PCP and TC for review.    STANISLAW LawsN, RN  [employer] [facility]

## 2020-08-14 NOTE — TELEPHONE ENCOUNTER
I filled out the form as I found the info I was looking for.    Lokesh Casanova MD  [facility], [place]

## 2020-08-14 NOTE — TELEPHONE ENCOUNTER
Dr Casanova please see note below. Does pt need a virtual visit in order to complete the form? Thank you.  Lenka Urban,

## 2020-08-17 NOTE — TELEPHONE ENCOUNTER
Form competed and faxed to Joanne. Form then sent to [employer]. My Chart message sent to the pt.  Lenka Urban,

## 2020-08-21 DIAGNOSIS — J44.9 CHRONIC OBSTRUCTIVE PULMONARY DISEASE, UNSPECIFIED COPD TYPE (H): ICD-10-CM

## 2020-08-21 RX ORDER — FLUTICASONE PROPIONATE 220 UG/1
1 AEROSOL, METERED RESPIRATORY (INHALATION) 2 TIMES DAILY
Qty: 1 INHALER | Refills: 4 | OUTPATIENT
Start: 2020-08-21

## 2020-09-01 ENCOUNTER — HOSPITAL ENCOUNTER (OUTPATIENT)
Dept: MAMMOGRAPHY | Facility: CLINIC | Age: 55
Discharge: HOME | End: 2020-09-01
Attending: FAMILY MEDICINE | Admitting: FAMILY MEDICINE
Payer: COMMERCIAL

## 2020-09-01 DIAGNOSIS — Z12.31 ENCOUNTER FOR SCREENING MAMMOGRAM FOR BREAST CANCER: ICD-10-CM

## 2020-09-01 PROCEDURE — 77067 SCR MAMMO BI INCL CAD: CPT

## 2020-10-16 ENCOUNTER — COMMUNICATION - HEALTHEAST (OUTPATIENT)
Dept: SCHEDULING | Facility: CLINIC | Age: 55
End: 2020-10-16

## 2020-10-19 ENCOUNTER — TRANSFERRED RECORDS (OUTPATIENT)
Dept: HEALTH INFORMATION MANAGEMENT | Facility: CLINIC | Age: 55
End: 2020-10-19

## 2020-10-19 LAB — RETINOPATHY: NEGATIVE

## 2021-02-07 ENCOUNTER — HEALTH MAINTENANCE LETTER (OUTPATIENT)
Age: 56
End: 2021-02-07

## 2021-03-10 ENCOUNTER — IMMUNIZATION (OUTPATIENT)
Dept: NURSING | Facility: CLINIC | Age: 56
End: 2021-03-10
Payer: COMMERCIAL

## 2021-03-10 PROCEDURE — 91303 PR COVID VAC JANSSEN AD26 0.5ML: CPT

## 2021-03-10 PROCEDURE — 0031A PR COVID VAC JANSSEN AD26 0.5ML: CPT

## 2021-06-15 ENCOUNTER — TELEPHONE (OUTPATIENT)
Dept: FAMILY MEDICINE | Facility: CLINIC | Age: 56
End: 2021-06-15

## 2021-06-15 ENCOUNTER — OFFICE VISIT (OUTPATIENT)
Dept: FAMILY MEDICINE | Facility: CLINIC | Age: 56
End: 2021-06-15
Payer: COMMERCIAL

## 2021-06-15 VITALS
SYSTOLIC BLOOD PRESSURE: 130 MMHG | HEART RATE: 71 BPM | RESPIRATION RATE: 16 BRPM | HEIGHT: 60 IN | TEMPERATURE: 97.6 F | BODY MASS INDEX: 39.27 KG/M2 | WEIGHT: 200 LBS | OXYGEN SATURATION: 98 % | DIASTOLIC BLOOD PRESSURE: 80 MMHG

## 2021-06-15 DIAGNOSIS — E66.01 MORBID OBESITY (H): ICD-10-CM

## 2021-06-15 DIAGNOSIS — R20.0 BILATERAL ARM NUMBNESS AND TINGLING WHILE SLEEPING: ICD-10-CM

## 2021-06-15 DIAGNOSIS — E03.9 HYPOTHYROIDISM, UNSPECIFIED TYPE: ICD-10-CM

## 2021-06-15 DIAGNOSIS — E11.9 TYPE 2 DIABETES MELLITUS WITHOUT COMPLICATION, WITHOUT LONG-TERM CURRENT USE OF INSULIN (H): ICD-10-CM

## 2021-06-15 DIAGNOSIS — J44.9 CHRONIC OBSTRUCTIVE PULMONARY DISEASE, UNSPECIFIED COPD TYPE (H): ICD-10-CM

## 2021-06-15 DIAGNOSIS — I10 HYPERTENSION GOAL BP (BLOOD PRESSURE) < 140/80: ICD-10-CM

## 2021-06-15 DIAGNOSIS — Z00.00 ROUTINE GENERAL MEDICAL EXAMINATION AT A HEALTH CARE FACILITY: Primary | ICD-10-CM

## 2021-06-15 DIAGNOSIS — Z87.891 PERSONAL HISTORY OF TOBACCO USE: ICD-10-CM

## 2021-06-15 DIAGNOSIS — R20.2 BILATERAL ARM NUMBNESS AND TINGLING WHILE SLEEPING: ICD-10-CM

## 2021-06-15 DIAGNOSIS — E78.5 HYPERLIPIDEMIA LDL GOAL <100: ICD-10-CM

## 2021-06-15 DIAGNOSIS — Z01.84 IMMUNITY STATUS TESTING: ICD-10-CM

## 2021-06-15 LAB
ALBUMIN SERPL-MCNC: 4.1 G/DL (ref 3.4–5)
ALP SERPL-CCNC: 90 U/L (ref 40–150)
ALT SERPL W P-5'-P-CCNC: 29 U/L (ref 0–50)
ANION GAP SERPL CALCULATED.3IONS-SCNC: 2 MMOL/L (ref 3–14)
AST SERPL W P-5'-P-CCNC: 21 U/L (ref 0–45)
BILIRUB SERPL-MCNC: 0.8 MG/DL (ref 0.2–1.3)
BUN SERPL-MCNC: 12 MG/DL (ref 7–30)
CALCIUM SERPL-MCNC: 9.9 MG/DL (ref 8.5–10.1)
CHLORIDE SERPL-SCNC: 103 MMOL/L (ref 94–109)
CHOLEST SERPL-MCNC: 132 MG/DL
CO2 SERPL-SCNC: 34 MMOL/L (ref 20–32)
CREAT SERPL-MCNC: 0.64 MG/DL (ref 0.52–1.04)
GFR SERPL CREATININE-BSD FRML MDRD: >90 ML/MIN/{1.73_M2}
GLUCOSE SERPL-MCNC: 92 MG/DL (ref 70–99)
HBA1C MFR BLD: 6.7 % (ref 0–5.6)
HDLC SERPL-MCNC: 49 MG/DL
LDLC SERPL CALC-MCNC: 55 MG/DL
NONHDLC SERPL-MCNC: 83 MG/DL
POTASSIUM SERPL-SCNC: 3.6 MMOL/L (ref 3.4–5.3)
PROT SERPL-MCNC: 8.2 G/DL (ref 6.8–8.8)
SODIUM SERPL-SCNC: 139 MMOL/L (ref 133–144)
TRIGL SERPL-MCNC: 141 MG/DL
TSH SERPL DL<=0.005 MIU/L-ACNC: 0.08 MU/L (ref 0.4–4)

## 2021-06-15 PROCEDURE — 99396 PREV VISIT EST AGE 40-64: CPT | Performed by: FAMILY MEDICINE

## 2021-06-15 PROCEDURE — 82043 UR ALBUMIN QUANTITATIVE: CPT | Performed by: FAMILY MEDICINE

## 2021-06-15 PROCEDURE — 84443 ASSAY THYROID STIM HORMONE: CPT | Performed by: FAMILY MEDICINE

## 2021-06-15 PROCEDURE — 86706 HEP B SURFACE ANTIBODY: CPT | Performed by: FAMILY MEDICINE

## 2021-06-15 PROCEDURE — 83036 HEMOGLOBIN GLYCOSYLATED A1C: CPT | Performed by: FAMILY MEDICINE

## 2021-06-15 PROCEDURE — 80053 COMPREHEN METABOLIC PANEL: CPT | Performed by: FAMILY MEDICINE

## 2021-06-15 PROCEDURE — G0296 VISIT TO DETERM LDCT ELIG: HCPCS | Performed by: FAMILY MEDICINE

## 2021-06-15 PROCEDURE — 99213 OFFICE O/P EST LOW 20 MIN: CPT | Mod: 25 | Performed by: FAMILY MEDICINE

## 2021-06-15 PROCEDURE — 36415 COLL VENOUS BLD VENIPUNCTURE: CPT | Performed by: FAMILY MEDICINE

## 2021-06-15 PROCEDURE — 80061 LIPID PANEL: CPT | Performed by: FAMILY MEDICINE

## 2021-06-15 RX ORDER — FLUTICASONE PROPIONATE 220 UG/1
1 AEROSOL, METERED RESPIRATORY (INHALATION) 2 TIMES DAILY
Qty: 12 G | Refills: 4 | Status: SHIPPED | OUTPATIENT
Start: 2021-06-15 | End: 2022-07-05

## 2021-06-15 RX ORDER — GLIPIZIDE 10 MG/1
TABLET, FILM COATED, EXTENDED RELEASE ORAL
Qty: 270 TABLET | Refills: 3 | Status: SHIPPED | OUTPATIENT
Start: 2021-06-15 | End: 2022-07-05

## 2021-06-15 RX ORDER — LEVOTHYROXINE SODIUM 150 UG/1
TABLET ORAL
Qty: 90 TABLET | Refills: 3 | Status: SHIPPED | OUTPATIENT
Start: 2021-06-15 | End: 2021-06-16

## 2021-06-15 RX ORDER — ALBUTEROL SULFATE 90 UG/1
2 AEROSOL, METERED RESPIRATORY (INHALATION) EVERY 6 HOURS PRN
Qty: 18 G | Refills: 5 | Status: SHIPPED | OUTPATIENT
Start: 2021-06-15 | End: 2022-07-05

## 2021-06-15 RX ORDER — HYDROCHLOROTHIAZIDE 25 MG/1
TABLET ORAL
Qty: 90 TABLET | Refills: 3 | Status: SHIPPED | OUTPATIENT
Start: 2021-06-15 | End: 2022-06-30

## 2021-06-15 RX ORDER — LOSARTAN POTASSIUM 50 MG/1
50 TABLET ORAL DAILY
Qty: 90 TABLET | Refills: 3 | Status: SHIPPED | OUTPATIENT
Start: 2021-06-15 | End: 2022-06-30

## 2021-06-15 RX ORDER — SIMVASTATIN 20 MG
20 TABLET ORAL AT BEDTIME
Qty: 90 TABLET | Refills: 3 | Status: SHIPPED | OUTPATIENT
Start: 2021-06-15 | End: 2022-06-30

## 2021-06-15 ASSESSMENT — PATIENT HEALTH QUESTIONNAIRE - PHQ9
SUM OF ALL RESPONSES TO PHQ QUESTIONS 1-9: 12
5. POOR APPETITE OR OVEREATING: NEARLY EVERY DAY

## 2021-06-15 ASSESSMENT — ANXIETY QUESTIONNAIRES
5. BEING SO RESTLESS THAT IT IS HARD TO SIT STILL: MORE THAN HALF THE DAYS
3. WORRYING TOO MUCH ABOUT DIFFERENT THINGS: NEARLY EVERY DAY
6. BECOMING EASILY ANNOYED OR IRRITABLE: NEARLY EVERY DAY
IF YOU CHECKED OFF ANY PROBLEMS ON THIS QUESTIONNAIRE, HOW DIFFICULT HAVE THESE PROBLEMS MADE IT FOR YOU TO DO YOUR WORK, TAKE CARE OF THINGS AT HOME, OR GET ALONG WITH OTHER PEOPLE: EXTREMELY DIFFICULT
7. FEELING AFRAID AS IF SOMETHING AWFUL MIGHT HAPPEN: NEARLY EVERY DAY
2. NOT BEING ABLE TO STOP OR CONTROL WORRYING: NEARLY EVERY DAY
1. FEELING NERVOUS, ANXIOUS, OR ON EDGE: NEARLY EVERY DAY
GAD7 TOTAL SCORE: 20

## 2021-06-15 ASSESSMENT — MIFFLIN-ST. JEOR: SCORE: 1427.66

## 2021-06-15 NOTE — LETTER
Izabella 15, 2021      Jazlyn Bartlett  [id number] [facility] [id number]        To Whom It May Concern,        Patient is under my professional medical care and was seen in clinic on 6/15/21.      Sincerely,        Lokesh Casanova MD

## 2021-06-15 NOTE — TELEPHONE ENCOUNTER
Patient requests a letter stating that she was at the doctors office today. Requests letter emailed to her at work [email or web address]. Emailed as requested. Ana Cortes RN

## 2021-06-15 NOTE — PROGRESS NOTES
SUBJECTIVE:   CC: Jazlyn Bartlett is an 55 year old woman who presents for preventive health visit.       Patient has been advised of split billing requirements and indicates understanding: Yes  Healthy Habits:    Getting at least 3 servings of Calcium per day:  NO    Bi-annual eye exam:  Yes    Dental care twice a year:  Yes    Sleep apnea or symptoms of sleep apnea:  Excessive snoring    Diet:  Regular (no restrictions)    Duration of exercise:  N/A    Taking medications regularly:  Yes    Barriers to taking medications:  None    Medication side effects:  None    PHQ-2 Total Score:    Additional concerns today:  No          Diabetes Follow-up    How often are you checking your blood sugar? A few times a week  What time of day are you checking your blood sugars (select all that apply)?  Before meals  Have you had any blood sugars above 200?  No  Have you had any blood sugars below 70?  No    What symptoms do you notice when your blood sugar is low?  None    What concerns do you have today about your diabetes? None     Do you have any of these symptoms? (Select all that apply)  No numbness or tingling in feet.  No redness, sores or blisters on feet.  No complaints of excessive thirst.  No reports of blurry vision.  No significant changes to weight.      BP Readings from Last 2 Encounters:   06/15/21 130/80   08/03/20 122/70     Hemoglobin A1C (%)   Date Value   06/15/2021 6.7 (H)   08/03/2020 7.2 (H)     LDL Cholesterol Calculated (mg/dL)   Date Value   08/03/2020 56   05/03/2019 40         Hyperlipidemia Follow-Up      Are you regularly taking any medication or supplement to lower your cholesterol?   Statin      Are you having muscle aches or other side effects that you think could be caused by your cholesterol lowering medication?  No    Hypertension Follow-up      Do you check your blood pressure regularly outside of the clinic? Yes     Are you following a low salt diet? Yes    Are your blood pressures ever  more than 140 on the top number (systolic) OR more   than 90 on the bottom number (diastolic), for example 140/90? No    COPD Follow-Up    Overall, how are your COPD symptoms since your last clinic visit?  No change    How much fatigue or shortness of breath do you have when you are walking?  None    How much shortness of breath do you have when you are resting?  None    How often do you cough? Rarely    Have you noticed any change in your sputum/phlegm?  No    Have you experienced a recent fever? No    Please describe how far you can walk without stopping to rest:  2-5 blocks    How many flights of stairs are you able to walk up without stopping?  None    Have you had any Emergency Room Visits, Urgent Care Visits, or Hospital Admissions because of your COPD since your last office visit?  No    History   Smoking Status     Current Every Day Smoker     Packs/day: 0.50     Types: Cigarettes   Smokeless Tobacco     Former User     Quit date: 2/22/2020     Lab Results   Component Value Date    FEV1 1.75 10/03/2013    NFH4NLB 67% 10/03/2013       Hypothyroidism Follow-up      Since last visit, patient describes the following symptoms: Weight stable, no hair loss, no skin changes, no constipation, no loose stools    Patient reports that she is not able to sleep well at night as her both arms and hands fall asleep and tingles.  She is also noted some weakness.  Symptoms present for the last several months.  Progressively getting worse.  No previous evaluation done.      Today's PHQ-2 Score:   PHQ-2 ( 1999 [employer]) 8/3/2020   Q1: Little interest or pleasure in doing things 0   Q2: Feeling down, depressed or hopeless 0   PHQ-2 Score 0   Q1: Little interest or pleasure in doing things -   Q2: Feeling down, depressed or hopeless -   PHQ-2 Score -       Abuse: Current or Past (Physical, Sexual or Emotional) - No  Do you feel safe in your environment? Yes        Social History     Tobacco Use     Smoking status: Current Every Day  Smoker     Packs/day: 0.50     Types: Cigarettes     Smokeless tobacco: Former User     Quit date: 2/22/2020   Substance Use Topics     Alcohol use: No     Alcohol/week: 0.0 standard drinks     If you drink alcohol do you typically have >3 drinks per day or >7 drinks per week? No    Alcohol Use 6/15/2021   Prescreen: >3 drinks/day or >7 drinks/week? No       Reviewed orders with patient.  Reviewed health maintenance and updated orders accordingly - Yes  BP Readings from Last 3 Encounters:   06/15/21 130/80   08/03/20 122/70   02/25/20 124/78    Wt Readings from Last 3 Encounters:   06/15/21 90.7 kg (200 lb)   08/03/20 91.8 kg (202 lb 6.4 oz)   02/25/20 98 kg (216 lb)                  Patient Active Problem List   Diagnosis     Neck mass     Moderate major depression (H)     Anxiety     Vitamin D deficiency disease     CARDIOVASCULAR SCREENING; LDL GOAL LESS THAN 100     GERD (gastroesophageal reflux disease)     Hypertension goal BP (blood pressure) < 140/80     Type 2 diabetes mellitus without complication (H)     Chronic airway obstruction (H)     Advanced directives, counseling/discussion     Autoimmune gastritis- repeat UGI 1/16 with gastritis without eosinophils     Urinary incontinence     Osteoarthritis of patellofemoral joint     Morbid obesity (H)     Tobacco abuse     Lumbar radiculopathy     Hyperlipidemia LDL goal <100     Hypothyroidism, unspecified type     Chronic obstructive pulmonary disease, unspecified COPD type (H)     Past Surgical History:   Procedure Laterality Date     ABLATION, ENDOMETRIAL, THERMAL, W/O HYSTEROSCOPIC GUIDANCE       DILATION AND CURETTAGE, HYSTEROSCOPY, ABLATE ENDOMETRIUM NOVASURE, COMBINED  10/11/2012    Procedure: COMBINED DILATION AND CURETTAGE, HYSTEROSCOPY, ABLATE ENDOMETRIUM NOVASURE;  COMBINED DILATION AND CURETTAGE, HYSTEROSCOPY, ABLATE ENDOMETRIUM ,pelvic exam under anesthesia;  Surgeon: Shruti Barrios MD;  Location: [facility] OR     ESOPHAGOSCOPY, GASTROSCOPY,  DUODENOSCOPY (EGD), COMBINED N/A 1/19/2016    Procedure: COMBINED ESOPHAGOSCOPY, GASTROSCOPY, DUODENOSCOPY (EGD), BIOPSY SINGLE OR MULTIPLE;  Surgeon: Kristofer Molina MD;  Location: [facility]     Removal of cancerous lump on neck       TONSILLECTOMY         Social History     Tobacco Use     Smoking status: Current Every Day Smoker     Packs/day: 0.50     Types: Cigarettes     Smokeless tobacco: Former User     Quit date: 2/22/2020   Substance Use Topics     Alcohol use: No     Alcohol/week: 0.0 standard drinks     Family History   Problem Relation Age of Onset     Diabetes Mother      Breast Cancer Mother      Diabetes Father      Lung Cancer Father      Rheumatoid Arthritis Father          Current Outpatient Medications   Medication Sig Dispense Refill     albuterol (PROAIR HFA/PROVENTIL HFA/VENTOLIN HFA) 108 (90 Base) MCG/ACT inhaler Inhale 2 puffs into the lungs every 6 hours as needed for shortness of breath / dyspnea 18 g 5     aspirin 81 MG EC tablet Take 1 tablet (81 mg) by mouth daily 100 tablet 3     Cholecalciferol (VITAMIN D) 2000 UNITS tablet Take 2,000 Units by mouth daily 100 tablet 3     fluticasone (FLOVENT HFA) 220 MCG/ACT inhaler Inhale 1 puff into the lungs 2 times daily 12 g 4     glipiZIDE (GLUCOTROL XL) 10 MG 24 hr tablet 1 tab in am and 2 tabs in  tablet 3     hydrochlorothiazide (HYDRODIURIL) 25 MG tablet TAKE 1 TABLET (25 MG) BY MOUTH DAILY 90 tablet 3     ipratropium - albuterol 0.5 mg/2.5 mg/3 mL (DUONEB) 0.5-2.5 (3) MG/3ML neb solution Take 1 vial (3 mLs) by nebulization every 6 hours as needed for shortness of breath / dyspnea or wheezing 90 vial 3     levothyroxine (SYNTHROID/LEVOTHROID) 150 MCG tablet TAKE 1 TABLET (150 MCG) BY MOUTH DAILY 90 tablet 3     losartan (COZAAR) 50 MG tablet Take 1 tablet (50 mg) by mouth daily 90 tablet 3     salmeterol (SEREVENT) 50 MCG/DOSE inhaler Inhale 1 puff into the lungs 2 times daily 60 each 5     simvastatin (ZOCOR) 20 MG tablet Take 1  "tablet (20 mg) by mouth At Bedtime 90 tablet 3     Allergies   Allergen Reactions     Hydrocodone-Acetaminophen Nausea and Vomiting     Ibuprofen Sodium Nausea and Vomiting     Lisinopril Cough       Breast Cancer Screening:  Any new diagnosis of family breast, ovarian, or bowel cancer? No    FSH-7: No flowsheet data found.  click delete button to remove this line now  Mammogram Screening: Recommended mammography every 1-2 years with patient discussion and risk factor consideration  Pertinent mammograms are reviewed under the imaging tab.    History of abnormal Pap smear: NO - age 30-65 PAP every 5 years with negative HPV co-testing recommended  PAP / HPV Latest Ref Rng & Units 11/7/2017 12/15/2014 9/5/2013   PAP - NIL NIL NIL   HPV 16 DNA NEG:Negative Negative - -   HPV 18 DNA NEG:Negative Negative - -   OTHER HR HPV NEG:Negative Negative - -     Reviewed and updated as needed this visit by clinical staff  Tobacco  Allergies  Meds  Problems  Med Hx  Surg Hx  Fam Hx  Soc Hx          Reviewed and updated as needed this visit by Provider   Allergies   Problems  Med Hx               Review of Systems  CONSTITUTIONAL: NEGATIVE for fever, chills, change in weight  INTEGUMENTARY/SKIN: NEGATIVE for worrisome rashes, moles or lesions  EYES: NEGATIVE for vision changes or irritation  ENT: NEGATIVE for ear, mouth and throat problems  RESP: NEGATIVE for significant cough or SOB  BREAST: NEGATIVE for masses, tenderness or discharge  CV: NEGATIVE for chest pain, palpitations or peripheral edema  GI: NEGATIVE for nausea, abdominal pain, heartburn, or change in bowel habits  : NEGATIVE for unusual urinary or vaginal symptoms. No vaginal bleeding.  MUSCULOSKELETAL: NEGATIVE for significant arthralgias or myalgia  NEURO: NEGATIVE for weakness, dizziness or paresthesias  PSYCHIATRIC: NEGATIVE for changes in mood or affect      OBJECTIVE:   /80   Pulse 71   Temp 97.6  F (36.4  C)   Resp 16   Ht 1.53 m (5' 0.25\")  "  Wt 90.7 kg (200 lb)   SpO2 98%   BMI 38.74 kg/m    Physical Exam  GENERAL APPEARANCE: healthy, alert and no distress  EYES: Eyes grossly normal to inspection, PERRL and conjunctivae and sclerae normal  HENT: ear canals and TM's normal, nose and mouth without ulcers or lesions, oropharynx clear and oral mucous membranes moist  NECK: no adenopathy, no asymmetry, masses, or scars and thyroid normal to palpation  RESP: lungs clear to auscultation - no rales, rhonchi or wheezes  BREAST: normal without masses, tenderness or nipple discharge and no palpable axillary masses or adenopathy  CV: regular rate and rhythm, normal S1 S2, no S3 or S4, no murmur, click or rub, no peripheral edema and peripheral pulses strong  ABDOMEN: soft, nontender, no hepatosplenomegaly, no masses and bowel sounds normal  MS: no musculoskeletal defects are noted and gait is age appropriate without ataxia  SKIN: no suspicious lesions or rashes  NEURO: Normal strength and tone, sensory exam grossly normal, mentation intact and speech normal  PSYCH: mentation appears normal and affect normal/bright        ASSESSMENT/PLAN:   1. Routine general medical examination at a health care facility      2. Bilateral arm numbness and tingling while sleeping  Recommending to proceed with MRI of the cervical spine to work-up bilateral arm numbness and tingling.  - MR Cervical Spine w/o Contrast; Future    3. Chronic obstructive pulmonary disease, unspecified COPD type (H)  Symptomatically stable.  - albuterol (PROAIR HFA/PROVENTIL HFA/VENTOLIN HFA) 108 (90 Base) MCG/ACT inhaler; Inhale 2 puffs into the lungs every 6 hours as needed for shortness of breath / dyspnea  Dispense: 18 g; Refill: 5  - fluticasone (FLOVENT HFA) 220 MCG/ACT inhaler; Inhale 1 puff into the lungs 2 times daily  Dispense: 12 g; Refill: 4  - salmeterol (SEREVENT) 50 MCG/DOSE inhaler; Inhale 1 puff into the lungs 2 times daily  Dispense: 60 each; Refill: 5    4. Type 2 diabetes mellitus  without complication, without long-term current use of insulin (H)  Lab Results   Component Value Date    A1C 6.7 06/15/2021   Well-controlled.  Resume current medications    - glipiZIDE (GLUCOTROL XL) 10 MG 24 hr tablet; 1 tab in am and 2 tabs in PM  Dispense: 270 tablet; Refill: 3  - Hemoglobin A1c  - Albumin Random Urine Quantitative with Creat Ratio  - Comprehensive metabolic panel    5. Hypertension goal BP (blood pressure) < 140/80  Well-controlled.  - hydrochlorothiazide (HYDRODIURIL) 25 MG tablet; TAKE 1 TABLET (25 MG) BY MOUTH DAILY  Dispense: 90 tablet; Refill: 3  - losartan (COZAAR) 50 MG tablet; Take 1 tablet (50 mg) by mouth daily  Dispense: 90 tablet; Refill: 3    6. Hypothyroidism, unspecified type  TSH   Date Value Ref Range Status   08/03/2020 0.41 0.40 - 4.00 mU/L Final     Previously well controlled.  Repeat labs ordered today  - levothyroxine (SYNTHROID/LEVOTHROID) 150 MCG tablet; TAKE 1 TABLET (150 MCG) BY MOUTH DAILY  Dispense: 90 tablet; Refill: 3  - TSH    7. Hyperlipidemia LDL goal <100  LDL Cholesterol Calculated   Date Value Ref Range Status   08/03/2020 56 <100 mg/dL Final     Comment:     Desirable:       <100 mg/dl     Previously well controlled.  Repeat labs ordered today  - simvastatin (ZOCOR) 20 MG tablet; Take 1 tablet (20 mg) by mouth At Bedtime  Dispense: 90 tablet; Refill: 3  - Comprehensive metabolic panel  - Lipid panel reflex to direct LDL Fasting    8. Immunity status testing  Patient would like to know about her hepatitis B immunity status.  - Hepatitis B Surface Antibody    9. Morbid obesity (H)  Elevated BMI noted    10. Personal history of tobacco use  Recommending lung cancer screening and smoking cessation.  - Prof fee: Shared Decisionmaking for Lung Cancer Screening  - CT Chest Lung Cancer Scrn Low Dose wo; Future    Patient has been advised of split billing requirements and indicates understanding:   COUNSELING:  Reviewed preventive health counseling, as reflected in  "patient instructions       Regular exercise       Healthy diet/nutrition    Estimated body mass index is 38.74 kg/m  as calculated from the following:    Height as of this encounter: 1.53 m (5' 0.25\").    Weight as of this encounter: 90.7 kg (200 lb).    Weight management plan: Discussed healthy diet and exercise guidelines    She reports that she has been smoking cigarettes. She has been smoking about 0.50 packs per day. She quit smokeless tobacco use about 15 months ago.  Tobacco Cessation Action Plan:   Information offered: Patient not interested at this time      Counseling Resources:  ATP IV Guidelines  Pooled Cohorts Equation Calculator  Breast Cancer Risk Calculator  BRCA-Related Cancer Risk Assessment: FHS-7 Tool  FRAX Risk Assessment  ICSI Preventive Guidelines  Dietary Guidelines for Americans, 2010  USDA's MyPlate  ASA Prophylaxis  Lung CA Screening    Lokesh Casanova MD  [facility]  "

## 2021-06-15 NOTE — PROGRESS NOTES
Lung Cancer Screening Shared Decision Making Visit     Jazlyn Bartlett is eligible for lung cancer screening on the basis of the information provided in my signed lung cancer screening order.     I have discussed with patient the risks and benefits of screening for lung cancer with low-dose CT.     The risks include:  radiation exposure: one low dose chest CT has as much ionizing radiation as about 15 chest x-rays or 6 months of background radiation living in [place]    false positives: 96% of positive findings/nodules are NOT cancer, but some might still require additional diagnostic evaluation, including biopsy  over-diagnosis: some slow growing cancers that might never have been clinically significant will be detected and treated unnecessarily     The benefit of early detection of lung cancer is contingent upon adherence to annual screening or more frequent follow up if indicated.     Furthermore, reaping the benefits of screening requires Jazlyn Bartlett to be willing and physically able to undergo diagnostic procedures, if indicated. Although no specific guide is available for determining severity of comorbidities, it is reasonable to withhold screening in patients who have greater mortality risk from other diseases.     We did discuss that the only way to prevent lung cancer is to not smoke. Smoking cessation counseling was given, duration < 3 minutes.      I did not offer risk estimation using a calculator such as this one:    ShouldIScreen

## 2021-06-15 NOTE — PATIENT INSTRUCTIONS
Preventive Health Recommendations  Female Ages 50 - 64    Yearly exam: See your health care provider every year in order to  o Review health changes.   o Discuss preventive care.    o Review your medicines if your doctor has prescribed any.      Get a Pap test every three years (unless you have an abnormal result and your provider advises testing more often).    If you get Pap tests with HPV test, you only need to test every 5 years, unless you have an abnormal result.     You do not need a Pap test if your uterus was removed (hysterectomy) and you have not had cancer.    You should be tested each year for STDs (sexually transmitted diseases) if you're at risk.     Have a mammogram every 1 to 2 years.    Have a colonoscopy at age 50, or have a yearly FIT test (stool test). These exams screen for colon cancer.      Have a cholesterol test every 5 years, or more often if advised.    Have a diabetes test (fasting glucose) every three years. If you are at risk for diabetes, you should have this test more often.     If you are at risk for osteoporosis (brittle bone disease), think about having a bone density scan (DEXA).    Shots: Get a flu shot each year. Get a tetanus shot every 10 years.    Nutrition:     Eat at least 5 servings of fruits and vegetables each day.    Eat whole-grain bread, whole-wheat pasta and brown rice instead of white grains and rice.    Get adequate Calcium and Vitamin D.     Lifestyle    Exercise at least 150 minutes a week (30 minutes a day, 5 days a week). This will help you control your weight and prevent disease.    Limit alcohol to one drink per day.    No smoking.     Wear sunscreen to prevent skin cancer.     See your dentist every six months for an exam and cleaning.    See your eye doctor every 1 to 2 years.    Lung Cancer Screening   Frequently Asked Questions  If you are at high-risk for lung cancer, getting screened with low-dose computed tomography (LDCT) every year can help save  your life. This handout offers answers to some of the most common questions about lung cancer screening. If you have other questions, please call [phone] ([phone]).     What is it?  Lung cancer screening uses special X-ray technology to create an image of your lung tissue. The exam is quick and easy and takes less than 10 seconds. We don t give you any medicine or use any needles. You can eat before and after the exam. You don t need to change your clothes as long as the clothing on your chest doesn t contain metal. But, you do need to be able to hold your breath for at least 6 seconds during the exam.    What is the goal of lung cancer screening?  The goal of lung cancer screening is to save lives. Many times, lung cancer is not found until a person starts having physical symptoms. Lung cancer screening can help detect lung cancer in the earliest stages when it may be easier to treat.    Who should be screened for lung cancer?  We suggest lung cancer screening for anyone who is at high-risk for lung cancer. You are in the high-risk group if you:      are between the ages of 55 and 79, and    have smoked at least 1 pack of cigarettes a day for 30 or more years, and    still smoke or have quit within the past 15 years.    However, if you have a new cough or shortness of breath, you should talk to your doctor before being screened.    Some national lung health advocacy groups also recommend screening for people ages 50 to 79 who have smoked an average of 1 pack of cigarettes a day for 20 years. They must also have at least 1 other risk factor for lung cancer, not including exposure to secondhand smoke. Other risk factors are having had cancer in the past, emphysema, pulmonary fibrosis, COPD, a family history of lung cancer, or exposure to certain materials such as arsenic, asbestos, beryllium, cadmium, chromium, diesel fumes, nickel, radon or silica. Your care team can help you know if you have one of  these risk factors.     Why does it matter if I have symptoms?  Certain symptoms can be a sign that you have a condition in your lungs that should be checked and treated by your doctor. These symptoms include fever, chest pain, a new or changing cough, shortness of breath that you have never felt before, coughing up blood or unexplained weight loss. Having any of these symptoms can greatly affect the results of lung cancer screening.       Should all smokers get an LDCT lung cancer screening exam?  It depends. Lung cancer screening is for a very specific group of men and women who have a history of heavy smoking over a long period of time (see  Who should be screened for lung cancer  above).  I am in the high-risk group, but have been diagnosed with cancer in the past. Is LDCT lung cancer screening right for me?  In some cases, you should not have LDCT lung screening, such as when your doctor is already following your cancer with CT scan studies. Your doctor will help you decide if LDCT lung screening is right for you.  Do I need to have a screening exam every year?  Yes. If you are in the high-risk group described earlier, you should get an LDCT lung cancer screening exam every year until you are 79, or are no longer willing or able to undergo screening and possible procedures to diagnose and treat lung cancer.  How effective is LDCT at preventing death from lung cancer?  Studies have shown that LDCT lung cancer screening can lower the risk of death from lung cancer by 20 percent in people who are at high-risk.  What are the risks?  There are some risks and limitations of LDCT lung cancer screening. We want to make sure you understand the risks and benefits, so please let us know if you have any questions. Your doctor may want to talk with you more about these risks.    Radiation exposure: As with any exam that uses radiation, there is a very small increased risk of cancer. The amount of radiation in LDCT is  small--about the same amount a person would get from a mammogram. Your doctor orders the exam when he or she feels the potential benefits outweigh the risks.    False negatives: No test is perfect, including LDCT. It is possible that you may have a medical condition, including lung cancer, that is not found during your exam. This is called a false negative result.    False positives and more testing: LDCT very often finds something in the lung that could be cancer, but in fact is not. This is called a false positive result. False positive tests often cause anxiety. To make sure these findings are not cancer, you may need to have more tests. These tests will be done only if you give us permission. Sometimes patients need a treatment that can have side effects, such as a biopsy. For more information on false positives, see  What can I expect from the results?     Findings not related to lung cancer: Your LDCT exam also takes pictures of areas of your body next to your lungs. In a very small number of cases, the CT scan will show an abnormal finding in one of these areas, such as your kidneys, adrenal glands, liver or thyroid. This finding may not be serious, but you may need more tests. Your doctor can help you decide what other tests you may need, if any.  What can I expect from the results?  About 1 out of 4 LDCT exams will find something that may need more tests. Most of the time, these findings are lung nodules. Lung nodules are very small collections of tissue in the lung. These nodules are very common, and the vast majority--more than 97 percent--are not cancer (benign). Most are normal lymph nodes or small areas of scarring from past infections.  But, if a small lung nodule is found to be cancer, the cancer can be cured more than 90 percent of the time. To know if the nodule is cancer, we may need to get more images before your next yearly screening exam. If the nodule has suspicious features (for example, it  is large, has an odd shape or grows over time), we will refer you to a specialist for further testing.  Will my doctor also get the results?  Yes. Your doctor will get a copy of your results.  Is it okay to keep smoking now that there s a cancer screening exam?  No. Tobacco is one of the strongest cancer-causing agents. It causes not only lung cancer, but other cancers and cardiovascular (heart) diseases as well. The damage caused by smoking builds over time. This means that the longer you smoke, the higher your risk of disease. While it is never too late to quit, the sooner you quit, the better.  Where can I find help to quit smoking?  The best way to prevent lung cancer is to stop smoking. If you have already quit smoking, congratulations and keep it up! For help on quitting smoking, please call [facility] at 0-979-225-ZYTD ([phone]) or the American Cancer Society at 1-930.257.1275 to find local resources near you.  One-on-one health coaching:  If you d prefer to work individually with a health care provider on tobacco cessation, we offer:      Medication Therapy Management:  Our specially trained pharmacists work closely with you and your doctor to help you quit smoking.  Call [phone] or [phone] (toll free).     Can Do: Health coaching offered by [facility].  [email or web address]

## 2021-06-16 ENCOUNTER — TELEPHONE (OUTPATIENT)
Dept: FAMILY MEDICINE | Facility: CLINIC | Age: 56
End: 2021-06-16

## 2021-06-16 DIAGNOSIS — E03.9 HYPOTHYROIDISM, UNSPECIFIED TYPE: ICD-10-CM

## 2021-06-16 LAB
CREAT UR-MCNC: 238 MG/DL
HBV SURFACE AB SERPL IA-ACNC: 0.32 M[IU]/ML
MICROALBUMIN UR-MCNC: 16 MG/L
MICROALBUMIN/CREAT UR: 6.68 MG/G CR (ref 0–25)

## 2021-06-16 RX ORDER — LEVOTHYROXINE SODIUM 137 UG/1
TABLET ORAL
Qty: 90 TABLET | Refills: 1 | Status: SHIPPED | OUTPATIENT
Start: 2021-06-16 | End: 2022-07-05

## 2021-06-16 ASSESSMENT — ANXIETY QUESTIONNAIRES: GAD7 TOTAL SCORE: 20

## 2021-06-16 NOTE — TELEPHONE ENCOUNTER
Pt calling saying [facility] need pre approval to get her CT and MRI completed. She would like to get this done. Her insurance provided her with a  Number for a company called [employer] and we will provide information to them. The number is [phone]. TC will call and see what is needed then follow up with Dr. Casanova if anything else needs to be done.   Jessica Tyler     Pt can be updated [phone] and can leave message (detailed) or send messge via my chart.

## 2021-06-16 NOTE — TELEPHONE ENCOUNTER
Called the number provided by pt. I spoke with a representative the CT will need a peer to peer review. The number is [phone] regarding approval and further review. Please call when able. MRI will not get approved either without this. All you need when you call is pt name and date of birth along with insurance ID which is [id number]. Please advise as appropriate and route back to TC to update pt. Or speak with me for any further question.   Jessica Tyler

## 2021-06-16 NOTE — TELEPHONE ENCOUNTER
I talked to them and they have approved CT scan lung, low-dose and MRI of the cervical spine both.    The approval number that I was given is [id number]  It is approved from June 16 through July 15, 2021.  Please let the patient know that these needs to be scheduled and done as soon as possible.    Lokesh Casanova MD  [facility], [place]

## 2021-06-23 ENCOUNTER — ALLIED HEALTH/NURSE VISIT (OUTPATIENT)
Dept: FAMILY MEDICINE | Facility: CLINIC | Age: 56
End: 2021-06-23
Payer: COMMERCIAL

## 2021-06-23 DIAGNOSIS — Z23 NEED FOR HEPATITIS B VACCINATION: Primary | ICD-10-CM

## 2021-06-23 PROCEDURE — 90746 HEPB VACCINE 3 DOSE ADULT IM: CPT

## 2021-06-23 PROCEDURE — 90471 IMMUNIZATION ADMIN: CPT

## 2021-06-23 PROCEDURE — 99207 PR NO CHARGE NURSE ONLY: CPT

## 2021-06-29 ENCOUNTER — HOSPITAL ENCOUNTER (OUTPATIENT)
Dept: CT IMAGING | Facility: CLINIC | Age: 56
Discharge: HOME | End: 2021-06-29
Attending: FAMILY MEDICINE | Admitting: FAMILY MEDICINE
Payer: COMMERCIAL

## 2021-06-29 ENCOUNTER — HOSPITAL ENCOUNTER (OUTPATIENT)
Dept: MRI IMAGING | Facility: CLINIC | Age: 56
Discharge: HOME | End: 2021-06-29
Attending: FAMILY MEDICINE | Admitting: FAMILY MEDICINE
Payer: COMMERCIAL

## 2021-06-29 ENCOUNTER — TELEPHONE (OUTPATIENT)
Dept: FAMILY MEDICINE | Facility: CLINIC | Age: 56
End: 2021-06-29

## 2021-06-29 ENCOUNTER — DOCUMENTATION ONLY (OUTPATIENT)
Dept: FAMILY MEDICINE | Facility: CLINIC | Age: 56
End: 2021-06-29

## 2021-06-29 ENCOUNTER — OFFICE VISIT (OUTPATIENT)
Dept: FAMILY MEDICINE | Facility: CLINIC | Age: 56
End: 2021-06-29
Payer: COMMERCIAL

## 2021-06-29 VITALS
TEMPERATURE: 97.5 F | OXYGEN SATURATION: 95 % | SYSTOLIC BLOOD PRESSURE: 126 MMHG | HEART RATE: 74 BPM | BODY MASS INDEX: 39.46 KG/M2 | DIASTOLIC BLOOD PRESSURE: 70 MMHG | WEIGHT: 201 LBS | HEIGHT: 60 IN

## 2021-06-29 DIAGNOSIS — D35.02 ADRENAL ADENOMA, LEFT: ICD-10-CM

## 2021-06-29 DIAGNOSIS — N28.89 LEFT RENAL MASS: ICD-10-CM

## 2021-06-29 DIAGNOSIS — R20.0 BILATERAL ARM NUMBNESS AND TINGLING WHILE SLEEPING: Primary | ICD-10-CM

## 2021-06-29 DIAGNOSIS — Z87.891 PERSONAL HISTORY OF TOBACCO USE: ICD-10-CM

## 2021-06-29 DIAGNOSIS — R20.2 BILATERAL ARM NUMBNESS AND TINGLING WHILE SLEEPING: Primary | ICD-10-CM

## 2021-06-29 DIAGNOSIS — R06.09 DOE (DYSPNEA ON EXERTION): Primary | ICD-10-CM

## 2021-06-29 DIAGNOSIS — R91.8 PULMONARY NODULES: ICD-10-CM

## 2021-06-29 DIAGNOSIS — R20.2 BILATERAL ARM NUMBNESS AND TINGLING WHILE SLEEPING: ICD-10-CM

## 2021-06-29 DIAGNOSIS — R20.0 BILATERAL ARM NUMBNESS AND TINGLING WHILE SLEEPING: ICD-10-CM

## 2021-06-29 PROCEDURE — 72141 MRI NECK SPINE W/O DYE: CPT

## 2021-06-29 PROCEDURE — 99214 OFFICE O/P EST MOD 30 MIN: CPT | Performed by: FAMILY MEDICINE

## 2021-06-29 PROCEDURE — 71271 CT THORAX LUNG CANCER SCR C-: CPT

## 2021-06-29 ASSESSMENT — MIFFLIN-ST. JEOR: SCORE: 1432.2

## 2021-06-29 NOTE — PROGRESS NOTES
Assessment & Plan       Patient recently had a low-dose CT lung cancer screening.  Results were reviewed with the patient in detail.    CALDWELL (dyspnea on exertion)  Patient complains that she gets short of breath on exertion.  She had a negative stress test about 3 to 4 years ago.  With the presence of moderate amount of atherosclerosis in the coronary arteries noted on the CT scan, recommending to proceed with a stress test again.  Patient agrees to that  - NM Lexiscan stress test; Future    Left renal mass  Incidental finding of left renal mass and left adrenal adenoma noted on the CT scan.  MRI of the kidney ordered for evaluation  - MR Renal wo & w Contrast; Future    Adrenal adenoma, left    - MR Renal wo & w Contrast; Future    Pulmonary nodules  CT noted pulmonary nodules as well as granulomas.  Repeat CT scan in December 2021 recommended.      956    Return in about 5 months (around 12/13/2021) for Diabetes Recheck.    Lokesh Casanova MD  [facility]    Fe Marquez is a 55 year old who presents for the following health issues     HPI     Concern - CT test results   Patient recently had a CT scan for lung cancer screening done.  She continues to still smoke.  Slowly trying to wean herself off.  Report had multiple incidental finding for which she was seen in person to review the results.  Results are as below.                                                                 IMPRESSION:   1. ACR Assessment Category:  Lung-RADS Category 3. Probably benign  finding(s)- short term follow up suggested with 6 month low dose CT  (please order exam code IMG 2163). .   2. Significant Incidental Finding(s):  Category S: Yes.  *  Moderate atherosclerotic vascular calcification of the coronary  arteries.  *  3.1 cm partially exophytic mildly complex cystic lesion arising  from the posterior aspect of the left kidney, could represent  hemorrhagic/proteinaceous cyst versus less likely renal  "neoplasm.  Renal ultrasound can confirm its cystic nature.  *  1.6 cm subcutaneous focus in the posterior chest wall, slightly  increased in size as compared to 7/25/2019 exam where it measured 1.3  cm, indeterminate, could represent sebaceous cyst.  *  2 cm left adrenal nodule with imaging characteristics most  consistent with lipid rich adenoma.      Review of Systems   CONSTITUTIONAL: NEGATIVE for fever, chills, change in weight  ENT/MOUTH: NEGATIVE for ear, mouth and throat problems  RESP: NEGATIVE for significant cough or SOB  CV: NEGATIVE for chest pain, palpitations or peripheral edema      Objective    /70   Pulse 74   Temp 97.5  F (36.4  C) (Tympanic)   Ht 1.53 m (5' 0.25\")   Wt 91.2 kg (201 lb)   SpO2 95%   BMI 38.93 kg/m    Body mass index is 38.93 kg/m .  Physical Exam   GENERAL: healthy, alert and no distress  NECK: no adenopathy, no asymmetry, masses, or scars and thyroid normal to palpation  RESP: lungs clear to auscultation - no rales, rhonchi or wheezes  CV: regular rate and rhythm, normal S1 S2, no S3 or S4, no murmur, click or rub, no peripheral edema and peripheral pulses strong  ABDOMEN: soft, nontender, no hepatosplenomegaly, no masses and bowel sounds normal  MS: no gross musculoskeletal defects noted, no edema                "

## 2021-06-29 NOTE — RESULT ENCOUNTER NOTE
Please call the patient to notify patient that I would like to see the patient back in the clinic to review her CT lung findings with her.  Please have her schedule an appointment in the next few days if possible.    Lokesh Casanova MD

## 2021-06-29 NOTE — TELEPHONE ENCOUNTER
----- Message from Lokesh Casanova MD sent at 6/29/2021  9:55 AM CDT -----  Please call the patient to notify patient that I would like to see the patient back in the clinic to review her CT lung findings with her.  Please have her schedule an appointment in the next few days if possible.    Lokesh Casanova MD

## 2021-06-29 NOTE — TELEPHONE ENCOUNTER
S/w pt and gave Dr. Casanova's message below.  Scheduled today at 2:40 pm with Dr. Casanova.    Gosia BOTELLO RN  EP Triage

## 2021-07-02 ENCOUNTER — TELEPHONE (OUTPATIENT)
Dept: FAMILY MEDICINE | Facility: CLINIC | Age: 56
End: 2021-07-02

## 2021-07-02 DIAGNOSIS — R91.8 ABNORMAL CT LUNG SCREENING: ICD-10-CM

## 2021-07-02 NOTE — TELEPHONE ENCOUNTER
[facility] Radiology Lung Cancer Screening CT result notification:     LDCT/Lung Cancer Screening CT Exam date: 6/29/21  Radiologist Impression AND Recommendations:   ACR Assessment Category:  Lung-RADS Category 3. Probably benign  finding(s)- short term follow up suggested with 6 month low dose CT  (please order exam code IMG 2163). .    Pertinent Findings:  Lung/pleura: No pleural effusion or pneumothorax. Few scattered  calcified pulmonary granulomas including 8 mm partially calcified left  lower lobe granuloma. Few scattered pulmonary nodules including 6 mm  nodular opacity in the right lower lobe (series 6 image 144), not  well-seen on prior studies. Another example is 2 mm posterior left  lower lobe nodule (series 6 image 153), not significantly changed as  compared to 5/22/2016.     Ordering Provider: Dr. Lokesh Bartlett did receive the remaining radiology results from her provider.     Lung Nodule Program Protocol recommendation [Pertaining to lung nodules]:    Lung RADS 3 Protocol: Results RN to notify Patient of results/recommendations and place order for 6 month CT ([id number]) - MD cobb   CT Chest order placed to be completed within 6 months (Yes/No):  Yes due on or after 1/2/2022.  Image scheduling will contact Patient 1 month prior to due date.  RN communicated the lung nodule finding to the patient (Yes/No):  No  The patient had the following questions: N/A  Correct letter sent as per Lung nodule protocol (Yes/No):  Yes  Did Patient have any CT's of chest previous? (inquire only if no CT's were used for comparison) (Yes/No/NA) N/A      Lung Nodule Clinics    [facility]ulomon[facility] (Lung Care) Clinic at [facility]  Floor 2, Check-In D, [address], [place], [place]    Hours: M-F 7AM to 5PM    [facility]     Floor 2, [address] [facility] [phone]    Hours: M - F 7AM - 7PM;  Sat 8A to noon     [facility]  Cancer Clinic at [facility]    [address] , [place], [phone]    Hours: M - F 7AM - 7PM;  Sat 8A to noon      Sabiha Jones  [facility]  Lung Nodule and Emergency Dept Lab Result RN  Ph# [phone]

## 2021-07-06 ENCOUNTER — HOSPITAL ENCOUNTER (OUTPATIENT)
Dept: CARDIOLOGY | Facility: CLINIC | Age: 56
Discharge: HOME | End: 2021-07-06
Attending: FAMILY MEDICINE | Admitting: FAMILY MEDICINE
Payer: COMMERCIAL

## 2021-07-06 ENCOUNTER — HOSPITAL ENCOUNTER (OUTPATIENT)
Dept: NUCLEAR MEDICINE | Facility: CLINIC | Age: 56
Setting detail: NUCLEAR MEDICINE
End: 2021-07-06
Attending: FAMILY MEDICINE
Payer: COMMERCIAL

## 2021-07-06 ENCOUNTER — HOSPITAL ENCOUNTER (OUTPATIENT)
Dept: NUCLEAR MEDICINE | Facility: CLINIC | Age: 56
Setting detail: NUCLEAR MEDICINE
Discharge: HOME | End: 2021-07-06
Attending: FAMILY MEDICINE | Admitting: FAMILY MEDICINE
Payer: COMMERCIAL

## 2021-07-06 DIAGNOSIS — R06.09 DOE (DYSPNEA ON EXERTION): ICD-10-CM

## 2021-07-06 PROCEDURE — 93018 CV STRESS TEST I&R ONLY: CPT | Performed by: INTERNAL MEDICINE

## 2021-07-06 PROCEDURE — 93017 CV STRESS TEST TRACING ONLY: CPT

## 2021-07-06 PROCEDURE — 93016 CV STRESS TEST SUPVJ ONLY: CPT | Performed by: INTERNAL MEDICINE

## 2021-07-06 PROCEDURE — 343N000001 HC RX 343: Performed by: FAMILY MEDICINE

## 2021-07-06 PROCEDURE — 250N000011 HC RX IP 250 OP 636

## 2021-07-06 PROCEDURE — 78452 HT MUSCLE IMAGE SPECT MULT: CPT | Mod: 26 | Performed by: INTERNAL MEDICINE

## 2021-07-06 PROCEDURE — A9502 TC99M TETROFOSMIN: HCPCS | Performed by: FAMILY MEDICINE

## 2021-07-06 PROCEDURE — 78452 HT MUSCLE IMAGE SPECT MULT: CPT

## 2021-07-06 RX ORDER — REGADENOSON 0.08 MG/ML
INJECTION, SOLUTION INTRAVENOUS
Status: COMPLETED
Start: 2021-07-06 | End: 2021-07-06

## 2021-07-06 RX ADMIN — TETROFOSMIN 29.8 MCI.: 1.38 INJECTION, POWDER, LYOPHILIZED, FOR SOLUTION INTRAVENOUS at 15:15

## 2021-07-06 RX ADMIN — REGADENOSON: 0.08 INJECTION, SOLUTION INTRAVENOUS at 15:15

## 2021-07-06 RX ADMIN — TETROFOSMIN 10.5 MCI.: 1.38 INJECTION, POWDER, LYOPHILIZED, FOR SOLUTION INTRAVENOUS at 13:03

## 2021-07-07 LAB
CV STRESS MAX HR HE: 77
NUC STRESS EJECTION FRACTION: 76 %
RATE PRESSURE PRODUCT: NORMAL
STRESS ECHO BASELINE DIASTOLIC HE: 82
STRESS ECHO BASELINE HR: 57
STRESS ECHO BASELINE SYSTOLIC BP: 146
STRESS ECHO CALCULATED PERCENT HR: 47 %
STRESS ECHO LAST STRESS DIASTOLIC BP: 83
STRESS ECHO LAST STRESS SYSTOLIC BP: 139
STRESS ECHO TARGET HR: 165

## 2021-07-12 ENCOUNTER — HOSPITAL ENCOUNTER (OUTPATIENT)
Dept: MRI IMAGING | Facility: CLINIC | Age: 56
Discharge: HOME | End: 2021-07-12
Attending: FAMILY MEDICINE | Admitting: FAMILY MEDICINE
Payer: COMMERCIAL

## 2021-07-12 DIAGNOSIS — N28.89 LEFT RENAL MASS: ICD-10-CM

## 2021-07-12 DIAGNOSIS — D35.02 ADRENAL ADENOMA, LEFT: ICD-10-CM

## 2021-07-12 PROCEDURE — 74183 MRI ABD W/O CNTR FLWD CNTR: CPT

## 2021-07-12 PROCEDURE — 255N000002 HC RX 255 OP 636: Performed by: FAMILY MEDICINE

## 2021-07-12 PROCEDURE — A9585 GADOBUTROL INJECTION: HCPCS | Performed by: FAMILY MEDICINE

## 2021-07-12 RX ORDER — GADOBUTROL 604.72 MG/ML
10 INJECTION INTRAVENOUS ONCE
Status: COMPLETED | OUTPATIENT
Start: 2021-07-12 | End: 2021-07-12

## 2021-07-12 RX ADMIN — GADOBUTROL 10 ML: 604.72 INJECTION INTRAVENOUS at 17:21

## 2021-07-14 ENCOUNTER — TELEPHONE (OUTPATIENT)
Dept: FAMILY MEDICINE | Facility: CLINIC | Age: 56
End: 2021-07-14

## 2021-07-14 ENCOUNTER — PRE VISIT (OUTPATIENT)
Dept: UROLOGY | Facility: CLINIC | Age: 56
End: 2021-07-14

## 2021-07-14 DIAGNOSIS — J44.9 CHRONIC OBSTRUCTIVE PULMONARY DISEASE, UNSPECIFIED COPD TYPE (H): ICD-10-CM

## 2021-07-14 NOTE — RESULT ENCOUNTER NOTE
Please call the patient to notify patient  MRI of the kidney shows 3.3 cm left kidney mass growing from the posterior surface.  Radiologist thinks it is most likely a cyst.  I  would recommend consulting with the urology for further evaluation and management.  Urology referral has been placed.          Lokesh Casanova MD

## 2021-07-14 NOTE — TELEPHONE ENCOUNTER
Adult Urology Referral    [facility]   [address]   [address]   [facility] [address]   Phone: [phone]        Gave pt Dr. Casanova's message below along with referral information above.    Pt states understanding.    Gosia BOTELLO RN  EP Triage

## 2021-07-14 NOTE — TELEPHONE ENCOUNTER
Pt calling and is requesting a letter to excuse her from work for a few hours on 7/12 while she was getting her MRI. Letter attached, please make any necessary changes.  Please release to My Chart. Thank you.  Lenka Urban,

## 2021-07-14 NOTE — LETTER
July 14, 2021      Jazlyn Bartlett  [address] [facility] [address]        To Whom It May Concern,     Jazlyn Bartlett is a pt under my direct care. Please excuse her from work starting at 3:30 on July 12. Any questions, please feel free to contact me at [phone]. Thank you.      Sincerely,          Lokesh Casanova MD

## 2021-07-14 NOTE — TELEPHONE ENCOUNTER
----- Message from Lokesh Casanova MD sent at 7/14/2021  8:29 AM CDT -----  Please call the patient to notify patient  MRI of the kidney shows 3.3 cm left kidney mass growing from the posterior surface.  Radiologist thinks it is most likely a cyst.  I  would recommend consulting with the urology for further evaluation and management.  Urology referral has been placed.          Lokesh Casanova MD

## 2021-07-15 ENCOUNTER — TELEPHONE (OUTPATIENT)
Dept: FAMILY MEDICINE | Facility: CLINIC | Age: 56
End: 2021-07-15

## 2021-07-15 NOTE — TELEPHONE ENCOUNTER
[facility] calling to report that the Nuclear Med Lexiscan Stress Test CPT [id number] has been denied by pt's imsurance. It was denied because the pt is able to get on a treadmill which indicates the test is not medically necessary.  The phone number to call to speak with a physician for review is [phone]. Thank you.  Lenka Urban,

## 2021-07-16 NOTE — TELEPHONE ENCOUNTER
Please inquire if the patient can do an exercise stress test ( walking on tread mill) ??    Otherwise, I will have to call for a peer to peer review .    Lokesh Casanova MD  [facility], [place]

## 2021-07-20 NOTE — TELEPHONE ENCOUNTER
Patient Contact    Attempt # 1    Was call answered?  No.  Left message on voicemail with information to call triage back.    On call back:     Ana Cortes RN

## 2021-07-20 NOTE — TELEPHONE ENCOUNTER
"Patient states she is \"not able to complete an exercise stress test because she is unable to breathe due to her COPD.\"    Ana Tomlin, MSN, RN     "

## 2021-07-20 NOTE — TELEPHONE ENCOUNTER
Non detailed message left for pt to return call to clinic and ask to speak with a triage nurse.    Gosia BOTELLO RN  EP Triage

## 2021-07-20 NOTE — TELEPHONE ENCOUNTER
I did the peer to peer review.  They will cover the stress test.  Authorization number is [id number].  They are aware that the test is already been performed on 7/6/2021    Please route this information to the financial team.  Also notify the patient that the procedure will be covered.    Lokesh Casanova MD  [facility]

## 2021-07-23 ENCOUNTER — TRANSFERRED RECORDS (OUTPATIENT)
Dept: HEALTH INFORMATION MANAGEMENT | Facility: CLINIC | Age: 56
End: 2021-07-23

## 2021-07-23 ENCOUNTER — MEDICAL CORRESPONDENCE (OUTPATIENT)
Dept: HEALTH INFORMATION MANAGEMENT | Facility: CLINIC | Age: 56
End: 2021-07-23

## 2021-08-03 ENCOUNTER — APPOINTMENT (OUTPATIENT)
Dept: GENERAL RADIOLOGY | Facility: CLINIC | Age: 56
End: 2021-08-03
Attending: EMERGENCY MEDICINE
Payer: COMMERCIAL

## 2021-08-03 ENCOUNTER — MYC MEDICAL ADVICE (OUTPATIENT)
Dept: FAMILY MEDICINE | Facility: CLINIC | Age: 56
End: 2021-08-03

## 2021-08-03 ENCOUNTER — HOSPITAL ENCOUNTER (EMERGENCY)
Facility: CLINIC | Age: 56
Discharge: HOME | End: 2021-08-03
Attending: EMERGENCY MEDICINE | Admitting: EMERGENCY MEDICINE
Payer: COMMERCIAL

## 2021-08-03 ENCOUNTER — NURSE TRIAGE (OUTPATIENT)
Dept: FAMILY MEDICINE | Facility: CLINIC | Age: 56
End: 2021-08-03

## 2021-08-03 VITALS
RESPIRATION RATE: 19 BRPM | TEMPERATURE: 96.9 F | OXYGEN SATURATION: 95 % | DIASTOLIC BLOOD PRESSURE: 91 MMHG | SYSTOLIC BLOOD PRESSURE: 151 MMHG | HEART RATE: 78 BPM

## 2021-08-03 DIAGNOSIS — J44.1 COPD EXACERBATION (H): ICD-10-CM

## 2021-08-03 LAB
ANION GAP SERPL CALCULATED.3IONS-SCNC: 4 MMOL/L (ref 3–14)
ATRIAL RATE - MUSE: 74 BPM
BASOPHILS # BLD AUTO: 0 10E3/UL (ref 0–0.2)
BASOPHILS NFR BLD AUTO: 0 %
BUN SERPL-MCNC: 9 MG/DL (ref 7–30)
CALCIUM SERPL-MCNC: 9.6 MG/DL (ref 8.5–10.1)
CHLORIDE BLD-SCNC: 103 MMOL/L (ref 94–109)
CO2 SERPL-SCNC: 31 MMOL/L (ref 20–32)
CREAT SERPL-MCNC: 0.65 MG/DL (ref 0.52–1.04)
DEPRECATED S PYO AG THROAT QL EIA: NEGATIVE
DIASTOLIC BLOOD PRESSURE - MUSE: NORMAL MMHG
EOSINOPHIL # BLD AUTO: 0.1 10E3/UL (ref 0–0.7)
EOSINOPHIL NFR BLD AUTO: 1 %
ERYTHROCYTE [DISTWIDTH] IN BLOOD BY AUTOMATED COUNT: 13.4 % (ref 10–15)
GFR SERPL CREATININE-BSD FRML MDRD: >90 ML/MIN/1.73M2
GLUCOSE BLD-MCNC: 124 MG/DL (ref 70–99)
GROUP A STREP BY PCR: NOT DETECTED
HCT VFR BLD AUTO: 44.4 % (ref 35–47)
HGB BLD-MCNC: 14.8 G/DL (ref 11.7–15.7)
HOLD SPECIMEN: NORMAL
IMM GRANULOCYTES # BLD: 0.1 10E3/UL
IMM GRANULOCYTES NFR BLD: 1 %
INTERPRETATION ECG - MUSE: NORMAL
LYMPHOCYTES # BLD AUTO: 1.6 10E3/UL (ref 0.8–5.3)
LYMPHOCYTES NFR BLD AUTO: 11 %
MCH RBC QN AUTO: 30.6 PG (ref 26.5–33)
MCHC RBC AUTO-ENTMCNC: 33.3 G/DL (ref 31.5–36.5)
MCV RBC AUTO: 92 FL (ref 78–100)
MONOCYTES # BLD AUTO: 0.9 10E3/UL (ref 0–1.3)
MONOCYTES NFR BLD AUTO: 6 %
NEUTROPHILS # BLD AUTO: 12.5 10E3/UL (ref 1.6–8.3)
NEUTROPHILS NFR BLD AUTO: 81 %
NRBC # BLD AUTO: 0 10E3/UL
NRBC BLD AUTO-RTO: 0 /100
P AXIS - MUSE: 39 DEGREES
PLATELET # BLD AUTO: 289 10E3/UL (ref 150–450)
POTASSIUM BLD-SCNC: 3.3 MMOL/L (ref 3.4–5.3)
PR INTERVAL - MUSE: 140 MS
QRS DURATION - MUSE: 88 MS
QT - MUSE: 396 MS
QTC - MUSE: 439 MS
R AXIS - MUSE: 12 DEGREES
RBC # BLD AUTO: 4.84 10E6/UL (ref 3.8–5.2)
SARS-COV-2 RNA RESP QL NAA+PROBE: NEGATIVE
SODIUM SERPL-SCNC: 138 MMOL/L (ref 133–144)
SYSTOLIC BLOOD PRESSURE - MUSE: NORMAL MMHG
T AXIS - MUSE: 29 DEGREES
VENTRICULAR RATE- MUSE: 74 BPM
WBC # BLD AUTO: 15.1 10E3/UL (ref 4–11)

## 2021-08-03 PROCEDURE — 93005 ELECTROCARDIOGRAM TRACING: CPT

## 2021-08-03 PROCEDURE — 87651 STREP A DNA AMP PROBE: CPT | Performed by: EMERGENCY MEDICINE

## 2021-08-03 PROCEDURE — 96365 THER/PROPH/DIAG IV INF INIT: CPT

## 2021-08-03 PROCEDURE — 87635 SARS-COV-2 COVID-19 AMP PRB: CPT | Performed by: EMERGENCY MEDICINE

## 2021-08-03 PROCEDURE — 36415 COLL VENOUS BLD VENIPUNCTURE: CPT | Performed by: EMERGENCY MEDICINE

## 2021-08-03 PROCEDURE — 250N000009 HC RX 250: Performed by: EMERGENCY MEDICINE

## 2021-08-03 PROCEDURE — 85025 COMPLETE CBC W/AUTO DIFF WBC: CPT | Performed by: EMERGENCY MEDICINE

## 2021-08-03 PROCEDURE — 250N000011 HC RX IP 250 OP 636: Performed by: EMERGENCY MEDICINE

## 2021-08-03 PROCEDURE — C9803 HOPD COVID-19 SPEC COLLECT: HCPCS

## 2021-08-03 PROCEDURE — 80048 BASIC METABOLIC PNL TOTAL CA: CPT | Performed by: EMERGENCY MEDICINE

## 2021-08-03 PROCEDURE — 96375 TX/PRO/DX INJ NEW DRUG ADDON: CPT

## 2021-08-03 PROCEDURE — 99285 EMERGENCY DEPT VISIT HI MDM: CPT | Mod: 25

## 2021-08-03 PROCEDURE — 258N000003 HC RX IP 258 OP 636: Performed by: EMERGENCY MEDICINE

## 2021-08-03 PROCEDURE — 87880 STREP A ASSAY W/OPTIC: CPT | Performed by: EMERGENCY MEDICINE

## 2021-08-03 PROCEDURE — 71046 X-RAY EXAM CHEST 2 VIEWS: CPT

## 2021-08-03 RX ORDER — PREDNISONE 50 MG/1
50 TABLET ORAL DAILY
Qty: 5 TABLET | Refills: 0 | Status: SHIPPED | OUTPATIENT
Start: 2021-08-03 | End: 2021-08-08

## 2021-08-03 RX ORDER — MAGNESIUM SULFATE HEPTAHYDRATE 40 MG/ML
2 INJECTION, SOLUTION INTRAVENOUS ONCE
Status: COMPLETED | OUTPATIENT
Start: 2021-08-03 | End: 2021-08-03

## 2021-08-03 RX ORDER — METHYLPREDNISOLONE SODIUM SUCCINATE 125 MG/2ML
125 INJECTION, POWDER, LYOPHILIZED, FOR SOLUTION INTRAMUSCULAR; INTRAVENOUS ONCE
Status: COMPLETED | OUTPATIENT
Start: 2021-08-03 | End: 2021-08-03

## 2021-08-03 RX ORDER — SODIUM CHLORIDE 9 MG/ML
INJECTION, SOLUTION INTRAVENOUS CONTINUOUS
Status: DISCONTINUED | OUTPATIENT
Start: 2021-08-03 | End: 2021-08-03 | Stop reason: HOSPADM

## 2021-08-03 RX ORDER — IPRATROPIUM BROMIDE AND ALBUTEROL SULFATE 2.5; .5 MG/3ML; MG/3ML
3 SOLUTION RESPIRATORY (INHALATION)
Status: DISCONTINUED | OUTPATIENT
Start: 2021-08-03 | End: 2021-08-03 | Stop reason: HOSPADM

## 2021-08-03 RX ADMIN — METHYLPREDNISOLONE SODIUM SUCCINATE 125 MG: 125 INJECTION, POWDER, FOR SOLUTION INTRAMUSCULAR; INTRAVENOUS at 14:03

## 2021-08-03 RX ADMIN — MAGNESIUM SULFATE 2 G: 2 INJECTION INTRAVENOUS at 14:07

## 2021-08-03 RX ADMIN — IPRATROPIUM BROMIDE AND ALBUTEROL SULFATE 3 ML: .5; 3 SOLUTION RESPIRATORY (INHALATION) at 14:01

## 2021-08-03 RX ADMIN — IPRATROPIUM BROMIDE AND ALBUTEROL SULFATE 3 ML: .5; 3 SOLUTION RESPIRATORY (INHALATION) at 15:05

## 2021-08-03 RX ADMIN — SODIUM CHLORIDE 1000 ML: 9 INJECTION, SOLUTION INTRAVENOUS at 14:12

## 2021-08-03 ASSESSMENT — ENCOUNTER SYMPTOMS
COUGH: 1
SHORTNESS OF BREATH: 1
CHEST TIGHTNESS: 1
CHILLS: 0
FEVER: 0

## 2021-08-03 NOTE — ED PROVIDER NOTES
History   Chief Complaint:  Shortness of Breath and Cough    The history is provided by the patient.      Jazlyn Bartlett is a 55 year old female with history of COPD, type 2 diabetes mellitus, hypertension and hyperlipidemia who presents with shortness of breath and cough. The patient tells us that her granddaughter graduated this past weekend which caused her to be much more active than she typically had been. This caused her to have some body aches yesterday, the patient then rested and felt fairly normal the rest of evening. However, the patient woke up this morning with shortness of breath, cough and increased myalgia which has made it very difficult to breath. The patient does have her COVID-19 vaccinations. The patient denies fever or chills.     To note, the patient tells us that her  has had a cough for about one week.     Review of Systems   Constitutional: Negative for chills and fever.   Respiratory: Positive for cough, chest tightness and shortness of breath.    All other systems reviewed and are negative.    Allergies:  Hydrocodone-Acetaminophen   Ibuprofen   Lisinopril     Medications:  Albuterol   Xanax  Flovent   Glucotrol   Hydrodiuril   Synthroid  Ipratropium   Cozaar  Serevent   Zocor    Past Medical History:    COPD  Chronic pain   Type 2 diabetes mellitus  GERD  Hypertension   Hypothyroidism   Kidney stone  Mucoepidermoid carcinoma of parotid gland  Hyperlipidemia  Lumbar radiculopathy  Urinary incontinence     Past Surgical History:    Ablation, endometrial   Dilation and curettage, hysteroscopy  EGD  Removal of cancerous lump on neck   Tonsillectomy     Family History:    Diabetes  Breast cancer  Lung Cancer  Rheumatoid Arthritis    Social History:  The patient presents to the ED alone   The patient is     Physical Exam     Patient Vitals for the past 24 hrs:   BP Temp Temp src Pulse Resp SpO2   08/03/21 1107 (!) 151/91 96.9  F (36.1  C) Temporal 78 19 95 %       Physical  Exam  Vitals: reviewed by me  General: Pt seen on \[facility] [place] [facility]\"", [facility], cooperative, and alert to conversation  Eyes: Tracking well, clear conjunctiva BL  ENT: MMM, midline trachea.   Lungs: Expiratory wheezes heard throughout bilateral lung fields.  Mild respiratory distress, mild tachypnea.  These findings are mostly resolved after nebulizers.  Speaking in full sentences.  CV: Rate as above  MSK: no joint effusion.  No evidence of trauma  Skin: No rash  Neuro: Clear speech and no facial droop.  Psych: Not RIS, no e/o AH/VH      Emergency Department Course   ECG  ECG taken at 1120, ECG read at 1125  Normal sinus rhythm. Normal ECG.  Rate 74 bpm. TN interval 140 ms. QRS duration 88 ms. QT/QTc 396/439 ms. P-R-T axes 39 12 29.     Imaging:    XR Chest 2 Views:  No acute cardiopulmonary disase. Stable calcified granuloma at the lateral left lung base. Reading per radiology.    Laboratory:     CBC: WBC 15.1(H), HGB 14.8,    BMP: Potassium 3.3(L); Glucose 124(H) o/w WNL (Creatinine 0.65)   Symptomatic COVID19 Virus PCR by nasopharyngeal swab: Negative   Rapid strep screen: Negative  Beta group culture A strep: Pending    Emergency Department Course:    Reviewed:  I reviewed nursing notes, vitals, past medical history and care everywhere    Assessments:  1325 I obtained history and examined the patient as noted above.   1500 I rechecked the patient and explained findings.     Interventions:  1401 Ipratropium 3 mL IV    1403 Solu-Medrol 125 mg IV   1407 Magnesium Sulfate 2 g IV   1505 Ipratropium 3 mL neb     Disposition:  The patient was discharged to home.     Impression & Plan     Medical Decision Making:  Alma Bartlett is a very pleasant 55 year old female who presents to the emergency room with shortness of breath, in the setting of what appears to be a COPD excerebration. I believe this diagnosis is supported by her lung exam, and her medical history, and she feels much improved after nebulizer's and  steroids here. Her Xray shows no evidence of pneumonia, and her COVID test is negative. She is vaccinated. Her lung exam has improved significantly since receiving her treatment and nebulizer's here, and I do think she is stable to go home. Red flags for when to come back to the ER were discussed, patient is OK with this plan and appears to be pleased with her care.      Covid-19  Jazlyn Bartlett was evaluated during a global COVID-19 pandemic, which necessitated consideration that the patient might be at risk for infection with the SARS-CoV-2 virus that causes COVID-19.   Applicable protocols for evaluation were followed during the patient's care.   COVID-19 was considered as part of the patient's evaluation. The plan for testing is:  a test was obtained during this visit.    Diagnosis:    ICD-10-CM    1. COPD exacerbation (H)  J44.1      Discharge Medications:  New Prescriptions    PREDNISONE (DELTASONE) 50 MG TABLET    Take 1 tablet (50 mg) by mouth daily for 5 days     Scribe Disclosure:  I, Luis Rai, am serving as a scribe at 1:19 PM on 8/3/2021 to document services personally performed by Owen Melgoza MD, based on my observations and the provider's statements to me.          Owen Melgoza MD  08/03/21 [id number]

## 2021-08-03 NOTE — DISCHARGE INSTRUCTIONS
Please come back to the ER immediately with any worsening symptoms, or if the medication we have given you does not work and you have worsening shortness of breath.  Please use the albuterol you have your home.  Please be certain to follow with your regular doctor the next 3 to 5 days for a routine checkup.  Come back to the ER with any other concerns or worsening shortness of breath.

## 2021-08-03 NOTE — ED NOTES
Respiratory - Respiratory WDL: breath sounds; cough; all (pt comes in with SOB, productive cough, fever chills and sore throat. pt has hx of COPD.) Cough Frequency: frequent  Cough Type: good; productive  Cough And Deep Breathing: done independently per patient  Secretions Amount: large; moderate  Secretions Color: clear   Respiratory Secretions Assessment - Secretions Amount: large; moderate  Secretions Color: clear

## 2021-08-03 NOTE — LETTER
August 3, 2021      To Whom It May Concern:      Jazlyn Bartlett was seen in our Emergency Department today, 08/03/21.  I expect her condition to improve over the next 2-3 days.  She may return to work/school when improved.    Sincerely,      Kathya Cisneros RN

## 2021-08-03 NOTE — ED TRIAGE NOTES
Presents with sudden onset of cough, sob, exertion, aches, chills and sore throat. VSS on RA. Hx COPD.

## 2021-08-03 NOTE — TELEPHONE ENCOUNTER
"Was fine yesterday, woke up with cough and SOB>    Patient speaking in 1 word and stopping to breathe, coughing, nurse can hear wheezing and rales on the phone   Used neb machine with Duo neb did not help, used inhaler with no relief.     Advised to hang up and call 911 will call back in 5 minutes to be sure was able to reach them.     Called patient back after 5 minutes - did not call 911 is having  bring to [facility] now.         Ceci Pulliam RN  [facility]                       Reason for Disposition    Severe difficulty breathing (e.g., struggling for each breath, speak in single words, pulse > 120)    Additional Information    [1] Wheezing (high pitched whistling sound) AND [2] previous asthma attacks or use of asthma medicines    Answer Assessment - Initial Assessment Questions  1. RESPIRATORY STATUS: \"Describe your breathing?\" (e.g., wheezing, shortness of breath, unable to speak, severe coughing)       Wheezing, SOB, severe coughing,   2. ONSET: \"When did this asthma attack begin?\"       Not asthma has COPD   3. TRIGGER: \"What do you think triggered this attack?\" (e.g., URI, exposure to pollen or other allergen, tobacco smoke)       Unknown   4. PEAK EXPIRATORY FLOW RATE (PEFR): \"Do you use a peak flow meter?\" If so, ask: \"What's the current peak flow? What's your personal best peak flow?\"       n/a  5. SEVERITY: \"How bad is this attack?\"     - MILD: No SOB at rest, mild SOB with walking, speaks normally in sentences, can lay down, no retractions, pulse < 100. (GREEN Zone: PEFR %)    - MODERATE: SOB at rest, SOB with minimal exertion and prefers to sit, cannot lie down flat, speaks in phrases, mild retractions, audible wheezing, pulse 100-120. (YELLOW Zone: PEFR 50-80%)     - SEVERE: Very SOB at rest, speaks in single words, struggling to breathe, sitting hunched forward, retractions, usually loud wheezing, sometimes minimal wheezing because of decreased air " "movement, pulse > 120. (RED Zone: PEFR < 50%).       n/a  6. MEDICATIONS (Inhaler or nebs): \"What are your asthma medications?\" and \"What treatments have you given so far?\"     - Quick-relief: albuterol, metaproterenol, salbutamol, or other inhaled or nebulized beta-agonist medicines    - Long-term-control: steroids, cromolyn, or other anti-inflammatory medicines.      Neb machine - medicine is not helping   7. OTHER SYMPTOMS: \"Do you have any other symptoms? (e.g., runny nose, chest pain, fever)      No fever, has runny nose and chest pain   8. PREGNANCY: \"Is there any chance you are pregnant?\" \"When was your last menstrual period?\"      n/a    Protocols used: ASTHMA ATTACK-A-AH, RESPIRATORY MULTIPLE SYMPTOMS - GUIDELINE PAHBGDRYN-N-EE      "

## 2021-08-04 NOTE — TELEPHONE ENCOUNTER
Patient Contact    Attempt # 2    Was call answered?  No.  Left message on voicemail with information to call triage back.    On call back:     - Triage symptoms for difficulty breathing

## 2021-08-04 NOTE — RESULT ENCOUNTER NOTE
Group A Streptococcus PCR is NEGATIVE  No treatment or change in treatment [facility] ED lab result Strep Group A protocol.

## 2021-09-18 ENCOUNTER — HEALTH MAINTENANCE LETTER (OUTPATIENT)
Age: 56
End: 2021-09-18
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
## 2021-12-02 ENCOUNTER — OFFICE VISIT (OUTPATIENT)
Dept: FAMILY MEDICINE | Facility: CLINIC | Age: 56
End: 2021-12-02
Payer: COMMERCIAL

## 2021-12-02 VITALS
OXYGEN SATURATION: 94 % | TEMPERATURE: 98.3 F | WEIGHT: 199.7 LBS | HEIGHT: 60 IN | HEART RATE: 66 BPM | SYSTOLIC BLOOD PRESSURE: 126 MMHG | DIASTOLIC BLOOD PRESSURE: 78 MMHG | BODY MASS INDEX: 39.21 KG/M2

## 2021-12-02 DIAGNOSIS — E11.9 TYPE 2 DIABETES MELLITUS WITHOUT COMPLICATION, WITHOUT LONG-TERM CURRENT USE OF INSULIN (H): ICD-10-CM

## 2021-12-02 DIAGNOSIS — Z72.0 TOBACCO ABUSE: ICD-10-CM

## 2021-12-02 DIAGNOSIS — J44.1 COPD EXACERBATION (H): Primary | ICD-10-CM

## 2021-12-02 LAB — HBA1C MFR BLD: 6.7 % (ref 0–5.6)

## 2021-12-02 PROCEDURE — 36415 COLL VENOUS BLD VENIPUNCTURE: CPT | Performed by: NURSE PRACTITIONER

## 2021-12-02 PROCEDURE — 80048 BASIC METABOLIC PNL TOTAL CA: CPT | Performed by: NURSE PRACTITIONER

## 2021-12-02 PROCEDURE — 83036 HEMOGLOBIN GLYCOSYLATED A1C: CPT | Performed by: NURSE PRACTITIONER

## 2021-12-02 PROCEDURE — 99214 OFFICE O/P EST MOD 30 MIN: CPT | Performed by: NURSE PRACTITIONER

## 2021-12-02 RX ORDER — PREDNISONE 20 MG/1
TABLET ORAL
Qty: 20 TABLET | Refills: 0 | Status: SHIPPED | OUTPATIENT
Start: 2021-12-02 | End: 2022-07-05

## 2021-12-02 RX ORDER — TIOTROPIUM BROMIDE 18 UG/1
18 CAPSULE ORAL; RESPIRATORY (INHALATION) DAILY
COMMUNITY
End: 2021-12-02

## 2021-12-02 RX ORDER — AZITHROMYCIN 250 MG/1
TABLET, FILM COATED ORAL
Qty: 6 TABLET | Refills: 0 | Status: SHIPPED | OUTPATIENT
Start: 2021-12-02 | End: 2021-12-07

## 2021-12-02 ASSESSMENT — MIFFLIN-ST. JEOR: SCORE: 1421.3

## 2021-12-02 NOTE — PROGRESS NOTES
"  Assessment & Plan     COPD exacerbation (H)  Agreed upon prednisone taper and azithromycin for exacerbation. To follow-up with any worsening symptoms. Will follow-up for flu shot 2 weeks.   - predniSONE (DELTASONE) 20 MG tablet  Dispense: 20 tablet; Refill: 0  - azithromycin (ZITHROMAX) 250 MG tablet  Dispense: 6 tablet; Refill: 0    Type 2 diabetes mellitus without complication, without long-term current use of insulin (H)  Control improving. A1c pending. Will treat as appropriate, no medication refills needed today.   - HEMOGLOBIN A1C  - Basic metabolic panel  (Ca, Cl, CO2, Creat, Gluc, K, Na, BUN)  - HEMOGLOBIN A1C  - Basic metabolic panel  (Ca, Cl, CO2, Creat, Gluc, K, Na, BUN)    Tobacco abuse  \"a work in progress.\" No medications wanted.       Return in about 2 weeks (around 12/16/2021) for MA Only Visit for flu shot.    ELIZABETH King [facility]    Fe Marquez is a 56 year old who presents for the following health issues     HPI     Diabetes Follow-up      How often are you checking your blood sugar? Not at all    What concerns do you have today about your diabetes? None     Do you have any of these symptoms? (Select all that apply)  Excessive thirst    Metformin not used due to constant nuasea and vomiting.   Diabetic diet, cut out soda.   Housework for exercise.   Compliant with medication.     BP Readings from Last 2 Encounters:   12/02/21 126/78   08/03/21 (!) 151/91     Hemoglobin A1C (%)   Date Value   12/02/2021 6.7 (H)   06/15/2021 6.7 (H)   08/03/2020 7.2 (H)     LDL Cholesterol Calculated (mg/dL)   Date Value   06/15/2021 55   08/03/2020 56         COPD Follow-Up    Overall, how are your COPD symptoms since your last clinic visit?  Slightly worse    How much fatigue or shortness of breath do you have when you are walking?  More than usual    How much shortness of breath do you have when you are resting?  None    How often do you cough? Often    Have you " "noticed any change in your sputum/phlegm?  Yes- cloudy white    Have you experienced a recent fever? No    Please describe how far you can walk without stopping to rest:  The length of 3-5 rooms    How many flights of stairs are you able to walk up without stopping?  3 or more  Have you had any Emergency Room Visits, Urgent Care Visits, or Hospital Admissions because of your COPD since your last office visit?  Yes    How many times have you gone to the ED, Urgent Care, or been hospitalized for COPD since your last clinic visit?  1    Has noted increased wheeze past few days. Feels similar to early stages of exacerbation.     History   Smoking Status     Current Every Day Smoker     Packs/day: 0.50     Types: Cigarettes   Smokeless Tobacco     Former User     Quit date: 2/22/2020     Lab Results   Component Value Date    FEV1 1.75 10/03/2013    OZV5HCU 67% 10/03/2013       How many servings of fruits and vegetables do you eat daily?  2-3    On average, how many sweetened beverages do you drink each day (Examples: soda, juice, sweet tea, etc.  Do NOT count diet or artificially sweetened beverages)?   1    How many days per week do you exercise enough to make your heart beat faster? 3 or less    How many minutes a day do you exercise enough to make your heart beat faster? 9 or less    How many days per week do you miss taking your medication? 0        Review of Systems         Objective    /78 (BP Location: Right arm, Patient Position: Sitting, Cuff Size: Adult Large)   Pulse 66   Temp 98.3  F (36.8  C) (Oral)   Ht 1.53 m (5' 0.25\")   Wt 90.6 kg (199 lb 11.2 oz)   SpO2 94%   BMI 38.68 kg/m    Body mass index is 38.68 kg/m .  Physical Exam   GENERAL: healthy, alert and no distress  RESP: rhonchi throughout  CV: regular rate and rhythm, normal S1 S2, no S3 or S4, no murmur, click or rub, no peripheral edema and peripheral pulses strong  ABDOMEN: soft, nontender, no hepatosplenomegaly, no masses and bowel " sounds normal  MS: no gross musculoskeletal defects noted, no edema

## 2021-12-02 NOTE — LETTER
December 2, 2021      Jazlyn Bartlett  [address] [facility] [address]        To Whom It May Concern:    Jazlyn Bartlett  was seen on 12/2/2021.  Please excuse her for her time while getting care.         Sincerely,        ELIZABETH King CNP

## 2021-12-03 LAB
ANION GAP SERPL CALCULATED.3IONS-SCNC: 5 MMOL/L (ref 3–14)
BUN SERPL-MCNC: 10 MG/DL (ref 7–30)
CALCIUM SERPL-MCNC: 9.4 MG/DL (ref 8.5–10.1)
CHLORIDE BLD-SCNC: 104 MMOL/L (ref 94–109)
CO2 SERPL-SCNC: 29 MMOL/L (ref 20–32)
CREAT SERPL-MCNC: 0.6 MG/DL (ref 0.52–1.04)
GFR SERPL CREATININE-BSD FRML MDRD: >90 ML/MIN/1.73M2
GLUCOSE BLD-MCNC: 108 MG/DL (ref 70–99)
POTASSIUM BLD-SCNC: 3.6 MMOL/L (ref 3.4–5.3)
SODIUM SERPL-SCNC: 138 MMOL/L (ref 133–144)

## 2022-06-25 ENCOUNTER — HEALTH MAINTENANCE LETTER (OUTPATIENT)
Age: 57
End: 2022-06-25

## 2022-06-28 ASSESSMENT — ENCOUNTER SYMPTOMS
BREAST MASS: 0
FREQUENCY: 1
ARTHRALGIAS: 1
DIZZINESS: 1
PARESTHESIAS: 1
CONSTIPATION: 1
WEAKNESS: 1
MYALGIAS: 1
SORE THROAT: 1
EYE PAIN: 1
JOINT SWELLING: 1

## 2022-06-28 ASSESSMENT — PATIENT HEALTH QUESTIONNAIRE - PHQ9
SUM OF ALL RESPONSES TO PHQ QUESTIONS 1-9: 9
SUM OF ALL RESPONSES TO PHQ QUESTIONS 1-9: 9

## 2022-07-05 ENCOUNTER — OFFICE VISIT (OUTPATIENT)
Dept: FAMILY MEDICINE | Facility: CLINIC | Age: 57
End: 2022-07-05
Payer: COMMERCIAL

## 2022-07-05 VITALS
HEIGHT: 60 IN | WEIGHT: 204.8 LBS | RESPIRATION RATE: 16 BRPM | HEART RATE: 65 BPM | TEMPERATURE: 97.3 F | BODY MASS INDEX: 40.21 KG/M2 | OXYGEN SATURATION: 97 % | DIASTOLIC BLOOD PRESSURE: 80 MMHG | SYSTOLIC BLOOD PRESSURE: 122 MMHG

## 2022-07-05 DIAGNOSIS — R20.0 BILATERAL ARM NUMBNESS AND TINGLING WHILE SLEEPING: ICD-10-CM

## 2022-07-05 DIAGNOSIS — Z87.891 PERSONAL HISTORY OF TOBACCO USE: ICD-10-CM

## 2022-07-05 DIAGNOSIS — L98.9 SKIN LESION: ICD-10-CM

## 2022-07-05 DIAGNOSIS — E03.9 HYPOTHYROIDISM, UNSPECIFIED TYPE: ICD-10-CM

## 2022-07-05 DIAGNOSIS — Z12.31 ENCOUNTER FOR SCREENING MAMMOGRAM FOR BREAST CANCER: ICD-10-CM

## 2022-07-05 DIAGNOSIS — E11.9 TYPE 2 DIABETES MELLITUS WITHOUT COMPLICATION, WITHOUT LONG-TERM CURRENT USE OF INSULIN (H): ICD-10-CM

## 2022-07-05 DIAGNOSIS — Z12.11 COLON CANCER SCREENING: ICD-10-CM

## 2022-07-05 DIAGNOSIS — J44.9 CHRONIC OBSTRUCTIVE PULMONARY DISEASE, UNSPECIFIED COPD TYPE (H): ICD-10-CM

## 2022-07-05 DIAGNOSIS — Z00.00 ANNUAL PHYSICAL EXAM: Primary | ICD-10-CM

## 2022-07-05 DIAGNOSIS — I10 HYPERTENSION GOAL BP (BLOOD PRESSURE) < 140/80: ICD-10-CM

## 2022-07-05 DIAGNOSIS — E78.5 HYPERLIPIDEMIA LDL GOAL <100: ICD-10-CM

## 2022-07-05 DIAGNOSIS — Z23 HIGH PRIORITY FOR 2019-NCOV VACCINE: ICD-10-CM

## 2022-07-05 DIAGNOSIS — R20.2 BILATERAL ARM NUMBNESS AND TINGLING WHILE SLEEPING: ICD-10-CM

## 2022-07-05 LAB
ALBUMIN SERPL-MCNC: 3.6 G/DL (ref 3.4–5)
ALP SERPL-CCNC: 89 U/L (ref 40–150)
ALT SERPL W P-5'-P-CCNC: 26 U/L (ref 0–50)
ANION GAP SERPL CALCULATED.3IONS-SCNC: 7 MMOL/L (ref 3–14)
AST SERPL W P-5'-P-CCNC: 28 U/L (ref 0–45)
BILIRUB SERPL-MCNC: 0.6 MG/DL (ref 0.2–1.3)
BUN SERPL-MCNC: 13 MG/DL (ref 7–30)
CALCIUM SERPL-MCNC: 9.6 MG/DL (ref 8.5–10.1)
CHLORIDE BLD-SCNC: 105 MMOL/L (ref 94–109)
CHOLEST SERPL-MCNC: 121 MG/DL
CO2 SERPL-SCNC: 26 MMOL/L (ref 20–32)
CREAT SERPL-MCNC: 0.62 MG/DL (ref 0.52–1.04)
CREAT UR-MCNC: 90 MG/DL
FASTING STATUS PATIENT QL REPORTED: YES
GFR SERPL CREATININE-BSD FRML MDRD: >90 ML/MIN/1.73M2
GLUCOSE BLD-MCNC: 133 MG/DL (ref 70–99)
HBA1C MFR BLD: 7.1 % (ref 0–5.6)
HDLC SERPL-MCNC: 46 MG/DL
LDLC SERPL CALC-MCNC: 53 MG/DL
MICROALBUMIN UR-MCNC: <5 MG/L
MICROALBUMIN/CREAT UR: NORMAL MG/G{CREAT}
NONHDLC SERPL-MCNC: 75 MG/DL
POTASSIUM BLD-SCNC: 3.9 MMOL/L (ref 3.4–5.3)
PROT SERPL-MCNC: 6.9 G/DL (ref 6.8–8.8)
SODIUM SERPL-SCNC: 138 MMOL/L (ref 133–144)
TRIGL SERPL-MCNC: 112 MG/DL
TSH SERPL DL<=0.005 MIU/L-ACNC: 0.84 MU/L (ref 0.4–4)

## 2022-07-05 PROCEDURE — 80061 LIPID PANEL: CPT | Performed by: FAMILY MEDICINE

## 2022-07-05 PROCEDURE — 91306 COVID-19,PF,MODERNA (18+ YRS BOOSTER .25ML): CPT | Performed by: FAMILY MEDICINE

## 2022-07-05 PROCEDURE — 36415 COLL VENOUS BLD VENIPUNCTURE: CPT | Performed by: FAMILY MEDICINE

## 2022-07-05 PROCEDURE — 83036 HEMOGLOBIN GLYCOSYLATED A1C: CPT | Performed by: FAMILY MEDICINE

## 2022-07-05 PROCEDURE — 84443 ASSAY THYROID STIM HORMONE: CPT | Performed by: FAMILY MEDICINE

## 2022-07-05 PROCEDURE — 82043 UR ALBUMIN QUANTITATIVE: CPT | Performed by: FAMILY MEDICINE

## 2022-07-05 PROCEDURE — 99396 PREV VISIT EST AGE 40-64: CPT | Mod: 25 | Performed by: FAMILY MEDICINE

## 2022-07-05 PROCEDURE — 80053 COMPREHEN METABOLIC PANEL: CPT | Performed by: FAMILY MEDICINE

## 2022-07-05 PROCEDURE — 0064A COVID-19,PF,MODERNA (18+ YRS BOOSTER .25ML): CPT | Performed by: FAMILY MEDICINE

## 2022-07-05 PROCEDURE — 99214 OFFICE O/P EST MOD 30 MIN: CPT | Mod: 25 | Performed by: FAMILY MEDICINE

## 2022-07-05 PROCEDURE — G0296 VISIT TO DETERM LDCT ELIG: HCPCS | Performed by: FAMILY MEDICINE

## 2022-07-05 RX ORDER — LOSARTAN POTASSIUM 50 MG/1
50 TABLET ORAL DAILY
Qty: 90 TABLET | Refills: 3 | Status: SHIPPED | OUTPATIENT
Start: 2022-07-05 | End: 2023-07-11

## 2022-07-05 RX ORDER — GLIPIZIDE 10 MG/1
TABLET, FILM COATED, EXTENDED RELEASE ORAL
Qty: 270 TABLET | Refills: 3 | Status: SHIPPED | OUTPATIENT
Start: 2022-07-05 | End: 2023-07-11

## 2022-07-05 RX ORDER — ALBUTEROL SULFATE 90 UG/1
2 AEROSOL, METERED RESPIRATORY (INHALATION) EVERY 6 HOURS PRN
Qty: 18 G | Refills: 5 | Status: SHIPPED | OUTPATIENT
Start: 2022-07-05 | End: 2022-11-20

## 2022-07-05 RX ORDER — SIMVASTATIN 20 MG
20 TABLET ORAL AT BEDTIME
Qty: 90 TABLET | Refills: 3 | Status: SHIPPED | OUTPATIENT
Start: 2022-07-05 | End: 2023-07-11

## 2022-07-05 RX ORDER — HYDROCHLOROTHIAZIDE 25 MG/1
TABLET ORAL
Qty: 90 TABLET | Refills: 3 | Status: SHIPPED | OUTPATIENT
Start: 2022-07-05 | End: 2023-07-11

## 2022-07-05 RX ORDER — LEVOTHYROXINE SODIUM 137 UG/1
TABLET ORAL
Qty: 90 TABLET | Refills: 3 | Status: SHIPPED | OUTPATIENT
Start: 2022-07-05 | End: 2023-07-11

## 2022-07-05 ASSESSMENT — ENCOUNTER SYMPTOMS
JOINT SWELLING: 1
CONSTIPATION: 1
SORE THROAT: 1
BREAST MASS: 0
EYE PAIN: 1
MYALGIAS: 1
WEAKNESS: 1
ARTHRALGIAS: 1
FREQUENCY: 1
PARESTHESIAS: 1
DIZZINESS: 1

## 2022-07-05 ASSESSMENT — PAIN SCALES - GENERAL: PAINLEVEL: NO PAIN (0)

## 2022-07-05 NOTE — PROGRESS NOTES
Lung Cancer Screening Shared Decision Making Visit     Jazlyn Bartlett, a 56 year old female, is eligible for lung cancer screening    History   Smoking Status     Current Every Day Smoker     Packs/day: 0.50     Types: Cigarettes   Smokeless Tobacco     Former User     Quit date: 2/22/2020       I have discussed with patient the risks and benefits of screening for lung cancer with low-dose CT.     The risks include:    radiation exposure: one low dose chest CT has as much ionizing radiation as about 15 chest x-rays, or 6 months of background radiation living in [place]      false positives: most findings/nodules are NOT cancer, but some might still require additional diagnostic evaluation, including biopsy    over-diagnosis: some slow growing cancers that might never have been clinically significant will be detected and treated unnecessarily     The benefit of early detection of lung cancer is contingent upon adherence to annual screening or more frequent follow up if indicated.     Furthermore, to benefit from screening, Jazlyn must be willing and able to undergo diagnostic procedures, if indicated. Although no specific guide is available for determining severity of comorbidities, it is reasonable to withhold screening in patients who have greater mortality risk from other diseases.     We did discuss that the best way to prevent lung cancer is to not smoke.    Some patients may value a numeric estimation of lung cancer risk when evaluating if lung cancer screening is right for them, here is one calculator:    ShouldIScreen

## 2022-07-05 NOTE — PROGRESS NOTES
{PROVIDER CHARTING PREFERENCE:969844}    Fe Marquez is a 56 year old{ACCOMPANIED BY STATEMENT (Optional):353349}, presenting for the following health issues:  No chief complaint on file.      Healthy Habits:     Getting at least 3 servings of Calcium per day:  NO    Bi-annual eye exam:  NO    Dental care twice a year:  NO    Sleep apnea or symptoms of sleep apnea:  Daytime drowsiness and Excessive snoring    Diet:  Diabetic    Frequency of exercise:  None    Taking medications regularly:  Yes    Medication side effects:  Muscle aches and Lightheadedness    PHQ-2 Total Score: 1    Additional concerns today:  Yes       {SUPERLIST (Optional):251287}  {additonal problems for provider to add (Optional):477609}    Review of Systems   HENT: Positive for congestion, ear pain and sore throat.    Eyes: Positive for pain and visual disturbance.   Cardiovascular: Positive for peripheral edema.   Gastrointestinal: Positive for constipation.   Breasts:  Positive for tenderness. Negative for breast mass and discharge.   Genitourinary: Positive for frequency, genital sores and urgency. Negative for pelvic pain, vaginal bleeding and vaginal discharge.   Musculoskeletal: Positive for arthralgias, joint swelling and myalgias.   Skin: Positive for rash.   Neurological: Positive for dizziness, weakness and paresthesias.      {ROS COMP (Optional):418195}      Objective    There were no vitals taken for this visit.  There is no height or weight on file to calculate BMI.  Physical Exam   {Exam List (Optional):035288}    {Diagnostic Test Results (Optional):198608}    {AMBULATORY ATTESTATION (Optional):500369}            .  ..  Answers for HPI/ROS submitted by the patient on 6/28/2022  PHQ9 TOTAL SCORE: 9

## 2022-07-05 NOTE — PROGRESS NOTES
SUBJECTIVE:   CC: Jazlyn Bartlett is an 56 year old woman who presents for preventive health visit.       Patient has been advised of split billing requirements and indicates understanding: Yes  Healthy Habits:     Getting at least 3 servings of Calcium per day:  NO    Bi-annual eye exam:  NO    Dental care twice a year:  NO    Sleep apnea or symptoms of sleep apnea:  Daytime drowsiness and Excessive snoring    Diet:  Diabetic    Frequency of exercise:  None    Taking medications regularly:  Yes    Medication side effects:  Muscle aches and Lightheadedness    PHQ-2 Total Score: 1    Additional concerns today:  Yes    Diabetes Follow-up    How often are you checking your blood sugar? One time daily  What time of day are you checking your blood sugars (select all that apply)?  Before meals  Have you had any blood sugars above 200?  No  Have you had any blood sugars below 70?  No    What symptoms do you notice when your blood sugar is low?  None    What concerns do you have today about your diabetes? None     Do you have any of these symptoms? (Select all that apply)        BP Readings from Last 2 Encounters:   07/26/22 114/70   07/13/22 128/78     Hemoglobin A1C (%)   Date Value   07/05/2022 7.1 (H)   12/02/2021 6.7 (H)   06/15/2021 6.7 (H)   08/03/2020 7.2 (H)     LDL Cholesterol Calculated (mg/dL)   Date Value   07/05/2022 53   06/15/2021 55   08/03/2020 56         Hyperlipidemia Follow-Up      Are you regularly taking any medication or supplement to lower your cholesterol?   Yes- statin    Are you having muscle aches or other side effects that you think could be caused by your cholesterol lowering medication?  No    Hypertension Follow-up      Do you check your blood pressure regularly outside of the clinic? Yes     Are you following a low salt diet? Yes    Are your blood pressures ever more than 140 on the top number (systolic) OR more   than 90 on the bottom number (diastolic), for example 140/90? No    COPD  Follow-Up    Overall, how are your COPD symptoms since your last clinic visit?  No change    How much fatigue or shortness of breath do you have when you are walking?  None    How much shortness of breath do you have when you are resting?  None    How often do you cough? Rarely    Have you noticed any change in your sputum/phlegm?  Yes- sometimes    Have you experienced a recent fever? No    Please describe how far you can walk without stopping to rest:  The length of 3-5 rooms    How many flights of stairs are you able to walk up without stopping?  2    Have you had any Emergency Room Visits, Urgent Care Visits, or Hospital Admissions because of your COPD since your last office visit?  No    History   Smoking Status     Current Every Day Smoker     Packs/day: 0.50     Types: Cigarettes   Smokeless Tobacco     Former User     Quit date: 2/22/2020     Lab Results   Component Value Date    FEV1 1.75 10/03/2013    [id number] 67% 10/03/2013       Hypothyroidism Follow-up      Since last visit, patient describes the following symptoms: Weight stable, no hair loss, no skin changes, no constipation, no loose stools      Today's PHQ-2 Score:   PHQ-2 ( 1999 [employer]) 7/26/2022   Q1: Little interest or pleasure in doing things 0   Q2: Feeling down, depressed or hopeless 0   PHQ-2 Score 0   PHQ-2 Total Score (12-17 Years)- Positive if 3 or more points; Administer PHQ-A if positive -   Q1: Little interest or pleasure in doing things -   Q2: Feeling down, depressed or hopeless -   PHQ-2 Score -       Abuse: Current or Past (Physical, Sexual or Emotional) - No  Do you feel safe in your environment? Yes        Social History     Tobacco Use     Smoking status: Current Every Day Smoker     Packs/day: 0.50     Types: Cigarettes     Smokeless tobacco: Former User     Quit date: 2/22/2020   Substance Use Topics     Alcohol use: No     Alcohol/week: 0.0 standard drinks     If you drink alcohol do you typically have >3 drinks per day or >7  drinks per week? No    No flowsheet data found.    Reviewed orders with patient.  Reviewed health maintenance and updated orders accordingly - Yes  BP Readings from Last 3 Encounters:   07/26/22 114/70   07/13/22 128/78   07/05/22 122/80    Wt Readings from Last 3 Encounters:   07/26/22 91 kg (200 lb 9.6 oz)   07/13/22 92.5 kg (204 lb)   07/05/22 92.9 kg (204 lb 12.8 oz)                  Patient Active Problem List   Diagnosis     Neck mass     Moderate major depression (H)     Anxiety     Vitamin D deficiency disease     CARDIOVASCULAR SCREENING; LDL GOAL LESS THAN 100     GERD (gastroesophageal reflux disease)     Hypertension goal BP (blood pressure) < 140/80     Type 2 diabetes mellitus without complication (H)     Chronic airway obstruction (H)     Advanced directives, counseling/discussion     Autoimmune gastritis- repeat UGI 1/16 with gastritis without eosinophils     Urinary incontinence     Osteoarthritis of patellofemoral joint     Morbid obesity (H)     Tobacco abuse     Lumbar radiculopathy     Hyperlipidemia LDL goal <100     Hypothyroidism, unspecified type     Chronic obstructive pulmonary disease, unspecified COPD type (H)     Abnormal CT lung screening     Past Surgical History:   Procedure Laterality Date     ABLATION, ENDOMETRIAL, THERMAL, W/O HYSTEROSCOPIC GUIDANCE       DILATION AND CURETTAGE, HYSTEROSCOPY, ABLATE ENDOMETRIUM NOVASURE, COMBINED  10/11/2012    Procedure: COMBINED DILATION AND CURETTAGE, HYSTEROSCOPY, ABLATE ENDOMETRIUM NOVASURE;  COMBINED DILATION AND CURETTAGE, HYSTEROSCOPY, ABLATE ENDOMETRIUM ,pelvic exam under anesthesia;  Surgeon: Shruti Barrios MD;  Location:  OR     ESOPHAGOSCOPY, GASTROSCOPY, DUODENOSCOPY (EGD), COMBINED N/A 1/19/2016    Procedure: COMBINED ESOPHAGOSCOPY, GASTROSCOPY, DUODENOSCOPY (EGD), BIOPSY SINGLE OR MULTIPLE;  Surgeon: Kristofer Molina MD;  Location:  GI     Removal of cancerous lump on neck       TONSILLECTOMY         Social History      Tobacco Use     Smoking status: Current Every Day Smoker     Packs/day: 0.50     Types: Cigarettes     Smokeless tobacco: Former User     Quit date: 2/22/2020   Substance Use Topics     Alcohol use: No     Alcohol/week: 0.0 standard drinks     Family History   Problem Relation Age of Onset     Diabetes Mother      Breast Cancer Mother      Diabetes Father      Lung Cancer Father      Rheumatoid Arthritis Father          Current Outpatient Medications   Medication Sig Dispense Refill     albuterol (PROAIR HFA/PROVENTIL HFA/VENTOLIN HFA) 108 (90 Base) MCG/ACT inhaler Inhale 2 puffs into the lungs every 6 hours as needed for shortness of breath / dyspnea 18 g 5     aspirin 81 MG EC tablet Take 1 tablet (81 mg) by mouth daily 100 tablet 3     Cholecalciferol (VITAMIN D) 2000 UNITS tablet Take 2,000 Units by mouth daily 100 tablet 3     glipiZIDE (GLUCOTROL XL) 10 MG 24 hr tablet 1 tab in am and 2 tabs in  tablet 3     hydrochlorothiazide (HYDRODIURIL) 25 MG tablet TAKE 1 TABLET (25 MG) BY MOUTH DAILY 90 tablet 3     levothyroxine (SYNTHROID/LEVOTHROID) 137 MCG tablet TAKE 1 TABLET (137 MCG) BY MOUTH DAILY 90 tablet 3     losartan (COZAAR) 50 MG tablet Take 1 tablet (50 mg) by mouth daily 90 tablet 3     simvastatin (ZOCOR) 20 MG tablet Take 1 tablet (20 mg) by mouth At Bedtime 90 tablet 3     terbinafine (LAMISIL) 250 MG tablet Take 1 tablet (250 mg) by mouth daily for 90 days 90 tablet 0     Allergies   Allergen Reactions     Hydrocodone-Acetaminophen Nausea and Vomiting     Ibuprofen Sodium Nausea and Vomiting     Lisinopril Cough     Recent Labs   Lab Test 07/05/22  0918 12/02/21  1112 08/03/21  1133 06/15/21  1212 08/03/20  1221   A1C 7.1* 6.7*  --  6.7* 7.2*   LDL 53  --   --  55 56   HDL 46*  --   --  49* 49*   TRIG 112  --   --  141 115   ALT 26  --   --  29 53*   CR 0.62 0.60   < > 0.64 0.63   GFRESTIMATED >90 >90   < > >90 >90   GFRESTBLACK  --   --   --  >90 >90   POTASSIUM 3.9 3.6   < > 3.6 4.0    TSH 0.84  --   --  0.08* 0.41    < > = values in this interval not displayed.        Breast Cancer Screening:    FHS-7:   Breast CA Risk Assessment (FHS-7) 6/28/2022 7/11/2022 7/25/2022   Did any of your first-degree relatives have breast or ovarian cancer? Yes Yes Yes   Did any of your relatives have bilateral breast cancer? Unknown No Unknown   Did any man in your family have breast cancer? No No Unknown   Did any woman in your family have breast and ovarian cancer? Yes No Yes   Did any woman in your family have breast cancer before age 50 y? Unknown No Unknown   Do you have 2 or more relatives with breast and/or ovarian cancer? Yes No Unknown   Do you have 2 or more relatives with breast and/or bowel cancer? Yes No Unknown       Mammogram Screening: Recommended mammography every 1-2 years with patient discussion and risk factor consideration  Pertinent mammograms are reviewed under the imaging tab.    History of abnormal Pap smear: NO - age 30-65 PAP every 5 years with negative HPV co-testing recommended  PAP / HPV Latest Ref Rng & Units 11/7/2017 12/15/2014 9/5/2013   PAP (Historical) - NIL NIL NIL   HPV16 NEG:Negative Negative - -   HPV18 NEG:Negative Negative - -   HRHPV NEG:Negative Negative - -     Reviewed and updated as needed this visit by clinical staff   Tobacco  Allergies  Meds                Reviewed and updated as needed this visit by Provider    Allergies                    Review of Systems   HENT: Positive for congestion, ear pain and sore throat.    Eyes: Positive for pain and visual disturbance.   Cardiovascular: Positive for peripheral edema.   Gastrointestinal: Positive for constipation.   Breasts:  Positive for tenderness. Negative for breast mass and discharge.   Genitourinary: Positive for frequency, genital sores and urgency. Negative for pelvic pain, vaginal bleeding and vaginal discharge.   Musculoskeletal: Positive for arthralgias, joint swelling and myalgias.   Skin: Positive for  "rash.   Neurological: Positive for dizziness, weakness and paresthesias.          OBJECTIVE:   /80   Pulse 65   Temp 97.3  F (36.3  C) (Tympanic)   Resp 16   Ht 1.53 m (5' 0.24\")   Wt 92.9 kg (204 lb 12.8 oz)   SpO2 97%   BMI 39.68 kg/m    Physical Exam  GENERAL APPEARANCE: healthy, alert and no distress  EYES: Eyes grossly normal to inspection, PERRL and conjunctivae and sclerae normal  HENT: ear canals and TM's normal, nose and mouth without ulcers or lesions, oropharynx clear and oral mucous membranes moist  NECK: no adenopathy, no asymmetry, masses, or scars and thyroid normal to palpation  RESP: lungs clear to auscultation - no rales, rhonchi or wheezes  BREAST: normal without masses, tenderness or nipple discharge and no palpable axillary masses or adenopathy  CV: regular rate and rhythm, normal S1 S2, no S3 or S4, no murmur, click or rub, no peripheral edema and peripheral pulses strong  ABDOMEN: soft, nontender, no hepatosplenomegaly, no masses and bowel sounds normal  MS: no musculoskeletal defects are noted and gait is age appropriate without ataxia  SKIN: no suspicious lesions or rashes  NEURO: Normal strength and tone, sensory exam grossly normal, mentation intact and speech normal  PSYCH: mentation appears normal and affect normal/bright        ASSESSMENT/PLAN:   1. Annual physical exam      2. Chronic obstructive pulmonary disease, unspecified COPD type (H)  Symptomatically stable.  - albuterol (PROAIR HFA/PROVENTIL HFA/VENTOLIN HFA) 108 (90 Base) MCG/ACT inhaler; Inhale 2 puffs into the lungs every 6 hours as needed for shortness of breath / dyspnea  Dispense: 18 g; Refill: 5    3. Type 2 diabetes mellitus without complication, without long-term current use of insulin (H)  Lab Results   Component Value Date    A1C 7.1 07/05/2022    A1C 6.7 12/02/2021     Diabetic control is within goal.  Resume current medications without any changes.  Recheck in 6 months recommended.  - HEMOGLOBIN A1C; " Future  - Albumin Random Urine Quantitative with Creat Ratio; Future  - glipiZIDE (GLUCOTROL XL) 10 MG 24 hr tablet; 1 tab in am and 2 tabs in PM  Dispense: 270 tablet; Refill: 3  - HEMOGLOBIN A1C  - Albumin Random Urine Quantitative with Creat Ratio    4. Hypertension goal BP (blood pressure) < 140/80  Well-controlled  - hydrochlorothiazide (HYDRODIURIL) 25 MG tablet; TAKE 1 TABLET (25 MG) BY MOUTH DAILY  Dispense: 90 tablet; Refill: 3  - losartan (COZAAR) 50 MG tablet; Take 1 tablet (50 mg) by mouth daily  Dispense: 90 tablet; Refill: 3  - Comprehensive metabolic panel; Future  - Comprehensive metabolic panel    5. Hypothyroidism, unspecified type  Thyroid function test is normal.  Resume current dose of levothyroxine.  - levothyroxine (SYNTHROID/LEVOTHROID) 137 MCG tablet; TAKE 1 TABLET (137 MCG) BY MOUTH DAILY  Dispense: 90 tablet; Refill: 3  - TSH; Future  - TSH    6. Hyperlipidemia LDL goal <100  Cholesterol levels are within the goal.  Resume current medication without any changes.  Refill ordered  - Lipid panel reflex to direct LDL Fasting; Future  - simvastatin (ZOCOR) 20 MG tablet; Take 1 tablet (20 mg) by mouth At Bedtime  Dispense: 90 tablet; Refill: 3  - Lipid panel reflex to direct LDL Fasting    7. Skin lesion  Recommending dermatology consultation for skin lesion check  - Adult Dermatology Referral; Future    8. Encounter for screening mammogram for breast cancer    - MA Screen Bilateral w/Pasquale; Future    9. Colon cancer screening    - Colonscopy Screening  Referral; Future    10. Personal history of tobacco use  Recommending lung cancer screening.  Patient continues to smoke.  - Prof fee: Shared Decision Making for Lung Cancer Screening  - CT Chest Lung Cancer Scrn Low Dose wo; Future    11. High priority for 2019-nCoV vaccine    - COVID-19,PF,MODERNA (18+ Yrs BOOSTER .25mL)                COUNSELING:  Reviewed preventive health counseling, as reflected in patient instructions       Regular  "exercise       Healthy diet/nutrition    Estimated body mass index is 39.68 kg/m  as calculated from the following:    Height as of this encounter: 1.53 m (5' 0.24\").    Weight as of this encounter: 92.9 kg (204 lb 12.8 oz).    Weight management plan: Discussed healthy diet and exercise guidelines    She reports that she has been smoking cigarettes. She has been smoking about 0.50 packs per day. She quit smokeless tobacco use about 2 years ago.  Tobacco Cessation Action Plan:   Information offered: Patient not interested at this time      Counseling Resources:  ATP IV Guidelines  Pooled Cohorts Equation Calculator  Breast Cancer Risk Calculator  BRCA-Related Cancer Risk Assessment: FHS-7 Tool  FRAX Risk Assessment  ICSI Preventive Guidelines  Dietary Guidelines for Americans, 2010  USDA's MyPlate  ASA Prophylaxis  Lung CA Screening    Lokesh Casanova MD  [facility] [place]  Answers for HPI/ROS submitted by the patient on 6/28/2022  PHQ9 TOTAL SCORE: 9      "

## 2022-07-05 NOTE — LETTER
[facility]          [address] [place] [address]                            [phone]  Fax: [phone]    Jazlyn Bartlett  [address] [place] [id number]    [id number]    July 5, 2022      To whom it may concern    Please excuse Jazlyn Bartlett from work on 7/5/22 for her doctors appointment. She will start working today at 11 am.       If you have any other questions or concerns please feel free to contact me at anytime.        Sincerely,      Edilberto Campa M.D.

## 2022-07-05 NOTE — PATIENT INSTRUCTIONS
Lung Cancer Screening   Frequently Asked Questions  If you are at high-risk for lung cancer, getting screened with low-dose computed tomography (LDCT) every year can help save your life. This handout offers answers to some of the most common questions about lung cancer screening. If you have other questions, please call [phone] ([phone]).     What is it?  Lung cancer screening uses special X-ray technology to create an image of your lung tissue. The exam is quick and easy and takes less than 10 seconds. We don t give you any medicine or use any needles. You can eat before and after the exam. You don t need to change your clothes as long as the clothing on your chest doesn t contain metal. But, you do need to be able to hold your breath for at least 6 seconds during the exam.    What is the goal of lung cancer screening?  The goal of lung cancer screening is to save lives. Many times, lung cancer is not found until a person starts having physical symptoms. Lung cancer screening can help detect lung cancer in the earliest stages when it may be easier to treat.    Who should be screened for lung cancer?  We suggest lung cancer screening for anyone who is at high-risk for lung cancer. You are in the high-risk group if you:      are between the ages of 55 and 79, and    have smoked at least 1 pack of cigarettes a day for 20 or more years, and    still smoke or have quit within the past 15 years.    However, if you have a new cough or shortness of breath, you should talk to your doctor before being screened.    Why does it matter if I have symptoms?  Certain symptoms can be a sign that you have a condition in your lungs that should be checked and treated by your doctor. These symptoms include fever, chest pain, a new or changing cough, shortness of breath that you have never felt before, coughing up blood or unexplained weight loss. Having any of these symptoms can greatly affect the results of lung  cancer screening.       Should all smokers get an LDCT lung cancer screening exam?  It depends. Lung cancer screening is for a very specific group of men and women who have a history of heavy smoking over a long period of time (see  Who should be screened for lung cancer  above).  I am in the high-risk group, but have been diagnosed with cancer in the past. Is LDCT lung cancer screening right for me?  In some cases, you should not have LDCT lung screening, such as when your doctor is already following your cancer with CT scan studies. Your doctor will help you decide if LDCT lung screening is right for you.  Do I need to have a screening exam every year?  Yes. If you are in the high-risk group described earlier, you should get an LDCT lung cancer screening exam every year until you are 79, or are no longer willing or able to undergo screening and possible procedures to diagnose and treat lung cancer.  How effective is LDCT at preventing death from lung cancer?  Studies have shown that LDCT lung cancer screening can lower the risk of death from lung cancer by 20 percent in people who are at high-risk.  What are the risks?  There are some risks and limitations of LDCT lung cancer screening. We want to make sure you understand the risks and benefits, so please let us know if you have any questions. Your doctor may want to talk with you more about these risks.    Radiation exposure: As with any exam that uses radiation, there is a very small increased risk of cancer. The amount of radiation in LDCT is small--about the same amount a person would get from a mammogram. Your doctor orders the exam when he or she feels the potential benefits outweigh the risks.    False negatives: No test is perfect, including LDCT. It is possible that you may have a medical condition, including lung cancer, that is not found during your exam. This is called a false negative result.    False positives and more testing: LDCT very often finds  something in the lung that could be cancer, but in fact is not. This is called a false positive result. False positive tests often cause anxiety. To make sure these findings are not cancer, you may need to have more tests. These tests will be done only if you give us permission. Sometimes patients need a treatment that can have side effects, such as a biopsy. For more information on false positives, see  What can I expect from the results?     Findings not related to lung cancer: Your LDCT exam also takes pictures of areas of your body next to your lungs. In a very small number of cases, the CT scan will show an abnormal finding in one of these areas, such as your kidneys, adrenal glands, liver or thyroid. This finding may not be serious, but you may need more tests. Your doctor can help you decide what other tests you may need, if any.  What can I expect from the results?  About 1 out of 4 LDCT exams will find something that may need more tests. Most of the time, these findings are lung nodules. Lung nodules are very small collections of tissue in the lung. These nodules are very common, and the vast majority--more than 97 percent--are not cancer (benign). Most are normal lymph nodes or small areas of scarring from past infections.  But, if a small lung nodule is found to be cancer, the cancer can be cured more than 90 percent of the time. To know if the nodule is cancer, we may need to get more images before your next yearly screening exam. If the nodule has suspicious features (for example, it is large, has an odd shape or grows over time), we will refer you to a specialist for further testing.  Will my doctor also get the results?  Yes. Your doctor will get a copy of your results.  Is it okay to keep smoking now that there s a cancer screening exam?  No. Tobacco is one of the strongest cancer-causing agents. It causes not only lung cancer, but other cancers and cardiovascular (heart) diseases as well. The damage  caused by smoking builds over time. This means that the longer you smoke, the higher your risk of disease. While it is never too late to quit, the sooner you quit, the better.  Where can I find help to quit smoking?  The best way to prevent lung cancer is to stop smoking. If you have already quit smoking, congratulations and keep it up! For help on quitting smoking, please call [place] at 5-165-QUITNOW ([phone]) or the American [employer] Society at 1-278.774.8480 to find local resources near you.  One-on-one health coaching:  If you d prefer to work individually with a health care provider on tobacco cessation, we offer:      Medication Therapy Management:  Our specially trained pharmacists work closely with you and your doctor to help you quit smoking.  Call [phone] or [phone] (toll free).

## 2022-07-11 ENCOUNTER — HOSPITAL ENCOUNTER (OUTPATIENT)
Dept: MAMMOGRAPHY | Facility: CLINIC | Age: 57
Discharge: HOME | End: 2022-07-11
Attending: FAMILY MEDICINE
Payer: COMMERCIAL

## 2022-07-11 ENCOUNTER — HOSPITAL ENCOUNTER (OUTPATIENT)
Dept: CT IMAGING | Facility: CLINIC | Age: 57
Discharge: HOME | End: 2022-07-11
Attending: FAMILY MEDICINE
Payer: COMMERCIAL

## 2022-07-11 DIAGNOSIS — Z12.31 ENCOUNTER FOR SCREENING MAMMOGRAM FOR BREAST CANCER: ICD-10-CM

## 2022-07-11 DIAGNOSIS — Z87.891 PERSONAL HISTORY OF TOBACCO USE: ICD-10-CM

## 2022-07-11 PROCEDURE — 71271 CT THORAX LUNG CANCER SCR C-: CPT

## 2022-07-11 PROCEDURE — 77067 SCR MAMMO BI INCL CAD: CPT

## 2022-07-13 ENCOUNTER — OFFICE VISIT (OUTPATIENT)
Dept: PODIATRY | Facility: CLINIC | Age: 57
End: 2022-07-13
Payer: COMMERCIAL

## 2022-07-13 VITALS
HEART RATE: 64 BPM | WEIGHT: 204 LBS | DIASTOLIC BLOOD PRESSURE: 78 MMHG | BODY MASS INDEX: 40.05 KG/M2 | HEIGHT: 60 IN | SYSTOLIC BLOOD PRESSURE: 128 MMHG

## 2022-07-13 DIAGNOSIS — M79.671 RIGHT FOOT PAIN: ICD-10-CM

## 2022-07-13 DIAGNOSIS — E11.9 TYPE 2 DIABETES MELLITUS WITHOUT COMPLICATION, WITHOUT LONG-TERM CURRENT USE OF INSULIN (H): ICD-10-CM

## 2022-07-13 DIAGNOSIS — L60.1 ONYCHOLYSIS: ICD-10-CM

## 2022-07-13 DIAGNOSIS — L60.8 CHANGE IN NAIL APPEARANCE: ICD-10-CM

## 2022-07-13 DIAGNOSIS — M72.2 PLANTAR FASCIAL FIBROMATOSIS: Primary | ICD-10-CM

## 2022-07-13 PROCEDURE — 99203 OFFICE O/P NEW LOW 30 MIN: CPT | Performed by: PODIATRIST

## 2022-07-13 RX ORDER — TERBINAFINE HYDROCHLORIDE 250 MG/1
250 TABLET ORAL DAILY
Qty: 90 TABLET | Refills: 0 | Status: SHIPPED | OUTPATIENT
Start: 2022-07-13 | End: 2022-10-11

## 2022-07-13 ASSESSMENT — PAIN SCALES - GENERAL: PAINLEVEL: MODERATE PAIN (5)

## 2022-07-13 NOTE — PATIENT INSTRUCTIONS
Thank you for choosing [facility] Podiatry / Foot & Ankle Surgery!    DR. CARR'S CLINIC LOCATIONS:     [facility] TRIAGE LINE: [phone]   [address] [place] [address] APPOINTMENTS: [phone]   [place] [address] RADIOLOGY: [phone]    SET UP SURGERY: [phone]    BILLING QUESTIONS: [phone]   [facility] FAX: [phone] [address]    [place] [address]      Follow up: as needed    Next steps: NAIL FUNGUS / ONYCHOMYCOSIS   Nail fungus is not a hygiene problem and will likely not lead to significant medical problems. The nails may get thick causing pain and possibly local skin infection. Treatments include debridement (trimming), oral antifungals, topical antifungals and complete removal of the nail. Most fungal nails are not treated.     Topicals such as tea tree oil can be helpful for surface fungus and may, at best, limit progression. Over the counter creams (such as Lamisil) can also be used however, their effectiveness is also quite low.  Topical treatment with Pen lac is expensive and often not covered by insurance. Pen lac has an approximate 8% success rate. Topical therapy recommendations is to apply twice a day for at least 3-4 months as it takes 9 months for new nail to grow out.     Experts suggest soaking your feet for 15 to 20 minutes in a mixture of 1 cup vinegar to 4 cups warm water. Be sure to rinse well and pat your feet dry when you're done. You can soak your feet like this daily. But if your skin becomes irritated, try soaking only two to three times a week. Vicks VapoRub, as with vinegar, there have been no controlled clinical trials to assess the effectiveness of Vicks VapoRub on nail fungus, but there have been numerous anecdotal reports that it works. There's no consensus on how often to apply this product, so check with your doctor before using it on your nails.      Oral therapies include Sporanox and Lamisil. Oral therapies are also  expensive and not very effective. Side effects such as liver disease are the main concern. Return of fungus is common even if the treatment worked.      Other Tips:  - Penlac nail medication apply daily x 4 months; remove old polish first day of each week  - Antifungal cream/powder (Zeasorb) - apply daily to feet and shoes x 2 months  - Clean shoes with Lysol or in washing machine every few weeks  - Rotate shoe gear; give them 24 hours to dry out between days wearing them  - Clean pair of socks in morning, clean pair in afternoon if your feet sweat  - Shower shoes used in public showers/pools          PLANTAR FIBROMA  A plantar fibroma is a fibrous knot (nodule) in the arch of the foot. It is embedded within the plantar fascia, a band of tissue that extends from the heel to the toes on the bottom of the foot. A plantar fibroma can develop in one or both feet, is benign (non-malignant), and usually will not go away or get smaller without treatment. Definitive causes for this condition have not been clearly identified.    SIGNS & SYMPTOMS  The characteristic sign of a plantar fibroma is a noticeable lump in the arch that feels firm to the touch. This mass can remain the same size or get larger over time, or additional fibromas may develop.  People who have a plantar fibroma may or may not have pain. When pain does occur, it is often caused by shoes pushing against the lump in the arch, although it can also arise when walking or standing barefoot.    DIAGNOSIS  To diagnose a plantar fibroma, the foot and ankle surgeon will examine the foot and press on the affected area. Sometimes this can produce pain that extends down to the toes. An MRI or biopsy may be performed to further evaluate the lump and aid in diagnosis.    TREATMENT OPTIONS   Non-surgical treatment may help relieve the pain of a plantar fibroma, although it will not make the mass disappear. The foot and ankle surgeon may select one or more of the following  non-surgical options:     Steroid injections. Injecting corticosteroid medication into the mass may help  shrink it and thereby relieve the pain that occurs when walking. This reduction  may be only temporary and the fibroma could slowly return to its original size.      Orthotic devices. If the fibroma is stable, meaning it is not changing in size,  custom orthotic devices (shoe inserts) may relieve the pain by distributing the  patient s weight away from the fibroma.      Physical therapy. The pain is sometimes treated through physical therapy  methods that deliver anti-inflammatory medication into the fibroma without the  need for injection.  Cross friction massage.     Verapamil Cream: Applying 10% or higher verapamil cream can help to decrease  size.     Surgery: If the mass increases in size or pain, the patient should be further  evaluated. Surgical treatment to remove the fibroma is considered if the patient  continues to experience pain following non-surgical approaches. Surgical  removal of a plantar fibroma may result in a flattening of the arch or  development of hammertoes. Orthotic devices may be prescribed to provide  support to the foot. Due to the high incidence of recurrence with this condition,  continued follow-up with the foot and ankle surgeon is recommended.     Call [phone] to schedule your Orthotics appointment at the [facility] location.     [facility] LOCATIONS  [facility]  [address]  [place] [address]  Phone: [phone]  Fax: [phone] [facility]  [address]  [place] [address]  Phone: [phone]   Fax: [phone]   [facility]  [address]  [place] [address]  Phone: [phone]  Fax: [phone] [facility]  [address]  [place] [address]  Phone: [phone]   Fax: [phone]   [facility]  [address]  [place] [address]  Phone: [phone]  Fax: [phone] * Please call any location listed to make an appointment for a casting/fitting. Your referral was sent to their central office and they will all have the order on file.          SMOKING CESSATION  What's in cigarette smoke? - Cigarette smoke contains over 4,000 chemicals. Nicotine is one of the main ingredients which is an insecticide/herbicide. It is poisonous to our nervous system, increases blood clotting risk, and decreases the body's defenses to fight off infection. Another chemical is Carbon Monoxide is an asphyxiating gas that permanently binds to blood cells and blocks the transport of oxygen. This leads to tissue death and decreases your metabolism. Tar is a chemical that coats your lungs and trachea which impairs new oxygen coming in and carbon dioxide getting out of your body.   How does smoking impact surgery? - Smoking is particularly hazardous with regards to surgery. Surgery puts stress on the body and a smoker's body is already under strain from these chemicals. Putting the two together, especially for an elective surgery, could be a recipe for disaster. Smoking before and after surgery increases your risk of heart problems, slow wound healing, delayed bone healing, blood clots, wound infection and anesthesia complications.   What are the benefits of quitting? - In 20 minutes your blood pressure will drop back down to normal. In 8 hours the carbon monoxide (a toxic gas) levels in your blood stream will drop by half, and oxygen levels will return to normal. In 48 hours your chance of having a heart attack will have decreased. All nicotine will have left your body. Your sense of taste and smell will return to a normal level. In 72 hours your bronchial tubes will relax, and your energy levels will increase. In 2 weeks your circulation will increase, and it will continue to improve for the next 10  weeks.    Recommendations for elective surgery - Ideally, patients should quit smoking 8 weeks before and at least 2 weeks after elective surgery in order to avoid complications. Simply cutting back on the amount of cigarettes smoked per day does not offer any benefit or decrease the risk of poor wound healing, heart problems, and infection. Smokers should also start taking Vitamin C and B for two weeks before surgery and two weeks after surgery.    Ways to Stop Smokin. Nicotine patches, lozenges, or gum  2. Support Groups  3. Medications (see below)    List of Medications:  1. Varenicline Tartrate (CHANTIX) (may be discontinued by FDA as of 2021)  2. Bupropion HCL (WELLBUTRIN, ZYBAN) - note: make sure Wellbutrin is for smoking cessation and not other issues   3. Nicotine Patch (NICODERM)   4. Nicotine Inhaler (NICOTROL)   5. Nicotine Gum Nicotine Polacrilex   6. Nicotine Lozenge: Nicotine Polacrilex (COMMIT)   * [facility] offers a smoking support group as well!  Please visit: [email or web address]  If you are interested in these, ask about getting a prescription or talk to your primary care doctor about what may be the best way for you to quit.

## 2022-07-13 NOTE — NURSING NOTE
"Chief Complaint   Patient presents with     Consult     Right foot bump and bilateral toenail fungus       Initial /78   Pulse 64   Ht 1.53 m (5' 0.24\")   Wt 92.5 kg (204 lb)   BMI 39.52 kg/m   Estimated body mass index is 39.52 kg/m  as calculated from the following:    Height as of this encounter: 1.53 m (5' 0.24\").    Weight as of this encounter: 92.5 kg (204 lb).  Medications and allergies reviewed.      Rosy MEDELLIN MA    "

## 2022-07-13 NOTE — PROGRESS NOTES
ASSESSMENT:  Encounter Diagnoses   Name Primary?     Plantar fascial fibromatosis, right foot Yes     Change in nail appearance      Onycholysis      Type 2 diabetes mellitus without complication, without long-term current use of insulin (H)      MEDICAL DECISION MAKING:  A diabetic foot exam was performed.  Pedal pulses are palpable and protective sensation is intact.  I offered a referral for diabetic, extra-depth shoes and custom molded multi density orthoses.  She is encouraged to maintain good control of her glucose levels.    The cause and nature of plantar fibromas was discussed.  Although typically a clinical diagnosis, I told the patient that an MRI can be ordered if he/she wants more characterization of the mass. .    We discussed appropriate shoes and the option of accommodative inserts.  Surgical excision is not recommended as a first-line treatment, if the patient finds conservative options satisfactory for pain reduction.  Nonetheless, I explained that surgical excision has provided some of my patients long-term symptom relief, but the lesion can return.   The custom orthoses might help offload this mass.    I explained that toenail changes are often from injury to a nail unit, rather than from fungus.  Injury might be a one-time event or from repetitive irritation in foot wear and with certain types of activities.  Injured nails are likely more susceptible to fungal infection.  Change seen in multiple nails is more likely from fungus.  Treatment options are limited.     It was explained that many people opt to simply keep the involved nails trimmed and filed. [facility] does not offer this service.  Another option is a trial of a topical and/or oral antifungal.  Many times these are not successful nor provide a cure.  These medications do not correct a nail deformity.      Alma would like to try oral terbinafine.  A recent comprehensive metabolic panel shows a normal AST, ALT and alkaline  "phosphatase.    Plan: terbinafine (LAMISIL) 250 MG tablet once daily x 90 days             Disclaimer: This note consists of symbols derived from keyboarding, dictation and/or voice recognition software. As a result, there may be errors in the script that have gone undetected. Please consider this when interpreting information found in this chart.    Owen Coffey, ANANDA, FACFAS, MS    [facility]le [facility]      ____________________________________________________________________    HPI:       Allie presents with a primary concern of a bump on the bottom of her right foot.  This causes intermittent pain.  She describes an aching, when she \"steps wrong.\"  She also has more pain when walking without shoes.    She describes a multiple year history of changes involving multiple toenails.  She is concerned about toenail fungus.  Her  and another relative had success with oral antifungal therapy.  She reports trying multiple topical antifungal medications without success.    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, s/p resection   *  *  Past Surgical History:   Procedure Laterality Date     ABLATION, ENDOMETRIAL, THERMAL, W/O HYSTEROSCOPIC GUIDANCE       DILATION AND CURETTAGE, HYSTEROSCOPY, ABLATE ENDOMETRIUM NOVASURE, COMBINED  10/11/2012    Procedure: COMBINED DILATION AND CURETTAGE, HYSTEROSCOPY, ABLATE ENDOMETRIUM NOVASURE;  COMBINED DILATION AND CURETTAGE, HYSTEROSCOPY, ABLATE ENDOMETRIUM ,pelvic exam under anesthesia;  Surgeon: Shruti Barrios MD;  Location:  OR     ESOPHAGOSCOPY, GASTROSCOPY, DUODENOSCOPY " "(EGD), COMBINED N/A 1/19/2016    Procedure: COMBINED ESOPHAGOSCOPY, GASTROSCOPY, DUODENOSCOPY (EGD), BIOPSY SINGLE OR MULTIPLE;  Surgeon: Kristofer Molina MD;  Location:  GI     Removal of cancerous lump on neck       TONSILLECTOMY     *  *  Current Outpatient Medications   Medication Sig Dispense Refill     albuterol (PROAIR HFA/PROVENTIL HFA/VENTOLIN HFA) 108 (90 Base) MCG/ACT inhaler Inhale 2 puffs into the lungs every 6 hours as needed for shortness of breath / dyspnea 18 g 5     aspirin 81 MG EC tablet Take 1 tablet (81 mg) by mouth daily 100 tablet 3     Cholecalciferol (VITAMIN D) 2000 UNITS tablet Take 2,000 Units by mouth daily 100 tablet 3     glipiZIDE (GLUCOTROL XL) 10 MG 24 hr tablet 1 tab in am and 2 tabs in  tablet 3     hydrochlorothiazide (HYDRODIURIL) 25 MG tablet TAKE 1 TABLET (25 MG) BY MOUTH DAILY 90 tablet 3     levothyroxine (SYNTHROID/LEVOTHROID) 137 MCG tablet TAKE 1 TABLET (137 MCG) BY MOUTH DAILY 90 tablet 3     losartan (COZAAR) 50 MG tablet Take 1 tablet (50 mg) by mouth daily 90 tablet 3     simvastatin (ZOCOR) 20 MG tablet Take 1 tablet (20 mg) by mouth At Bedtime 90 tablet 3         EXAM:    Vitals: /78   Pulse 64   Ht 1.53 m (5' 0.24\")   Wt 92.5 kg (204 lb)   BMI 39.52 kg/m    BMI: Body mass index is 39.52 kg/m .    Constitutional:  Jazlyn Bartlett is in no apparent distress, appears well-nourished.  Cooperative with history and physical exam.    Diabetic Foot Exam (details below):  normal DP and PT pulses, no trophic changes or ulcerative lesions and normal sensory exam    Vascular:  Pedal pulses are palpable for both the DP and PT arteries.  CFT < 3 sec.  No edema.      Neuro: Light touch sensation is intact to the L4, L5, S1 distributions  No evidence of weakness, spasticity, or contracture in the lower extremities.   Protective sensation is intact, bilateral foot, per [place] Monofilament testing.    Derm: Normal texture and turgor.  No erythema, " ecchymosis, or cyanosis.  No open lesions.   There is a subcutaneous mass along the plantar fascial band, right foot.  This is in the mid arch.  It is fairly firm.    Multiple toenails are thickened and mildly discolored colored.  Several are incurvated.    Musculoskeletal:    Lower extremity muscle strength is normal. No gross deformities.  Adequate bilateral ankle and subtalar joint range of motion.

## 2022-07-13 NOTE — LETTER
7/13/2022         RE: Jazlyn Bartlett  [address]  [facility] [address]        Dear Colleague,    Thank you for referring your patient, Jazlyn Bartlett, to the [facility]. Please see a copy of my visit note below.    ASSESSMENT:  Encounter Diagnoses   Name Primary?     Plantar fascial fibromatosis, right foot Yes     Change in nail appearance      Onycholysis      Type 2 diabetes mellitus without complication, without long-term current use of insulin (H)      MEDICAL DECISION MAKING:  A diabetic foot exam was performed.  Pedal pulses are palpable and protective sensation is intact.  I offered a referral for diabetic, extra-depth shoes and custom molded multi density orthoses.  She is encouraged to maintain good control of her glucose levels.    The cause and nature of plantar fibromas was discussed.  Although typically a clinical diagnosis, I told the patient that an MRI can be ordered if he/she wants more characterization of the mass. .    We discussed appropriate shoes and the option of accommodative inserts.  Surgical excision is not recommended as a first-line treatment, if the patient finds conservative options satisfactory for pain reduction.  Nonetheless, I explained that surgical excision has provided some of my patients long-term symptom relief, but the lesion can return.   The custom orthoses might help offload this mass.    I explained that toenail changes are often from injury to a nail unit, rather than from fungus.  Injury might be a one-time event or from repetitive irritation in foot wear and with certain types of activities.  Injured nails are likely more susceptible to fungal infection.  Change seen in multiple nails is more likely from fungus.  Treatment options are limited.     It was explained that many people opt to simply keep the involved nails trimmed and filed. [facility] does not offer this service.  Another option is a trial of a  "topical and/or oral antifungal.  Many times these are not successful nor provide a cure.  These medications do not correct a nail deformity.      Alma would like to try oral terbinafine.  A recent comprehensive metabolic panel shows a normal AST, ALT and alkaline phosphatase.    Plan: terbinafine (LAMISIL) 250 MG tablet once daily x 90 days             Disclaimer: This note consists of symbols derived from keyboarding, dictation and/or voice recognition software. As a result, there may be errors in the script that have gone undetected. Please consider this when interpreting information found in this chart.    Owen Coffey, ANANDA, FACFAS, MS    [facility] & Ankle [facility]      ____________________________________________________________________    HPI:       Allie presents with a primary concern of a bump on the bottom of her right foot.  This causes intermittent pain.  She describes an aching, when she \"steps wrong.\"  She also has more pain when walking without shoes.    She describes a multiple year history of changes involving multiple toenails.  She is concerned about toenail fungus.  Her  and another relative had success with oral antifungal therapy.  She reports trying multiple topical antifungal medications without success.    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, s/p resection   *  *  Past Surgical History:   Procedure Laterality Date     ABLATION, ENDOMETRIAL, THERMAL, W/O HYSTEROSCOPIC GUIDANCE       DILATION AND CURETTAGE, HYSTEROSCOPY, ABLATE ENDOMETRIUM NOVASURE, COMBINED  10/11/2012    Procedure: " "COMBINED DILATION AND CURETTAGE, HYSTEROSCOPY, ABLATE ENDOMETRIUM NOVASURE;  COMBINED DILATION AND CURETTAGE, HYSTEROSCOPY, ABLATE ENDOMETRIUM ,pelvic exam under anesthesia;  Surgeon: Shruti Barrios MD;  Location:  OR     ESOPHAGOSCOPY, GASTROSCOPY, DUODENOSCOPY (EGD), COMBINED N/A 1/19/2016    Procedure: COMBINED ESOPHAGOSCOPY, GASTROSCOPY, DUODENOSCOPY (EGD), BIOPSY SINGLE OR MULTIPLE;  Surgeon: Kristofer Molina MD;  Location:  GI     Removal of cancerous lump on neck       TONSILLECTOMY     *  *  Current Outpatient Medications   Medication Sig Dispense Refill     albuterol (PROAIR HFA/PROVENTIL HFA/VENTOLIN HFA) 108 (90 Base) MCG/ACT inhaler Inhale 2 puffs into the lungs every 6 hours as needed for shortness of breath / dyspnea 18 g 5     aspirin 81 MG EC tablet Take 1 tablet (81 mg) by mouth daily 100 tablet 3     Cholecalciferol (VITAMIN D) 2000 UNITS tablet Take 2,000 Units by mouth daily 100 tablet 3     glipiZIDE (GLUCOTROL XL) 10 MG 24 hr tablet 1 tab in am and 2 tabs in  tablet 3     hydrochlorothiazide (HYDRODIURIL) 25 MG tablet TAKE 1 TABLET (25 MG) BY MOUTH DAILY 90 tablet 3     levothyroxine (SYNTHROID/LEVOTHROID) 137 MCG tablet TAKE 1 TABLET (137 MCG) BY MOUTH DAILY 90 tablet 3     losartan (COZAAR) 50 MG tablet Take 1 tablet (50 mg) by mouth daily 90 tablet 3     simvastatin (ZOCOR) 20 MG tablet Take 1 tablet (20 mg) by mouth At Bedtime 90 tablet 3         EXAM:    Vitals: /78   Pulse 64   Ht 1.53 m (5' 0.24\")   Wt 92.5 kg (204 lb)   BMI 39.52 kg/m    BMI: Body mass index is 39.52 kg/m .    Constitutional:  Jazlyn Bartlett is in no apparent distress, appears well-nourished.  Cooperative with history and physical exam.    Diabetic Foot Exam (details below):  normal DP and PT pulses, no trophic changes or ulcerative lesions and normal sensory exam    Vascular:  Pedal pulses are palpable for both the DP and PT arteries.  CFT < 3 sec.  No edema.      Neuro: Light touch sensation " is intact to the L4, L5, S1 distributions  No evidence of weakness, spasticity, or contracture in the lower extremities.   Protective sensation is intact, bilateral foot, per [place] Monofilament testing.    Derm: Normal texture and turgor.  No erythema, ecchymosis, or cyanosis.  No open lesions.   There is a subcutaneous mass along the plantar fascial band, right foot.  This is in the mid arch.  It is fairly firm.    Multiple toenails are thickened and mildly discolored colored.  Several are incurvated.    Musculoskeletal:    Lower extremity muscle strength is normal. No gross deformities.  Adequate bilateral ankle and subtalar joint range of motion.              Again, thank you for allowing me to participate in the care of your patient.        Sincerely,        Owen Coffey DPM

## 2022-07-13 NOTE — LETTER
[facility]  [address] [address]  Phone: [phone]    July 13, 2022        Jazlyn Bartlett  [address] [place] [id number]          To whom it may concern:    RE: Jazlyn Bartlett    Patient was seen and treated today at our clinic and missed work.    Please contact me for questions or concerns.      Sincerely,        Owen Coffey DPM

## 2022-07-14 ENCOUNTER — TRANSFERRED RECORDS (OUTPATIENT)
Dept: MEDSURG UNIT | Facility: CLINIC | Age: 57
End: 2022-07-14

## 2022-07-15 ENCOUNTER — MEDICAL CORRESPONDENCE (OUTPATIENT)
Dept: HEALTH INFORMATION MANAGEMENT | Facility: CLINIC | Age: 57
End: 2022-07-15

## 2022-07-26 ENCOUNTER — OFFICE VISIT (OUTPATIENT)
Dept: FAMILY MEDICINE | Facility: CLINIC | Age: 57
End: 2022-07-26
Payer: COMMERCIAL

## 2022-07-26 VITALS
DIASTOLIC BLOOD PRESSURE: 70 MMHG | SYSTOLIC BLOOD PRESSURE: 114 MMHG | WEIGHT: 200.6 LBS | HEART RATE: 86 BPM | RESPIRATION RATE: 12 BRPM | TEMPERATURE: 99.2 F | BODY MASS INDEX: 39.38 KG/M2 | HEIGHT: 60 IN | OXYGEN SATURATION: 97 %

## 2022-07-26 DIAGNOSIS — Z01.818 PREOP GENERAL PHYSICAL EXAM: Primary | ICD-10-CM

## 2022-07-26 DIAGNOSIS — G56.03 BILATERAL CARPAL TUNNEL SYNDROME: ICD-10-CM

## 2022-07-26 PROCEDURE — 99214 OFFICE O/P EST MOD 30 MIN: CPT | Performed by: PHYSICIAN ASSISTANT

## 2022-07-26 ASSESSMENT — PAIN SCALES - GENERAL: PAINLEVEL: SEVERE PAIN (6)

## 2022-07-26 NOTE — PROGRESS NOTES
[address] [address]  Phone: [phone]  Primary Provider: Lokesh Casanova  Pre-op Performing Provider: LALO MARCH      PREOPERATIVE EVALUATION:  Today's date: 7/26/2022    Jazlyn Bartlett is a 56 year old female who presents for a preoperative evaluation.    Surgical Information:  Surgery/Procedure: Carpal Tunnel (Bilateral) Surgery   Surgery Location: [facility]   Surgeon: Dr. Brent Yancey   Surgery Date: 8/1/2022  Time of Surgery: TBD   Where patient plans to recover: At home with family  Fax number for surgical facility: [phone]    Type of Anesthesia Anticipated: to be determined    Assessment & Plan     The proposed surgical procedure is considered INTERMEDIATE risk.    Preop general physical exam    Bilateral carpal tunnel syndrome             Risks and Recommendations:  The patient has the following additional risks and recommendations for perioperative complications:    - Patient is not on insulin therapy: regular NPO guidelines can be followed.     Medication Instructions:   - aspirin: Discontinue aspirin 7-10 days prior to procedure to reduce bleeding risk. It should be resumed postoperatively.   Stop hydrochlorothiazide, losartan and glipizide day of procedure.  Resume glipizide     once no long NPO  RECOMMENDATION:  APPROVAL GIVEN to proceed with proposed procedure, without further diagnostic evaluation.          Subjective     HPI related to upcoming procedure: Alma presents to the clinic for preoperative evaluation prior to carpal tunnel surgery. She is feeling well today, no concerns    Preop Questions 7/25/2022   1. Have you ever had a heart attack or stroke? NO   2. Have you ever had surgery on your heart or blood vessels, such as a stent placement, a coronary artery bypass, or surgery on an artery in your head, neck, heart, or legs? No   3. Do you have chest pain with activity? No   4. Do  you have a history of  heart failure? No   5. Do you currently have a cold, bronchitis or symptoms of other infection? No   6. Do you have a cough, shortness of breath, or wheezing? No   7. Do you or anyone in your family have previous history of blood clots? No   8. Do you or does anyone in your family have a serious bleeding problem such as prolonged bleeding following surgeries or cuts? No   9. Have you ever had problems with anemia or been told to take iron pills? No   10. Have you had any abnormal blood loss such as black, tarry or bloody stools, or abnormal vaginal bleeding? No   11. Have you ever had a blood transfusion? No   12. Are you willing to have a blood transfusion if it is medically needed before, during, or after your surgery? Yes   13. Have you or any of your relatives ever had problems with anesthesia? NO   14. Do you have sleep apnea, excessive snoring or daytime drowsiness? YES - due to  pain of hand   14a. Do you have a CPAP machine? No   15. Do you have any artifical heart valves or other implanted medical devices like a pacemaker, defibrillator, or continuous glucose monitor? No   16. Do you have artificial joints? No   17. Are you allergic to latex? No   18. Is there any chance that you may be pregnant? No       Health Care Directive:  Patient does not have a Health Care Directive or Living Will:    Preoperative Review of :   reviewed - no record of controlled substances prescribed.      Status of Chronic Conditions:  See problem list for active medical problems.  Problems all longstanding and stable, except as noted/documented.  See ROS for pertinent symptoms related to these conditions.      Review of Systems  Constitutional, neuro, ENT, endocrine, pulmonary, cardiac, gastrointestinal, genitourinary, musculoskeletal, integument and psychiatric systems are negative, except as otherwise noted.    Patient Active Problem List    Diagnosis Date Noted     Abnormal CT lung screening  07/02/2021     Priority: Medium     [facility]       Hypothyroidism, unspecified type 11/07/2017     Priority: Medium     Chronic obstructive pulmonary disease, unspecified COPD type (H) 11/07/2017     Priority: Medium     Hyperlipidemia LDL goal <100 05/16/2017     Priority: Medium     Lumbar radiculopathy 09/12/2016     Priority: Medium     Tobacco abuse 04/24/2016     Priority: Medium     Osteoarthritis of patellofemoral joint 07/31/2015     Priority: Medium     Morbid obesity (H) 07/31/2015     Priority: Medium     Urinary incontinence 12/15/2014     Priority: Medium     Autoimmune gastritis- repeat UGI 1/16 with gastritis without eosinophils 11/07/2013     Priority: Medium     Advanced directives, counseling/discussion 10/29/2013     Priority: Medium     Advance Care Planning:   Initial facilitation introduction:   Jazlyn Bartlett presented for initial session regarding ACP at the clinic. She was accompanied by self. [employer] information provided and resources reviewed. She currently wishes to complete an ACP document.  She currently has the following questions or concerns about Advance Care Planning: none.  Confirmed/documented designated decision maker(s). See permanent comments section of demographics in clinical tab. Confirmed code status reflects current choices . Follow-up meeting: to be arranged by patient.  Added by Shira Hernandez on 10/29/2013         Chronic airway obstruction (H) 09/10/2013     Priority: Medium     Problem list name updated by automated process. Provider to review       Type 2 diabetes mellitus without complication (H) 09/08/2013     Priority: Medium     Hypertension goal BP (blood pressure) < 140/80 09/05/2013     Priority: Medium     GERD (gastroesophageal reflux disease) 08/28/2013     Priority: Medium     CARDIOVASCULAR SCREENING; LDL GOAL LESS THAN 100 08/25/2013     Priority: Medium     Vitamin D  deficiency disease 08/23/2013     Priority: Medium     Neck mass 08/22/2013     Priority: Medium     Moderate major depression (H) 08/22/2013     Priority: Medium     Anxiety 08/22/2013     Priority: Medium      Past Medical History:   Diagnosis Date     Chronic pain     in both legs     COPD (chronic obstructive pulmonary disease)      Diabetes mellitus - type 2      Gastro-oesophageal reflux disease      Hypertension      Hypothyroidism      Kidney stone     3 episodes of stones     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, s/p resection     Past Surgical History:   Procedure Laterality Date     ABLATION, ENDOMETRIAL, THERMAL, W/O HYSTEROSCOPIC GUIDANCE       DILATION AND CURETTAGE, HYSTEROSCOPY, ABLATE ENDOMETRIUM NOVASURE, COMBINED  10/11/2012    Procedure: COMBINED DILATION AND CURETTAGE, HYSTEROSCOPY, ABLATE ENDOMETRIUM NOVASURE;  COMBINED DILATION AND CURETTAGE, HYSTEROSCOPY, ABLATE ENDOMETRIUM ,pelvic exam under anesthesia;  Surgeon: Shruti Barrios MD;  Location:  OR     ESOPHAGOSCOPY, GASTROSCOPY, DUODENOSCOPY (EGD), COMBINED N/A 1/19/2016    Procedure: COMBINED ESOPHAGOSCOPY, GASTROSCOPY, DUODENOSCOPY (EGD), BIOPSY SINGLE OR MULTIPLE;  Surgeon: Kristofer Molina MD;  Location:  GI     Removal of cancerous lump on neck       TONSILLECTOMY       Current Outpatient Medications   Medication Sig Dispense Refill     albuterol (PROAIR HFA/PROVENTIL HFA/VENTOLIN HFA) 108 (90 Base) MCG/ACT inhaler Inhale 2 puffs into the lungs every 6 hours as needed for shortness of breath / dyspnea 18 g 5     aspirin 81 MG EC tablet Take 1 tablet (81 mg) by mouth daily 100 tablet 3     Cholecalciferol (VITAMIN D) 2000 UNITS tablet Take 2,000 Units by mouth daily 100 tablet 3     glipiZIDE (GLUCOTROL XL) 10 MG 24 hr tablet 1 tab in am and 2 tabs in  tablet 3     hydrochlorothiazide (HYDRODIURIL) 25 MG tablet TAKE 1 TABLET (25 MG) BY MOUTH DAILY 90 tablet 3     levothyroxine (SYNTHROID/LEVOTHROID)  "137 MCG tablet TAKE 1 TABLET (137 MCG) BY MOUTH DAILY 90 tablet 3     losartan (COZAAR) 50 MG tablet Take 1 tablet (50 mg) by mouth daily 90 tablet 3     simvastatin (ZOCOR) 20 MG tablet Take 1 tablet (20 mg) by mouth At Bedtime 90 tablet 3     terbinafine (LAMISIL) 250 MG tablet Take 1 tablet (250 mg) by mouth daily for 90 days 90 tablet 0       Allergies   Allergen Reactions     Hydrocodone-Acetaminophen Nausea and Vomiting     Ibuprofen Sodium Nausea and Vomiting     Lisinopril Cough        Social History     Tobacco Use     Smoking status: Current Every Day Smoker     Packs/day: 0.50     Types: Cigarettes     Smokeless tobacco: Former User     Quit date: 2/22/2020   Substance Use Topics     Alcohol use: No     Alcohol/week: 0.0 standard drinks     Family History   Problem Relation Age of Onset     Diabetes Mother      Breast Cancer Mother      Diabetes Father      Lung Cancer Father      Rheumatoid Arthritis Father      History   Drug Use No         Objective     /70 (BP Location: Left arm, Patient Position: Sitting, Cuff Size: Adult Large)   Pulse 86   Temp 99.2  F (37.3  C) (Tympanic)   Resp 12   Ht 1.53 m (5' 0.24\")   Wt 91 kg (200 lb 9.6 oz)   SpO2 97%   Breastfeeding No   BMI 38.87 kg/m      Physical Exam    GENERAL APPEARANCE: healthy, alert and no distress     EYES: EOMI, PERRL     HENT: ear canals and TM's normal and nose and mouth without ulcers or lesions     NECK: no adenopathy, no asymmetry, masses, or scars and thyroid normal to palpation     RESP: lungs clear to auscultation - no rales, rhonchi or wheezes     CV: regular rates and rhythm, normal S1 S2, no S3 or S4 and no murmur, click or rub     ABDOMEN:  soft, nontender, no HSM or masses and bowel sounds normal     MS: extremities normal- no gross deformities noted, no evidence of inflammation in joints, FROM in all extremities.     SKIN: no suspicious lesions or rashes     NEURO: Normal strength and tone, sensory exam grossly " normal, mentation intact and speech normal     PSYCH: mentation appears normal. and affect normal/bright    Recent Labs   Lab Test 07/05/22  0918 12/02/21  1112 08/03/21  1133   HGB  --   --  14.8   PLT  --   --  289    138 138   POTASSIUM 3.9 3.6 3.3*   CR 0.62 0.60 0.65   A1C 7.1* 6.7*  --         Diagnostics:  Recent Results (from the past 720 hour(s))   HEMOGLOBIN A1C    Collection Time: 07/05/22  9:18 AM   Result Value Ref Range    Hemoglobin A1C 7.1 (H) 0.0 - 5.6 %   Lipid panel reflex to direct LDL Fasting    Collection Time: 07/05/22  9:18 AM   Result Value Ref Range    Cholesterol 121 <200 mg/dL    Triglycerides 112 <150 mg/dL    Direct Measure HDL 46 (L) >=50 mg/dL    LDL Cholesterol Calculated 53 <=100 mg/dL    Non HDL Cholesterol 75 <130 mg/dL    Patient Fasting > 8hrs? Yes    Albumin Random Urine Quantitative with Creat Ratio    Collection Time: 07/05/22  9:18 AM   Result Value Ref Range    Creatinine Urine mg/dL 90 mg/dL    Albumin Urine mg/L <5 mg/L    Albumin Urine mg/g Cr     TSH    Collection Time: 07/05/22  9:18 AM   Result Value Ref Range    TSH 0.84 0.40 - 4.00 mU/L   Comprehensive metabolic panel    Collection Time: 07/05/22  9:18 AM   Result Value Ref Range    Sodium 138 133 - 144 mmol/L    Potassium 3.9 3.4 - 5.3 mmol/L    Chloride 105 94 - 109 mmol/L    Carbon Dioxide (CO2) 26 20 - 32 mmol/L    Anion Gap 7 3 - 14 mmol/L    Urea Nitrogen 13 7 - 30 mg/dL    Creatinine 0.62 0.52 - 1.04 mg/dL    Calcium 9.6 8.5 - 10.1 mg/dL    Glucose 133 (H) 70 - 99 mg/dL    Alkaline Phosphatase 89 40 - 150 U/L    AST 28 0 - 45 U/L    ALT 26 0 - 50 U/L    Protein Total 6.9 6.8 - 8.8 g/dL    Albumin 3.6 3.4 - 5.0 g/dL    Bilirubin Total 0.6 0.2 - 1.3 mg/dL    GFR Estimate >90 >60 mL/min/1.73m2      No EKG required, no history of coronary heart disease, significant arrhythmia, peripheral arterial disease or other structural heart disease.    Revised Cardiac Risk Index (RCRI):  The patient has the following  serious cardiovascular risks for perioperative complications:   - No serious cardiac risks = 0 points     RCRI Interpretation: 0 points: Class I (very low risk - 0.4% complication rate)           Signed Electronically by: Ronni Covington PA-C  Copy of this evaluation report is provided to requesting physician.

## 2022-07-26 NOTE — PATIENT INSTRUCTIONS
Preparing for Your Surgery  Getting started  A nurse will call you to review your health history and instructions. They will give you an arrival time based on your scheduled surgery time. Please be ready to share:    Your doctor's clinic name and phone number    Your medical, surgical and anesthesia history    A list of allergies and sensitivities    A list of medicines, including herbal treatments and over-the-counter drugs    Whether the patient has a legal guardian (ask how to send us the papers in advance)  Please tell us if you're pregnant--or if there's any chance you might be pregnant. Some surgeries may injure a fetus (unborn baby), so they require a pregnancy test. Surgeries that are safe for a fetus don't always need a test, and you can choose whether to have one.   If you have a child who's having surgery, please ask for a copy of Preparing for Your Child's Surgery.    Preparing for surgery    Within 30 days of surgery: Have a pre-op exam (sometimes called an H&P, or History and Physical). This can be done at a clinic or pre-operative center.  ? If you're having a , you may not need this exam. Talk to your care team.    At your pre-op exam, talk to your care team about all medicines you take. If you need to stop any medicines before surgery, ask when to start taking them again.  ? We do this for your safety. Many medicines can make you bleed too much during surgery. Some change how well surgery (anesthesia) drugs work.    Call your insurance company to let them know you're having surgery. (If you don't have insurance, call [phone].)    Call your clinic if there's any change in your health. This includes signs of a cold or flu (sore throat, runny nose, cough, rash, fever). It also includes a scrape or scratch near the surgery site.    If you have questions on the day of surgery, call your hospital or surgery center.  COVID testing  You may need to be tested for COVID-19 before having  surgery. If so, we will give you instructions.  Eating and drinking guidelines  For your safety: Unless your surgeon tells you otherwise, follow the guidelines below.    Eat and drink as usual until 8 hours before surgery. After that, no food or milk.    Drink clear liquids until 2 hours before surgery. These are liquids you can see through, like water, Gatorade and Propel Water. You may also have black coffee and tea (no cream or milk).    Nothing by mouth within 2 hours of surgery. This includes gum, candy and breath mints.    If you drink alcohol: Stop drinking it the night before surgery.    If your care team tells you to take medicine on the morning of surgery, it's okay to take it with a sip of water.  Preventing infection    Shower or bathe the night before and morning of your surgery. Follow the instructions your clinic gave you. (If no instructions, use regular soap.)    Don't shave or clip hair near your surgery site. We'll remove the hair if needed.    Don't smoke or vape the morning of surgery. You may chew nicotine gum up to 2 hours before surgery. A nicotine patch is okay.  ? Note: Some surgeries require you to completely quit smoking and nicotine. Check with your surgeon.    Your care team will make every effort to keep you safe from infection. We will:  ? Clean our hands often with soap and water (or an alcohol-based hand rub).  ? Clean the skin at your surgery site with a special soap that kills germs.  ? Give you a special gown to keep you warm. (Cold raises the risk of infection.)  ? Wear special hair covers, masks, gowns and gloves during surgery.  ? Give antibiotic medicine, if prescribed. Not all surgeries need antibiotics.  What to bring on the day of surgery    Photo ID and insurance card    Copy of your health care directive, if you have one    Glasses and hearing aides (bring cases)  ? You can't wear contacts during surgery    Inhaler and eye drops, if you use them (tell us about these when  you arrive)    CPAP machine or breathing device, if you use them    A few personal items, if spending the night    If you have . . .  ? A pacemaker, ICD (cardiac defibrillator) or other implant: Bring the ID card.  ? An implanted stimulator: Bring the remote control.  ? A legal guardian: Bring a copy of the certified (court-stamped) guardianship papers.  Please remove any jewelry, including body piercings. Leave jewelry and other valuables at home.  If you're going home the day of surgery    You must have a responsible adult drive you home. They should stay with you overnight as well.    If you don't have someone to stay with you, and you aren't safe to go home alone, we may keep you overnight. Insurance often won't pay for this.  After surgery  If it's hard to control your pain or you need more pain medicine, please call your surgeon's office.  Questions?   If you have any questions for your care team, list them here: _________________________________________________________________________________________________________________________________________________________________________ ____________________________________ ____________________________________ ____________________________________  For informational purposes only. Not to replace the advice of your health care provider. Copyright   2003, 2019 [facility]. All rights reserved. Clinically reviewed by Aurea Watts MD. [employer] [id number] - REV 07/21.

## 2022-07-29 ENCOUNTER — TRANSFERRED RECORDS (OUTPATIENT)
Dept: HEALTH INFORMATION MANAGEMENT | Facility: CLINIC | Age: 57
End: 2022-07-29

## 2022-07-29 LAB — RETINOPATHY: NORMAL

## 2022-08-08 ENCOUNTER — TRANSFERRED RECORDS (OUTPATIENT)
Dept: HEALTH INFORMATION MANAGEMENT | Facility: CLINIC | Age: 57
End: 2022-08-08

## 2022-08-10 DIAGNOSIS — E03.9 HYPOTHYROIDISM, UNSPECIFIED TYPE: ICD-10-CM

## 2022-08-11 ENCOUNTER — TELEPHONE (OUTPATIENT)
Dept: FAMILY MEDICINE | Facility: CLINIC | Age: 57
End: 2022-08-11

## 2022-08-11 RX ORDER — LEVOTHYROXINE SODIUM 150 UG/1
TABLET ORAL
Qty: 90 TABLET | Refills: 3 | OUTPATIENT
Start: 2022-08-11

## 2022-08-11 NOTE — TELEPHONE ENCOUNTER
-TSH (thyroid stimulating hormone) is abnormal suggesting you are currently overreplaced.  ADVISE: changing your medication dose to 137 micrograms/day (a prescription has been sent to your pharmacy) and rechecking your TSH with a lab appointment in 8 weeks.      Refused due to therapy change. Ana Cortes RN

## 2022-08-11 NOTE — TELEPHONE ENCOUNTER
Pt is due now to update PHQ9.  Sharahart message sent to pt. Follow up end date 8/14/2022.   PHQ-9 SCORE 2/25/2020 6/15/2021 6/28/2022   PHQ-9 Total Score - - -   PHQ-9 Total Score MyChart - - 9 (Mild depression)   PHQ-9 Total Score 4 12 9     Kali BARROW CMA

## 2022-08-20 ENCOUNTER — HEALTH MAINTENANCE LETTER (OUTPATIENT)
Age: 57
End: 2022-08-20

## 2022-08-29 ENCOUNTER — OFFICE VISIT (OUTPATIENT)
Dept: FAMILY MEDICINE | Facility: CLINIC | Age: 57
End: 2022-08-29
Payer: COMMERCIAL

## 2022-08-29 VITALS
BODY MASS INDEX: 38.14 KG/M2 | TEMPERATURE: 98.1 F | DIASTOLIC BLOOD PRESSURE: 72 MMHG | HEIGHT: 61 IN | OXYGEN SATURATION: 95 % | SYSTOLIC BLOOD PRESSURE: 128 MMHG | WEIGHT: 202 LBS | HEART RATE: 82 BPM | RESPIRATION RATE: 22 BRPM

## 2022-08-29 DIAGNOSIS — J44.1 COPD EXACERBATION (H): ICD-10-CM

## 2022-08-29 DIAGNOSIS — J44.9 CHRONIC OBSTRUCTIVE PULMONARY DISEASE, UNSPECIFIED COPD TYPE (H): ICD-10-CM

## 2022-08-29 DIAGNOSIS — J01.90 ACUTE SINUSITIS WITH SYMPTOMS > 10 DAYS: Primary | ICD-10-CM

## 2022-08-29 PROCEDURE — 99214 OFFICE O/P EST MOD 30 MIN: CPT | Performed by: PHYSICIAN ASSISTANT

## 2022-08-29 RX ORDER — FLUTICASONE PROPIONATE 220 UG/1
1 AEROSOL, METERED RESPIRATORY (INHALATION) 2 TIMES DAILY
Qty: 12 G | Refills: 0 | Status: SHIPPED | OUTPATIENT
Start: 2022-08-29 | End: 2023-07-11

## 2022-08-29 RX ORDER — FLUTICASONE PROPIONATE 220 UG/1
AEROSOL, METERED RESPIRATORY (INHALATION)
Refills: 0 | OUTPATIENT
Start: 2022-08-29

## 2022-08-29 RX ORDER — PREDNISONE 20 MG/1
40 TABLET ORAL DAILY
Qty: 10 TABLET | Refills: 0 | Status: SHIPPED | OUTPATIENT
Start: 2022-08-29 | End: 2022-09-03

## 2022-08-29 NOTE — PROGRESS NOTES
Assessment & Plan     Acute sinusitis with symptoms > 10 days  Present on history and exam. Given this and copd exacerbation, augmentin with steroid burst. Follow up with pcp in near future to dsicuss chronic management. In regards to this, will restart flovent but recommending discussing with pcp about adding laba to regimen. If no improvemetn in 5 days or worsening, to call.   - amoxicillin-clavulanate (AUGMENTIN) 875-125 MG tablet; Take 1 tablet by mouth 2 times daily  - predniSONE (DELTASONE) 20 MG tablet; Take 2 tablets (40 mg) by mouth daily for 5 days  -Medication use and side effects discussed with the patient. Patient is in complete understanding and agreement with plan.       COPD exacerbation (H)  As above   - amoxicillin-clavulanate (AUGMENTIN) 875-125 MG tablet; Take 1 tablet by mouth 2 times daily  - predniSONE (DELTASONE) 20 MG tablet; Take 2 tablets (40 mg) by mouth daily for 5 days    Chronic obstructive pulmonary disease, unspecified COPD type (H)  As above   - fluticasone (FLOVENT HFA) 220 MCG/ACT inhaler; Inhale 1 puff into the lungs 2 times daily        No follow-ups on file.    TANYA Maciel [facility] TAMRA Marquez is a 56 year old accompanied by her self, presenting for the following health issues:  Pharyngitis      HPI     Acute illness concerns: Sore Throat  Onset/Duration: 1 1/2 weeks  Symptoms:  Fever: No  Chills/Sweats: YES  Headache (location?): No  Sinus Pressure: YES  Conjunctivitis:  No  Ear Pain: YES: left  Rhinorrhea: YES  Congestion: YES  Sore Throat: YES  Cough: YES-productive of yellow sputum  Wheeze: YES  Decreased Appetite: YES  Nausea: YES  Vomiting: No  Diarrhea: YES- 3 days ago, not since  Dysuria/Freq.: No  Dysuria or Hematuria: No  Fatigue/Achiness: YES- fatigue  Sick/Strep Exposure: No  Therapies tried and outcome: Nyquil, dayquil, alkaseltzer, musinex, inhaler, vicks vaporub     History of copd. Has prn albuterol. Was  "on flovent as recent as last year. Patient is unaware of why this was stopped.         Review of Systems   Constitutional, HEENT, cardiovascular, pulmonary, GI, , musculoskeletal, neuro, skin, endocrine and psych systems are negative, except as otherwise noted.      Objective    /72 (BP Location: Right arm, Patient Position: Sitting, Cuff Size: Adult Large)   Pulse 82   Temp 98.1  F (36.7  C) (Oral)   Resp 22   Ht 1.549 m (5' 1\")   Wt 91.6 kg (202 lb)   SpO2 95%   BMI 38.17 kg/m    Body mass index is 38.17 kg/m .  Physical Exam   GENERAL: healthy, alert and no distress  RESP: no rales , no wheezes and rhonchi throughout  CV: regular rates and rhythm  PSYCH: mentation appears normal, affect normal/bright            .  ..    "

## 2022-08-29 NOTE — PROGRESS NOTES
Assessment & Plan     Acute sinusitis with symptoms > 10 days  Will treat with Augmentin and Prednisone.   - amoxicillin-clavulanate (AUGMENTIN) 875-125 MG tablet; Take 1 tablet by mouth 2 times daily  - predniSONE (DELTASONE) 20 MG tablet; Take 2 tablets (40 mg) by mouth daily for 5 days    COPD exacerbation (H)  Given history of COPD will treat for acute sinusitis and COPD exacerbation with Augmentin and Prednisone. Also refilled her Flovent maintenance inhaler as she ran out. She should continue to use Albuterol inhaler as needed.  - amoxicillin-clavulanate (AUGMENTIN) 875-125 MG tablet; Take 1 tablet by mouth 2 times daily  - predniSONE (DELTASONE) 20 MG tablet; Take 2 tablets (40 mg) by mouth daily for 5 days    Chronic obstructive pulmonary disease, unspecified COPD type (H)  Ran out of maintenance Flovent. Has continued to use Albuterol rescue inhaler-more frequently with current illness. Refilled Flovent inhaler today. Recommended she follow up with her PCP to discuss ongoing COPD management. She may benefit from LABA/LAMA for COPD maintenance.   - fluticasone (FLOVENT HFA) 220 MCG/ACT inhaler; Inhale 1 puff into the lungs 2 times daily       No follow-ups on file.    Rosa Maria Miguel PAKarsonS  [employer] [facility]     HEALTH [facility]    Fe Marquez is a 56 year old, presenting for the following health issues:  Pharyngitis      HPI     Acute Illness  Acute illness concerns: Sore Throat  Onset/Duration: 1 1/2 weeks  Symptoms:  Fever: No  Chills/Sweats: YES  Headache (location?): No  Sinus Pressure: YES  Conjunctivitis:  No  Ear Pain: YES: left  Rhinorrhea: YES  Congestion: YES  Sore Throat: YES  Cough: YES-productive of yellow sputum  Wheeze: YES  Decreased Appetite: YES  Nausea: YES  Vomiting: No  Diarrhea: YES- 3 days ago, not since  Dysuria/Freq.: No  Dysuria or Hematuria: No  Fatigue/Achiness: YES- fatigue  Sick/Strep Exposure: No  Therapies tried and outcome: Nyquil,  "dayquil, alkaseltzer, musinex, inhaler, vicks vaporub    Has a history of COPD and has had frequent pneumonia in the past. Uses Albuterol as needed and has been needing to use her rescue inhaler several times a day with her current illness.    Review of Systems   Constitutional, HEENT, cardiovascular, pulmonary, gi and gu systems are negative, except as otherwise noted.        Objective    /72 (BP Location: Right arm, Patient Position: Sitting, Cuff Size: Adult Large)   Pulse 82   Temp 98.1  F (36.7  C) (Oral)   Resp 22   Ht 1.549 m (5' 1\")   Wt 91.6 kg (202 lb)   SpO2 95%   BMI 38.17 kg/m    Body mass index is 38.17 kg/m .  Physical Exam   GENERAL: healthy, alert and no distress  HENT: posterior oropharynx is erythematous. No tonsillar hypertrophy. No tonsillar exudates.  NECK: no adenopathy, no asymmetry, masses, or scars and thyroid normal to palpation  RESP: Diffuse rhonchi on exam. No significant wheezing. No crackles. Intermittently coughing throughout evaluation.   CV: regular rate and rhythm, normal S1 S2, no S3 or S4, no murmur, click or rub  MS: no gross musculoskeletal defects noted, no edema  PSYCH: mentation appears normal, affect normal/bright    "

## 2022-09-15 ENCOUNTER — TELEPHONE (OUTPATIENT)
Dept: GASTROENTEROLOGY | Facility: CLINIC | Age: 57
End: 2022-09-15

## 2022-09-15 NOTE — TELEPHONE ENCOUNTER
Screening Questions    BlueKIND OF PREP RedLOCATION [review exclusion criteria] GreenSEDATION TYPE      1. Are you active on mychart? y    2. What insurance is in the chart? BCBS     3.   Ordering/Referring Provider: Lokesh Casanova MD       4. BMI   (If greater than 40 review exclusion criteria [PAC APPT IF [MAC] @ [facility])  39.1  [If yes, BMI OVER 40-EXTENDED PREP]      **(Sedation review/consideration needed)**  Do you have a legal guardian or Medical Power of    and/or are you able to give consent for your medical care?     Can give consent    5. Have you had a positive covid test in the last 90 days?   n -     6.  Are you currently on dialysis?   n [ If yes, G-PREP & HOSPITAL setting ONLY]     7.  Do you have chronic kidney disease?  n [ If yes, G-PREP ]    8.   Do you have a diagnosis of diabetes?   y   [ If yes, G-PREP ]    9.  On a regular basis do you go 3-5 days between bowel movements?   y   [ If yes, EXTENDED PREP]    10.  Are you taking any prescription pain medications on a routine schedule?    n -  [ If yes, EXTENDED PREP] [If yes, MAC]      11.   Do you have any chemical dependencies such as alcohol, street drugs, or methadone?    n [If yes, MAC]    12.   Do you have any history of post-traumatic stress syndrome, severe anxiety or history of psychosis?    y  [If yes, MAC]    13.  [FEMALES] Are you currently pregnant? n    If yes, how many weeks?       Respiratory/Heart Screening:  [If yes to any of the following HOSPITAL setting only]     14. Do you have Pulmonary Hypertension [Lungs]?   n       15. Do you have UNCONTROLLED asthma?   n     16.  Do you use daily home oxygen?  n      17. Do you have mod to severe Obstructive Sleep Apnea?         (OKAY @ [facility]  [facility]  SH  PH  RI  MG - if pt is not on OXYGEN)  n      18.   Have you had a heart or lung transplant?   n      19.   Have you had a stroke or Transient ischemic attack (TIA - aka  mini stroke ) within 6 months?  (If yes, please review  exclusion criteria)  n     20.   In the past 6 months, have you had any heart related issues including cardiomyopathy or heart attack?   n           If yes, did it require cardiac stenting or other implantable device?         21.   Do you have any implantable devices in your body (pacemaker, defib, LVAD)? (If yes, please review exclusion criteria)  n   22.  Do you take the medication Phentermine?     Yes-> Hold for 7 days before procedure.  Please consult your prescribing provider if you have questions about holding this medication.     No-> Continue to next question.    23. Do you take nitroglycerin?   n           If yes, how often?   (if yes, HOSPITAL setting ONLY)    24.  Are you currently taking any blood thinners?    [If yes, INFORM patient to follw up w/ ORDERING PROVIDER FOR BRIDGING INSTRUCTIONS]     n    25.   Do you transfer independently?                (If NO, please HOSPITAL setting ONLY)  y    26.   Preferred LOCAL Pharmacy for Pre Prescription:         [facility] #[id number] - [facility] [address]    Scheduling Details  (Please ask for phone number if not scheduled by patient)      Caller : Jazlyn Bartlett    Date of Procedure: TBD  Surgeon: TBD  Location: TBD        Sedation Type: MAC l anxiety  Conscious Sedation- Needs  for 6 hours after the procedure  MAC/General-Needs  for 24 hours after procedure    TBD :[Pre-op Required] at [facility]  SH  MG and OR for MAC sedation   (advise patient they will need a pre-op WITH IN 30 DAYS of procedure date)     Type of Procedure Scheduled:   Lower Endoscopy [Colonoscopy]    Which Colonoscopy Prep was Sent?:   extended - constipation    [facility] CF PATIENTS & [facility]'S PATIENTS NEEDS EXTENDED PREP       Informed patient they will need an adult  not yet  Cannot take any type of public or medical transportation alone    Pre-Procedure Covid test to be completed at [facility] or Externally: not yet  **INFORMED OF HOME TESTING & LAB  OPTION**        Confirmed Nurse will call to complete assessment not yet    Additional comments: Patient needs MAC per exclusion criteria, has anxiety. Transferred to [facility] [phone].

## 2022-09-16 DIAGNOSIS — Z12.11 COLON CANCER SCREENING: Primary | ICD-10-CM

## 2022-09-22 ENCOUNTER — TRANSFERRED RECORDS (OUTPATIENT)
Dept: HEALTH INFORMATION MANAGEMENT | Facility: CLINIC | Age: 57
End: 2022-09-22

## 2022-10-04 RX ORDER — BISACODYL 5 MG
TABLET, DELAYED RELEASE (ENTERIC COATED) ORAL
Qty: 4 TABLET | Refills: 0 | Status: SHIPPED | OUTPATIENT
Start: 2022-10-04 | End: 2022-12-02

## 2022-10-17 ENCOUNTER — TRANSFERRED RECORDS (OUTPATIENT)
Dept: HEALTH INFORMATION MANAGEMENT | Facility: CLINIC | Age: 57
End: 2022-10-17

## 2022-10-24 RX ORDER — ASPIRIN 81 MG/1
81 TABLET, CHEWABLE ORAL AT BEDTIME
COMMUNITY

## 2022-10-25 ENCOUNTER — OFFICE VISIT (OUTPATIENT)
Dept: FAMILY MEDICINE | Facility: CLINIC | Age: 57
End: 2022-10-25
Payer: COMMERCIAL

## 2022-10-25 VITALS
HEIGHT: 61 IN | HEART RATE: 73 BPM | DIASTOLIC BLOOD PRESSURE: 84 MMHG | SYSTOLIC BLOOD PRESSURE: 139 MMHG | WEIGHT: 206 LBS | TEMPERATURE: 96.8 F | OXYGEN SATURATION: 96 % | RESPIRATION RATE: 22 BRPM | BODY MASS INDEX: 38.89 KG/M2

## 2022-10-25 DIAGNOSIS — E11.9 TYPE 2 DIABETES MELLITUS WITHOUT COMPLICATION, WITHOUT LONG-TERM CURRENT USE OF INSULIN (H): ICD-10-CM

## 2022-10-25 DIAGNOSIS — Z12.11 COLON CANCER SCREENING: Primary | ICD-10-CM

## 2022-10-25 DIAGNOSIS — Z01.818 PREOP GENERAL PHYSICAL EXAM: ICD-10-CM

## 2022-10-25 DIAGNOSIS — Z72.0 TOBACCO ABUSE: ICD-10-CM

## 2022-10-25 DIAGNOSIS — J44.9 CHRONIC OBSTRUCTIVE PULMONARY DISEASE, UNSPECIFIED COPD TYPE (H): ICD-10-CM

## 2022-10-25 DIAGNOSIS — E66.01 MORBID OBESITY (H): ICD-10-CM

## 2022-10-25 LAB — HGB BLD-MCNC: 15.3 G/DL (ref 11.7–15.7)

## 2022-10-25 PROCEDURE — 84132 ASSAY OF SERUM POTASSIUM: CPT | Performed by: PHYSICIAN ASSISTANT

## 2022-10-25 PROCEDURE — 36415 COLL VENOUS BLD VENIPUNCTURE: CPT | Performed by: PHYSICIAN ASSISTANT

## 2022-10-25 PROCEDURE — 93000 ELECTROCARDIOGRAM COMPLETE: CPT | Performed by: PHYSICIAN ASSISTANT

## 2022-10-25 PROCEDURE — 99214 OFFICE O/P EST MOD 30 MIN: CPT | Performed by: PHYSICIAN ASSISTANT

## 2022-10-25 PROCEDURE — 85018 HEMOGLOBIN: CPT | Performed by: PHYSICIAN ASSISTANT

## 2022-10-25 ASSESSMENT — PATIENT HEALTH QUESTIONNAIRE - PHQ9
SUM OF ALL RESPONSES TO PHQ QUESTIONS 1-9: 5
10. IF YOU CHECKED OFF ANY PROBLEMS, HOW DIFFICULT HAVE THESE PROBLEMS MADE IT FOR YOU TO DO YOUR WORK, TAKE CARE OF THINGS AT HOME, OR GET ALONG WITH OTHER PEOPLE: SOMEWHAT DIFFICULT
SUM OF ALL RESPONSES TO PHQ QUESTIONS 1-9: 5

## 2022-10-25 ASSESSMENT — PAIN SCALES - GENERAL: PAINLEVEL: NO PAIN (0)

## 2022-10-25 NOTE — LETTER
[address], [address]  [facility] [address]  Phone: [phone]    October 25, 2022        Jazlyn Bartlett  [address] [facility] [address]          To whom it may concern:    RE: Jazlyn Bartlett    Patient was seen and treated today at our clinic and missed work.    Please contact me for questions or concerns.      Sincerely,        Sharron Rees PA-C

## 2022-10-25 NOTE — PROGRESS NOTES
[address], [address]  [facility] [address]  Phone: [phone]  Primary Provider: Lokesh Casanova  Pre-op Performing Provider: ALLISON COLLINS PA-C  Chief Complaint   Patient presents with     Pre-Op Exam     Health Maintenance     Last Eye Exam done on 07/29/2022 at [facility], abstracted.         PREOPERATIVE EVALUATION:  Today's date: 10/25/2022    Jazlyn Bartlett is a 57 year old female who presents for a preoperative evaluation.    Surgical Information:  Surgery/Procedure: COLONOSCOPY   Surgery Location:  OR  Surgeon: Michael Enciso,  Surgery Date: 10/28/2022  Time of Surgery: 8:45 AM  Where patient plans to recover: At home with family  Fax number for surgical facility: Note does not need to be faxed, will be available electronically in Epic.    Type of Anesthesia Anticipated: MAC    Assessment & Plan     The proposed surgical procedure is considered LOW risk.    Colon cancer screening  - pt took last dose of asa yesterday    Preop general physical exam    - Potassium  - Hemoglobin  - EKG 12-lead complete w/read - Clinics    Type 2 diabetes mellitus without complication, without long-term current use of insulin (H)  Lab Results   Component Value Date    A1C 7.1 07/05/2022    A1C 6.7 12/02/2021    A1C 6.7 06/15/2021    A1C 7.2 08/03/2020    A1C 9.6 02/19/2020    A1C 7.9 07/26/2019    A1C 7.8 05/03/2019         Chronic obstructive pulmonary disease, unspecified COPD type (H)  stable    Tobacco abuse  Cessation advised    Morbid obesity (H)        Risks and Recommendations:  The patient has the following additional risks and recommendations for perioperative complications:   - No identified additional risk factors other than previously addressed    Medication Instructions:  last dose of asa was yesterday    RECOMMENDATION:  APPROVAL GIVEN to proceed with proposed procedure, without further diagnostic evaluation.      Subjective     HPI related to upcoming  procedure: colonoscopy with MAC    Preop Questions 10/18/2022   1. Have you ever had a heart attack or stroke? No   2. Have you ever had surgery on your heart or blood vessels, such as a stent placement, a coronary artery bypass, or surgery on an artery in your head, neck, heart, or legs? No   3. Do you have chest pain with activity? No   4. Do you have a history of  heart failure? No   5. Do you currently have a cold, bronchitis or symptoms of other infection? No   6. Do you have a cough, shortness of breath, or wheezing? No   7. Do you or anyone in your family have previous history of blood clots? UNKNOWN    8. Do you or does anyone in your family have a serious bleeding problem such as prolonged bleeding following surgeries or cuts? UNKNOWN    9. Have you ever had problems with anemia or been told to take iron pills? YES - remote history   10. Have you had any abnormal blood loss such as black, tarry or bloody stools, or abnormal vaginal bleeding? No   11. Have you ever had a blood transfusion? No   12. Are you willing to have a blood transfusion if it is medically needed before, during, or after your surgery? Yes   13. Have you or any of your relatives ever had problems with anesthesia? UNKNOWN    14. Do you have sleep apnea, excessive snoring or daytime drowsiness? UNKNOWN - pt states she snores   14a. Do you have a CPAP machine? -   15. Do you have any artifical heart valves or other implanted medical devices like a pacemaker, defibrillator, or continuous glucose monitor? No   16. Do you have artificial joints? No   17. Are you allergic to latex? No   18. Is there any chance that you may be pregnant? No       Status of Chronic Conditions:  COPD - Patient has a longstanding history of moderate-severe COPD . Patient has been doing well overall noting NO SYMPTOMS and continues on medication regimen consisting of Flovent and prn serevent without adverse reactions or side effects.    DIABETES - Patient has a  longstanding history of DiabetesType Type II . Patient is being treated with oral agents and denies significant side effects. Control has been good. Complicating factors include but are not limited to: hypertension and tobacco use.     HYPERTENSION - Patient has longstanding history of HTN , currently denies any symptoms referable to elevated blood pressure. Specifically denies chest pain, palpitations, dyspnea, orthopnea, PND or peripheral edema. Blood pressure readings have been in normal range. Current medication regimen is as listed below. Patient denies any side effects of medication.       Review of Systems  CONSTITUTIONAL: NEGATIVE for fever, chills, change in weight  ENT/MOUTH: NEGATIVE for ear, mouth and throat problems  RESP: NEGATIVE for significant cough or SOB  CV: NEGATIVE for chest pain, palpitations or peripheral edema    Patient Active Problem List    Diagnosis Date Noted     Abnormal CT lung screening 07/02/2021     Priority: Medium     Currently managed with follow up imaging by [facility].       Hypothyroidism, unspecified type 11/07/2017     Priority: Medium     Chronic obstructive pulmonary disease, unspecified COPD type (H) 11/07/2017     Priority: Medium     Hyperlipidemia LDL goal <100 05/16/2017     Priority: Medium     Lumbar radiculopathy 09/12/2016     Priority: Medium     Tobacco abuse 04/24/2016     Priority: Medium     Osteoarthritis of patellofemoral joint 07/31/2015     Priority: Medium     Morbid obesity (H) 07/31/2015     Priority: Medium     Urinary incontinence 12/15/2014     Priority: Medium     Autoimmune gastritis- repeat UGI 1/16 with gastritis without eosinophils 11/07/2013     Priority: Medium     Advanced directives, counseling/discussion 10/29/2013     Priority: Medium     Advance Care Planning:   Initial facilitation introduction:   Jazlyn Barltett presented for initial session regarding ACP at the clinic. She was accompanied by self. Honoring  Choices information provided and resources reviewed. She currently wishes to complete an ACP document.  She currently has the following questions or concerns about Advance Care Planning: none.  Confirmed/documented designated decision maker(s). See permanent comments section of demographics in clinical tab. Confirmed code status reflects current choices . Follow-up meeting: to be arranged by patient.  Added by Shira Hernandez on 10/29/2013         Chronic airway obstruction (H) 09/10/2013     Priority: Medium     Problem list name updated by automated process. Provider to review       Type 2 diabetes mellitus without complication (H) 09/08/2013     Priority: Medium     Hypertension goal BP (blood pressure) < 140/80 09/05/2013     Priority: Medium     GERD (gastroesophageal reflux disease) 08/28/2013     Priority: Medium     CARDIOVASCULAR SCREENING; LDL GOAL LESS THAN 100 08/25/2013     Priority: Medium     Vitamin D deficiency disease 08/23/2013     Priority: Medium     Neck mass 08/22/2013     Priority: Medium     Moderate major depression (H) 08/22/2013     Priority: Medium     Anxiety 08/22/2013     Priority: Medium      Past Medical History:   Diagnosis Date     Arthritis 2016    i feel like its in my hands     Chronic pain     in both legs     COPD (chronic obstructive pulmonary disease)      Depressive disorder 1990     Diabetes mellitus - type 2      Gastro-oesophageal reflux disease      Hypertension      Hypothyroidism      Kidney stone     3 episodes of stones     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, s/p resection     Uncomplicated asthma 2010     Past Surgical History:   Procedure Laterality Date     ABDOMEN SURGERY  1986    Tubal     ABLATION, ENDOMETRIAL, THERMAL, W/O HYSTEROSCOPIC GUIDANCE       BIOPSY  2010    Neck     COLONOSCOPY  2011     DILATION AND CURETTAGE, HYSTEROSCOPY, ABLATE ENDOMETRIUM NOVASURE, COMBINED  10/11/2012    Procedure: COMBINED DILATION AND CURETTAGE,  HYSTEROSCOPY, ABLATE ENDOMETRIUM NOVASURE;  COMBINED DILATION AND CURETTAGE, HYSTEROSCOPY, ABLATE ENDOMETRIUM ,pelvic exam under anesthesia;  Surgeon: Shruti Barrios MD;  Location:  OR     ESOPHAGOSCOPY, GASTROSCOPY, DUODENOSCOPY (EGD), COMBINED N/A 01/19/2016    Procedure: COMBINED ESOPHAGOSCOPY, GASTROSCOPY, DUODENOSCOPY (EGD), BIOPSY SINGLE OR MULTIPLE;  Surgeon: Kristofer Molina MD;  Location:  GI     GENITOURINARY SURGERY  1986 tubes tied & burned     HEAD & NECK SURGERY  removal of cancerous limp knod 2010     Removal of cancerous lump on neck       TONSILLECTOMY       Current Outpatient Medications   Medication Sig Dispense Refill     albuterol (PROAIR HFA/PROVENTIL HFA/VENTOLIN HFA) 108 (90 Base) MCG/ACT inhaler Inhale 2 puffs into the lungs every 6 hours as needed for shortness of breath / dyspnea 18 g 5     aspirin (ASA) 81 MG chewable tablet Take 81 mg by mouth daily       bisacodyl (DULCOLAX) 5 MG EC tablet Take 2 tablets at 3 pm the day before your procedure. If your procedure is before 11 am, take 2 additional tablets at 8 pm. If your procedure is after 11 am, take 2 additional tablets at 6 am. For additional instructions refer to your colonoscopy prep instructions. 4 tablet 0     fluticasone (FLOVENT HFA) 220 MCG/ACT inhaler Inhale 1 puff into the lungs 2 times daily (Patient taking differently: Inhale 1 puff into the lungs 2 times daily as needed) 12 g 0     glipiZIDE (GLUCOTROL XL) 10 MG 24 hr tablet 1 tab in am and 2 tabs in  tablet 3     hydrochlorothiazide (HYDRODIURIL) 25 MG tablet TAKE 1 TABLET (25 MG) BY MOUTH DAILY 90 tablet 3     levothyroxine (SYNTHROID/LEVOTHROID) 137 MCG tablet TAKE 1 TABLET (137 MCG) BY MOUTH DAILY 90 tablet 3     losartan (COZAAR) 50 MG tablet Take 1 tablet (50 mg) by mouth daily 90 tablet 3     polyethylene glycol (GOLYTELY) 236 g suspension The night before the exam at 6 pm drink an 8-ounce glass every 15 minutes until the jug is half empty. If you  "arrive before 11 AM: Drink the other half of the [employer] jug at 11 PM night before procedure. If you arrive after 11 AM: Drink the other half of the Gol[employer] jug at 6 AM day of procedure. For additional instructions refer to your colonoscopy prep instructions. 4000 mL 0     salmeterol (SEREVENT) 50 MCG/DOSE inhaler Inhale 1 puff into the lungs 2 times daily as needed       simvastatin (ZOCOR) 20 MG tablet Take 1 tablet (20 mg) by mouth At Bedtime 90 tablet 3       Allergies   Allergen Reactions     Hydrocodone-Acetaminophen Nausea and Vomiting     Ibuprofen Sodium Nausea and Vomiting     Lisinopril Cough        Social History     Tobacco Use     Smoking status: Every Day     Packs/day: 0.50     Years: 37.00     Pack years: 18.50     Types: Cigarettes     Start date: 1/1/1979     Smokeless tobacco: Former     Quit date: 2/22/2020   Substance Use Topics     Alcohol use: No     Family History   Problem Relation Age of Onset     Diabetes Mother      Breast Cancer Mother      Hyperlipidemia Mother      Osteoporosis Mother      Diabetes Father      Lung Cancer Father      Rheumatoid Arthritis Father      Coronary Artery Disease Father      Hyperlipidemia Father      Cerebrovascular Disease Father      Prostate Cancer Father      Diabetes Maternal Grandmother      Diabetes Other      Hypertension Other      Depression Other      Anxiety Disorder Other      Asthma Other      Thyroid Disease Other      Obesity Other      Diabetes Sister      Obesity Sister      Diabetes Sister      Diabetes Brother      Diabetes Daughter      Breast Cancer Sister      Anesthesia Reaction Daughter      History   Drug Use No         Objective     /84 (BP Location: Left arm, Patient Position: Sitting, Cuff Size: Adult Large)   Pulse 73   Temp 96.8  F (36  C) (Temporal)   Resp 22   Ht 1.549 m (5' 1\")   Wt 93.4 kg (206 lb)   SpO2 96%   BMI 38.92 kg/m      Physical Exam  GENERAL APPEARANCE: healthy, alert and no distress  HENT: ear " canals and TM's normal and nose and mouth without ulcers or lesions  RESP: lungs clear to auscultation - no rales, rhonchi or wheezes  CV: regular rate and rhythm, normal S1 S2, no S3 or S4 and no murmur, click or rub   ABDOMEN: soft, nontender, no HSM or masses and bowel sounds normal  NEURO: Normal strength and tone, sensory exam grossly normal, mentation intact and speech normal    Recent Labs   Lab Test 07/05/22  0918 12/02/21  1112 08/03/21  1133   HGB  --   --  14.8   PLT  --   --  289    138 138   POTASSIUM 3.9 3.6 3.3*   CR 0.62 0.60 0.65   A1C 7.1* 6.7*  --         Diagnostics:  Results for orders placed or performed in visit on 10/25/22   Hemoglobin     Status: Normal   Result Value Ref Range    Hemoglobin 15.3 11.7 - 15.7 g/dL        EKG: appears normal, NSR, normal axis, normal intervals, no acute ST/T changes c/w ischemia, no LVH by voltage criteria, unchanged from previous tracings    Revised Cardiac Risk Index (RCRI):  The patient has the following serious cardiovascular risks for perioperative complications:   - No serious cardiac risks = 0 points     RCRI Interpretation: 0 points: Class I (very low risk - 0.4% complication rate)           Signed Electronically by: Sharron Rees PA-C  Copy of this evaluation report is provided to requesting physician.

## 2022-10-25 NOTE — Clinical Note
Please abstract the following data from this visit with this patient into the appropriate field in Epic:  Tests that can be patient reported without a hard copy:  Eye exam with ophthalmology on this date: 07/29/2022 at [facility]

## 2022-10-26 LAB — POTASSIUM BLD-SCNC: 3.8 MMOL/L (ref 3.4–5.3)

## 2022-10-28 ENCOUNTER — ANESTHESIA (OUTPATIENT)
Dept: SURGERY | Facility: CLINIC | Age: 57
End: 2022-10-28
Payer: COMMERCIAL

## 2022-10-28 ENCOUNTER — ANESTHESIA EVENT (OUTPATIENT)
Dept: SURGERY | Facility: CLINIC | Age: 57
End: 2022-10-28
Payer: COMMERCIAL

## 2022-10-28 ENCOUNTER — HOSPITAL ENCOUNTER (OUTPATIENT)
Facility: CLINIC | Age: 57
Discharge: HOME | End: 2022-10-28
Attending: SURGERY | Admitting: SURGERY
Payer: COMMERCIAL

## 2022-10-28 VITALS
HEART RATE: 58 BPM | TEMPERATURE: 97.2 F | RESPIRATION RATE: 16 BRPM | HEIGHT: 61 IN | BODY MASS INDEX: 37.91 KG/M2 | SYSTOLIC BLOOD PRESSURE: 139 MMHG | WEIGHT: 200.8 LBS | DIASTOLIC BLOOD PRESSURE: 95 MMHG | OXYGEN SATURATION: 96 %

## 2022-10-28 DIAGNOSIS — Z12.11 SPECIAL SCREENING FOR MALIGNANT NEOPLASMS, COLON: Primary | ICD-10-CM

## 2022-10-28 LAB
COLONOSCOPY: NORMAL
GLUCOSE BLDC GLUCOMTR-MCNC: 112 MG/DL (ref 70–99)
GLUCOSE BLDC GLUCOMTR-MCNC: 140 MG/DL (ref 70–99)

## 2022-10-28 PROCEDURE — 272N000001 HC OR GENERAL SUPPLY STERILE: Performed by: SURGERY

## 2022-10-28 PROCEDURE — 250N000011 HC RX IP 250 OP 636: Performed by: NURSE ANESTHETIST, CERTIFIED REGISTERED

## 2022-10-28 PROCEDURE — 88305 TISSUE EXAM BY PATHOLOGIST: CPT | Mod: TC | Performed by: SURGERY

## 2022-10-28 PROCEDURE — 88305 TISSUE EXAM BY PATHOLOGIST: CPT | Mod: 26 | Performed by: PATHOLOGY

## 2022-10-28 PROCEDURE — 360N000075 HC SURGERY LEVEL 2, PER MIN: Performed by: SURGERY

## 2022-10-28 PROCEDURE — 250N000011 HC RX IP 250 OP 636: Performed by: SURGERY

## 2022-10-28 PROCEDURE — 258N000003 HC RX IP 258 OP 636: Performed by: ANESTHESIOLOGY

## 2022-10-28 PROCEDURE — 710N000012 HC RECOVERY PHASE 2, PER MINUTE: Performed by: SURGERY

## 2022-10-28 PROCEDURE — 250N000009 HC RX 250: Performed by: NURSE ANESTHETIST, CERTIFIED REGISTERED

## 2022-10-28 PROCEDURE — 999N000141 HC STATISTIC PRE-PROCEDURE NURSING ASSESSMENT: Performed by: SURGERY

## 2022-10-28 PROCEDURE — 82962 GLUCOSE BLOOD TEST: CPT

## 2022-10-28 PROCEDURE — 370N000017 HC ANESTHESIA TECHNICAL FEE, PER MIN: Performed by: SURGERY

## 2022-10-28 PROCEDURE — 258N000003 HC RX IP 258 OP 636: Performed by: NURSE ANESTHETIST, CERTIFIED REGISTERED

## 2022-10-28 PROCEDURE — 45385 COLONOSCOPY W/LESION REMOVAL: CPT | Mod: PT | Performed by: SURGERY

## 2022-10-28 RX ORDER — PROPOFOL 10 MG/ML
INJECTION, EMULSION INTRAVENOUS CONTINUOUS PRN
Status: DISCONTINUED | OUTPATIENT
Start: 2022-10-28 | End: 2022-10-28

## 2022-10-28 RX ORDER — SODIUM CHLORIDE, SODIUM LACTATE, POTASSIUM CHLORIDE, CALCIUM CHLORIDE 600; 310; 30; 20 MG/100ML; MG/100ML; MG/100ML; MG/100ML
INJECTION, SOLUTION INTRAVENOUS CONTINUOUS
Status: DISCONTINUED | OUTPATIENT
Start: 2022-10-28 | End: 2022-10-28 | Stop reason: HOSPADM

## 2022-10-28 RX ORDER — ONDANSETRON 2 MG/ML
4 INJECTION INTRAMUSCULAR; INTRAVENOUS
Status: COMPLETED | OUTPATIENT
Start: 2022-10-28 | End: 2022-10-28

## 2022-10-28 RX ORDER — FENTANYL CITRATE 50 UG/ML
25 INJECTION, SOLUTION INTRAMUSCULAR; INTRAVENOUS
Status: DISCONTINUED | OUTPATIENT
Start: 2022-10-28 | End: 2022-10-28 | Stop reason: HOSPADM

## 2022-10-28 RX ORDER — ONDANSETRON 4 MG/1
4 TABLET, ORALLY DISINTEGRATING ORAL EVERY 6 HOURS PRN
Status: CANCELLED | OUTPATIENT
Start: 2022-10-28

## 2022-10-28 RX ORDER — PROCHLORPERAZINE MALEATE 10 MG
10 TABLET ORAL EVERY 6 HOURS PRN
Status: CANCELLED | OUTPATIENT
Start: 2022-10-28

## 2022-10-28 RX ORDER — NALOXONE HYDROCHLORIDE 0.4 MG/ML
0.4 INJECTION, SOLUTION INTRAMUSCULAR; INTRAVENOUS; SUBCUTANEOUS
Status: CANCELLED | OUTPATIENT
Start: 2022-10-28

## 2022-10-28 RX ORDER — FENTANYL CITRATE 50 UG/ML
50 INJECTION, SOLUTION INTRAMUSCULAR; INTRAVENOUS EVERY 5 MIN PRN
Status: DISCONTINUED | OUTPATIENT
Start: 2022-10-28 | End: 2022-10-28 | Stop reason: HOSPADM

## 2022-10-28 RX ORDER — ONDANSETRON 2 MG/ML
4 INJECTION INTRAMUSCULAR; INTRAVENOUS EVERY 30 MIN PRN
Status: DISCONTINUED | OUTPATIENT
Start: 2022-10-28 | End: 2022-10-28 | Stop reason: HOSPADM

## 2022-10-28 RX ORDER — ALBUTEROL SULFATE 0.83 MG/ML
2.5 SOLUTION RESPIRATORY (INHALATION) EVERY 4 HOURS PRN
Status: DISCONTINUED | OUTPATIENT
Start: 2022-10-28 | End: 2022-10-28 | Stop reason: HOSPADM

## 2022-10-28 RX ORDER — LIDOCAINE HYDROCHLORIDE 10 MG/ML
INJECTION, SOLUTION INFILTRATION; PERINEURAL PRN
Status: DISCONTINUED | OUTPATIENT
Start: 2022-10-28 | End: 2022-10-28

## 2022-10-28 RX ORDER — LIDOCAINE 40 MG/G
CREAM TOPICAL
Status: DISCONTINUED | OUTPATIENT
Start: 2022-10-28 | End: 2022-10-28 | Stop reason: HOSPADM

## 2022-10-28 RX ORDER — LORAZEPAM 2 MG/ML
.5-1 INJECTION INTRAMUSCULAR
Status: DISCONTINUED | OUTPATIENT
Start: 2022-10-28 | End: 2022-10-28 | Stop reason: HOSPADM

## 2022-10-28 RX ORDER — NALOXONE HYDROCHLORIDE 0.4 MG/ML
0.2 INJECTION, SOLUTION INTRAMUSCULAR; INTRAVENOUS; SUBCUTANEOUS
Status: CANCELLED | OUTPATIENT
Start: 2022-10-28

## 2022-10-28 RX ORDER — ONDANSETRON 2 MG/ML
4 INJECTION INTRAMUSCULAR; INTRAVENOUS EVERY 6 HOURS PRN
Status: CANCELLED | OUTPATIENT
Start: 2022-10-28

## 2022-10-28 RX ORDER — ONDANSETRON 4 MG/1
4 TABLET, ORALLY DISINTEGRATING ORAL EVERY 30 MIN PRN
Status: DISCONTINUED | OUTPATIENT
Start: 2022-10-28 | End: 2022-10-28 | Stop reason: HOSPADM

## 2022-10-28 RX ORDER — FENTANYL CITRATE 50 UG/ML
INJECTION, SOLUTION INTRAMUSCULAR; INTRAVENOUS PRN
Status: DISCONTINUED | OUTPATIENT
Start: 2022-10-28 | End: 2022-10-28

## 2022-10-28 RX ORDER — HYDRALAZINE HYDROCHLORIDE 20 MG/ML
5-10 INJECTION INTRAMUSCULAR; INTRAVENOUS EVERY 10 MIN PRN
Status: DISCONTINUED | OUTPATIENT
Start: 2022-10-28 | End: 2022-10-28 | Stop reason: HOSPADM

## 2022-10-28 RX ORDER — MEPERIDINE HYDROCHLORIDE 25 MG/ML
12.5 INJECTION INTRAMUSCULAR; INTRAVENOUS; SUBCUTANEOUS
Status: DISCONTINUED | OUTPATIENT
Start: 2022-10-28 | End: 2022-10-28 | Stop reason: HOSPADM

## 2022-10-28 RX ORDER — SODIUM CHLORIDE, SODIUM LACTATE, POTASSIUM CHLORIDE, CALCIUM CHLORIDE 600; 310; 30; 20 MG/100ML; MG/100ML; MG/100ML; MG/100ML
INJECTION, SOLUTION INTRAVENOUS CONTINUOUS PRN
Status: DISCONTINUED | OUTPATIENT
Start: 2022-10-28 | End: 2022-10-28

## 2022-10-28 RX ORDER — KETAMINE HYDROCHLORIDE 10 MG/ML
INJECTION INTRAMUSCULAR; INTRAVENOUS PRN
Status: DISCONTINUED | OUTPATIENT
Start: 2022-10-28 | End: 2022-10-28

## 2022-10-28 RX ORDER — HYDROMORPHONE HYDROCHLORIDE 1 MG/ML
0.5 INJECTION, SOLUTION INTRAMUSCULAR; INTRAVENOUS; SUBCUTANEOUS EVERY 5 MIN PRN
Status: DISCONTINUED | OUTPATIENT
Start: 2022-10-28 | End: 2022-10-28 | Stop reason: HOSPADM

## 2022-10-28 RX ORDER — FLUMAZENIL 0.1 MG/ML
0.2 INJECTION, SOLUTION INTRAVENOUS
Status: CANCELLED | OUTPATIENT
Start: 2022-10-28 | End: 2022-10-28

## 2022-10-28 RX ORDER — OXYCODONE HYDROCHLORIDE 5 MG/1
5 TABLET ORAL EVERY 4 HOURS PRN
Status: DISCONTINUED | OUTPATIENT
Start: 2022-10-28 | End: 2022-10-28 | Stop reason: HOSPADM

## 2022-10-28 RX ADMIN — FENTANYL CITRATE 100 MCG: 50 INJECTION, SOLUTION INTRAMUSCULAR; INTRAVENOUS at 07:57

## 2022-10-28 RX ADMIN — ONDANSETRON 4 MG: 2 INJECTION INTRAMUSCULAR; INTRAVENOUS at 08:36

## 2022-10-28 RX ADMIN — SODIUM CHLORIDE, POTASSIUM CHLORIDE, SODIUM LACTATE AND CALCIUM CHLORIDE: 600; 310; 30; 20 INJECTION, SOLUTION INTRAVENOUS at 07:42

## 2022-10-28 RX ADMIN — Medication 20 MG: at 07:57

## 2022-10-28 RX ADMIN — MIDAZOLAM 2 MG: 1 INJECTION INTRAMUSCULAR; INTRAVENOUS at 07:54

## 2022-10-28 RX ADMIN — SODIUM CHLORIDE, POTASSIUM CHLORIDE, SODIUM LACTATE AND CALCIUM CHLORIDE: 600; 310; 30; 20 INJECTION, SOLUTION INTRAVENOUS at 07:31

## 2022-10-28 RX ADMIN — PROPOFOL 200 MCG/KG/MIN: 10 INJECTION, EMULSION INTRAVENOUS at 07:57

## 2022-10-28 RX ADMIN — LIDOCAINE HYDROCHLORIDE 30 MG: 10 INJECTION, SOLUTION INFILTRATION; PERINEURAL at 07:57

## 2022-10-28 ASSESSMENT — ACTIVITIES OF DAILY LIVING (ADL)
ADLS_ACUITY_SCORE: 35
ADLS_ACUITY_SCORE: 35

## 2022-10-28 ASSESSMENT — COPD QUESTIONNAIRES
CAT_SEVERITY: MODERATE
COPD: 1

## 2022-10-28 ASSESSMENT — LIFESTYLE VARIABLES: TOBACCO_USE: 1

## 2022-10-28 NOTE — ANESTHESIA PREPROCEDURE EVALUATION
Anesthesia Pre-Procedure Evaluation    Patient: Jazlyn Bartlett   MRN: [id number] : 1965        Procedure : Procedure(s):  COLONOSCOPY (fv)          Past Medical History:   Diagnosis Date     Arthritis 2016    i feel like its in my hands     Chronic pain     in both legs     COPD (chronic obstructive pulmonary disease)      Depressive disorder      Diabetes mellitus - type 2      Gastro-oesophageal reflux disease      Hypertension      Hypothyroidism      Kidney stone     3 episodes of stones     Mucoepidermoid carcinoma of parotid gland     low grade, s/p resection     Uncomplicated asthma       Past Surgical History:   Procedure Laterality Date     ABDOMEN SURGERY      Tubal     ABLATION, ENDOMETRIAL, THERMAL, W/O HYSTEROSCOPIC GUIDANCE       BIOPSY      Neck     COLONOSCOPY       DILATION AND CURETTAGE, HYSTEROSCOPY, ABLATE ENDOMETRIUM NOVASURE, COMBINED  10/11/2012    Procedure: COMBINED DILATION AND CURETTAGE, HYSTEROSCOPY, ABLATE ENDOMETRIUM NOVASURE;  COMBINED DILATION AND CURETTAGE, HYSTEROSCOPY, ABLATE ENDOMETRIUM ,pelvic exam under anesthesia;  Surgeon: Shruti Barrios MD;  Location:  OR     ESOPHAGOSCOPY, GASTROSCOPY, DUODENOSCOPY (EGD), COMBINED N/A 2016    Procedure: COMBINED ESOPHAGOSCOPY, GASTROSCOPY, DUODENOSCOPY (EGD), BIOPSY SINGLE OR MULTIPLE;  Surgeon: Kristofer Molina MD;  Location:  GI     GENITOURINARY SURGERY   tubes tied & burned     HEAD & NECK SURGERY  removal of cancerous limp knod      Removal of cancerous lump on neck       TONSILLECTOMY        Allergies   Allergen Reactions     Hydrocodone-Acetaminophen Nausea and Vomiting     Ibuprofen Sodium Nausea and Vomiting     Lisinopril Cough      Social History     Tobacco Use     Smoking status: Every Day     Packs/day: 0.50     Years: 37.00     Pack years: 18.50     Types: Cigarettes     Start date: 1979     Smokeless tobacco: Former     Quit date: 2020   Substance Use Topics      Alcohol use: No      Wt Readings from Last 1 Encounters:   10/28/22 91.1 kg (200 lb 12.8 oz)        Anesthesia Evaluation   Pt has had prior anesthetic.     No history of anesthetic complications       ROS/MED HX  ENT/Pulmonary:     (+) tobacco use, Current use, asthma moderate,  COPD,     Neurologic:     (+) peripheral neuropathy, - hands.     Cardiovascular:     (+) hypertension-----Previous cardiac testing   Echo: Date: Results:    Stress Test: Date: 2021 Results:   The nuclear stress test is probably negative for inducible myocardial ischemia or infarction.    Left ventricular function is hyperdynamic.    The left ventricular ejection fraction at stress is 76%.    A prior study was conducted on 3/5/2018.  This study has no change when compared with the prior study  ECG Reviewed: Date: Results:    Cath:  Date: Results:      METS/Exercise Tolerance:     Hematologic:  - neg hematologic  ROS     Musculoskeletal:   (+) arthritis,     GI/Hepatic:     (+) GERD, Asymptomatic on medication,     Renal/Genitourinary:     (+) renal disease, Nephrolithiasis ,     Endo:     (+) type II DM, thyroid problem, hypothyroidism, Obesity,     Psychiatric/Substance Use:     (+) psychiatric history depression     Infectious Disease:  - neg infectious disease ROS     Malignancy:       Other:      (+) , H/O Chronic Pain,        Physical Exam    Airway        Mallampati: II   TM distance: > 3 FB   Neck ROM: full   Mouth opening: > 3 cm    Respiratory Devices and Support         Dental  no notable dental history         Cardiovascular          Rhythm and rate: regular and normal     Pulmonary   pulmonary exam normal                OUTSIDE LABS:  CBC:   Lab Results   Component Value Date    WBC 15.1 (H) 08/03/2021    WBC 16.2 (H) 09/11/2019    HGB 15.3 10/25/2022    HGB 14.8 08/03/2021    HCT 44.4 08/03/2021    HCT 44.2 09/11/2019     08/03/2021     09/11/2019     BMP:   Lab Results   Component Value Date     07/05/2022      12/02/2021    POTASSIUM 3.8 10/25/2022    POTASSIUM 3.9 07/05/2022    CHLORIDE 105 07/05/2022    CHLORIDE 104 12/02/2021    CO2 26 07/05/2022    CO2 29 12/02/2021    BUN 13 07/05/2022    BUN 10 12/02/2021    CR 0.62 07/05/2022    CR 0.60 12/02/2021     (H) 10/28/2022     (H) 07/05/2022     COAGS: No results found for: PTT, INR, FIBR  POC:   Lab Results   Component Value Date     (H) 01/19/2016    HCG Negative 01/04/2013    HCGS Negative 07/25/2019     HEPATIC:   Lab Results   Component Value Date    ALBUMIN 3.6 07/05/2022    PROTTOTAL 6.9 07/05/2022    ALT 26 07/05/2022    AST 28 07/05/2022    ALKPHOS 89 07/05/2022    BILITOTAL 0.6 07/05/2022     OTHER:   Lab Results   Component Value Date    LACT 1.4 04/30/2017    A1C 7.1 (H) 07/05/2022    NAZIA 9.6 07/05/2022    LIPASE 145 07/25/2019    TSH 0.84 07/05/2022    T4 1.16 01/05/2016    CRP 12.0 (H) 03/20/2018    SED 17 03/20/2018       Anesthesia Plan    ASA Status:  3   NPO Status:  NPO Appropriate    Anesthesia Type: MAC.     - Reason for MAC: straight local not clinically adequate   Induction: Intravenous.   Maintenance: TIVA.        Consents    Anesthesia Plan(s) and associated risks, benefits, and realistic alternatives discussed. Questions answered and patient/representative(s) expressed understanding.    - Discussed:     - Discussed with:  Patient      - Extended Intubation/Ventilatory Support Discussed: No.      - Patient is DNR/DNI Status: No    Use of blood products discussed: No .     Postoperative Care    Pain management: IV analgesics.   PONV prophylaxis: Ondansetron (or other 5HT-3), Dexamethasone or Solumedrol     Comments:                Alexx Alexander MD

## 2022-10-28 NOTE — LETTER
October 31, 2022      Alma Bartlett  [address] [facility] [address]        Dear ,    We are writing to inform you of your test results.    Pathology from the colon polyp looked like a hyperplastic polyp which does not have cancer risk.  Next colonoscopy can be in 10 years.       Resulted Orders   Surgical Pathology Exam   Result Value Ref Range    Case Report       Surgical Pathology Report                         Case: [id number]                                  Authorizing Provider:  Michael Enciso MD  Collected:           10/28/2022 08:12 AM          Ordering Location:     [facility]   Received:            10/28/2022 08:29 AM                                 Main OR                                                                      Pathologist:           Anali Ambriz MD PhD                                                      Specimen:    Large Intestine, Colon, Sigmoid                                                            Final Diagnosis       A.  Colon, Sigmoid, polyp, polypectomy:  -Colonic mucosa with focal surface serrated changes most consistent with hyperplastic polyp  -Negative for dysplasia or malignancy.        Clinical Information       Procedure:  COLONOSCOPY with polypectomy  Pre-op Diagnosis: Colon cancer screening [Z12.11]  Post-op Diagnosis: Z12.11 - Colon cancer screening [ICD-10-CM]      Gross Description       A(1). Large Intestine, Colon, Sigmoid, :  The specimen is received in formalin, labeled with the patient's name, medical record number and other identifying information and designated  sigmoid colon polyp . It consists of a single tan soft tissue fragment measuring 0.2 cm in greatest dimension. Entirely submitted in one cassette.   (YEMI Bradley)      Microscopic Description       Microscopic examination was performed.      Performing Labs       The technical component of this testing was completed at [facility]  [facility]      Case Images         If you have any questions or concerns, please call the clinic at the number listed above.       Sincerely,      Michael Enciso MD

## 2022-10-28 NOTE — ANESTHESIA CARE TRANSFER NOTE
Patient: Jazlyn Bartlett    Procedure: Procedure(s):  COLONOSCOPY with polypectomy       Diagnosis: Colon cancer screening [Z12.11]  Diagnosis Additional Information: No value filed.    Anesthesia Type:   MAC     Note:      Level of Consciousness: drowsy  Oxygen Supplementation: face mask    Independent Airway: airway patency satisfactory and stable  Dentition: dentition unchanged  Vital Signs Stable: post-procedure vital signs reviewed and stable  Report to RN Given: handoff report given  Patient transferred to: Phase II    Handoff Report: Identifed the Patient, Identified the Reponsible Provider, Reviewed the pertinent medical history, Discussed the surgical course, Reviewed Intra-OP anesthesia mangement and issues during anesthesia, Set expectations for post-procedure period and Allowed opportunity for questions and acknowledgement of understanding      Vitals:  Vitals Value Taken Time   BP     Temp     Pulse     Resp     SpO2         Electronically Signed By: ELIZABETH Martin CRNA  October 28, 2022  8:22 AM

## 2022-10-28 NOTE — LETTER
[facility] SERVICES    [address] [place] [address]  Phone: [phone]  Fax: [phone]           October 4, 2022    Jazlyn Bartlett  [address]  [facility] [address]      Dear Jazlyn Bartlett      Thank you for choosing [facility] [place].  You are scheduled for the following procedure(s) to be done in the Operating Room.     You will need to have a History and Physical Exam done within 30 days of the scheduled procedure(s). Please arrange this with your primary care physician as soon as possible.        Date of Procedure:  October 28, 2022    Scheduled Endoscopy Procedure(s): Colonoscopy and Esophagogastroduodenoscopy/Upper GI Endoscopy (EGD)    Scheduled MD: Dr. Michael Enciso                      Arrival Time:  6:40 am   *Check in at the Surgery Center desk*     Procedure Time: 8:40 am       Location:   [facility]      Surgery Center   (on the south side of the [facility])       [address] [place], [place] [address]                       STANDARD Golytely (Colyte, Nulytely)  Prep Instructions for your Colonoscopy  Please read these instructions carefully at least 7 days prior to your colonoscopy procedure. Be sure to follow all directions completely. The inside of your colon must be clean to allow for a complete examination for the presence of any growths, polyps, and/or abnormalities, as well as their biopsy or removal. A number of tips are included in order to make this part of the procedure as comfortable as possible.    Getting ready:     A nurse will call you within a week of your exam to prescribe your bowel prep, review the prep instructions, and answer any other questions you have.     You must arrange for an adult to drive you home after your exam. Your colonoscopy cannot be done unless you have a ride. If you need to use public transportation, someone must ride with you and stay with you for a minimum of  6-24 hours.    Check with your insurance company to be sure they will cover this exam.    7 days before the exam:    Talk to your doctor:  If you take blood-thinners (such as Coumadin, Plavix, Xarelto), your prescription or schedule may need to change before the test.    Stop taking fiber supplements, multi-vitamins with iron, and medicines that contain iron.    Continue taking prescribed aspirin; talk to your prescribing doctor with any concerns.    Stop eating corn, nuts and foods with seeds.  These can stay in the colon for days.    If you have diabetes:  Ask to have your exam early in the morning.  Also, ask your doctor if you should change your diet or medicines.    3 days before the exam:    Begin a low-fiber diet: No raw fruits or vegetables, whole wheat, seeds, nuts, popcorn or other high-fiber foods (see list on page). No binding agents: (bran, Metamucil, Fibercon) and no Olestra (a fat substitute).    One day before the exam:    You can have a light, low-fiber breakfast. But drink only clear liquids after 9 a.m. (see list below). Drink at least 8 to 10 full glasses of clear liquids during the day.     Fill the jug that contains the Golytely powder with warm water. Cover and shake until well mixed. Use a full gallon of water. Chill for 3 hours, but do not add ice.    You will start drinking half of the Golytely solution at 6 p.m. The timing of drinking the 2nd half of the Golytely solution depends on your exam time. See Step 2 below.    After you start drinking the solution, stay near a toilet. You may have watery stools (diarrhea), mild cramping, bloating , and nausea.     Step One:  o At 3 p.m., take 2 tablets of Dulcolax (bisacodyl).  o At 6 p.m. start drinking the Golytely solution. Drink an 8-ounce glass every 15 minutes until the jug is half empty. Drink each glass quickly.                     Step Two:   If you arrive before 11 AM:  At 11 p.m. on the day before your exam:    Take 2 Dulcolax  (Bisacodyl) tablets.     Start drinking the other half of the Golytely jug. Drink one 8-ounce glass every 15 minutes until the jug is empty. Drink each glass quickly.  If you arrive after 11 AM:  At 6 AM on the day of the exam:    Take 2 tablets of Dulcolax (Bisacodyl).     Drink the other half of the Golytyel jug.     Drink one 8-ounce glass every 15 minutes until the jug is empty.     Drink each glass quickly.     You should finish the prep 4 hours before the exam.      Day of exam:      You may drink clear liquids only up until 4 hours before your exam.    Do not drink anything 4 hours before your exam, not even water. (If you must take medicine, you can take it with a sip of water.)     Do not chew or swallow anything including water or gum for at least 4 hours before your exam. If you do, we may cancel the exam for your safety.     Do not take diabetes medicine by mouth until after your exam.    If you have asthma, bring your inhaler.    Arrive with an adult who will take you home after your test. The medicine used will make you sleepy. If you don't have someone to take you home, we will cancel your test.       CLEAR LIQUIDS   You may have:    Water, tea, coffee (no milk or cream)    Soda pop, Gatorade (not red or purple)    Jell-O, Popsicles (no milk or fruit pieces - not red or purple)    Fat-free soup broth or bouillon    Plain hard candy, such as clear life savers (not red or purple)    Clear juices and fruit-flavored drinks, such as apple juice, white grape juice, Hi-C, and Kurt-Aid (not red or purple)   Do not have:    Milk or milk products such as ice cream, malts or shakes, or coffee creamer    Red or purple drinks of any kind such as cranberry juice or grape juice. Avoid red or purple Jell-O, Popsicles, Kurt-Aid, sorbet, sherbet and candy    Juices with pulp such as orange, grapefruit, pineapple or tomato juice    Cream soups of any kind    Alcohol and beer    Protein drinks or protein powder     LOW  FIBER DIET   You may have:      Starches: White bread, rolls, biscuits, croissants, [place] toast, white flour tortillas, waffles, pancakes, [place]olan toast; white rice, noodles, pasta, macaroni; cooked and peeled potatoes; plain crackers, saltines; cooked farina or cream of rice; puffed rice, corn flakes, Rice Krispies, Special K     Vegetables: tender cooked and canned, vegetable broths    Fruits and fruit juices: Strained fruit juice, canned fruit without seeds or skin (not pineapple), applesauce, pear sauce, ripe bananas, melons (not watermelon)     Milk products: Milk (plain or flavored), cheese, cottage cheese, yogurt (no berries), custard, ice cream      Proteins: Tender, well-cooked ground beef, lamb, veal, ham, pork, chicken, turkey, fish or organ meats; eggs; creamy peanut butter     Fats and condiments:  Margarine, butter, oils, mayonnaise, sour cream, salad dressing, plain gravy; spices, cooked herbs; sugar, clear jelly, honey, syrup     Snacks, sweets and drinks: Pretzels, hard candy; plain cakes and cookies (no nuts or seeds); gelatin, plain pudding, sherbet, Popsicles; coffee, tea, carbonated ( fizzy ) drinks Do not have:      Starches: Breads or rolls that contain nuts, seeds or fruit; whole wheat or whole grain breads that contain more than 1 gram of fiber per slice; cornbread; corn or whole wheat tortillas; potatoes with skin; brown rice, wild rice, kasha (buckwheat), and oatmeal     Vegetables: Any raw or steamed vegetables; vegetables with seeds; corn in any form     Fruits and fruit juices: Prunes, prune juice, raisins and other dried fruits, berries and other fruits with seeds, canned pineapple juices with pulp such as orange, grapefruit, pineapple or tomato juice    Milk products: Any yogurt with nuts, seeds or berries     Proteins: Tough, fibrous meats with gristle; cooked dried beans, peas or lentils; crunchy peanut butter    Fats and condiments: Pickles, olives, relish, horseradish; jam,  marmalade, preserves     Snacks, sweets and drinks: Popcorn, nuts, seeds, granola, coconut, candies made with nuts or seeds; all desserts that contain nuts, seeds, raisins and other dried fruits, coconut, whole grains or bran.        FAQ:      How do you know if your colon is cleaned out?   o After completing the bowel prep, your bowel movements should be all liquid and yellow. Your bowel movements will look similar to urine in the toilet. If there are pieces of stool (poop) in the toilet, or if you can't see to the bottom of the toilet, please call our office for advice. Call [phone] and ask to speak with a nurse.     Why do you need a responsible  to take you home and stay with you?  o We require a responsible adult to take you home for your safety. The sedation medicines used to relax you during the procedure can impair your judgement and reaction time, make you forgetful and possible a little unsteady. Do not drive, make any important decisions, or sign any legal documents for 24 hours after your procedure.     It is normal to feel bloated and gassy after your procedure. Walking will help move the air through your colon. You can take non-aspirin pain relievers that contain acetaminophen (Tylenol).     When can you eat after your procedure?  o You may resume your normal diet when you feel ready, unless advised otherwise by the doctor performing your procedure. Do not drink alcohol for 24 hours after your procedure.     You many resume normal activities (work, exercise, etc.) after 24 hours.     When will you get test results?  o You should have your procedure results and any lab results (if applicable) by letter, MyChart message, or phone call within 2 weeks. If you have any questions, please call the doctor that referred you for the procedure.       Thank you for choosing [facility] for your procedure. If you are sent a survey regarding your care, please take the time to complete the  questionnaire. We value your feedback!      Updated: 6/22/2022

## 2022-10-28 NOTE — ANESTHESIA POSTPROCEDURE EVALUATION
Patient: Jazlyn Bartlett    Procedure: Procedure(s):  COLONOSCOPY with polypectomy       Anesthesia Type:  MAC    Note:  Disposition: Outpatient   Postop Pain Control: Uneventful            Sign Out: Well controlled pain   PONV: No   Neuro/Psych: Uneventful            Sign Out: Acceptable/Baseline neuro status   Airway/Respiratory: Uneventful            Sign Out: Acceptable/Baseline resp. status   CV/Hemodynamics: Uneventful            Sign Out: Acceptable CV status   Other NRE: NONE   DID A NON-ROUTINE EVENT OCCUR? No           Last vitals:  Vitals Value Taken Time   /95 10/28/22 0905   Temp 97.2  F (36.2  C) 10/28/22 0905   Pulse 58 10/28/22 0905   Resp 16 10/28/22 0905   SpO2 96 % 10/28/22 0905   Vitals shown include unvalidated device data.    Electronically Signed By: Alexx Alexander MD  October 28, 2022  10:56 AM

## 2022-10-31 LAB
PATH REPORT.COMMENTS IMP SPEC: NORMAL
PATH REPORT.COMMENTS IMP SPEC: NORMAL
PATH REPORT.FINAL DX SPEC: NORMAL
PATH REPORT.GROSS SPEC: NORMAL
PATH REPORT.MICROSCOPIC SPEC OTHER STN: NORMAL
PATH REPORT.RELEVANT HX SPEC: NORMAL
PHOTO IMAGE: NORMAL

## 2022-11-20 ENCOUNTER — TELEPHONE (OUTPATIENT)
Dept: FAMILY MEDICINE | Facility: CLINIC | Age: 57
End: 2022-11-20

## 2022-11-20 NOTE — TELEPHONE ENCOUNTER
Reason for Call:  Other call back    Detailed comments: covid 19 positive     Phone Number Patient can be reached at: [phone]  Best Time: anytime    Can we leave a detailed message on this number? YES    Call taken on 11/20/2022 at 8:36 AM by Peace Hensley

## 2022-11-28 ENCOUNTER — MYC MEDICAL ADVICE (OUTPATIENT)
Dept: FAMILY MEDICINE | Facility: CLINIC | Age: 57
End: 2022-11-28

## 2022-12-02 ENCOUNTER — VIRTUAL VISIT (OUTPATIENT)
Dept: FAMILY MEDICINE | Facility: CLINIC | Age: 57
End: 2022-12-02
Payer: COMMERCIAL

## 2022-12-02 ENCOUNTER — TELEPHONE (OUTPATIENT)
Dept: FAMILY MEDICINE | Facility: CLINIC | Age: 57
End: 2022-12-02

## 2022-12-02 DIAGNOSIS — U07.1 INFECTION DUE TO 2019 NOVEL CORONAVIRUS: ICD-10-CM

## 2022-12-02 DIAGNOSIS — J44.9 CHRONIC OBSTRUCTIVE PULMONARY DISEASE, UNSPECIFIED COPD TYPE (H): Primary | ICD-10-CM

## 2022-12-02 PROCEDURE — 99214 OFFICE O/P EST MOD 30 MIN: CPT | Mod: CS | Performed by: FAMILY MEDICINE

## 2022-12-02 NOTE — LETTER
To whom it may concern.      Jazlyn Bartlett      1965            Patient is seen in the clinic 12/2/2022 on video visit.  Currently she is asymptomatic.  She had COVID diagnosis on 11/20/2022.                      Sincerely,         Ankur Park MD    12/2/2022

## 2022-12-02 NOTE — TELEPHONE ENCOUNTER
Patient was seen on 12/02 for the completion of her [place] paperwork which we faxed to number provider by the doctor.  [phone].  Leave # is [id number].

## 2022-12-02 NOTE — PROGRESS NOTES
Alma is a 57 year old who is being evaluated via a billable video visit.      How would you like to obtain your AVS? MyChart  If the video visit is dropped, the invitation should be resent by: Text to cell phone: [phone]  Will anyone else be joining your video visit? No          Assessment & Plan     Chronic obstructive pulmonary disease, unspecified COPD type (H)      Infection due to 2019 novel coronavirus  Patient has diagnosis of COVID-19 11/20/2022.  She had initial fatigue tiredness and missed work for 2 weeks.  She now back to work since today.  I do not have any limitations based on her symptoms currently however secondary to some fatigue tiredness however she can resume her work without any limitations.  Any new or changing symptoms she needs to talk to her primary for further evaluation.  FMLA for 2 weeks filled which is appropriate.               No follow-ups on file.    Ankur Park MD  [facility]    Fe Marquez is a 57 year old, presenting for the following health issues:  No chief complaint on file.  Patient made this visit to follow-up on her COVID symptoms.  Apparently she had COVID-positive 11/19/2022.  After COVID diagnosis she has upper respiratory symptoms and she took back Slo-Bid which helped but she continued to have some fatigue tiredness so she missed work for 2 weeks still 12/1/2022.  She is back to work on 12/2/2022.  Currently she is overall stable denies any shortness of breath however she has underlying COPD.  Her energy levels are somewhat better.  On and off fatigue tiredness still present but no limitation.    History of Present Illness       Reason for visit:  Covid issues    She eats 0-1 servings of fruits and vegetables daily.She consumes 0 sweetened beverage(s) daily.She exercises with enough effort to increase her heart rate 10 to 19 minutes per day.  She exercises with enough effort to increase her heart rate 5 days per week.   She is  taking medications regularly.       Review of Systems   Constitutional, HEENT, cardiovascular, pulmonary, gi and gu systems are negative, except as otherwise noted.      Objective           Vitals:  No vitals were obtained today due to virtual visit.    Physical Exam   GENERAL: Healthy, alert and no distress  EYES: Eyes grossly normal to inspection.  No discharge or erythema, or obvious scleral/conjunctival abnormalities.  RESP: No audible wheeze, cough, or visible cyanosis.  No visible retractions or increased work of breathing.    SKIN: Visible skin clear. No significant rash, abnormal pigmentation or lesions.  NEURO: Cranial nerves grossly intact.  Mentation and speech appropriate for age.  PSYCH: Mentation appears normal, affect normal/bright, judgement and insight intact, normal speech and appearance well-groomed.          Video-Visit Details    Video Start Time: 11:45  Type of service:  Video Visit    Video End Time:12:00    Originating Location (pt. Location): Home        Distant Location (provider location):  On-site    Platform used for Video Visit: Antenna

## 2022-12-06 NOTE — TELEPHONE ENCOUNTER
FMLA Form done.  Please notify the patient to pick it up.    Lokesh Casanova MD  [facility] [place]

## 2022-12-07 NOTE — TELEPHONE ENCOUNTER
Picked up completed paperwork, left voicemail for patient that paperwork will be at the EP  for .    Ana DUARTE    [facility]

## 2023-01-27 ASSESSMENT — ENCOUNTER SYMPTOMS
HEMATOCHEZIA: 0
NAUSEA: 1
EYE PAIN: 0
CONSTIPATION: 1
FEVER: 0
ABDOMINAL PAIN: 0
COUGH: 1
HEADACHES: 0
NERVOUS/ANXIOUS: 0
SHORTNESS OF BREATH: 1
DIZZINESS: 0
ARTHRALGIAS: 1
JOINT SWELLING: 1
MYALGIAS: 1
PARESTHESIAS: 1
DYSURIA: 0
PALPITATIONS: 0
HEMATURIA: 0
BREAST MASS: 0
FREQUENCY: 0
SORE THROAT: 0
DIARRHEA: 0
CHILLS: 1
WEAKNESS: 0
HEARTBURN: 1

## 2023-02-03 ENCOUNTER — OFFICE VISIT (OUTPATIENT)
Dept: INTERNAL MEDICINE | Facility: CLINIC | Age: 58
End: 2023-02-03
Payer: COMMERCIAL

## 2023-02-03 VITALS
BODY MASS INDEX: 38.86 KG/M2 | RESPIRATION RATE: 16 BRPM | HEART RATE: 86 BPM | SYSTOLIC BLOOD PRESSURE: 132 MMHG | DIASTOLIC BLOOD PRESSURE: 86 MMHG | OXYGEN SATURATION: 98 % | HEIGHT: 61 IN | TEMPERATURE: 97.6 F | WEIGHT: 205.8 LBS

## 2023-02-03 DIAGNOSIS — Z00.00 ENCOUNTER FOR PREVENTIVE HEALTH EXAMINATION: Primary | ICD-10-CM

## 2023-02-03 DIAGNOSIS — E78.5 HYPERLIPIDEMIA LDL GOAL <100: ICD-10-CM

## 2023-02-03 DIAGNOSIS — J44.9 CHRONIC OBSTRUCTIVE PULMONARY DISEASE, UNSPECIFIED COPD TYPE (H): ICD-10-CM

## 2023-02-03 DIAGNOSIS — Z13.820 ENCOUNTER FOR OSTEOPOROSIS SCREENING IN ASYMPTOMATIC POSTMENOPAUSAL PATIENT: ICD-10-CM

## 2023-02-03 DIAGNOSIS — E03.9 HYPOTHYROIDISM, UNSPECIFIED TYPE: ICD-10-CM

## 2023-02-03 DIAGNOSIS — E11.9 TYPE 2 DIABETES MELLITUS WITHOUT COMPLICATION, WITHOUT LONG-TERM CURRENT USE OF INSULIN (H): ICD-10-CM

## 2023-02-03 DIAGNOSIS — E55.9 VITAMIN D DEFICIENCY DISEASE: ICD-10-CM

## 2023-02-03 DIAGNOSIS — Z12.31 ENCOUNTER FOR SCREENING MAMMOGRAM FOR BREAST CANCER: ICD-10-CM

## 2023-02-03 DIAGNOSIS — Z11.3 SCREEN FOR STD (SEXUALLY TRANSMITTED DISEASE): ICD-10-CM

## 2023-02-03 DIAGNOSIS — Z78.0 ENCOUNTER FOR OSTEOPOROSIS SCREENING IN ASYMPTOMATIC POSTMENOPAUSAL PATIENT: ICD-10-CM

## 2023-02-03 DIAGNOSIS — Z12.4 CERVICAL CANCER SCREENING: ICD-10-CM

## 2023-02-03 DIAGNOSIS — I10 HYPERTENSION GOAL BP (BLOOD PRESSURE) < 140/80: ICD-10-CM

## 2023-02-03 DIAGNOSIS — E66.01 MORBID OBESITY (H): ICD-10-CM

## 2023-02-03 DIAGNOSIS — Z72.0 TOBACCO ABUSE: ICD-10-CM

## 2023-02-03 LAB
ANION GAP SERPL CALCULATED.3IONS-SCNC: 10 MMOL/L (ref 7–15)
BUN SERPL-MCNC: 12.8 MG/DL (ref 6–20)
CALCIUM SERPL-MCNC: 10 MG/DL (ref 8.6–10)
CHLORIDE SERPL-SCNC: 101 MMOL/L (ref 98–107)
CHOLEST SERPL-MCNC: 138 MG/DL
CREAT SERPL-MCNC: 0.69 MG/DL (ref 0.51–0.95)
CREAT UR-MCNC: 84.7 MG/DL
DEPRECATED HCO3 PLAS-SCNC: 27 MMOL/L (ref 22–29)
GFR SERPL CREATININE-BSD FRML MDRD: >90 ML/MIN/1.73M2
GLUCOSE SERPL-MCNC: 134 MG/DL (ref 70–99)
HBA1C MFR BLD: 6.9 % (ref 0–5.6)
HDLC SERPL-MCNC: 46 MG/DL
LDLC SERPL CALC-MCNC: 66 MG/DL
MICROALBUMIN UR-MCNC: <12 MG/L
MICROALBUMIN/CREAT UR: NORMAL MG/G{CREAT}
NONHDLC SERPL-MCNC: 92 MG/DL
POTASSIUM SERPL-SCNC: 4.1 MMOL/L (ref 3.4–5.3)
SODIUM SERPL-SCNC: 138 MMOL/L (ref 136–145)
TRIGL SERPL-MCNC: 132 MG/DL
TSH SERPL DL<=0.005 MIU/L-ACNC: 0.39 UIU/ML (ref 0.3–4.2)

## 2023-02-03 PROCEDURE — 80048 BASIC METABOLIC PNL TOTAL CA: CPT

## 2023-02-03 PROCEDURE — 84443 ASSAY THYROID STIM HORMONE: CPT

## 2023-02-03 PROCEDURE — 86803 HEPATITIS C AB TEST: CPT

## 2023-02-03 PROCEDURE — 82043 UR ALBUMIN QUANTITATIVE: CPT

## 2023-02-03 PROCEDURE — 99396 PREV VISIT EST AGE 40-64: CPT

## 2023-02-03 PROCEDURE — 82570 ASSAY OF URINE CREATININE: CPT

## 2023-02-03 PROCEDURE — 82306 VITAMIN D 25 HYDROXY: CPT

## 2023-02-03 PROCEDURE — G0145 SCR C/V CYTO,THINLAYER,RESCR: HCPCS

## 2023-02-03 PROCEDURE — 87389 HIV-1 AG W/HIV-1&-2 AB AG IA: CPT

## 2023-02-03 PROCEDURE — 83036 HEMOGLOBIN GLYCOSYLATED A1C: CPT

## 2023-02-03 PROCEDURE — 99214 OFFICE O/P EST MOD 30 MIN: CPT | Mod: 25

## 2023-02-03 PROCEDURE — 80061 LIPID PANEL: CPT

## 2023-02-03 PROCEDURE — 36415 COLL VENOUS BLD VENIPUNCTURE: CPT

## 2023-02-03 PROCEDURE — 87591 N.GONORRHOEAE DNA AMP PROB: CPT

## 2023-02-03 PROCEDURE — 87624 HPV HI-RISK TYP POOLED RSLT: CPT

## 2023-02-03 PROCEDURE — 87491 CHLMYD TRACH DNA AMP PROBE: CPT

## 2023-02-03 ASSESSMENT — ENCOUNTER SYMPTOMS
PARESTHESIAS: 1
DIZZINESS: 0
SHORTNESS OF BREATH: 1
HEADACHES: 0
NERVOUS/ANXIOUS: 0
FREQUENCY: 0
EYE PAIN: 0
HEMATOCHEZIA: 0
WEAKNESS: 0
DIARRHEA: 0
PALPITATIONS: 0
ARTHRALGIAS: 1
JOINT SWELLING: 1
DYSURIA: 0
BREAST MASS: 0
HEARTBURN: 1
CONSTIPATION: 1
HEMATURIA: 0
SORE THROAT: 0
MYALGIAS: 1
COUGH: 1
ABDOMINAL PAIN: 0
FEVER: 0
CHILLS: 1
NAUSEA: 1

## 2023-02-03 ASSESSMENT — PAIN SCALES - GENERAL: PAINLEVEL: NO PAIN (0)

## 2023-02-03 NOTE — PROGRESS NOTES
SUBJECTIVE:   CC: Alma is an 57 year old who presents for preventive health visit.     Patient has been advised of split billing requirements and indicates understanding: Yes  Healthy Habits:     Getting at least 3 servings of Calcium per day:  NO    Bi-annual eye exam:  Yes    Dental care twice a year:  NO    Sleep apnea or symptoms of sleep apnea:  Daytime drowsiness and Excessive snoring    Diet:  Diabetic    Frequency of exercise:  1 day/week    Duration of exercise:  15-30 minutes    Taking medications regularly:  Yes    Medication side effects:  Muscle aches and Other    PHQ-2 Total Score: 0    Additional concerns today:  No        Today's PHQ-2 Score:   PHQ-2 ( 1999 Pfizer) 1/27/2023   Q1: Little interest or pleasure in doing things 0   Q2: Feeling down, depressed or hopeless 0   PHQ-2 Score 0   PHQ-2 Total Score (12-17 Years)- Positive if 3 or more points; Administer PHQ-A if positive -   Q1: Little interest or pleasure in doing things Not at all   Q2: Feeling down, depressed or hopeless Not at all   PHQ-2 Score 0           Social History     Tobacco Use     Smoking status: Every Day     Packs/day: 0.50     Years: 37.00     Pack years: 18.50     Types: Cigarettes     Start date: 1/1/1979     Smokeless tobacco: Former     Quit date: 2/22/2020   Substance Use Topics     Alcohol use: No       Alcohol Use 1/27/2023   Prescreen: >3 drinks/day or >7 drinks/week? No   Prescreen: >3 drinks/day or >7 drinks/week? -       Reviewed orders with patient.  Reviewed health maintenance and updated orders accordingly - Yes  Lab work is in process    Breast Cancer Screening:    Breast CA Risk Assessment (FHS-7) 6/28/2022 7/25/2022 10/18/2022   Do you have a family history of breast, colon, or ovarian cancer? Yes Yes No / Unknown         Mammogram Screening: Recommended mammography every 1-2 years with patient discussion and risk factor consideration  Pertinent mammograms are reviewed under the imaging tab.    History of  "abnormal Pap smear: NO - age 30-65 PAP every 5 years with negative HPV co-testing recommended  PAP / HPV Latest Ref Rng & Units 11/7/2017 12/15/2014 9/5/2013   PAP (Historical) - NIL NIL NIL   HPV16 NEG:Negative Negative - -   HPV18 NEG:Negative Negative - -   HRHPV NEG:Negative Negative - -     Reviewed and updated as needed this visit by clinical staff   Tobacco  Allergies  Meds  Problems  Med Hx  Surg Hx  Fam Hx          Reviewed and updated as needed this visit by Provider                   Review of Systems   Constitutional: Positive for chills. Negative for fever.   HENT: Positive for congestion. Negative for ear pain, hearing loss and sore throat.    Eyes: Negative for pain and visual disturbance.   Respiratory: Positive for cough and shortness of breath.    Cardiovascular: Positive for peripheral edema. Negative for chest pain and palpitations.   Gastrointestinal: Positive for constipation, heartburn and nausea. Negative for abdominal pain, diarrhea and hematochezia.   Breasts:  Negative for tenderness, breast mass and discharge.   Genitourinary: Positive for genital sores. Negative for dysuria, frequency, hematuria, pelvic pain, urgency, vaginal bleeding and vaginal discharge.   Musculoskeletal: Positive for arthralgias, joint swelling and myalgias.   Skin: Negative for rash.   Neurological: Positive for paresthesias. Negative for dizziness, weakness and headaches.   Psychiatric/Behavioral: Negative for mood changes. The patient is not nervous/anxious.         OBJECTIVE:   /86 (BP Location: Left arm, Cuff Size: Adult Regular)   Pulse 86   Temp 97.6  F (36.4  C) (Tympanic)   Resp 16   Ht 1.549 m (5' 1\")   Wt 93.4 kg (205 lb 12.8 oz)   SpO2 98%   BMI 38.89 kg/m        Physical Exam  Constitutional:       General: She is not in acute distress.     Appearance: Normal appearance. She is not ill-appearing, toxic-appearing or diaphoretic.   HENT:      Head: Normocephalic and atraumatic.   Eyes: "      Conjunctiva/sclera: Conjunctivae normal.   Cardiovascular:      Rate and Rhythm: Normal rate and regular rhythm.      Heart sounds: Normal heart sounds.   Pulmonary:      Effort: Pulmonary effort is normal.      Breath sounds: Normal breath sounds.   Chest:      Comments: Breasts: symmetric, skin intact, without lesions, no lymphadenopathy, no masses, non-tender bilaterally  Genitourinary:     Comments: PELVIC:   Vulva: normal external female genitalia,   Urethra meatus: normal, non-tender  Vagina: normal vaginal mucosa, rugated, no lesions, normal physiologic discharge.    Cervix: nulliparous cervix, no lesions.    Uterus: Normal contour, size, position; non-tender  Perineum: normal, no lesions, intact  Skin:     General: Skin is warm and dry.   Neurological:      Mental Status: She is alert and oriented to person, place, and time.   Psychiatric:         Mood and Affect: Mood normal.         Behavior: Behavior normal.         Thought Content: Thought content normal.         Judgment: Judgment normal.         Diagnostic Test Results:  Labs reviewed in Epic    ASSESSMENT/PLAN:   (Z00.00) Encounter for preventive health examination  (primary encounter diagnosis)  Comment: Pt presents for annual physical. Discussed chronic L knee pain likely due to OA. She has not seen PT or Ortho yet at this time.      (Z12.4) Cervical cancer screening  Plan: Pap Screen with HPV - recommended age 30 - 65         years            (E11.9) Type 2 diabetes mellitus without complication, without long-term current use of insulin (H)  Comment: A1C at goal today at 6.9%.   Plan: HEMOGLOBIN A1C, Basic metabolic panel  (Ca, Cl,        CO2, Creat, Gluc, K, Na, BUN), Albumin Random         Urine Quantitative with Creat Ratio            (I10) Hypertension goal BP (blood pressure) < 140/80  Comment: BP at goal today at 132/86. Could think about increasing losartan to 100MG in the future as I ideally would like her BP <130/80 with her being a  diabetic.  Plan: Basic metabolic panel  (Ca, Cl, CO2, Creat,         Gluc, K, Na, BUN), Albumin Random Urine         Quantitative with Creat Ratio            (E78.5) Hyperlipidemia LDL goal <100  Comment: Last LDL was <100. Continue Simvastatin 20MG.  Plan: Lipid panel reflex to direct LDL Fasting           (J44.9) Chronic obstructive pulmonary disease, unspecified COPD type (H)  Comment: Hx of COPD. Currently taking Flovent inhaler BID.     (E66.01) Morbid obesity (H)  Comment:   With co morbidities of HTN, HLD, and DM. We discussed weight loss can help lower blood sugars. She tries to walk about 15 minutes during her lunch break every day. She was 240 lbs in the past and is now down to 205lbs. She was on Trulicity in the past to help with weight loss and diabetes control but suffered from significant GI side effects. We could maybe trial Ozempic which she is in agreement with. Will wait to see what A1C is today. She is currently taking Glipizide XL 10MG in A.M. and 20MG in P.M. --I will double check on this dosing as I believe max dose of Glipizide XL is 20MG/day.       (Z12.31) Encounter for screening mammogram for breast cancer  Plan: *MA Screening Digital Bilateral            (Z13.820,  Z78.0) Encounter for osteoporosis screening in asymptomatic postmenopausal patient  Plan: DX Hip/Pelvis/Spine         (Z11.3) Screen for STD (sexually transmitted disease)  Plan: Hepatitis C Screen Reflex to HCV RNA Quant and         Genotype, HIV Antigen Antibody Combo, NEISSERIA        GONORRHOEA PCR, CHLAMYDIA TRACHOMATIS PCR            (Z72.0) Tobacco abuse  Comment: She reports she has cut down on smoking but is not ready to quit at this time due to amount of stressors in her life.       (E03.9) Hypothyroidism, unspecified type  Comment: Will update TSH. Currently taking Synthroid 137 MCG daily.  Plan: TSH with free T4 reflex            (E55.9) Vitamin D deficiency disease  Plan: Vitamin D Deficiency           Patient has  "been advised of split billing requirements and indicates understanding: Yes      COUNSELING:  Reviewed preventive health counseling, as reflected in patient instructions       Regular exercise       Healthy diet/nutrition       Osteoporosis prevention/bone health       Colorectal Cancer Screening      BMI:   Estimated body mass index is 38.89 kg/m  as calculated from the following:    Height as of this encounter: 1.549 m (5' 1\").    Weight as of this encounter: 93.4 kg (205 lb 12.8 oz).   Weight management plan: Discussed healthy diet and exercise guidelines      She reports that she has been smoking cigarettes. She started smoking about 44 years ago. She has a 18.50 pack-year smoking history. She quit smokeless tobacco use about 2 years ago.  Nicotine/Tobacco Cessation Plan:   Information offered: Patient not interested at this time          ELIZABETH Leggett CNP  [facility]  "

## 2023-02-03 NOTE — LETTER
February 3, 2023      Jazlyn Bartlett  [address]  [place] [address]            To whom it concerns,       Alma was seen in our clinic by Sharron Justice CNP  this morning,  2/3/23.      Sincerely,      Sharron Justice RN, CNP

## 2023-02-04 LAB
C TRACH DNA SPEC QL NAA+PROBE: NEGATIVE
DEPRECATED CALCIDIOL+CALCIFEROL SERPL-MC: 42 UG/L (ref 20–75)
HCV AB SERPL QL IA: NONREACTIVE
HIV 1+2 AB+HIV1 P24 AG SERPL QL IA: NONREACTIVE
N GONORRHOEA DNA SPEC QL NAA+PROBE: NEGATIVE

## 2023-02-07 ENCOUNTER — MYC MEDICAL ADVICE (OUTPATIENT)
Dept: INTERNAL MEDICINE | Facility: CLINIC | Age: 58
End: 2023-02-07
Payer: COMMERCIAL

## 2023-02-07 ENCOUNTER — TELEPHONE (OUTPATIENT)
Dept: INTERNAL MEDICINE | Facility: CLINIC | Age: 58
End: 2023-02-07

## 2023-02-07 NOTE — TELEPHONE ENCOUNTER
Spoke with the patient as follow up per Sharron Justice CNP to verify the dose she is taking of Glipizide.    She is taking the medication as prescribed from Dr. Casanova.    Glipizide XL 10 MG      Taking 1 tab in AM   Taking 2 tabs in the PM    Informed patient that we would only reach out to her if there would be any change.    Please advise.

## 2023-02-08 LAB
BKR LAB AP GYN ADEQUACY: NORMAL
BKR LAB AP GYN INTERPRETATION: NORMAL
BKR LAB AP HPV REFLEX: NORMAL
BKR LAB AP PREVIOUS ABNORMAL: NORMAL
PATH REPORT.COMMENTS IMP SPEC: NORMAL
PATH REPORT.COMMENTS IMP SPEC: NORMAL
PATH REPORT.RELEVANT HX SPEC: NORMAL

## 2023-02-10 LAB
HUMAN PAPILLOMA VIRUS 16 DNA: NEGATIVE
HUMAN PAPILLOMA VIRUS 18 DNA: NEGATIVE
HUMAN PAPILLOMA VIRUS FINAL DIAGNOSIS: NORMAL
HUMAN PAPILLOMA VIRUS OTHER HR: NEGATIVE

## 2023-02-19 ENCOUNTER — OFFICE VISIT (OUTPATIENT)
Dept: URGENT CARE | Facility: URGENT CARE | Age: 58
End: 2023-02-19
Payer: COMMERCIAL

## 2023-02-19 ENCOUNTER — APPOINTMENT (OUTPATIENT)
Dept: ULTRASOUND IMAGING | Facility: CLINIC | Age: 58
End: 2023-02-19
Attending: EMERGENCY MEDICINE
Payer: COMMERCIAL

## 2023-02-19 ENCOUNTER — TRANSFERRED RECORDS (OUTPATIENT)
Dept: HEALTH INFORMATION MANAGEMENT | Facility: CLINIC | Age: 58
End: 2023-02-19

## 2023-02-19 ENCOUNTER — APPOINTMENT (OUTPATIENT)
Dept: GENERAL RADIOLOGY | Facility: CLINIC | Age: 58
End: 2023-02-19
Attending: EMERGENCY MEDICINE
Payer: COMMERCIAL

## 2023-02-19 ENCOUNTER — HOSPITAL ENCOUNTER (EMERGENCY)
Facility: CLINIC | Age: 58
Discharge: HOME | End: 2023-02-19
Attending: EMERGENCY MEDICINE | Admitting: EMERGENCY MEDICINE
Payer: COMMERCIAL

## 2023-02-19 VITALS
OXYGEN SATURATION: 100 % | HEART RATE: 61 BPM | TEMPERATURE: 97 F | SYSTOLIC BLOOD PRESSURE: 151 MMHG | DIASTOLIC BLOOD PRESSURE: 83 MMHG | RESPIRATION RATE: 15 BRPM

## 2023-02-19 VITALS
DIASTOLIC BLOOD PRESSURE: 77 MMHG | HEART RATE: 69 BPM | BODY MASS INDEX: 38.36 KG/M2 | WEIGHT: 203 LBS | RESPIRATION RATE: 16 BRPM | TEMPERATURE: 97.9 F | SYSTOLIC BLOOD PRESSURE: 127 MMHG | OXYGEN SATURATION: 97 %

## 2023-02-19 DIAGNOSIS — Z53.9 DIAGNOSIS NOT YET DEFINED: Primary | ICD-10-CM

## 2023-02-19 DIAGNOSIS — M25.561 ACUTE PAIN OF RIGHT KNEE: ICD-10-CM

## 2023-02-19 DIAGNOSIS — S83.004A DISLOCATION OF RIGHT PATELLA, INITIAL ENCOUNTER: ICD-10-CM

## 2023-02-19 PROCEDURE — 73562 X-RAY EXAM OF KNEE 3: CPT | Mod: RT

## 2023-02-19 PROCEDURE — 99284 EMERGENCY DEPT VISIT MOD MDM: CPT | Mod: 25

## 2023-02-19 PROCEDURE — 93971 EXTREMITY STUDY: CPT | Mod: RT

## 2023-02-19 ASSESSMENT — ACTIVITIES OF DAILY LIVING (ADL): ADLS_ACUITY_SCORE: 33

## 2023-02-19 ASSESSMENT — PAIN SCALES - GENERAL: PAINLEVEL: WORST PAIN (10)

## 2023-02-19 ASSESSMENT — ENCOUNTER SYMPTOMS
MYALGIAS: 1
ARTHRALGIAS: 1

## 2023-02-19 NOTE — ED TRIAGE NOTES
Arrives with complaints of pain in right knee pain and swelling in the calf. [facility] sent patient for concern of blood clot from a wrist surgery done in August. ABCs intact in triage without need for intervention at this time.

## 2023-02-19 NOTE — ED PROVIDER NOTES
History     Chief Complaint:  Right Knee Pain     The history is provided by the patient.      Jazlyn Bartlett is a 57 year old female with a history of adrenal adenoma, hypothyroidism, COPD, hypertension, and type 2 diabetes mellitus who presents with right knee pain and swelling in right calf. She notes she turned and twisted her knee and heard a pop and it has hurt since then. She notes she had to get her knee cap aligned after twisting it by straightening it. She notes difficulty standing for two days. She notes concern for right knee cap dislocation. Patient was sent here by [facility]. She notes they were concerned for blood clot.    Independent Historian:   None - Patient Only    Review of External Notes: Reviewed [facility] note from prior to arrival. Pt went there first before proceeding to [facility].      ROS:  Review of Systems   Musculoskeletal: Positive for arthralgias (Right knee) and myalgias (Right calf).   Neurological: Negative for numbness.     Allergies:  Hydrocodone-Acetaminophen  Ibuprofen Sodium  Lisinopril     Medications:    Albuterol inhaler  Aspirin 81 MG  Fluticasone inhaler  Glipizide   Hydrochlorothiazide   Levothyroxine   Losartan   Qvar Redihaler inhaler  Salmeterol inhaler  Simvastatin   Spiriva handihaler inhaler  [place]    Past Medical History:    Arthritis  Chronic pain  COPD  Depressive disorder  Diabetes mellitus type 2  Depressive disorder  GERD  Hypertension  Hypothyroidism  Kidney stone  Mucoepidermoid carcinoma of parotid gland  Uncomplicated asthma  Clostridium difficile colitis  Rhinitis  Excessive or frequent menstruation  Graves disease  Thyrotoxicosis  Plantar fascial fibromatosis  Adrenal adenoma    Past Surgical History:    Abdomen surgery  Ablation, endometrial, thermal, w/o hysteroscopic guidance  Neck biopsy  Colonoscopy X2  Dilation and curettage, hysteroscopy, ablate endometrium novasure, combined  EGD, combined  Genitourinary surgery  Head and neck surgery  Removal of cancerous  lump on neck  Tonsillectomy   Tubal ligation     Family History:    Family history includes Anesthesia Reaction in her daughter; Anxiety Disorder in an other family member; Asthma in an other family member; Breast Cancer in her mother and sister; Cerebrovascular Disease in her father; Coronary Artery Disease in her father; Depression in an other family member; Diabetes in her brother, daughter, father, maternal grandmother, mother, sister, sister, and another family member; Hyperlipidemia in her father and mother; Hypertension in an other family member; Lung Cancer in her father; Obesity in her sister and another family member; Osteoporosis in her mother; Prostate Cancer in her father; Rheumatoid Arthritis in her father; Thyroid Disease in an other family member.    Social History:  She reports that she has been smoking cigarettes. She started smoking about 44 years ago. She has a 18.50 pack-year smoking history. She quit smokeless tobacco use about 2 years ago. She reports that she does not drink alcohol and does not use drugs.  Patient arrived via car.  Patient presents alone.  Patient works for [employer].   PCP: Sharron Justice     Physical Exam     Patient Vitals for the past 24 hrs:   BP Temp Pulse Resp SpO2   02/19/23 1544 (!) 145/104 -- 69 15 96 %   02/19/23 1540 -- 97  F (36.1  C) -- -- --        Physical Exam  Head:  The scalp, face, and head appear normal  Eyes:  Conjunctivae are normal  ENT:    The nose is normal    Pinnae are normal  Neck:  Trachea midline  CV:  Normal rate, regular rhythm. DP pulses normal/equal bilaterally.  Resp:  No respiratory distress   Musc:  Normal muscular tone    No pitting edema bilaterally.     Pain to palpation overlying patella.   Skin:  No rash or lesions noted  Neuro: Speech is normal and fluent. Face is symmetric. Moving all extremities well.       Emergency Department Course     Imaging:  XR Knee Right 3 Views   Final Result   IMPRESSION:    1.  No change since the  comparison.   2.  Mild right knee degenerative arthrosis with medial compartment narrowing.   3.  Small-moderate-sized joint effusion.      US Lower Extremity Venous Duplex Right   Final Result   IMPRESSION:   1.  No deep venous thrombosis in the right lower extremity.         Report per radiology   XR independently reviewed. No fracture/dislocation.     Emergency Department Course & Assessments:    Independent Interpretation (X-rays, CTs, rhythm strip):  See above    Assessments/Consultations/Discussion of Management or Tests:    ED Course as of 02/19/23 1800   Sun Feb 19, 2023   1743 I obtained patient's history and examined as noted above.        Social Determinants of Health affecting care:   None    Disposition:  The patient was discharged to home.     Impression & Plan      Medical Decision Making:  Pt presents from orthopedic clinic with CC right knee pain. Pt describes injury that sounds consistent with a patellar dislocation. XR negative for fracture. Pt was referred here by orthopedics for rule out of DVT. Ultrasound negative. Doubt occult fracture. No evidence of septic joint. Pt already in a knee immobilizer from [facility] which is recommended treatment for patellar dislocation. No further ED intervention indicated. Recommended follow-up with orthopedics as they recommended. She is in stable condition at the time of discharge, indications for return to the ED were discussed as well as follow up. All questions were answered and she is in agreement with the plan.      Diagnosis:    ICD-10-CM    1. Acute pain of right knee  M25.561       2. Dislocation of right patella, initial encounter  S83.004A         Scribe Disclosure:  I, Tamia Gramajo, am serving as a scribe at 5:16 PM on 2/19/2023 to document services personally performed by Roly Noe MD based on my observations and the provider's statements to me.        Roly Noe MD  02/21/23 0236

## 2023-02-19 NOTE — LETTER
February 19, 2023      To Whom It May Concern:      Jazlyn Bartlett was seen in our Emergency Department today, 02/19/23. She suffered a dislocation of her patella (knee cap). She will need to wear a knee immobilizer until cleared by orthopedic surgery. Please allow accomodations at work for an elevated desk and chair to more comfortably keep leg straight until cleared by orthopedic surgery.    Sincerely,        Roly Noe MD

## 2023-02-21 ASSESSMENT — ENCOUNTER SYMPTOMS: NUMBNESS: 0

## 2023-03-14 ENCOUNTER — TRANSFERRED RECORDS (OUTPATIENT)
Dept: HEALTH INFORMATION MANAGEMENT | Facility: CLINIC | Age: 58
End: 2023-03-14
Payer: COMMERCIAL

## 2023-03-24 ENCOUNTER — TRANSFERRED RECORDS (OUTPATIENT)
Dept: HEALTH INFORMATION MANAGEMENT | Facility: CLINIC | Age: 58
End: 2023-03-24
Payer: COMMERCIAL

## 2023-05-22 ENCOUNTER — OFFICE VISIT (OUTPATIENT)
Dept: URGENT CARE | Facility: URGENT CARE | Age: 58
End: 2023-05-22
Payer: COMMERCIAL

## 2023-05-22 ENCOUNTER — ANCILLARY PROCEDURE (OUTPATIENT)
Dept: GENERAL RADIOLOGY | Facility: CLINIC | Age: 58
End: 2023-05-22
Attending: PHYSICIAN ASSISTANT
Payer: COMMERCIAL

## 2023-05-22 ENCOUNTER — MYC MEDICAL ADVICE (OUTPATIENT)
Dept: INTERNAL MEDICINE | Facility: CLINIC | Age: 58
End: 2023-05-22

## 2023-05-22 VITALS
RESPIRATION RATE: 26 BRPM | HEART RATE: 89 BPM | DIASTOLIC BLOOD PRESSURE: 80 MMHG | OXYGEN SATURATION: 96 % | WEIGHT: 203 LBS | TEMPERATURE: 98.8 F | BODY MASS INDEX: 38.36 KG/M2 | SYSTOLIC BLOOD PRESSURE: 130 MMHG

## 2023-05-22 DIAGNOSIS — R05.2 SUBACUTE COUGH: ICD-10-CM

## 2023-05-22 DIAGNOSIS — R05.2 SUBACUTE COUGH: Primary | ICD-10-CM

## 2023-05-22 DIAGNOSIS — J20.9 ACUTE BRONCHITIS WITH COEXISTING CONDITION REQUIRING PROPHYLACTIC TREATMENT: ICD-10-CM

## 2023-05-22 LAB
DEPRECATED S PYO AG THROAT QL EIA: NEGATIVE
GROUP A STREP BY PCR: NOT DETECTED

## 2023-05-22 PROCEDURE — 71046 X-RAY EXAM CHEST 2 VIEWS: CPT | Mod: TC | Performed by: RADIOLOGY

## 2023-05-22 PROCEDURE — 99214 OFFICE O/P EST MOD 30 MIN: CPT | Performed by: PHYSICIAN ASSISTANT

## 2023-05-22 PROCEDURE — 87635 SARS-COV-2 COVID-19 AMP PRB: CPT | Performed by: PHYSICIAN ASSISTANT

## 2023-05-22 PROCEDURE — 87651 STREP A DNA AMP PROBE: CPT | Performed by: PHYSICIAN ASSISTANT

## 2023-05-22 RX ORDER — PREDNISONE 20 MG/1
TABLET ORAL
Qty: 13 TABLET | Refills: 0 | Status: SHIPPED | OUTPATIENT
Start: 2023-05-22 | End: 2023-05-30

## 2023-05-22 RX ORDER — AZITHROMYCIN 250 MG/1
TABLET, FILM COATED ORAL
Qty: 6 TABLET | Refills: 0 | Status: SHIPPED | OUTPATIENT
Start: 2023-05-22 | End: 2023-05-27

## 2023-05-22 NOTE — PROGRESS NOTES
SUBJECTIVE:  Jazlyn Bartlett is a 57 year old female who presents to the clinic today with a chief complaint of cough , shortness of breath. and wheezing. for 2 week(s).  Her cough is described as productive of yellow sputum.    The patient's symptoms are moderate and stable.  Associated symptoms include congestion and fever. The patient's symptoms are exacerbated by no particular triggers  Patient has been using inhaler  to improve symptoms.    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 6 tablet 0     fluticasone (FLOVENT HFA) 220 MCG/ACT inhaler Inhale 1 puff into the lungs 2 times daily (Patient taking differently: Inhale 1 puff into the lungs 2 times daily as needed) 12 g 0     glipiZIDE (GLUCOTROL XL) 10 MG 24 hr tablet 1 tab in am and 2 tabs in  tablet 3     hydrochlorothiazide (HYDRODIURIL) 25 MG tablet TAKE 1 TABLET (25 MG) BY MOUTH DAILY 90 tablet 3     levothyroxine (SYNTHROID/LEVOTHROID) 137 MCG tablet TAKE 1 TABLET (137 MCG) BY MOUTH DAILY 90 tablet 3     losartan (COZAAR) 50 MG tablet Take 1 tablet (50 mg) by mouth daily 90 tablet 3      predniSONE (DELTASONE) 20 MG tablet Take 3 tablets (60 mg) by mouth daily for 2 days, THEN 2 tablets (40 mg) daily for 2 days, THEN 1 tablet (20 mg) daily for 2 days, THEN 0.5 tablets (10 mg) daily for 2 days. 13 tablet 0     QVAR REDIHALER 80 MCG/ACT inhaler INHALE 1 PUFF INTO THE LUNGS 2 TIMES DAILY WHILE ON PAXLOVID X 5 DAYS 10.6 g 0     salmeterol (SEREVENT) 50 MCG/DOSE inhaler Inhale 1 puff into the lungs 2 times daily as needed       simvastatin (ZOCOR) 20 MG tablet Take 1 tablet (20 mg) by mouth At Bedtime 90 tablet 3       Social History     Tobacco Use     Smoking status: Every Day     Packs/day: 0.50     Years: 37.00     Pack years: 18.50     Types: Cigarettes     Start date: 1/1/1979     Smokeless tobacco: Former     Quit date: 2/22/2020   Vaping Use     Vaping status: Never Used   Substance Use Topics     Alcohol use: No       ROS  Review of systems negative except as stated above.    OBJECTIVE:  /80   Pulse 89   Temp 98.8  F (37.1  C) (Tympanic)   Resp 26   Wt 92.1 kg (203 lb)   SpO2 96%   BMI 38.36 kg/m    GENERAL APPEARANCE: healthy, alert and no distress  RESP: lungs clear to auscultation - no rales, rhonchi or wheezes  CV: regular rates and rhythm, normal S1 S2, no murmur noted  NEURO: Normal strength and tone, sensory exam grossly normal,  normal speech and mentation  SKIN: no suspicious lesions or rashes    Labs pending  X-ray image initially interpreted in clinic by me in order to rule out pneumonia.  No evidence appreciated.    ASSESSMENT:    (R05.2) Subacute cough  (primary encounter diagnosis)  (J20.9) Acute bronchitis with coexisting condition requiring prophylactic treatment  Comment:covering for bacteria  Plan: XR Chest 2 Views, Symptomatic COVID-19 Virus         (Coronavirus) by PCR, Streptococcus A Rapid         Screen w/Reflex to PCR - Clinic Collect,         azithromycin (ZITHROMAX) 250 MG tablet,         predniSONE (DELTASONE) 20 MG tablet      Red flags and emergent follow  up discussed, and understood by patient  Follow up with PCP if symptoms worsen or fail to improve in 2 days

## 2023-05-23 LAB — SARS-COV-2 RNA RESP QL NAA+PROBE: NEGATIVE

## 2023-06-19 ENCOUNTER — HOSPITAL ENCOUNTER (OUTPATIENT)
Dept: ULTRASOUND IMAGING | Facility: CLINIC | Age: 58
Discharge: HOME | End: 2023-06-19
Attending: PHYSICIAN ASSISTANT
Payer: COMMERCIAL

## 2023-06-19 ENCOUNTER — OFFICE VISIT (OUTPATIENT)
Dept: PEDIATRICS | Facility: CLINIC | Age: 58
End: 2023-06-19
Attending: INTERNAL MEDICINE
Payer: COMMERCIAL

## 2023-06-19 ENCOUNTER — HOSPITAL ENCOUNTER (OUTPATIENT)
Dept: CT IMAGING | Facility: CLINIC | Age: 58
Discharge: HOME | End: 2023-06-19
Attending: PHYSICIAN ASSISTANT
Payer: COMMERCIAL

## 2023-06-19 ENCOUNTER — OFFICE VISIT (OUTPATIENT)
Dept: PEDIATRICS | Facility: CLINIC | Age: 58
End: 2023-06-19
Payer: COMMERCIAL

## 2023-06-19 ENCOUNTER — HOSPITAL ENCOUNTER (OUTPATIENT)
Dept: GENERAL RADIOLOGY | Facility: CLINIC | Age: 58
Discharge: HOME | End: 2023-06-19
Attending: PHYSICIAN ASSISTANT
Payer: COMMERCIAL

## 2023-06-19 VITALS
SYSTOLIC BLOOD PRESSURE: 141 MMHG | TEMPERATURE: 98.1 F | BODY MASS INDEX: 38 KG/M2 | HEART RATE: 83 BPM | RESPIRATION RATE: 18 BRPM | WEIGHT: 201 LBS | DIASTOLIC BLOOD PRESSURE: 86 MMHG | OXYGEN SATURATION: 97 %

## 2023-06-19 VITALS
SYSTOLIC BLOOD PRESSURE: 146 MMHG | WEIGHT: 201.8 LBS | HEIGHT: 61 IN | RESPIRATION RATE: 18 BRPM | TEMPERATURE: 98.9 F | HEART RATE: 84 BPM | DIASTOLIC BLOOD PRESSURE: 90 MMHG | OXYGEN SATURATION: 97 % | BODY MASS INDEX: 38.1 KG/M2

## 2023-06-19 DIAGNOSIS — M79.661 PAIN OF RIGHT LOWER LEG: ICD-10-CM

## 2023-06-19 DIAGNOSIS — M43.17 SPONDYLOLISTHESIS AT L5-S1 LEVEL: ICD-10-CM

## 2023-06-19 DIAGNOSIS — R79.82 ELEVATED C-REACTIVE PROTEIN (CRP): ICD-10-CM

## 2023-06-19 DIAGNOSIS — R10.9 BILATERAL FLANK PAIN: ICD-10-CM

## 2023-06-19 DIAGNOSIS — R32 URINARY INCONTINENCE, UNSPECIFIED TYPE: ICD-10-CM

## 2023-06-19 DIAGNOSIS — R50.9 FEVER, UNSPECIFIED FEVER CAUSE: ICD-10-CM

## 2023-06-19 DIAGNOSIS — M54.41 CHRONIC BILATERAL LOW BACK PAIN WITH RIGHT-SIDED SCIATICA: Primary | ICD-10-CM

## 2023-06-19 DIAGNOSIS — R79.89 ELEVATED D-DIMER: ICD-10-CM

## 2023-06-19 DIAGNOSIS — M48.061 FORAMINAL STENOSIS OF LUMBAR REGION: ICD-10-CM

## 2023-06-19 DIAGNOSIS — E11.9 TYPE 2 DIABETES MELLITUS WITHOUT COMPLICATION, WITHOUT LONG-TERM CURRENT USE OF INSULIN (H): ICD-10-CM

## 2023-06-19 DIAGNOSIS — M54.9 TENDERNESS OVER SPINE: ICD-10-CM

## 2023-06-19 DIAGNOSIS — R10.9 ACUTE RIGHT FLANK PAIN: ICD-10-CM

## 2023-06-19 DIAGNOSIS — M54.41 ACUTE BILATERAL LOW BACK PAIN WITH RIGHT-SIDED SCIATICA: Primary | ICD-10-CM

## 2023-06-19 DIAGNOSIS — K59.00 CONSTIPATION, UNSPECIFIED CONSTIPATION TYPE: ICD-10-CM

## 2023-06-19 DIAGNOSIS — R50.9 FEVER AND CHILLS: ICD-10-CM

## 2023-06-19 DIAGNOSIS — G89.29 CHRONIC BILATERAL LOW BACK PAIN WITH RIGHT-SIDED SCIATICA: Primary | ICD-10-CM

## 2023-06-19 DIAGNOSIS — J44.9 CHRONIC OBSTRUCTIVE PULMONARY DISEASE, UNSPECIFIED COPD TYPE (H): ICD-10-CM

## 2023-06-19 LAB
ALBUMIN SERPL BCG-MCNC: 4 G/DL (ref 3.5–5.2)
ALBUMIN UR-MCNC: 20 MG/DL
ALP SERPL-CCNC: 84 U/L (ref 35–104)
ALT SERPL W P-5'-P-CCNC: 18 U/L (ref 0–50)
ANION GAP SERPL CALCULATED.3IONS-SCNC: 13 MMOL/L (ref 7–15)
APPEARANCE UR: ABNORMAL
AST SERPL W P-5'-P-CCNC: 19 U/L (ref 0–45)
BACTERIA #/AREA URNS HPF: ABNORMAL /HPF
BASOPHILS # BLD AUTO: 0 10E3/UL (ref 0–0.2)
BASOPHILS NFR BLD AUTO: 0 %
BILIRUB SERPL-MCNC: 1.1 MG/DL
BILIRUB UR QL STRIP: NEGATIVE
BUN SERPL-MCNC: 9.9 MG/DL (ref 6–20)
CALCIUM SERPL-MCNC: 9.7 MG/DL (ref 8.6–10)
CHLORIDE SERPL-SCNC: 101 MMOL/L (ref 98–107)
COLOR UR AUTO: YELLOW
CREAT SERPL-MCNC: 0.69 MG/DL (ref 0.51–0.95)
CRP SERPL-MCNC: 166.37 MG/L
D DIMER PPP FEU-MCNC: 0.64 UG/ML FEU (ref 0–0.5)
DEPRECATED HCO3 PLAS-SCNC: 24 MMOL/L (ref 22–29)
EOSINOPHIL # BLD AUTO: 0.1 10E3/UL (ref 0–0.7)
EOSINOPHIL NFR BLD AUTO: 1 %
ERYTHROCYTE [DISTWIDTH] IN BLOOD BY AUTOMATED COUNT: 13.2 % (ref 10–15)
ERYTHROCYTE [SEDIMENTATION RATE] IN BLOOD BY WESTERGREN METHOD: 38 MM/HR (ref 0–30)
GFR SERPL CREATININE-BSD FRML MDRD: >90 ML/MIN/1.73M2
GLUCOSE SERPL-MCNC: 187 MG/DL (ref 70–99)
GLUCOSE UR STRIP-MCNC: NEGATIVE MG/DL
HCT VFR BLD AUTO: 43.3 % (ref 35–47)
HGB BLD-MCNC: 14.4 G/DL (ref 11.7–15.7)
HGB UR QL STRIP: ABNORMAL
IMM GRANULOCYTES # BLD: 0.1 10E3/UL
IMM GRANULOCYTES NFR BLD: 0 %
KETONES UR STRIP-MCNC: 100 MG/DL
LEUKOCYTE ESTERASE UR QL STRIP: NEGATIVE
LYMPHOCYTES # BLD AUTO: 2.8 10E3/UL (ref 0.8–5.3)
LYMPHOCYTES NFR BLD AUTO: 23 %
MCH RBC QN AUTO: 30.6 PG (ref 26.5–33)
MCHC RBC AUTO-ENTMCNC: 33.3 G/DL (ref 31.5–36.5)
MCV RBC AUTO: 92 FL (ref 78–100)
MONOCYTES # BLD AUTO: 0.8 10E3/UL (ref 0–1.3)
MONOCYTES NFR BLD AUTO: 7 %
MUCOUS THREADS #/AREA URNS LPF: PRESENT /LPF
NEUTROPHILS # BLD AUTO: 8.2 10E3/UL (ref 1.6–8.3)
NEUTROPHILS NFR BLD AUTO: 69 %
NITRATE UR QL: NEGATIVE
NRBC # BLD AUTO: 0 10E3/UL
NRBC BLD AUTO-RTO: 0 /100
PH UR STRIP: 5 [PH] (ref 5–7)
PLATELET # BLD AUTO: 322 10E3/UL (ref 150–450)
POTASSIUM SERPL-SCNC: 4.1 MMOL/L (ref 3.4–5.3)
PROCALCITONIN SERPL IA-MCNC: 0.04 NG/ML
PROT SERPL-MCNC: 7.3 G/DL (ref 6.4–8.3)
RBC # BLD AUTO: 4.71 10E6/UL (ref 3.8–5.2)
RBC URINE: 1 /HPF
SODIUM SERPL-SCNC: 138 MMOL/L (ref 136–145)
SP GR UR STRIP: 1.03 (ref 1–1.03)
SQUAMOUS EPITHELIAL: 3 /HPF
UROBILINOGEN UR STRIP-MCNC: NORMAL MG/DL
WBC # BLD AUTO: 11.9 10E3/UL (ref 4–11)
WBC URINE: 1 /HPF

## 2023-06-19 PROCEDURE — 72100 X-RAY EXAM L-S SPINE 2/3 VWS: CPT

## 2023-06-19 PROCEDURE — 85652 RBC SED RATE AUTOMATED: CPT | Performed by: PHYSICIAN ASSISTANT

## 2023-06-19 PROCEDURE — 86140 C-REACTIVE PROTEIN: CPT | Performed by: PHYSICIAN ASSISTANT

## 2023-06-19 PROCEDURE — 258N000003 HC RX IP 258 OP 636: Performed by: PHYSICIAN ASSISTANT

## 2023-06-19 PROCEDURE — 96360 HYDRATION IV INFUSION INIT: CPT | Performed by: PHYSICIAN ASSISTANT

## 2023-06-19 PROCEDURE — 85379 FIBRIN DEGRADATION QUANT: CPT | Performed by: PHYSICIAN ASSISTANT

## 2023-06-19 PROCEDURE — 84145 PROCALCITONIN (PCT): CPT | Performed by: PHYSICIAN ASSISTANT

## 2023-06-19 PROCEDURE — 99215 OFFICE O/P EST HI 40 MIN: CPT | Mod: 25 | Performed by: PHYSICIAN ASSISTANT

## 2023-06-19 PROCEDURE — 72132 CT LUMBAR SPINE W/DYE: CPT

## 2023-06-19 PROCEDURE — 93971 EXTREMITY STUDY: CPT | Mod: RT

## 2023-06-19 PROCEDURE — 99207 REFERRAL TO ACUTE AND DIAGNOSTIC SERVICES: CPT | Performed by: INTERNAL MEDICINE

## 2023-06-19 PROCEDURE — 36415 COLL VENOUS BLD VENIPUNCTURE: CPT | Performed by: PHYSICIAN ASSISTANT

## 2023-06-19 PROCEDURE — 96372 THER/PROPH/DIAG INJ SC/IM: CPT | Mod: 59 | Performed by: PHYSICIAN ASSISTANT

## 2023-06-19 PROCEDURE — 80053 COMPREHEN METABOLIC PANEL: CPT | Performed by: PHYSICIAN ASSISTANT

## 2023-06-19 PROCEDURE — 81001 URINALYSIS AUTO W/SCOPE: CPT | Performed by: PHYSICIAN ASSISTANT

## 2023-06-19 PROCEDURE — 250N000011 HC RX IP 250 OP 636: Performed by: PHYSICIAN ASSISTANT

## 2023-06-19 PROCEDURE — 85025 COMPLETE CBC W/AUTO DIFF WBC: CPT | Performed by: PHYSICIAN ASSISTANT

## 2023-06-19 PROCEDURE — 72072 X-RAY EXAM THORAC SPINE 3VWS: CPT

## 2023-06-19 RX ORDER — OXYCODONE HYDROCHLORIDE 5 MG/1
5-10 TABLET ORAL EVERY 6 HOURS PRN
Qty: 8 TABLET | Refills: 0 | Status: SHIPPED | OUTPATIENT
Start: 2023-06-19 | End: 2023-06-19

## 2023-06-19 RX ORDER — OXYCODONE HYDROCHLORIDE 5 MG/1
5-10 TABLET ORAL EVERY 6 HOURS PRN
Qty: 8 TABLET | Refills: 0 | Status: SHIPPED | OUTPATIENT
Start: 2023-06-19 | End: 2023-07-11

## 2023-06-19 RX ORDER — METHYLPREDNISOLONE 4 MG
TABLET, DOSE PACK ORAL
Qty: 21 TABLET | Refills: 0 | Status: CANCELLED | OUTPATIENT
Start: 2023-06-19

## 2023-06-19 RX ORDER — IOPAMIDOL 755 MG/ML
500 INJECTION, SOLUTION INTRAVASCULAR ONCE
Status: COMPLETED | OUTPATIENT
Start: 2023-06-19 | End: 2023-06-19

## 2023-06-19 RX ORDER — KETOROLAC TROMETHAMINE 30 MG/ML
30 INJECTION, SOLUTION INTRAMUSCULAR; INTRAVENOUS ONCE
Status: COMPLETED | OUTPATIENT
Start: 2023-06-19 | End: 2023-06-19

## 2023-06-19 RX ORDER — KETOROLAC TROMETHAMINE 30 MG/ML
30 INJECTION, SOLUTION INTRAMUSCULAR; INTRAVENOUS ONCE
Status: DISCONTINUED | OUTPATIENT
Start: 2023-06-19 | End: 2023-06-19

## 2023-06-19 RX ADMIN — Medication 1000 ML: at 16:58

## 2023-06-19 RX ADMIN — SODIUM CHLORIDE 100 ML: 9 INJECTION, SOLUTION INTRAVENOUS at 16:20

## 2023-06-19 RX ADMIN — KETOROLAC TROMETHAMINE 30 MG: 30 INJECTION, SOLUTION INTRAMUSCULAR; INTRAVENOUS at 16:00

## 2023-06-19 RX ADMIN — IOPAMIDOL 100 ML: 755 INJECTION, SOLUTION INTRAVENOUS at 16:19

## 2023-06-19 ASSESSMENT — PATIENT HEALTH QUESTIONNAIRE - PHQ9
SUM OF ALL RESPONSES TO PHQ QUESTIONS 1-9: 9
SUM OF ALL RESPONSES TO PHQ QUESTIONS 1-9: 9
10. IF YOU CHECKED OFF ANY PROBLEMS, HOW DIFFICULT HAVE THESE PROBLEMS MADE IT FOR YOU TO DO YOUR WORK, TAKE CARE OF THINGS AT HOME, OR GET ALONG WITH OTHER PEOPLE: EXTREMELY DIFFICULT

## 2023-06-19 ASSESSMENT — PAIN SCALES - GENERAL: PAINLEVEL: WORST PAIN (10)

## 2023-06-19 NOTE — PROGRESS NOTES
Assessment & Plan       Jazlyn Bartlett is a 57 year old female with a prior history of back pain episodes who presents with a 2 week history of worsening back pain. Concerning features are description of fevers and chills, tenderness over spinous processes, and CVA tenderness. Recent prednisone burst and taper for COPD flare 4 weeks ago.   Possibly related is also an increase in her urinary incontinence, now not urge incontinence but at times in the last few weeks has just been uncontrolled bladder emptying. Concerning for saddle anesthesia or cauda equina.  Given possibility of spinal abscess with fevers and midline tenderness, recommended more urgent further evaluation in [facility] vs ER for lab and imaging. Seems less likely to be nephrolithiasis or UTI. Patient and  prefer [facility] to avoid ER wait.  Plan: patient's  will drive her to [facility]. I spoke with accepting provider. They have option to give Toradol but no other pain medications. I did send a rx for 8 tabs of oxycodone down to our pharmacy that she can take if needed while there, especially as I am concerned she won't be able to tolerate supine position for scans.       ICD-10-CM    1. Acute bilateral low back pain with right-sided sciatica  M54.41 oxyCODONE (ROXICODONE) 5 MG tablet     DISCONTINUED: oxyCODONE (ROXICODONE) 5 MG tablet      2. Urinary incontinence, unspecified type  R32       3. Fever and chills  R50.9       4. Acute right flank pain  R10.9       5. Chronic obstructive pulmonary disease, unspecified COPD type (H)  J44.9       6. Type 2 diabetes mellitus without complication, without long-term current use of insulin (H)  E11.9               47 minutes spent by me on the date of the encounter doing chart review, history and exam, documentation and further activities per the note       See Patient Instructions    Blossom Singh MD  [facility] WOLF Marquez is a 57 year old, presenting for the following  health issues:  Neck Pain and Back Pain        6/19/2023    10:26 AM   Additional Questions   Roomed by Ana Cristina PANIAGUA     History of Present Illness       Back Pain:  She presents for follow up of back pain. Patient's back pain is a recurring problem.  Location of back pain:  Right middle of back, right buttock, left buttock, right hip, left hip and left side of waist  Description of back pain: dull ache, sharp, shooting and stabbing  Back pain spreads: right thigh and right side of neck    Since patient first noticed back pain, pain is: rapidly worsening  Does back pain interfere with her job:  Yes      She eats 2-3 servings of fruits and vegetables daily.She consumes 0 sweetened beverage(s) daily.She exercises with enough effort to increase her heart rate 9 or less minutes per day.  She exercises with enough effort to increase her heart rate 3 or less days per week.   She is taking medications regularly.    Today's PHQ-9         PHQ-9 Total Score: 9    PHQ-9 Q9 Thoughts of better off dead/self-harm past 2 weeks :   Not at all    How difficult have these problems made it for you to do your work, take care of things at home, or get along with other people: Extremely difficult       Pain History:  When did you first notice your pain? Few weeks ago   Have you seen anyone else for your pain? No  How has your pain affected your ability to work? Unable to work due to pain   What type of work do you or did you do? [employer]  Where in your body do you have pain? Back Pain  Onset/Duration: A few weeks  Description:   Location of pain: low back bilateral, neck bilateral, hip bilateral and waist bilateral  Character of pain: sharp, stabbing and constant  Pain radiation: radiates into the right buttocks, radiates into the right leg, radiates into the left buttocks and radiates into the left leg  New numbness or weakness in legs, not attributed to pain: YES  Intensity: Currently 10/10  Progression of Symptoms: worsening  History:    Specific cause: none  Pain interferes with job: YES  History of back problems: recurrent self limited episodes of low back pain in the past  Any previous MRI or X-rays: Yes- at [place].  Date 2/19/23  Sees a specialist for back pain: No  Alleviating factors:   Improved by: None    Precipitating factors:  Worsened by: Lifting, Bending, Standing, Sitting, Lying Flat, Walking and Coughing  Therapies tried and outcome: cold, heat, massage, muscle relaxants, NSAIDs, rest, sitting and standing    Accompanying Signs & Symptoms:  Risk of Fracture: None  Risk of Cauda Equina: Unexplained bowel or bladder changes  Risk of Infection: fever/chills  Risk of Cancer: None  Risk of Ankylosing Spondylitis: Onset at age <35, male, AND morning back stiffness  no       Initial low back injury 2015, lifting her brother, helping. Got a cortisone shot in her back and did PT. These helped for a while and went away. Had another injury, went back and had MRI.  Was left there, doing exercises and muscle relaxers. This also went away.     Started off with her R leg feeling like a charliehorse. Shooting pain from groin, going down her knee. Was already wearing a knee brace due to torn meniscus and tibia break, she says. Then started going up to her back. Now goes across back low back.     Has pain R flank as well in the last week. Some urinary urge incontinence. Many accidents. Doesn't feel when she needs to urinate, she just goes.  Feels like she has to urinate but only a trickle comes out.  If she has incontinence, it's usually larger amounts. This has been going on for at least 6 months.     Has weakness in R leg. Has to lift it herself. When she does lift her leg, she feels a pulling down her butt and leg. Has numbness in anterior thigh and inner LE.    Worries something is going on with her kidney. Has had kidney issues before, bad infection.  Hospitalized in 1990 for a week.    Notes at work her back has been hurting, legs dangle, sit  "stand desk not at correct height.     Last copd flare requiring prednisone sept. Last month another URI.         Review of Systems   Constitutional, HEENT, cardiovascular, pulmonary, gi and gu systems are negative, except as otherwise noted.      Objective    BP (!) 146/90   Pulse 84   Temp 98.9  F (37.2  C) (Tympanic)   Resp 18   Ht 1.549 m (5' 0.98\")   Wt 91.5 kg (201 lb 12.8 oz)   SpO2 97%   BMI 38.15 kg/m    Body mass index is 38.15 kg/m .  Physical Exam   GENERAL: healthy, alert and no distress  NECK: no adenopathy, no asymmetry, masses, or scars and thyroid normal to palpation  RESP: lungs clear to auscultation - no rales, rhonchi or wheezes  CV: regular rate and rhythm, normal S1 S2, no S3 or S4, no murmur, click or rub, no peripheral edema and peripheral pulses strong  MS: no gross musculoskeletal defects noted, no edema  Comprehensive back pain exam:  Tenderness of lumbar spinous processes, paraspinous musculature, Unable to complete strength testing due to severe back pain, unable to lift R leg, sensation grossly normal and with minor movements, winces and has apparent acute sharp pains.        "

## 2023-06-19 NOTE — PROGRESS NOTES
Assessment & Plan     Chronic bilateral low back pain with right-sided sciatica  Foraminal stenosis of lumbar region  Spondylolisthesis at L5-S1 level  Pain of right lower leg  Fever, unspecified fever cause  Bilateral flank pain  Constipation, unspecified constipation type  Tenderness over spine  Elevated C-reactive protein (CRP)  Elevated d-dimer  Stat labs show significant inflammation and in light of fever recommended rule out for discitis/abscess.  Thankfully, stat CT reassuring and negative for such process.  Does have significant degenerative changes and foraminal stenosis.  Will refer to spine for further evaluation and treatment.  Patient symptoms improved with Toradol and oxycodone given by PCP.  Positive D-dimer but negative ultrasound.  Suspect radicular etiology for right leg pain.  Supportive cares encouraged until follow-up with spine.  Advised of warning signs and when to seek urgent care.  Recommend trending of CRP level with PCP to ensure normalization.  - CBC with platelets differential  - Comprehensive metabolic panel  - CRP inflammation  - Erythrocyte sedimentation rate auto  - Procalcitonin  - D dimer quantitative  - sodium chloride (PF) 0.9% PF flush 3 mL  - UA with Microscopic reflex to Culture  - US Lower Extremity Venous Duplex Right  - 0.9% sodium chloride BOLUS  - ketorolac (TORADOL) injection 30 mg  - Spine  Referral  - XR Lumbar Spine 2/3 Views      53 minutes spent by me on the date of the encounter doing chart review, history and exam, documentation and further activities per the note       Return in about 1 week (around 6/26/2023) for spine clinic evaluation.    Ml Contreras PA-C  [facility] is a 57 year old, presenting for the following health issues:  Back Pain (Bilateral lower back pain X 4 days/Fevers intermittently X 1 week)      HPI     Abdominal/Flank Pain  Onset/Duration: X 4 days  Description:   Character:  "Sharp, Dull ache and Stabbing  Location: right flank left flank  Radiation: \"up my back and down Right leg\"  Intensity: 9/10  Progression of Symptoms:  worsening  Accompanying Signs & Symptoms:  Fever/chills: YES- both, 101.2 F last week  Gas/Bloating: YES- both  Nausea: YES  Vomitting: YES- vomiting all day today and yesterday  Diarrhea: no   Constipation:YES- chronic, last BM X 5 days ago, notes she hasn't eaten much either  Dysuria: no, \"I just don't feel it\"            Hematuria: no            Frequency: YES- but \"only a trickle comes out\"           Incontinence of urine: YES  History:   Trauma: no   Previous similar pain: YES- bone degeneration, history of injuring back in 2015  Previous tests done: none           Previous Abdominal surgery: YES- Ablation, Tubal ligation  Precipitating factors:   Does the pain change with:     Food: no      Bowel Movement: no     Urination: YES- worsening pain from sitting on toilet             Other factors: no   Therapies tried and outcome:  Tylenol, Aleve, Aleve rub, muscle rub, heating pad, staying off feet, cold compress.  Cold helps the most.    When food last eaten: Chicken sandwich 13:15 PM to take with medication, Oxycodone.    Was recently treated for a COPD exacerbation on 5/22/2023 with prednisone (long and strong taper) and azithromycin.  Few weeks later noticed pain that started in her right leg and radiated up to her back and now her back is significantly tender to the touch.  Has had issues with her back in the past but typically conservative care and time have made them go away.      Review of Systems   Constitutional, HEENT, cardiovascular, pulmonary, GI, , musculoskeletal, neuro, skin, endocrine and psych systems are negative, except as otherwise noted.      Objective    BP (!) 141/86 (BP Location: Right arm, Patient Position: Sitting, Cuff Size: Adult Large)   Pulse 83   Temp 98.1  F (36.7  C) (Oral)   Resp 18   Wt 91.2 kg (201 lb)   SpO2 97%   BMI " 38.00 kg/m    Body mass index is 38 kg/m .  Physical Exam   GENERAL: healthy, alert and no distress  EYES: Eyes grossly normal to inspection  RESP: lungs clear to auscultation - no rales, rhonchi or wheezes  CV: regular rate and rhythm, normal S1 S2, no S3 or S4, no murmur, click or rub, no peripheral edema and peripheral pulses strong  ABDOMEN: soft, nontender, no hepatosplenomegaly, no masses and bowel sounds normal  MS: Tenderness with guarding to palpation over the lower thoracic and superior lumbar spinous processes without palpable abnormality.  Tenderness also to ana paula lumbar musculature (more notable on Rigth>left), significant tenderness and guarding to compression of the right lower extremity over the calf and popliteal region no warmth or redness appreciated  SKIN: no suspicious lesions or rashes  NEURO: Normal strength and tone, mentation intact and speech normal  PSYCH: mentation appears normal, affect normal/bright    Results for orders placed or performed during the hospital encounter of 06/19/23   CT Lumbar Spine w Contrast     Status: None    Narrative    EXAM: CT LUMBAR SPINE W CONTRAST  LOCATION: [facility]  DATE: 6/19/2023    INDICATION: significant pain tenderness over spinous processes, fever, and elevated CRP   concern for abscess  COMPARISON: Lumbar spine radiograph 06/19/2023.  CONTRAST: 100mL Isovue 370  TECHNIQUE: Routine CT Lumbar Spine with IV contrast. Multiplanar reformats. Dose reduction techniques were used.    FINDINGS:  Nomenclature is based on 5 lumbar type vertebral bodies. Mild levocurvature of the lumbar spine. 2 mm spondylolisthesis of L5 on S1. Bilateral L5 pars defects. Mild age-indeterminate superior endplate vertebral body height loss of L1 with associated   small node. 2.1 x 2.3 cm left adrenal adenoma. The posterior paraspinal soft tissues are grossly within normal limits. Partially visualized large left renal cysts. Normal diameter of the distal  abdominal aorta. The visualized bony pelvis is grossly   within normal limits.    No prevertebral edema. No psoas muscle abscess.    T12-L1: Normal disc height. No herniation. Mild bilateral facet arthropathy. No spinal canal or neural foraminal stenosis.    L1-L2: Normal intervertebral disc height. Small broad-based disc bulge. Mild bilateral facet arthropathy. No spinal canal or neuroforaminal narrowing.    L2-L3: Moderate intervertebral disc height loss. Small broad-based disc bulge. Moderate bilateral facet arthropathy. Mild spinal canal narrowing. Mild to moderate right neuroforaminal narrowing. Moderate left neuroforaminal narrowing.    L3-L4: Normal disc height. Small broad-based disc bulge. Moderate bilateral facet arthropathy. No spinal canal narrowing. No right neuroforaminal narrowing. Mild left neuroforaminal narrowing.    L4-L5: Normal intervertebral disc height. Small broad-based disc bulge. Moderate bilateral facet arthropathy. Mild spinal canal narrowing. Moderate to severe right neuroforaminal narrowing. Moderate left neuroforaminal narrowing.    L5-S1: Moderate intervertebral disc height loss. Vacuum disc phenomena. Uncovering of the disc. Moderate bilateral facet arthropathy. Mild spinal canal narrowing. Moderate to severe bilateral neuroforaminal narrowing.      Impression    IMPRESSION:  1.  No CT evidence for discitis/osteomyelitis. No abnormal fluid collections.  2.  2 mm spondylolisthesis of L5 on S1. Bilateral L5 pars defects.  3.  Mild spinal canal narrowing at L2-L3, L4-L5 and L5-S1.  4.  Moderate to severe bilateral neuroforaminal narrowing at L5-S1.  5.  Moderate to severe right and moderate left neuroforaminal narrowing at L4-L5.     Results for orders placed or performed during the hospital encounter of 06/19/23   US Lower Extremity Venous Duplex Right     Status: None    Narrative    VENOUS ULTRASOUND RIGHT LEG  6/19/2023 4:24 PM     HISTORY: elevated D dimer, right calf pain; Pain of  right lower leg;  Elevated d-dimer    COMPARISON: None.    FINDINGS:  Examination of the deep veins with graded compression and  color flow Doppler with spectral wave form analysis was performed.   There is no evidence for DVT in the right lower extremity.      Impression    IMPRESSION: No evidence of deep venous thrombosis.    GRISEL ROBERTS MD         SYSTEM ID:  [id number]   Results for orders placed or performed during the hospital encounter of 06/19/23   XR Thoracic Spine 3 Views     Status: None    Narrative    THORACIC SPINE THREE VIEWS 6/19/2023 3:15 PM     COMPARISON: None    HISTORY: Bilateral flank pain with guarding and tenderness to  lumbar/thoracic spinous processes. Fever, unspecified fever cause.  Bilateral flank pain. Tenderness over spine.      Impression    IMPRESSION: Normal alignment. Vertebral body heights normal. No  fractures. Loss of disc space height and degenerative endplate  spurring at throughout the thoracic spine.    ADRIEL JORDAN MD         SYSTEM ID:  [id number]   Results for orders placed or performed during the hospital encounter of 06/19/23   XR Lumbar Spine 2/3 Views     Status: None    Narrative    LUMBAR SPINE TWO TO THREE VIEWS 6/19/2023 3:15 PM     COMPARISON: None    HISTORY: Severe back pain, tenderness to thoracic and lumbar vertebral  processes. Fever, unspecified fever cause. Bilateral flank pain.  Tenderness over spine.      Impression    IMPRESSION: Five lumbar type vertebrae. Normal alignment. Vertebral  body heights normal. No fractures. Facet arthropathy throughout the  lumbar spine. Loss of disc space height and degenerative endplate  spurring at L5-S1.    ADRIEL JORDAN MD         SYSTEM ID:  [id number]   Results for orders placed or performed in visit on 06/19/23   Comprehensive metabolic panel     Status: Abnormal   Result Value Ref Range    Sodium 138 136 - 145 mmol/L    Potassium 4.1 3.4 - 5.3 mmol/L    Chloride 101 98 - 107 mmol/L    Carbon Dioxide (CO2) 24 22 -  29 mmol/L    Anion Gap 13 7 - 15 mmol/L    Urea Nitrogen 9.9 6.0 - 20.0 mg/dL    Creatinine 0.69 0.51 - 0.95 mg/dL    Calcium 9.7 8.6 - 10.0 mg/dL    Glucose 187 (H) 70 - 99 mg/dL    Alkaline Phosphatase 84 35 - 104 U/L    AST 19 0 - 45 U/L    ALT 18 0 - 50 U/L    Protein Total 7.3 6.4 - 8.3 g/dL    Albumin 4.0 3.5 - 5.2 g/dL    Bilirubin Total 1.1 <=1.2 mg/dL    GFR Estimate >90 >60 mL/min/1.73m2   CRP inflammation     Status: Abnormal   Result Value Ref Range    CRP Inflammation 166.37 (H) <5.00 mg/L   Erythrocyte sedimentation rate auto     Status: Abnormal   Result Value Ref Range    Erythrocyte Sedimentation Rate 38 (H) 0 - 30 mm/hr   Procalcitonin     Status: Normal   Result Value Ref Range    Procalcitonin 0.04 <0.05 ng/mL   D dimer quantitative     Status: Abnormal   Result Value Ref Range    D-Dimer Quantitative 0.64 (H) 0.00 - 0.50 ug/mL FEU    Narrative    This D-dimer assay is intended for use in conjunction with a clinical pretest probability assessment model to exclude pulmonary embolism (PE) and deep venous thrombosis (DVT) in outpatients suspected of PE or DVT. The cut-off value is 0.50 ug/mL FEU.   CBC with platelets and differential     Status: Abnormal   Result Value Ref Range    WBC Count 11.9 (H) 4.0 - 11.0 10e3/uL    RBC Count 4.71 3.80 - 5.20 10e6/uL    Hemoglobin 14.4 11.7 - 15.7 g/dL    Hematocrit 43.3 35.0 - 47.0 %    MCV 92 78 - 100 fL    MCH 30.6 26.5 - 33.0 pg    MCHC 33.3 31.5 - 36.5 g/dL    RDW 13.2 10.0 - 15.0 %    Platelet Count 322 150 - 450 10e3/uL    % Neutrophils 69 %    % Lymphocytes 23 %    % Monocytes 7 %    % Eosinophils 1 %    % Basophils 0 %    % Immature Granulocytes 0 %    NRBCs per 100 WBC 0 <1 /100    Absolute Neutrophils 8.2 1.6 - 8.3 10e3/uL    Absolute Lymphocytes 2.8 0.8 - 5.3 10e3/uL    Absolute Monocytes 0.8 0.0 - 1.3 10e3/uL    Absolute Eosinophils 0.1 0.0 - 0.7 10e3/uL    Absolute Basophils 0.0 0.0 - 0.2 10e3/uL    Absolute Immature Granulocytes 0.1 <=0.4  10e3/uL    Absolute NRBCs 0.0 10e3/uL   UA with Microscopic reflex to Culture     Status: Abnormal    Specimen: Urine, Clean Catch   Result Value Ref Range    Color Urine Yellow Colorless, Straw, Light Yellow, Yellow    Appearance Urine Slightly Cloudy (A) Clear    Glucose Urine Negative Negative mg/dL    Bilirubin Urine Negative Negative    Ketones Urine 100 (A) Negative mg/dL    Specific Gravity Urine 1.029 1.003 - 1.035    Blood Urine Moderate (A) Negative    pH Urine 5.0 5.0 - 7.0    Protein Albumin Urine 20 (A) Negative mg/dL    Urobilinogen Urine Normal Normal, 2.0 mg/dL    Nitrite Urine Negative Negative    Leukocyte Esterase Urine Negative Negative    Bacteria Urine Few (A) None Seen /HPF    Mucus Urine Present (A) None Seen /LPF    RBC Urine 1 <=2 /HPF    WBC Urine 1 <=5 /HPF    Squamous Epithelials Urine 3 (H) <=1 /HPF    Narrative    Urine Culture not indicated   CBC with platelets differential     Status: Abnormal    Narrative    The following orders were created for panel order CBC with platelets differential.  Procedure                               Abnormality         Status                     ---------                               -----------         ------                     CBC with platelets and d...[[id number]]  Abnormal            Final result                 Please view results for these tests on the individual orders.

## 2023-06-19 NOTE — LETTER
June 19, 2023      Jazlyn Bartlett  [id number] [facility] [id number]        To Whom It May Concern:    Jazlyn Bartlett  was seen on June 19, 2023.  Please excuse her  until 6/26/23 due to injury. If she is feeling better prior to then, she may return sooner.         Sincerely,        Blossom Singh MD

## 2023-06-19 NOTE — RESULT ENCOUNTER NOTE
Results discussed directly with patient while patient was present. Any further details documented in the note.   Ml Contreras PA-C

## 2023-06-21 ENCOUNTER — PRE VISIT (OUTPATIENT)
Dept: NEUROSURGERY | Facility: CLINIC | Age: 58
End: 2023-06-21
Payer: COMMERCIAL

## 2023-06-21 NOTE — TELEPHONE ENCOUNTER
NEUROSURGERY- NEW PREVISIT PLANNING       Record Status/Location     Referring Provider Referral Ml Contreras PA-C   Diagnosis Referral Pain of right lower leg   Spondylolisthesis at L5-S1 level   Foraminal stenosis of lumbar region   Chronic bilateral low back pain with right-sided sciatica        MRI (HEAD, NECK, SPINE) Na    CT PACs 6/19/23 Lumbar [facility]    X-ray PACs 6/19/23 Lumbar [facility]    INJECTION Na    PHYSICAL THERAPY Encounter 2016 [facility]   SURGERY Na

## 2023-06-22 ENCOUNTER — TELEPHONE (OUTPATIENT)
Dept: INTERNAL MEDICINE | Facility: CLINIC | Age: 58
End: 2023-06-22

## 2023-06-22 ENCOUNTER — OFFICE VISIT (OUTPATIENT)
Dept: NEUROSURGERY | Facility: CLINIC | Age: 58
End: 2023-06-22
Payer: COMMERCIAL

## 2023-06-22 VITALS — DIASTOLIC BLOOD PRESSURE: 87 MMHG | HEART RATE: 68 BPM | SYSTOLIC BLOOD PRESSURE: 138 MMHG | OXYGEN SATURATION: 96 %

## 2023-06-22 DIAGNOSIS — M54.16 LUMBAR RADICULOPATHY: Primary | ICD-10-CM

## 2023-06-22 PROCEDURE — 99244 OFF/OP CNSLTJ NEW/EST MOD 40: CPT | Performed by: NURSE PRACTITIONER

## 2023-06-22 ASSESSMENT — PAIN SCALES - GENERAL: PAINLEVEL: EXTREME PAIN (8)

## 2023-06-22 NOTE — LETTER
"    6/22/2023         RE: Jazlyn Bartlett  [address]  [facility] [address]        Dear Colleague,    Thank you for referring your patient, Jazlyn Bartlett, to the [facility]. Please see a copy of my visit note below.    [facility] Neurosurgery Clinic Visit      CC: low back pain, right leg pain     Primary Care Provider: Sharron Justice    Reason For Visit:   I was asked by Ml Contreras PA-C to consult on the patient for:   M79.661 (ICD-10-CM) - Pain of right lower leg   M43.17 (ICD-10-CM) - Spondylolisthesis at L5-S1 level   M48.061 (ICD-10-CM) - Foraminal stenosis of lumbar region   M54.41, G89.29 (ICD-10-CM) - Chronic bilateral low back pain with right-sided sciatica     HPI: Jazlyn Bartlett is a 57 year old female who presents for evaluation of low back and right leg pain. She reports chronic low back pain started after a lifting injury in 2014. She developed right leg pain 1 month ago after sitting in an ergonomic chair at work. Today, patient reports bilateral low back pain that radiates to right buttocks, right anterior/posterior thigh, and right shin. Describes the pain as sharp, throbbing, and aching. Pain is worsened with walking and activity. Patient has tried rest, ice, heat, aleve, topical creams, and lidocaine patches for pain control. She reports a fall 1 month ago due to her knee \"giving out\". She reports urinary urgency over the past 3 months.     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    Past Surgical History:   Procedure Laterality Date     ABDOMEN SURGERY  1986    Tubal     ABLATION, ENDOMETRIAL, THERMAL, W/O HYSTEROSCOPIC GUIDANCE       BIOPSY  2010    Neck     COLONOSCOPY  2011     COLONOSCOPY N/A 10/28/2022    Procedure: COLONOSCOPY with polypectomy;  Surgeon: Michael Enciso MD;  Location: [facility] OR     DILATION AND CURETTAGE, HYSTEROSCOPY, ABLATE ENDOMETRIUM NOVASURE, COMBINED  10/11/2012    Procedure: COMBINED DILATION AND CURETTAGE, HYSTEROSCOPY, ABLATE ENDOMETRIUM NOVASURE;  COMBINED DILATION AND CURETTAGE, HYSTEROSCOPY, ABLATE ENDOMETRIUM ,pelvic exam under anesthesia;  Surgeon: Shruti Barrios MD;  Location: [facility] OR     ESOPHAGOSCOPY, GASTROSCOPY, DUODENOSCOPY (EGD), COMBINED N/A 01/19/2016    Procedure: COMBINED ESOPHAGOSCOPY, GASTROSCOPY, DUODENOSCOPY (EGD), BIOPSY SINGLE OR MULTIPLE;  Surgeon: Kristofer Molian MD;  Location:  GI     GENITOURINARY SURGERY  1986 tubes tied & burned     HEAD & NECK SURGERY  removal of cancerous limp knod 2010     Removal of cancerous lump on neck       TONSILLECTOMY       Past Surgical History reviewed with patient during visit.    Current Outpatient Medications   Medication     albuterol (PROAIR HFA/PROVENTIL HFA/VENTOLIN HFA) 108 (90 Base) MCG/ACT inhaler     aspirin (ASA) 81 MG chewable tablet     fluticasone (FLOVENT HFA) 220 MCG/ACT inhaler     glipiZIDE (GLUCOTROL XL) 10 MG 24 hr tablet     hydrochlorothiazide (HYDRODIURIL) 25 MG tablet     levothyroxine (SYNTHROID/LEVOTHROID) 137 MCG tablet     losartan (COZAAR) 50 MG tablet     oxyCODONE (ROXICODONE) 5 MG tablet     QVAR REDIHALER 80 MCG/ACT inhaler     salmeterol (SEREVENT) 50 MCG/DOSE inhaler     simvastatin (ZOCOR) 20 MG tablet     No current facility-administered medications for this visit.       Allergies   Allergen Reactions     Hydrocodone-Acetaminophen Nausea and Vomiting     Ibuprofen Sodium Nausea and Vomiting     Lisinopril Cough        Social History     Socioeconomic History     Marital status:      Spouse name: None     Number of children: None     Years of education: None     Highest education level: None   Tobacco Use     Smoking status: Every Day     Packs/day: 0.50     Years: 37.00     Pack years: 18.50     Types: Cigarettes     Start date: 1/1/1979     Smokeless tobacco: Former     Quit date: 2/22/2020   Vaping Use     Vaping Use: Never used   Substance and Sexual Activity     Alcohol use: No     Drug use: No     Sexual activity: Yes     Partners: Male     Birth control/protection: Female Surgical     Comment: tubes tied & burned age 21yr   Other Topics Concern     Parent/sibling w/ CABG, MI or angioplasty before 65F 55M? Yes     Comment: yes father       Family History   Problem Relation Age of Onset     Diabetes Mother      Breast Cancer Mother      Hyperlipidemia Mother      Osteoporosis Mother      Diabetes Father      Lung Cancer Father      Rheumatoid Arthritis Father      Coronary Artery Disease Father      Hyperlipidemia Father      Cerebrovascular Disease Father      Prostate Cancer Father      Diabetes Maternal Grandmother      Diabetes Other      Hypertension Other      Depression Other      Anxiety Disorder Other      Asthma Other      Thyroid Disease Other      Obesity Other      Diabetes Sister      Obesity Sister      Diabetes Sister      Diabetes Brother      Diabetes Daughter      Breast Cancer Sister      Anesthesia Reaction Daughter        ROS: 10 point ROS neg other than the symptoms noted above in the HPI.    Vital Signs: /87   Pulse 68   SpO2 96%     Neurological Examination:  Awake  Alert  Oriented x 3  Speech clear    Motor exam:  Shoulder Abduction  Right:  5/5   Left:  5/5  Biceps                      Right:  5/5   Left:  5/5  Triceps                     Right:  5/5   Left:  5/5  Wrist Extensors        Right:  5/5   Left:  5/5  Wrist Flexors            Right:  5/5   Left:  5/5  Intrinsics                    Right:  5/5   Left:  5/5    Iliopsoas  (hip flexion)               Right: 5/5  Left:  5/5  Quadriceps  (knee extension)       Right:  5/5  Left:  5/5  Hamstrings  (knee flexion)            Right:  5/5  Left:  5/5  Gastroc Soleus  (PF)                          Right:  5/5  Left:  5/5  Tibialis Ant  (DF)                          Right:  5/5  Left:  5/5  EHL                          Right:  5/5  Left:  5/5         Sensation normal     Reflexes are 2+ in the patellar and Achilles. There is no clonus.     Reflexes are 2+ in the brachial radialis and triceps. Negative Ericka sign bilaterally.    Musculoskeletal:  Gait: Able to stand from a seated position. Normal non-antalgic, non-myelopathic gait. Ambulates with walker.   Tenderness to palpation of lumbar spine and right SI joint.   Hip height is symmetrical.  Negative straight leg raise bilaterally.      Imaging:   EXAM: CT LUMBAR SPINE W CONTRAST  LOCATION: [facility]  DATE: 6/19/2023  IMPRESSION:  1.  No CT evidence for discitis/osteomyelitis. No abnormal fluid collections.  2.  2 mm spondylolisthesis of L5 on S1. Bilateral L5 pars defects.  3.  Mild spinal canal narrowing at L2-L3, L4-L5 and L5-S1.  4.  Moderate to severe bilateral neuroforaminal narrowing at L5-S1.  5.  Moderate to severe right and moderate left neuroforaminal narrowing at L4-L5.    Assessment/Plan:   57 year old female who presents for evaluation of bilateral low back pain that radiates to right buttocks, right anterior/posterior thigh, and right shin. Lumbar spine CT shows bilateral L5 pars defects and bilateral foraminal stenosis at L4-5 and L5-S1. Patient would like to try PT and OVI.     - Lumbar spine MRI ordered for further evaluation  - Will likely plan for OVI pending MRI results   - Referral to PT   - Follow up with PCP regarding renal cyst noted on lumbar spine CT   - Will call patient with results and next steps    Advised patient to call our clinic  with any questions or concerns. Discussed red flag symptoms and advised to seek medical attention if these develop. Patient voiced understanding and agreement.      Racquel Li CNP  [facility] Neurosurgery  [address] [address]  Tel [phone]  Pager [phone]          Again, thank you for allowing me to participate in the care of your patient.        Sincerely,        Racquel Li, NP

## 2023-06-22 NOTE — NURSING NOTE
"Jazlyn Bartlett is a 57 year old female who presents for:  Chief Complaint   Patient presents with     Consult     Aching pain bilaterally in low back and in right side leg        Initial Vitals:  /87   Pulse 68   SpO2 96%  Estimated body mass index is 38 kg/m  as calculated from the following:    Height as of 6/19/23: 5' 0.98\" (1.549 m).    Weight as of 6/19/23: 201 lb (91.2 kg).. There is no height or weight on file to calculate BSA. BP completed using cuff size: regular  Extreme Pain (8)    Nursing Comments:       Radha Guthrie    "

## 2023-06-22 NOTE — PATIENT INSTRUCTIONS
Order for lumbar spine MRI. You can call to schedule at [phone]. I will call with the results and next steps.     Referral to physical therapy. They will call you to schedule.     Follow-up with primary care provider regarding renal cyst noted on lumbar spine CT.     Please call our clinic if symptoms persist, change, or worsen at any time.    [facility] Neurosurgery  [address] [address]  Tel [phone]

## 2023-06-22 NOTE — TELEPHONE ENCOUNTER
Pt calls.    She had some blood tests done on Monday.  A d-dimer was done on Monday.  This was ordered by Ml Contreras.  She said she was told this means there is signs of a blood clot somewhere.  So she wants to know where it is and how does she get rid of it or did it travel anywhere?      Advised they did an ultrasound that was negative for a clot.      Advised to f/u with her primary care.      She wants this sent to Dr. Singh.  See also mycrenet of 6/19/23.      She is also asking about f/u for a cyst on her kidney.  Advised she should be scheduling a f/u appt with her pcp.

## 2023-06-22 NOTE — PROGRESS NOTES
"[facility] Neurosurgery Clinic Visit      CC: low back pain, right leg pain     Primary Care Provider: Sharron Justice    Reason For Visit:   I was asked by Ml Contreras PA-C to consult on the patient for:   M79.661 (ICD-10-CM) - Pain of right lower leg   M43.17 (ICD-10-CM) - Spondylolisthesis at L5-S1 level   M48.061 (ICD-10-CM) - Foraminal stenosis of lumbar region   M54.41, G89.29 (ICD-10-CM) - Chronic bilateral low back pain with right-sided sciatica     HPI: Jazlyn Bartlett is a 57 year old female who presents for evaluation of low back and right leg pain. She reports chronic low back pain started after a lifting injury in 2014. She developed right leg pain 1 month ago after sitting in an ergonomic chair at work. Today, patient reports bilateral low back pain that radiates to right buttocks, right anterior/posterior thigh, and right shin. Describes the pain as sharp, throbbing, and aching. Pain is worsened with walking and activity. Patient has tried rest, ice, heat, aleve, topical creams, and lidocaine patches for pain control. She reports a fall 1 month ago due to her knee \"giving out\". She reports urinary urgency over the past 3 months.     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    Past Surgical History:   Procedure Laterality Date     ABDOMEN SURGERY  1986    Tubal     ABLATION, ENDOMETRIAL, THERMAL, W/O HYSTEROSCOPIC GUIDANCE       BIOPSY  2010    Neck     COLONOSCOPY  2011     COLONOSCOPY N/A " 10/28/2022    Procedure: COLONOSCOPY with polypectomy;  Surgeon: Michael Enciso MD;  Location: [facility] OR     DILATION AND CURETTAGE, HYSTEROSCOPY, ABLATE ENDOMETRIUM NOVASURE, COMBINED  10/11/2012    Procedure: COMBINED DILATION AND CURETTAGE, HYSTEROSCOPY, ABLATE ENDOMETRIUM NOVASURE;  COMBINED DILATION AND CURETTAGE, HYSTEROSCOPY, ABLATE ENDOMETRIUM ,pelvic exam under anesthesia;  Surgeon: Shruti Barrios MD;  Location: [facility] OR     ESOPHAGOSCOPY, GASTROSCOPY, DUODENOSCOPY (EGD), COMBINED N/A 01/19/2016    Procedure: COMBINED ESOPHAGOSCOPY, GASTROSCOPY, DUODENOSCOPY (EGD), BIOPSY SINGLE OR MULTIPLE;  Surgeon: Kristofer Molina MD;  Location:  GI     GENITOURINARY SURGERY  1986 tubes tied & burned     HEAD & NECK SURGERY  removal of cancerous limp knod 2010     Removal of cancerous lump on neck       TONSILLECTOMY       Past Surgical History reviewed with patient during visit.    Current Outpatient Medications   Medication     albuterol (PROAIR HFA/PROVENTIL HFA/VENTOLIN HFA) 108 (90 Base) MCG/ACT inhaler     aspirin (ASA) 81 MG chewable tablet     fluticasone (FLOVENT HFA) 220 MCG/ACT inhaler     glipiZIDE (GLUCOTROL XL) 10 MG 24 hr tablet     hydrochlorothiazide (HYDRODIURIL) 25 MG tablet     levothyroxine (SYNTHROID/LEVOTHROID) 137 MCG tablet     losartan (COZAAR) 50 MG tablet     oxyCODONE (ROXICODONE) 5 MG tablet     QVAR REDIHALER 80 MCG/ACT inhaler     salmeterol (SEREVENT) 50 MCG/DOSE inhaler     simvastatin (ZOCOR) 20 MG tablet     No current facility-administered medications for this visit.       Allergies   Allergen Reactions     Hydrocodone-Acetaminophen Nausea and Vomiting     Ibuprofen Sodium Nausea and Vomiting     Lisinopril Cough       Social History     Socioeconomic History     Marital status:      Spouse name: None     Number of children: None     Years of education: None     Highest education level: None   Tobacco Use     Smoking status: Every Day     Packs/day: 0.50     Years:  37.00     Pack years: 18.50     Types: Cigarettes     Start date: 1/1/1979     Smokeless tobacco: Former     Quit date: 2/22/2020   Vaping Use     Vaping Use: Never used   Substance and Sexual Activity     Alcohol use: No     Drug use: No     Sexual activity: Yes     Partners: Male     Birth control/protection: Female Surgical     Comment: tubes tied & burned age 21yr   Other Topics Concern     Parent/sibling w/ CABG, MI or angioplasty before 65F 55M? Yes     Comment: yes father       Family History   Problem Relation Age of Onset     Diabetes Mother      Breast Cancer Mother      Hyperlipidemia Mother      Osteoporosis Mother      Diabetes Father      Lung Cancer Father      Rheumatoid Arthritis Father      Coronary Artery Disease Father      Hyperlipidemia Father      Cerebrovascular Disease Father      Prostate Cancer Father      Diabetes Maternal Grandmother      Diabetes Other      Hypertension Other      Depression Other      Anxiety Disorder Other      Asthma Other      Thyroid Disease Other      Obesity Other      Diabetes Sister      Obesity Sister      Diabetes Sister      Diabetes Brother      Diabetes Daughter      Breast Cancer Sister      Anesthesia Reaction Daughter        ROS: 10 point ROS neg other than the symptoms noted above in the HPI.    Vital Signs: /87   Pulse 68   SpO2 96%     Neurological Examination:  Awake  Alert  Oriented x 3  Speech clear    Motor exam:  Shoulder Abduction  Right:  5/5   Left:  5/5  Biceps                      Right:  5/5   Left:  5/5  Triceps                     Right:  5/5   Left:  5/5  Wrist Extensors        Right:  5/5   Left:  5/5  Wrist Flexors            Right:  5/5   Left:  5/5  Intrinsics                   Right:  5/5   Left:  5/5    Iliopsoas  (hip flexion)               Right: 5/5  Left:  5/5  Quadriceps  (knee extension)       Right:  5/5  Left:  5/5  Hamstrings  (knee flexion)            Right:  5/5  Left:  5/5  Gastroc Soleus  (PF)                           Right:  5/5  Left:  5/5  Tibialis Ant  (DF)                          Right:  5/5  Left:  5/5  EHL                          Right:  5/5  Left:  5/5         Sensation normal     Reflexes are 2+ in the patellar and Achilles. There is no clonus.     Reflexes are 2+ in the brachial radialis and triceps. Negative Ericka sign bilaterally.    Musculoskeletal:  Gait: Able to stand from a seated position. Normal non-antalgic, non-myelopathic gait. Ambulates with walker.   Tenderness to palpation of lumbar spine and right SI joint.   Hip height is symmetrical.  Negative straight leg raise bilaterally.      Imaging:   EXAM: CT LUMBAR SPINE W CONTRAST  LOCATION: [facility]  DATE: 6/19/2023  IMPRESSION:  1.  No CT evidence for discitis/osteomyelitis. No abnormal fluid collections.  2.  2 mm spondylolisthesis of L5 on S1. Bilateral L5 pars defects.  3.  Mild spinal canal narrowing at L2-L3, L4-L5 and L5-S1.  4.  Moderate to severe bilateral neuroforaminal narrowing at L5-S1.  5.  Moderate to severe right and moderate left neuroforaminal narrowing at L4-L5.    Assessment/Plan:   57 year old female who presents for evaluation of bilateral low back pain that radiates to right buttocks, right anterior/posterior thigh, and right shin. Lumbar spine CT shows bilateral L5 pars defects and bilateral foraminal stenosis at L4-5 and L5-S1. Patient would like to try PT and OVI.     - Lumbar spine MRI ordered for further evaluation  - Will likely plan for OVI pending MRI results   - Referral to PT   - Follow up with PCP regarding renal cyst noted on lumbar spine CT   - Will call patient with results and next steps    Advised patient to call our clinic with any questions or concerns. Discussed red flag symptoms and advised to seek medical attention if these develop. Patient voiced understanding and agreement.      Racquel Li, CNP  [facility] Neurosurgery  [address]  [address] [place] [address]  Tel [phone]  Pager [phone]

## 2023-06-23 NOTE — TELEPHONE ENCOUNTER
Left message for patient to call back.  Sharron doesn't have any availability next week unless she can work her in. Sharron does work on Monday so can advise at that time.

## 2023-06-23 NOTE — TELEPHONE ENCOUNTER
Routine to her PCP.  Also forwarding to RN triage. pls call her. She needs follow up appointment with her PCP next week. D-dimer is not a perfect test. May be mildly elevated when there is no active clot, but needs outpatient review. Very reassuring that her ultrasound did not show clot and d-dimer barely above normal..   She has appt with Dr. Justice on 7/11, in 2-3 weeks. Advise her to go in for ADS/ER follow up next week.   Let her know I did forward this message to her PCP as well.  Also, renal cysts are common, especially as we get older. This is not fully visualized given the CT scan was really tailored to look at the spine. Her PCP may want to do additional imaging to clarify.    Blossom Singh M.D.

## 2023-06-26 ENCOUNTER — HOSPITAL ENCOUNTER (OUTPATIENT)
Dept: MRI IMAGING | Facility: CLINIC | Age: 58
Discharge: HOME | End: 2023-06-26
Attending: NURSE PRACTITIONER | Admitting: NURSE PRACTITIONER
Payer: COMMERCIAL

## 2023-06-26 DIAGNOSIS — M54.16 LUMBAR RADICULOPATHY: ICD-10-CM

## 2023-06-26 PROCEDURE — 72148 MRI LUMBAR SPINE W/O DYE: CPT

## 2023-06-26 NOTE — TELEPHONE ENCOUNTER
I unfortunately do not have any same day appt's available today or Wednesday. Please call her and recommend ADS follow up as this was advised by MD. Also, if US was negative, I would not be too concerned about her D-dimer. This is not a very sensitive test.

## 2023-06-26 NOTE — TELEPHONE ENCOUNTER
Attempted to contact pt. Left message to call clinic.     Dr Fox Michelle may have openings this week too.

## 2023-06-29 ENCOUNTER — TELEPHONE (OUTPATIENT)
Dept: NEUROSURGERY | Facility: CLINIC | Age: 58
End: 2023-06-29
Payer: COMMERCIAL

## 2023-06-29 DIAGNOSIS — M54.16 LUMBAR RADICULOPATHY: Primary | ICD-10-CM

## 2023-06-29 NOTE — TELEPHONE ENCOUNTER
Reviewed imaging results as stated below.     EXAM: MR LUMBAR SPINE W/O CONTRAST  LOCATION: [facility]  DATE: 6/26/2023  IMPRESSION:  1.  At the L2/L3 level, there is a symmetric disc bulge and posterior fissure with mild spinal canal narrowing.  2.  No significant posterior disc bulge or spinal canal at any other level within the lumbar spine.  3.  Multilevel posterolateral disc disease and facet arthropathy with multilevel neural foraminal narrowing, most pronounced at the L4/L5 level where there is severe right and moderate left neural foraminal narrowing at the L5/S1 level where there is severe bilateral neural foraminal narrowing.    Recommend PT and OVI as discussed at office visit. Placed referrals.     Called patient, LVM, advised to call back to discuss results and next steps.     Racquel Li, CNP  [facility] Neurosurgery  [address] [address]  Tel [phone]  Pager [phone]

## 2023-06-30 NOTE — TELEPHONE ENCOUNTER
Spoke to patient. Updated her that Racquel Li CNP is OOO today but back on Monday to discuss MRI results.     Reviewed Racquel's recommendations as laid out in her note. Patient would like Racquel to call her on moday to review MRI results. Encounter routed. Patient in agreement with plan and would like to start PT and have OVI.     Orders have already been placed by Racquel.

## 2023-07-03 NOTE — TELEPHONE ENCOUNTER
Returned call to patient to discuss MRI results.   No answer, LVM.   Referrals placed for PT and OVI.   Advised patient to call back or schedule a phone visit to further discuss results.     EXAM: MR LUMBAR SPINE W/O CONTRAST  LOCATION: [facility]  DATE: 6/26/2023  IMPRESSION:  1.  At the L2/L3 level, there is a symmetric disc bulge and posterior fissure with mild spinal canal narrowing.  2.  No significant posterior disc bulge or spinal canal at any other level within the lumbar spine.  3.  Multilevel posterolateral disc disease and facet arthropathy with multilevel neural foraminal narrowing, most pronounced at the L4/L5 level where there is severe right and moderate left neural foraminal narrowing at the L5/S1 level where there is severe bilateral neural foraminal narrowing.     Racquel Li, CNP  [facility] Neurosurgery  [address] [address]  Tel [phone]  Pager [phone]

## 2023-07-11 ENCOUNTER — TELEPHONE (OUTPATIENT)
Dept: INTERNAL MEDICINE | Facility: CLINIC | Age: 58
End: 2023-07-11

## 2023-07-11 ENCOUNTER — OFFICE VISIT (OUTPATIENT)
Dept: INTERNAL MEDICINE | Facility: CLINIC | Age: 58
End: 2023-07-11
Payer: COMMERCIAL

## 2023-07-11 VITALS
OXYGEN SATURATION: 96 % | HEART RATE: 86 BPM | DIASTOLIC BLOOD PRESSURE: 80 MMHG | RESPIRATION RATE: 16 BRPM | SYSTOLIC BLOOD PRESSURE: 138 MMHG | BODY MASS INDEX: 39.27 KG/M2 | HEIGHT: 60 IN | TEMPERATURE: 97.6 F | WEIGHT: 200 LBS

## 2023-07-11 DIAGNOSIS — E78.5 HYPERLIPIDEMIA LDL GOAL <100: ICD-10-CM

## 2023-07-11 DIAGNOSIS — E11.9 TYPE 2 DIABETES MELLITUS WITHOUT COMPLICATION, WITHOUT LONG-TERM CURRENT USE OF INSULIN (H): Primary | ICD-10-CM

## 2023-07-11 DIAGNOSIS — I10 HYPERTENSION GOAL BP (BLOOD PRESSURE) < 140/80: ICD-10-CM

## 2023-07-11 DIAGNOSIS — N28.1 RENAL CYST: ICD-10-CM

## 2023-07-11 DIAGNOSIS — J44.9 CHRONIC OBSTRUCTIVE PULMONARY DISEASE, UNSPECIFIED COPD TYPE (H): ICD-10-CM

## 2023-07-11 DIAGNOSIS — E03.9 HYPOTHYROIDISM, UNSPECIFIED TYPE: ICD-10-CM

## 2023-07-11 LAB
ANION GAP SERPL CALCULATED.3IONS-SCNC: 11 MMOL/L (ref 7–15)
BUN SERPL-MCNC: 12.6 MG/DL (ref 6–20)
CALCIUM SERPL-MCNC: 10.1 MG/DL (ref 8.6–10)
CHLORIDE SERPL-SCNC: 100 MMOL/L (ref 98–107)
CREAT SERPL-MCNC: 0.73 MG/DL (ref 0.51–0.95)
DEPRECATED HCO3 PLAS-SCNC: 28 MMOL/L (ref 22–29)
GFR SERPL CREATININE-BSD FRML MDRD: >90 ML/MIN/1.73M2
GLUCOSE SERPL-MCNC: 120 MG/DL (ref 70–99)
HBA1C MFR BLD: 7.3 % (ref 0–5.6)
POTASSIUM SERPL-SCNC: 4 MMOL/L (ref 3.4–5.3)
SODIUM SERPL-SCNC: 139 MMOL/L (ref 136–145)

## 2023-07-11 PROCEDURE — 83036 HEMOGLOBIN GLYCOSYLATED A1C: CPT

## 2023-07-11 PROCEDURE — 80048 BASIC METABOLIC PNL TOTAL CA: CPT

## 2023-07-11 PROCEDURE — 99214 OFFICE O/P EST MOD 30 MIN: CPT

## 2023-07-11 PROCEDURE — 36415 COLL VENOUS BLD VENIPUNCTURE: CPT

## 2023-07-11 RX ORDER — LEVOTHYROXINE SODIUM 137 UG/1
TABLET ORAL
Qty: 90 TABLET | Refills: 1 | Status: SHIPPED | OUTPATIENT
Start: 2023-07-11 | End: 2023-08-25

## 2023-07-11 RX ORDER — SIMVASTATIN 20 MG
20 TABLET ORAL AT BEDTIME
Qty: 90 TABLET | Refills: 1 | Status: SHIPPED | OUTPATIENT
Start: 2023-07-11 | End: 2024-02-20

## 2023-07-11 RX ORDER — FLUTICASONE PROPIONATE 220 UG/1
1 AEROSOL, METERED RESPIRATORY (INHALATION) 2 TIMES DAILY
Qty: 12 G | Refills: 0 | Status: SHIPPED | OUTPATIENT
Start: 2023-07-11 | End: 2023-08-25

## 2023-07-11 RX ORDER — HYDROCHLOROTHIAZIDE 25 MG/1
TABLET ORAL
Qty: 90 TABLET | Refills: 1 | Status: SHIPPED | OUTPATIENT
Start: 2023-07-11 | End: 2024-02-20

## 2023-07-11 RX ORDER — GLIPIZIDE 10 MG/1
TABLET, FILM COATED, EXTENDED RELEASE ORAL
Qty: 270 TABLET | Refills: 1 | Status: SHIPPED | OUTPATIENT
Start: 2023-07-11 | End: 2024-02-20

## 2023-07-11 RX ORDER — LOSARTAN POTASSIUM 100 MG/1
100 TABLET ORAL DAILY
Qty: 90 TABLET | Refills: 0 | Status: SHIPPED | OUTPATIENT
Start: 2023-07-11 | End: 2023-08-25

## 2023-07-11 NOTE — PATIENT INSTRUCTIONS
-increase losartan to 100MG  -start GLP-1 (Ozempic if this is covered or we will find alternative)   -Someone will call you to schedule the kidney MRI

## 2023-07-11 NOTE — TELEPHONE ENCOUNTER
Prior Authorization Retail Medication Request    Medication/Dose: fluticasone (FLOVENT HFA) 220 MCG/ACT inhaler  ICD code (if different than what is on RX):  Chronic obstructive pulmonary disease, unspecified COPD type (H) [J44.9]   Previously Tried and Failed:    Rationale:      Insurance Name:    Insurance ID:        Pharmacy Information (if different than what is on RX)  Name:    Phone:

## 2023-07-11 NOTE — PROGRESS NOTES
Assessment & Plan     (E11.9) Type 2 diabetes mellitus without complication, without long-term current use of insulin (H)  (primary encounter diagnosis)  Comment: A1C is 7.3%. She is on Glipizide 20MG in AM and 10MG in PM. Has not tolerated Metformin in past. She is interested in starting GLP-1. Has used Trulicity in the past (2016) and did not tolerate this well d/t GI symptoms. I will write for Ozempic and we discussed side effects and time recommended to see if symptoms resolve.   Plan: Hemoglobin A1c, Basic metabolic panel  (Ca, Cl,        CO2, Creat, Gluc, K, Na, BUN), glipiZIDE         (GLUCOTROL XL) 10 MG 24 hr tablet, semaglutide         (OZEMPIC) 2 MG/3ML pen            (I10) Hypertension goal BP (blood pressure) < 140/80  Comment: BP at 138/80, with room for improvement, I will increase Losartan 50MG to 100MG. She will f/u in 4 weeks. Continue hydrochlorothiazide 25MG  Plan: losartan (COZAAR) 100 MG tablet,         hydrochlorothiazide (HYDRODIURIL) 25 MG tablet            (E03.9) Hypothyroidism, unspecified type  Plan: levothyroxine (SYNTHROID/LEVOTHROID) 137 MCG         tablet            (E78.5) Hyperlipidemia LDL goal <100  Plan: simvastatin (ZOCOR) 20 MG tablet            (N28.1) Renal cyst  Comment:   Left renal cysts found incidentally in 2021. Renal MRI was done in 07/2021 which showed:  3.3 cm T2 hyperintense partially exophytic lesion arising from the  posterior aspect of the mid/upper pole of the left kidney, containing  approximately 1.9 cm mildly T1 hyperintense nonenhancing internal  component. This lesion corresponds to the indeterminate complex cyst  seen on 6/29/2021 chest CT. Findings likely represent cyst with  hemorrhagic/proteinaceous component.  2. 2 cm left adrenal adenoma.    -She was recommended to see Urology for this in 2021 but did not follow through. We will update MRI before referring to Urology in case referral is not necessary.    Plan: MR Renal wo & w Contrast         We  talked about using her Flovent inhaler as directed (BID). She has previously been using this PRN. Wheezes were present on lungs bilaterally today.      MED REC REQUIRED  Post Medication Reconciliation Status:       ELIZABETH Leggett CNP  [facility]        Fe Marquez is a 57 year old, presenting for the following health issues:  No chief complaint on file.        7/11/2023     9:50 AM   Additional Questions   Roomed by Caitlyn MARIN     Follow up cyst on kidney     Diabetes Follow-up      How often are you checking your blood sugar? Not at all    What concerns do you have today about your diabetes? Blood sugars       Do you have any of these symptoms? (Select all that apply)  No numbness or tingling in feet.  No redness, sores or blisters on feet.  No complaints of excessive thirst.  No reports of blurry vision.  No significant changes to weight.          Hyperlipidemia Follow-Up      Are you regularly taking any medication or supplement to lower your cholesterol?   Yes- simvastatin     Are you having muscle aches or other side effects that you think could be caused by your cholesterol lowering medication?  No    Hypertension Follow-up      Do you check your blood pressure regularly outside of the clinic? No     Are you following a low salt diet? Yes    Are your blood pressures ever more than 140 on the top number (systolic) OR more   than 90 on the bottom number (diastolic), for example 140/90? No    BP Readings from Last 2 Encounters:   07/11/23 138/80   06/22/23 138/87     Hemoglobin A1C (%)   Date Value   02/03/2023 6.9 (H)   07/05/2022 7.1 (H)   06/15/2021 6.7 (H)   08/03/2020 7.2 (H)     LDL Cholesterol Calculated (mg/dL)   Date Value   02/03/2023 66   07/05/2022 53   06/15/2021 55   08/03/2020 56       Hypothyroidism Follow-up      Since last visit, patient describes the following symptoms: Weight stable, no hair loss, no skin changes, no constipation, no loose stools, dry  skin, constipation, tremors, anxiety, depression, fatigue and hair loss      How many servings of fruits and vegetables do you eat daily?  0-1    On average, how many sweetened beverages do you drink each day (Examples: soda, juice, sweet tea, etc.  Do NOT count diet or artificially sweetened beverages)?   0    How many days per week do you exercise enough to make your heart beat faster? 3 or less    How many minutes a day do you exercise enough to make your heart beat faster? 9 or less    How many days per week do you miss taking your medication? 0          Objective    /80   Pulse 86   Temp 97.6  F (36.4  C) (Oral)   Resp 16   Ht 1.524 m (5')   Wt 90.7 kg (200 lb)   SpO2 96%   BMI 39.06 kg/m    Body mass index is 39.06 kg/m .        Physical Exam  Constitutional:      General: She is not in acute distress.     Appearance: Normal appearance. She is not ill-appearing, toxic-appearing or diaphoretic.   HENT:      Head: Normocephalic and atraumatic.   Eyes:      Conjunctiva/sclera: Conjunctivae normal.   Cardiovascular:      Rate and Rhythm: Normal rate and regular rhythm.      Heart sounds: Normal heart sounds.   Pulmonary:      Effort: Pulmonary effort is normal.      Breath sounds: Mild wheezes present in bilateral upper lobes. Otherwise normal lung sounds throughout  Skin:     General: Skin is warm and dry.   Neurological:      Mental Status: She is alert and oriented to person, place, and time.   Psychiatric:         Mood and Affect: Mood normal.         Behavior: Behavior normal.         Thought Content: Thought content normal.         Judgment: Judgment normal.

## 2023-07-13 NOTE — TELEPHONE ENCOUNTER
Central Prior Authorization Team   Phone: [phone]    PA Initiation    Medication: fluticasone (FLOVENT HFA) 220 MCG/ACT inhaler  Insurance Company:    Pharmacy Filling the Rx: [facility] #[id number] - [place], [place] - [address]  Filling Pharmacy Phone: [phone]  Filling Pharmacy Fax:    Start Date: 7/13/2023

## 2023-07-14 NOTE — TELEPHONE ENCOUNTER
Prior Authorization Approval    Medication: FLUTICASONE PROPIONATE  MCG/ACT IN AERO  Authorization Effective Date: 7/13/2023  Authorization Expiration Date: 7/13/2024  Approved Dose/Quantity:   Reference #:     Insurance Company: [employer] [phone] Fax [phone]  Which Pharmacy is filling the prescription: [id number] - [place], [place] - [address] [address]  Pharmacy Notified: Yes  Patient Notified: Yes

## 2023-07-15 ENCOUNTER — HOSPITAL ENCOUNTER (OUTPATIENT)
Dept: MRI IMAGING | Facility: CLINIC | Age: 58
Discharge: HOME | End: 2023-07-15
Payer: COMMERCIAL

## 2023-07-15 DIAGNOSIS — N28.1 RENAL CYST: ICD-10-CM

## 2023-07-15 PROCEDURE — A9585 GADOBUTROL INJECTION: HCPCS

## 2023-07-15 PROCEDURE — 255N000002 HC RX 255 OP 636

## 2023-07-15 PROCEDURE — 74183 MRI ABD W/O CNTR FLWD CNTR: CPT

## 2023-07-15 RX ORDER — GADOBUTROL 604.72 MG/ML
9 INJECTION INTRAVENOUS ONCE
Status: COMPLETED | OUTPATIENT
Start: 2023-07-15 | End: 2023-07-15

## 2023-07-15 RX ADMIN — GADOBUTROL 9 ML: 604.72 INJECTION INTRAVENOUS at 07:50

## 2023-07-18 ENCOUNTER — HOSPITAL ENCOUNTER (OUTPATIENT)
Dept: MAMMOGRAPHY | Facility: CLINIC | Age: 58
Discharge: HOME | End: 2023-07-18
Payer: COMMERCIAL

## 2023-07-18 DIAGNOSIS — N28.1 RENAL CYST: Primary | ICD-10-CM

## 2023-07-18 DIAGNOSIS — Z12.31 VISIT FOR SCREENING MAMMOGRAM: ICD-10-CM

## 2023-07-18 PROCEDURE — 77067 SCR MAMMO BI INCL CAD: CPT

## 2023-07-25 ENCOUNTER — NURSE TRIAGE (OUTPATIENT)
Dept: NURSING | Facility: CLINIC | Age: 58
End: 2023-07-25
Payer: COMMERCIAL

## 2023-07-25 ENCOUNTER — TELEPHONE (OUTPATIENT)
Dept: INTERNAL MEDICINE | Facility: CLINIC | Age: 58
End: 2023-07-25
Payer: COMMERCIAL

## 2023-07-25 DIAGNOSIS — E11.9 TYPE 2 DIABETES MELLITUS WITHOUT COMPLICATION, WITHOUT LONG-TERM CURRENT USE OF INSULIN (H): Primary | ICD-10-CM

## 2023-07-25 NOTE — TELEPHONE ENCOUNTER
Patient calling as her sugar went down to 65 today and her hands were shaking and she was sweaty.      She has not been able to eat since starting Ozempic due to nausea and has vomited a couple of times.  She has been able to eat only a couple of soda crackers, toast and noodles.  Patient wanting to know if she should be taking her glipizide with the Ozempic due to the low blood sugar.      Call or send myc message with response.

## 2023-07-25 NOTE — TELEPHONE ENCOUNTER
Patient returning call and message relayed that she may hold her Glipizide and she says she would like some short-term help with Zofran.   [facility] in [place] on [phone].    Bernice Lyle RN  [facility]

## 2023-07-25 NOTE — TELEPHONE ENCOUNTER
Patient calling back and message relayed about medication.  See addended note from earlier today.  Patient feeling much better and denies need for triage.    Bernice Lyle RN  [facility]

## 2023-07-26 NOTE — PROGRESS NOTES
[facility] - Procedure Note    Date of Visit: 7/27/2023    Procedure performed: Lumbar L5-S1 interlaminar epidural steroid injection  Diagnosis: Lumbar spondylosis; Lumbar radiculitis/radiculopathy  : Linda Mccann MD   Anesthesia: none    Indications: Jazlyn Bartlett is a 57 year old female who is seen at the request of Racquel Li CNP for a lumbar epidural steroid injection. The patient describes bilateral low back pain with radiation to the posterior legs. The patient has been exhibiting symptoms consistent with lumbar intraspinal inflammation and radiculopathy. Symptoms have been persistent, disabling, and intermittently severe. The patient reports minimal improvement with conservative treatment, including previous injection in 2016 which was helpful, PT and medications.    MRI of the LUMBAR SPINE was done on 6/26/2023 which showed:   FINDINGS:   Nomenclature is based on 5 lumbar type vertebral bodies. Anterolisthesis of L5 on S1 measuring 2 mm. Anterior marginal osteophytes at L2/L3 and L5/S1. The vertebral bodies of the lumbar spine otherwise have normal stature, alignment, and marrow signal   intensity. Normal distal spinal cord and cauda equina with conus medullaris at the mid L2 level. The partially imaged intra-abdominal contents are unremarkable. Unremarkable visualized bony pelvis.     T12-L1: Mild loss of disc signal and disc height. Symmetric disc bulge without spinal canal narrowing. No neural foraminal narrowing.      L1-L2: Normal disc signal and disc height. No posterior disc bulge or spinal canal narrowing. No neural foraminal narrowing.     L2-L3: Mild loss of disc signal and disc height. Symmetric disc bulge and posterior annular fissure with mild spinal canal narrowing. Moderate bilateral neural foraminal narrowing.     L3-L4: No posterior disc bulge or spinal canal narrowing. Mild bilateral neural foraminal narrowing.     L4-L5: Mild loss of disc  signal and disc height. No posterior disc bulge or spinal canal narrowing. Severe right and moderate left neural foraminal narrowing.     L5-S1: Mild loss of disc signal and disc height. Symmetric disc bulge and advanced facet arthropathy without significant spinal canal narrowing. Severe bilateral neural foraminal narrowing.                                                                   IMPRESSION:  1.  At the L2/L3 level, there is a symmetric disc bulge and posterior fissure with mild spinal canal narrowing.  2.  No significant posterior disc bulge or spinal canal at any other level within the lumbar spine.  3.  Multilevel posterolateral disc disease and facet arthropathy with multilevel neural foraminal narrowing, most pronounced at the L4/L5 level where there is severe right and moderate left neural foraminal narrowing at the L5/S1 level where there is   severe bilateral neural foraminal narrowing.    Allergies:      Allergies   Allergen Reactions    Hydrocodone-Acetaminophen Nausea and Vomiting    Ibuprofen Sodium Nausea and Vomiting    Lisinopril Cough        Vitals:  BP (!) 142/81   Pulse 90   SpO2 93%     Review of Systems: The patient denies recent fever, chills, illness, use of antibiotics or anticoagulants. All other 10-point review of systems negative.     Procedure: The procedure and risks were explained, and informed written consent was obtained from the patient. Risks include but are not limited to: infection, bleeding, increased pain, and damage to soft tissue, nerve, muscle, and vasculature structures. After getting informed consent, patient was brought into the procedure suite and was placed in a prone position on the procedure table. A Pause for the Cause was performed. Patient was prepped and draped in sterile fashion.     The L5-S1 interspace was identified with use of fluoroscopy in AP view. A 25-gauge, 1.5 inch needle was used to anesthetize the skin and subcutaneous tissue entry site  with a total of 2 ml of 1% lidocaine. Under fluoroscopic visualization, a 22-gauge, 4.5 inch Tuohy epidural needle was slowly advanced towards the epidural space a few millimeters left of midline. The latter part of the needle advancement was guided with fluoroscopy in the lateral view. The epidural space was identified using loss of resistance technique. After negative aspiration for heme and cerebrospinal fluid, a total of 1 mL of non-ionic contrast was injected to confirm needle placement with 0 mL of contrast wasted. Epidurogram confirmed spread within the posterior epidural space. 2 ml of 40mg/ml of triamcinolone, 2 ml of 1% lidocaine, and 1 ml of preservative free saline was injected. The needle was removed.  Images were saved to PACS.    The patient tolerated the procedure well, and there was no evidence of procedural complications. No new sensory or motor deficits were noted following the procedure. The patient was stable and able to ambulate on discharge home. Post-procedure instructions were provided.     Pre-procedure pain score: 5/10 in the back, 5/10 in the leg  Post-procedure pain score: 3/10 in the back, 3/10 in the leg    Assessment/Plan: Jazlyn Bartlett is a 57 year old female s/p lumbar interlaminar epidural steroid injection today for lumbar spondylosis and radiculitis/radiculopathy.     1. Following today's procedure, the patient was advised to contact the Pain Management Center for any of the following:   Fever, chills, or night sweats   New onset of pain, numbness, or weakness   Any questions/concerns regarding the procedure  If unable to contact the Pain Center, the patient was instructed to go to a local Emergency Room for any complications.   2. Follow-up with the referring provider in 2 weeks for post-procedure evaluation.    YSABEL KEATING MD   Pain Management

## 2023-07-27 ENCOUNTER — RADIOLOGY INJECTION OFFICE VISIT (OUTPATIENT)
Dept: PALLIATIVE MEDICINE | Facility: CLINIC | Age: 58
End: 2023-07-27
Attending: NURSE PRACTITIONER
Payer: COMMERCIAL

## 2023-07-27 VITALS — HEART RATE: 89 BPM | DIASTOLIC BLOOD PRESSURE: 88 MMHG | SYSTOLIC BLOOD PRESSURE: 142 MMHG | OXYGEN SATURATION: 93 %

## 2023-07-27 DIAGNOSIS — M54.16 LUMBAR RADICULOPATHY: Primary | ICD-10-CM

## 2023-07-27 PROCEDURE — 62323 NJX INTERLAMINAR LMBR/SAC: CPT | Performed by: ANESTHESIOLOGY

## 2023-07-27 RX ORDER — ONDANSETRON 4 MG/1
4 TABLET, FILM COATED ORAL EVERY 8 HOURS PRN
Qty: 30 TABLET | Refills: 0 | Status: SHIPPED | OUTPATIENT
Start: 2023-07-27 | End: 2023-10-03

## 2023-07-27 RX ORDER — TRIAMCINOLONE ACETONIDE 40 MG/ML
40 INJECTION, SUSPENSION INTRA-ARTICULAR; INTRAMUSCULAR ONCE
Status: COMPLETED | OUTPATIENT
Start: 2023-07-27 | End: 2023-07-27

## 2023-07-27 RX ADMIN — TRIAMCINOLONE ACETONIDE 40 MG: 40 INJECTION, SUSPENSION INTRA-ARTICULAR; INTRAMUSCULAR at 15:50

## 2023-07-27 NOTE — PATIENT INSTRUCTIONS
[facility] Procedure Discharge Instructions    Today you saw:   Dr. Linda Mccann     Your procedure:  Epidural steroid injection      Medications used:  Lidocaine (anesthetic)  Kenalog (steroid)  Omnipaque (contrast)          Be cautious when walking as numbness and/or weakness in the legs may occur up to 6-8 hours after the procedure due to effect of the local anesthetic  Do not drive for 6 hours. The effect of the local anesthetic could slow your reflexes.   Avoid strenuous activity for the first 24 hours. You may resume your regular activities after that.   You may shower, however avoid swimming, tub baths or hot tubs for 24 hours following your procedure  You may have a mild to moderate increase in pain for several days following the injection.    You may use ice packs for 10-15 minutes, 3 to 4 times a day at the injection site for comfort  Do not use heat to painful areas for 6 to 8 hours. This will give the local anesthetic time to wear off and prevent you from accidentally burning your skin.  Unless you have been directed to avoid the use of anti-inflammatory medications (NSAIDS-ibuprofen, Aleve, Motrin), you may use these medications or Tylenol for pain control if needed.   Possible side effects of steroids that you may experience include flushing, elevated blood pressure, increased appetite, mild headaches and restlessness.  All of these symptoms will get better with time.  It may take up to 14 days for the steroid medication to start working although you may feel the effect as early as a few days after the procedure.   Follow up with your referring provider (Racquel Li NP) in 2-3 weeks    If you experience any of the following, call the pain center line during work hours at [phone] or on-call physician after hours at [phone]:  -Fever over 100 degree F  -Swelling, bleeding, redness, drainage, warmth at the injection site  -Progressive weakness or numbness in your legs or  arms  -Loss of bowel or bladder function  -Unusual headache that is not relieved by Tylenol or your regular headache medication  -Unusual new onset of pain that is not improving

## 2023-07-27 NOTE — NURSING NOTE
Pre-procedure Intake    If YES to any questions or NO to having a   Please complete laminated checklist and leave on the computer keyboard for Provider, verbally inform provider if able.    For SCS Trial, RFA's or any sedation procedure: NA  If yes, for how long?        Are you taking any any blood thinners such as Coumadin, Warfarin, Jantoven, Pradaxa Xarelto, Eliquis, Edoxaban, Enoxaparin, Lovenox, Heparin, Arixtra, Fondaparinux, or Fragmin? OR Antiplatelet medication such as Plavix, Brilinta, or Effient?  NO   If yes, when did you take your last dose?       Do you take aspirin?   YES: 81 mg  If cervical procedure, have you held aspirin for 6 days?   NA    Do you have any allergies to contrast dye, iodine, steroid and/or numbing medications?  NO     Are you currently taking antibiotics or have an active infection?  NO     Have you had a fever/elevated temperature within the past week? NO     Are you currently taking oral steroids? NO     Do you have a ? Yes     Are you pregnant or breastfeeding? NA     Have you received any vaccines in the past 2 weeks? NO     Notify provider and RNs if systolic BP >170, diastolic BP >100, P >100 or O2 sats < 90%

## 2023-07-27 NOTE — NURSING NOTE
Discharge Information    IV Discontiued Time:  NA    Amount of Fluid Infused:  NA    Discharge Criteria = When patient returns to baseline or as per MD order    Consciousness:  Pt is fully awake    Circulation:  BP +/- 20% of pre-procedure level    Respiration:  Patient is able to breathe deeply    O2 Sat:  Patient is able to maintain O2 Sat >92% on room air    Activity:  Moves 4 extremities on command    Ambulation:  Patient is able to stand and walk or stand and pivot into wheelchair    Dressing:  Clean/dry or No Dressing    Notes:   Discharge instructions and AVS given to patient    Patient meets criteria for discharge?  YES    Admitted to PCU?  No    Responsible adult present to accompany patient home?  Yes    Signature/Title:    Liudmila Guillen RN  RN Care Coordinator  [facility] [place]

## 2023-08-08 ENCOUNTER — MYC MEDICAL ADVICE (OUTPATIENT)
Dept: INTERNAL MEDICINE | Facility: CLINIC | Age: 58
End: 2023-08-08
Payer: COMMERCIAL

## 2023-08-08 DIAGNOSIS — E11.9 TYPE 2 DIABETES MELLITUS WITHOUT COMPLICATION, WITHOUT LONG-TERM CURRENT USE OF INSULIN (H): Primary | ICD-10-CM

## 2023-08-15 ENCOUNTER — TELEPHONE (OUTPATIENT)
Dept: INTERNAL MEDICINE | Facility: CLINIC | Age: 58
End: 2023-08-15
Payer: COMMERCIAL

## 2023-08-15 NOTE — TELEPHONE ENCOUNTER
[employer] was sent to attempt to reach out to the patient for a final time.    Michelle Hernandez, PharmD  Medication Therapy Management Pharmacist  Voicemail: [phone]

## 2023-08-15 NOTE — TELEPHONE ENCOUNTER
MT referral from: [facility] visit (referral by provider)    MT referral outreach attempt #2 on August 15, 2023 at 11:30 AM      Outcome: Patient not reachable after several attempts, will route to [facility] Pharmacist/Provider as an FYI.  [facility] scheduling number is [phone].  Thank you for the referral.    Use [employer] O[employer]- [employer] for the carrier/Plan on the flowsheet    RUSS Sullivan

## 2023-08-16 ENCOUNTER — TELEPHONE (OUTPATIENT)
Dept: INTERNAL MEDICINE | Facility: CLINIC | Age: 58
End: 2023-08-16

## 2023-08-16 NOTE — TELEPHONE ENCOUNTER
Fax received from [facility] [employer] D Hanis for review and signature.  Put in Sharron Justice's in basket.

## 2023-08-17 ENCOUNTER — TELEPHONE (OUTPATIENT)
Dept: INTERNAL MEDICINE | Facility: CLINIC | Age: 58
End: 2023-08-17
Payer: COMMERCIAL

## 2023-08-17 NOTE — TELEPHONE ENCOUNTER
MTM referral from: [facility] visit (referral by provider)    Outcome: Patient is not interested at this time because potential private pay, will route to MT Pharmacist/Provider as an FYI. Thank you for the referral.     Use [employer] OOS for the carrier/Plan on the flowsheet    [facility]

## 2023-08-17 NOTE — TELEPHONE ENCOUNTER
Left message to call back . ? Why did we received disability forms - ? How long for what Dx ? .VALERIA Pedroza LPN

## 2023-08-22 ENCOUNTER — NURSE TRIAGE (OUTPATIENT)
Dept: INTERNAL MEDICINE | Facility: CLINIC | Age: 58
End: 2023-08-22
Payer: COMMERCIAL

## 2023-08-22 ENCOUNTER — HOSPITAL ENCOUNTER (EMERGENCY)
Facility: CLINIC | Age: 58
Discharge: HOME | End: 2023-08-22
Attending: EMERGENCY MEDICINE | Admitting: EMERGENCY MEDICINE
Payer: COMMERCIAL

## 2023-08-22 VITALS
DIASTOLIC BLOOD PRESSURE: 84 MMHG | TEMPERATURE: 98.4 F | HEIGHT: 60 IN | RESPIRATION RATE: 17 BRPM | SYSTOLIC BLOOD PRESSURE: 131 MMHG | HEART RATE: 70 BPM | WEIGHT: 194 LBS | BODY MASS INDEX: 38.09 KG/M2 | OXYGEN SATURATION: 96 %

## 2023-08-22 DIAGNOSIS — R42 LIGHTHEADEDNESS: ICD-10-CM

## 2023-08-22 DIAGNOSIS — T50.905A MEDICATION REACTION, INITIAL ENCOUNTER: ICD-10-CM

## 2023-08-22 LAB
ALBUMIN SERPL BCG-MCNC: 4.6 G/DL (ref 3.5–5.2)
ALP SERPL-CCNC: 81 U/L (ref 35–104)
ALT SERPL W P-5'-P-CCNC: 21 U/L (ref 0–50)
ANION GAP SERPL CALCULATED.3IONS-SCNC: 13 MMOL/L (ref 7–15)
AST SERPL W P-5'-P-CCNC: 29 U/L (ref 0–45)
ATRIAL RATE - MUSE: 72 BPM
BASOPHILS # BLD AUTO: 0 10E3/UL (ref 0–0.2)
BASOPHILS NFR BLD AUTO: 0 %
BILIRUB DIRECT SERPL-MCNC: <0.2 MG/DL (ref 0–0.3)
BILIRUB SERPL-MCNC: 0.9 MG/DL
BUN SERPL-MCNC: 9.8 MG/DL (ref 6–20)
CALCIUM SERPL-MCNC: 10.4 MG/DL (ref 8.6–10)
CHLORIDE SERPL-SCNC: 98 MMOL/L (ref 98–107)
CREAT SERPL-MCNC: 0.72 MG/DL (ref 0.51–0.95)
D DIMER PPP FEU-MCNC: 0.3 UG/ML FEU (ref 0–0.5)
DEPRECATED HCO3 PLAS-SCNC: 27 MMOL/L (ref 22–29)
DIASTOLIC BLOOD PRESSURE - MUSE: NORMAL MMHG
EOSINOPHIL # BLD AUTO: 0.1 10E3/UL (ref 0–0.7)
EOSINOPHIL NFR BLD AUTO: 1 %
ERYTHROCYTE [DISTWIDTH] IN BLOOD BY AUTOMATED COUNT: 14.2 % (ref 10–15)
GFR SERPL CREATININE-BSD FRML MDRD: >90 ML/MIN/1.73M2
GLUCOSE BLDC GLUCOMTR-MCNC: 118 MG/DL (ref 70–99)
GLUCOSE SERPL-MCNC: 104 MG/DL (ref 70–99)
HCT VFR BLD AUTO: 47.8 % (ref 35–47)
HGB BLD-MCNC: 16.4 G/DL (ref 11.7–15.7)
HOLD SPECIMEN: NORMAL
HOLD SPECIMEN: NORMAL
IMM GRANULOCYTES # BLD: 0 10E3/UL
IMM GRANULOCYTES NFR BLD: 0 %
INTERPRETATION ECG - MUSE: NORMAL
LIPASE SERPL-CCNC: 56 U/L (ref 13–60)
LYMPHOCYTES # BLD AUTO: 2 10E3/UL (ref 0.8–5.3)
LYMPHOCYTES NFR BLD AUTO: 20 %
MAGNESIUM SERPL-MCNC: 2.1 MG/DL (ref 1.7–2.3)
MCH RBC QN AUTO: 31.1 PG (ref 26.5–33)
MCHC RBC AUTO-ENTMCNC: 34.3 G/DL (ref 31.5–36.5)
MCV RBC AUTO: 91 FL (ref 78–100)
MONOCYTES # BLD AUTO: 0.6 10E3/UL (ref 0–1.3)
MONOCYTES NFR BLD AUTO: 6 %
NEUTROPHILS # BLD AUTO: 7.4 10E3/UL (ref 1.6–8.3)
NEUTROPHILS NFR BLD AUTO: 73 %
NRBC # BLD AUTO: 0 10E3/UL
NRBC BLD AUTO-RTO: 0 /100
P AXIS - MUSE: 39 DEGREES
PLATELET # BLD AUTO: 276 10E3/UL (ref 150–450)
POTASSIUM SERPL-SCNC: 3.7 MMOL/L (ref 3.4–5.3)
PR INTERVAL - MUSE: 138 MS
PROT SERPL-MCNC: 7.2 G/DL (ref 6.4–8.3)
QRS DURATION - MUSE: 88 MS
QT - MUSE: 374 MS
QTC - MUSE: 409 MS
R AXIS - MUSE: -14 DEGREES
RBC # BLD AUTO: 5.28 10E6/UL (ref 3.8–5.2)
SODIUM SERPL-SCNC: 138 MMOL/L (ref 136–145)
SYSTOLIC BLOOD PRESSURE - MUSE: NORMAL MMHG
T AXIS - MUSE: 40 DEGREES
T4 FREE SERPL-MCNC: 1.54 NG/DL (ref 0.9–1.7)
TROPONIN T SERPL HS-MCNC: <6 NG/L
TSH SERPL DL<=0.005 MIU/L-ACNC: 4.95 UIU/ML (ref 0.3–4.2)
VENTRICULAR RATE- MUSE: 72 BPM
WBC # BLD AUTO: 10.1 10E3/UL (ref 4–11)

## 2023-08-22 PROCEDURE — 84443 ASSAY THYROID STIM HORMONE: CPT | Performed by: EMERGENCY MEDICINE

## 2023-08-22 PROCEDURE — 96361 HYDRATE IV INFUSION ADD-ON: CPT

## 2023-08-22 PROCEDURE — 96374 THER/PROPH/DIAG INJ IV PUSH: CPT

## 2023-08-22 PROCEDURE — 82962 GLUCOSE BLOOD TEST: CPT

## 2023-08-22 PROCEDURE — 99284 EMERGENCY DEPT VISIT MOD MDM: CPT | Mod: 25

## 2023-08-22 PROCEDURE — 36415 COLL VENOUS BLD VENIPUNCTURE: CPT | Performed by: EMERGENCY MEDICINE

## 2023-08-22 PROCEDURE — 85025 COMPLETE CBC W/AUTO DIFF WBC: CPT | Performed by: EMERGENCY MEDICINE

## 2023-08-22 PROCEDURE — 258N000003 HC RX IP 258 OP 636: Performed by: EMERGENCY MEDICINE

## 2023-08-22 PROCEDURE — 82310 ASSAY OF CALCIUM: CPT | Performed by: EMERGENCY MEDICINE

## 2023-08-22 PROCEDURE — 82248 BILIRUBIN DIRECT: CPT | Performed by: EMERGENCY MEDICINE

## 2023-08-22 PROCEDURE — 93005 ELECTROCARDIOGRAM TRACING: CPT

## 2023-08-22 PROCEDURE — 250N000011 HC RX IP 250 OP 636: Mod: JZ | Performed by: EMERGENCY MEDICINE

## 2023-08-22 PROCEDURE — 85379 FIBRIN DEGRADATION QUANT: CPT | Performed by: EMERGENCY MEDICINE

## 2023-08-22 PROCEDURE — 83690 ASSAY OF LIPASE: CPT | Performed by: EMERGENCY MEDICINE

## 2023-08-22 PROCEDURE — 84484 ASSAY OF TROPONIN QUANT: CPT | Performed by: EMERGENCY MEDICINE

## 2023-08-22 PROCEDURE — 84439 ASSAY OF FREE THYROXINE: CPT | Performed by: EMERGENCY MEDICINE

## 2023-08-22 PROCEDURE — 83735 ASSAY OF MAGNESIUM: CPT | Performed by: EMERGENCY MEDICINE

## 2023-08-22 RX ORDER — ONDANSETRON 2 MG/ML
4 INJECTION INTRAMUSCULAR; INTRAVENOUS ONCE
Status: COMPLETED | OUTPATIENT
Start: 2023-08-22 | End: 2023-08-22

## 2023-08-22 RX ADMIN — SODIUM CHLORIDE 1000 ML: 9 INJECTION, SOLUTION INTRAVENOUS at 17:06

## 2023-08-22 RX ADMIN — ONDANSETRON 4 MG: 2 INJECTION INTRAMUSCULAR; INTRAVENOUS at 17:06

## 2023-08-22 ASSESSMENT — ACTIVITIES OF DAILY LIVING (ADL): ADLS_ACUITY_SCORE: 33

## 2023-08-22 NOTE — ED TRIAGE NOTES
"Pt reports that she recently started ozempic, pt reports that since then she has been having titrations in BP meds and noted yesterday around noon she had her BG drop to 58, pt reports at that time she had SOB, dizziness and \"shaking\" pt reports that those symptoms have gotten better but still having some of those symptoms. VSS and ABC's intact        "

## 2023-08-22 NOTE — ED NOTES
Tele-PIT/Intake Evaluation      Video-Visit Details    Type of service:  Video Visit    Video Start Time (time video started): 4:55 PM  Video End Time (time video stopped): 4:55 PM   Originating Location (pt. Location): [facility]  Distant Location (provider location):  [facility]  Mode of Communication:  Video Conference via [employer]  Patient verbally consented to [employer] televisit.    History:  Patient is a 57-year-old female with a history of hypertension who presents with lightheadedness, dizziness, low blood sugars, chest pain, and shortness of breath ever since having an increase in her blood pressure medication and initiating her Ozempic.  Patient is on her fourth week of Ozempic.  She normally takes her dose on a weekly basis on Fridays.  She notes she is continued to have multiple symptoms of low blood sugars lightheadedness dizziness as well as this chest pressure and shortness of breath ever since.  She has also been having nausea and vomiting.  Denies any active abdominal pain.      Exam:  Patient Vitals for the past 24 hrs:   BP Temp Temp src Pulse Resp SpO2   08/22/23 1248 (!) 143/94 97.2  F (36.2  C) Temporal 74 18 98 %   General: Resting comfortably on the gurney  Head:  The scalp, face, and head appear normal  Eyes:  The pupils are normal    Conjunctivae and sclera appear normal  ENT:    The nose is normal    Ears/pinnae are normal  Neck:  Normal range of motion  Skin:  No rash or lesions noted.  Neuro:  Speech is normal and fluent  Psych: Awake. Alert.  Normal affect.      Appropriate interactions    Appropriate interventions for symptom management were initiated if applicable.  Appropriate diagnostic tests were initiated if indicated.    Important information for subsequent clinician:  Patient is a 57-year-old female who presents with multiple complaints including lightheadedness, dizziness, low blood sugars, chest pain and shortness of breath.  Plan for EKG, laboratory  work, urinalysis, IV fluids and Zofran.  Orthostatic vital signs ordered as well.  D-dimer has been ordered and chest imaging to be based on D-dimer results.    I briefly evaluated the patient and developed an initial plan of care. I discussed this plan and explained that this brief interaction does not constitute a full evaluation. Patient/family understands that they should wait to be fully evaluated and discuss any test results with another clinician prior to leaving the hospital.       Aniceto Palomino MD  08/22/23 7302

## 2023-08-22 NOTE — ED PROVIDER NOTES
History     Chief Complaint:  Dizziness (Dizziness, )       HPI   Jazlyn Bartlett is a 57 year old female with DM and HTN that has been on ozempic now 4 weeks and fewels she is not tolerating it.  She injects on Fridays by 2-4 days BG low weak and lightheaded no movement of food in stomach and vomting.  She is here with this repeat pattern today.  She has no new ha vision changes focal neuro weakness CP SOB NVD abd pain fever bites or rashes.        Independent Historian:        Review of External Notes:       Medications:    albuterol (PROAIR HFA/PROVENTIL HFA/VENTOLIN HFA) 108 (90 Base) MCG/ACT inhaler  aspirin (ASA) 81 MG chewable tablet  fluticasone (FLOVENT HFA) 220 MCG/ACT inhaler  glipiZIDE (GLUCOTROL XL) 10 MG 24 hr tablet  hydrochlorothiazide (HYDRODIURIL) 25 MG tablet  levothyroxine (SYNTHROID/LEVOTHROID) 137 MCG tablet  losartan (COZAAR) 100 MG tablet  ondansetron (ZOFRAN) 4 MG tablet  salmeterol (SEREVENT) 50 MCG/DOSE inhaler  semaglutide (OZEMPIC) 2 MG/3ML pen  simvastatin (ZOCOR) 20 MG tablet        Past Medical History:    Past Medical History:   Diagnosis Date    Arthritis 2016    Chronic pain     COPD (chronic obstructive pulmonary disease)     Depressive disorder 1990    Diabetes mellitus - type 2     Gastro-oesophageal reflux disease     Hypertension     Hypothyroidism     Kidney stone     Mucoepidermoid carcinoma of parotid gland 2011    Uncomplicated asthma 2010       Past Surgical History:    Past Surgical History:   Procedure Laterality Date    ABDOMEN SURGERY  1986    Tubal    ABLATION, ENDOMETRIAL, THERMAL, W/O HYSTEROSCOPIC GUIDANCE      BIOPSY  2010    Neck    COLONOSCOPY  2011    COLONOSCOPY N/A 10/28/2022    Procedure: COLONOSCOPY with polypectomy;  Surgeon: Michael Enciso MD;  Location: [facility] OR    DILATION AND CURETTAGE, HYSTEROSCOPY, ABLATE ENDOMETRIUM NOVASURE, COMBINED  10/11/2012    Procedure: COMBINED DILATION AND CURETTAGE, HYSTEROSCOPY, ABLATE ENDOMETRIUM NOVASURE;   COMBINED DILATION AND CURETTAGE, HYSTEROSCOPY, ABLATE ENDOMETRIUM ,pelvic exam under anesthesia;  Surgeon: Shruti Barrios MD;  Location: [facility] OR    ESOPHAGOSCOPY, GASTROSCOPY, DUODENOSCOPY (EGD), COMBINED N/A 01/19/2016    Procedure: COMBINED ESOPHAGOSCOPY, GASTROSCOPY, DUODENOSCOPY (EGD), BIOPSY SINGLE OR MULTIPLE;  Surgeon: Kristofer Molina MD;  Location:  GI    GENITOURINARY SURGERY  1986 tubes tied & burned    HEAD & NECK SURGERY  removal of cancerous limp knod 2010    Removal of cancerous lump on neck      TONSILLECTOMY            Physical Exam   Patient Vitals for the past 24 hrs:   BP Temp Temp src Pulse Resp SpO2 Height Weight   08/22/23 1736 131/84 98.4  F (36.9  C) Oral 67 16 98 % 1.524 m (5') 88 kg (194 lb)   08/22/23 1248 (!) 143/94 97.2  F (36.2  C) Temporal 74 18 98 % -- --        Physical Exam  General: Patient is well appearing. No distress.  Head: Atraumatic.  Eyes: Conjunctivae and EOM are normal. No scleral icterus.  Neck: Normal range of motion. Neck supple.   Cardiovascular: Normal rate, regular rhythm, normal heart sounds and intact distal pulses.   Pulmonary/Chest: Breath sounds normal. No respiratory distress.  Abdominal: Soft. Bowel sounds are normal. No distension. No tenderness. No rebound or guarding.   Musculoskeletal: Normal range of motion.  Skin: Warm and dry. No rash noted. Not diaphoretic.      Emergency Department Course   ECG  NSR HR 72    Imaging:  No orders to display     Report per radiology    Laboratory:  Labs Ordered and Resulted from Time of ED Arrival to Time of ED Departure   BASIC METABOLIC PANEL - Abnormal       Result Value    Sodium 138      Potassium 3.7      Chloride 98      Carbon Dioxide (CO2) 27      Anion Gap 13      Urea Nitrogen 9.8      Creatinine 0.72      Calcium 10.4 (*)     Glucose 104 (*)     GFR Estimate >90     GLUCOSE BY METER - Abnormal    GLUCOSE BY METER POCT 118 (*)    CBC WITH PLATELETS AND DIFFERENTIAL - Abnormal    WBC Count 10.1      RBC  Count 5.28 (*)     Hemoglobin 16.4 (*)     Hematocrit 47.8 (*)     MCV 91      MCH 31.1      MCHC 34.3      RDW 14.2      Platelet Count 276      % Neutrophils 73      % Lymphocytes 20      % Monocytes 6      % Eosinophils 1      % Basophils 0      % Immature Granulocytes 0      NRBCs per 100 WBC 0      Absolute Neutrophils 7.4      Absolute Lymphocytes 2.0      Absolute Monocytes 0.6      Absolute Eosinophils 0.1      Absolute Basophils 0.0      Absolute Immature Granulocytes 0.0      Absolute NRBCs 0.0     TSH WITH FREE T4 REFLEX - Abnormal    TSH 4.95 (*)    TROPONIN T, HIGH SENSITIVITY - Normal    Troponin T, High Sensitivity <6     D DIMER QUANTITATIVE - Normal    D-Dimer Quantitative 0.30     MAGNESIUM - Normal    Magnesium 2.1     T4 FREE - Normal    Free T4 1.54     HEPATIC FUNCTION PANEL - Normal    Protein Total 7.2      Albumin 4.6      Bilirubin Total 0.9      Alkaline Phosphatase 81      AST 29      ALT 21      Bilirubin Direct <0.20     LIPASE - Normal    Lipase 56          Procedures       Emergency Department Course & Assessments:             Interventions:  Medications   0.9% sodium chloride BOLUS (1,000 mLs Intravenous $New Bag 8/22/23 1706)   ondansetron (ZOFRAN) injection 4 mg (4 mg Intravenous $Given 8/22/23 1706)        Assessments:      Independent Interpretation (X-rays, CTs, rhythm strip):      Consultations/Discussion of Management or Tests:         Social Determinants of Health affecting care:       Disposition:      Impression & Plan        Medical Decision Making:  Pt presents with normal vitals normal exam no red flags for neuro CV resp or metabolic derangement with repeatable reactions to ozempic on days 2-4.  Extensive lab workup here reassuring that no end organs dysfxn.  Pt feels better after IVF and tolerating po.  Ambulatory without difficulty.  Already has planned follow up with PCP Friday to discuss this.    Diagnosis:    ICD-10-CM    1. Medication reaction, initial encounter   T50.905A       2. Lightheadedness  R42            Discharge Medications:  New Prescriptions    No medications on file            8/22/2023   Shayne Duran MD Stevens, Andrew C, MD  08/22/23 0845

## 2023-08-22 NOTE — TELEPHONE ENCOUNTER
"Yesterday had \"shakes\" and heart racing, eyes blurred, legs rubbery, trouble breathing - was at work. Sent home and just slept.    This morning 158 sugars   Then down to 122     BP was 197/94 - home arm cuff   This /84   178/96 now     Today feels okay - much better than yesterday. This AM was having a headache and right chest pains. Not present currently.     Normally BP is 140/80s or 90s     Last visit increased BP medication; since then having issues    Started Ozempic and dose increase on BP med at same time     \"I just don't feel well\" - like she can't focus, lightheaded     Blurry vision is present currently with high BP, feels better but still disoriented     Per protocol advised ED now. Pt verbalized agreement and understanding     Ml BAE, Triage RN  [facility]     Reason for Disposition   Systolic BP >= 160 OR Diastolic >= 100, and any cardiac or neurologic symptoms (e.g., chest pain, difficulty breathing, unsteady gait, blurred vision)    Additional Information   Negative: Sounds like a life-threatening emergency to the triager   Negative: Symptom is main concern (e.g., headache, chest pain)   Negative: Low blood pressure is main concern    Protocols used: Blood Pressure - High-A-OH    "

## 2023-08-22 NOTE — LETTER
August 22, 2023      To Whom It May Concern:      Jazlyn Bartlett was seen in our Emergency Department today, 08/22/23.  I expect her condition to improve over the next 1 day.  She may return to work/school when improved.    Sincerely,        Juan C Land RN

## 2023-08-23 ENCOUNTER — MYC MEDICAL ADVICE (OUTPATIENT)
Dept: INTERNAL MEDICINE | Facility: CLINIC | Age: 58
End: 2023-08-23

## 2023-08-23 NOTE — TELEPHONE ENCOUNTER
Left message for patient to call back and sent my chart message  Please obtain a fax number we can send her paperwork to.

## 2023-08-25 ENCOUNTER — VIRTUAL VISIT (OUTPATIENT)
Dept: INTERNAL MEDICINE | Facility: CLINIC | Age: 58
End: 2023-08-25
Payer: COMMERCIAL

## 2023-08-25 DIAGNOSIS — J44.9 CHRONIC OBSTRUCTIVE PULMONARY DISEASE, UNSPECIFIED COPD TYPE (H): ICD-10-CM

## 2023-08-25 DIAGNOSIS — I10 HYPERTENSION GOAL BP (BLOOD PRESSURE) < 140/80: ICD-10-CM

## 2023-08-25 DIAGNOSIS — Z23 NEED FOR VACCINATION: ICD-10-CM

## 2023-08-25 DIAGNOSIS — E03.9 HYPOTHYROIDISM, UNSPECIFIED TYPE: ICD-10-CM

## 2023-08-25 DIAGNOSIS — E11.9 TYPE 2 DIABETES MELLITUS WITHOUT COMPLICATION, WITHOUT LONG-TERM CURRENT USE OF INSULIN (H): Primary | ICD-10-CM

## 2023-08-25 PROCEDURE — 90715 TDAP VACCINE 7 YRS/> IM: CPT

## 2023-08-25 PROCEDURE — 90471 IMMUNIZATION ADMIN: CPT

## 2023-08-25 PROCEDURE — 90472 IMMUNIZATION ADMIN EACH ADD: CPT

## 2023-08-25 PROCEDURE — 99214 OFFICE O/P EST MOD 30 MIN: CPT | Mod: 25

## 2023-08-25 PROCEDURE — 90746 HEPB VACCINE 3 DOSE ADULT IM: CPT

## 2023-08-25 PROCEDURE — 99207 PR FOOT EXAM NO CHARGE: CPT

## 2023-08-25 RX ORDER — LEVOTHYROXINE SODIUM 150 UG/1
150 TABLET ORAL DAILY
Qty: 90 TABLET | Refills: 0 | Status: SHIPPED | OUTPATIENT
Start: 2023-08-25 | End: 2023-12-19

## 2023-08-25 RX ORDER — FLUTICASONE PROPIONATE 220 UG/1
1 AEROSOL, METERED RESPIRATORY (INHALATION) 2 TIMES DAILY
Qty: 12 G | Refills: 3 | Status: SHIPPED | OUTPATIENT
Start: 2023-08-25 | End: 2024-02-20

## 2023-08-25 RX ORDER — LOSARTAN POTASSIUM 100 MG/1
100 TABLET ORAL DAILY
Qty: 90 TABLET | Refills: 0 | Status: SHIPPED | OUTPATIENT
Start: 2023-08-25 | End: 2024-02-20

## 2023-08-25 ASSESSMENT — PATIENT HEALTH QUESTIONNAIRE - PHQ9
SUM OF ALL RESPONSES TO PHQ QUESTIONS 1-9: 6
SUM OF ALL RESPONSES TO PHQ QUESTIONS 1-9: 6
10. IF YOU CHECKED OFF ANY PROBLEMS, HOW DIFFICULT HAVE THESE PROBLEMS MADE IT FOR YOU TO DO YOUR WORK, TAKE CARE OF THINGS AT HOME, OR GET ALONG WITH OTHER PEOPLE: SOMEWHAT DIFFICULT

## 2023-08-25 NOTE — PROGRESS NOTES
Assessment & Plan     (E11.9) Type 2 diabetes mellitus without complication, without long-term current use of insulin (H)  (primary encounter diagnosis)  Comment: Recently started on Ozempic about a month ago and she has had severe constipation. Last bowel movement was 1 week ago. She has had significant abdominal pain with nausea as well. We will discontinue Ozempic for now and can re-visit GLP-1's in the future if she is interested.      She prefers to hold off on starting a new medication at this time for her diabetes.  She will see me in October with labs prior-- could talk about starting Pioglitazone or Januvia.     Plan: AMB Adult Diabetes Educator Referral,         Hemoglobin A1c, Basic metabolic panel  (Ca, Cl,        CO2, Creat, Gluc, K, Na, BUN)            (Z23) Need for vaccination  Comment:   Plan: HEPATITIS B, ADULT (ENGERIX-B/RECOMBIVAX HB),         TDAP 10-64Y (ADACEL,BOOSTRIX)            (E03.9) Hypothyroidism, unspecified type  Comment: TSH in ED was 4.95 this past week. we will increase her Synthroid from 137MCG to 150MCG. Will recheck TSH in 6 weeks when she rechecks A1C.   Plan: levothyroxine (SYNTHROID/LEVOTHROID) 150 MCG         tablet, TSH with free T4 reflex            (I10) Hypertension goal BP (blood pressure) < 140/80  Comment:   HTN is well controlled today at 126/80. Continue Losartan 100MG  Plan: losartan (COZAAR) 100 MG tablet            (J44.9) Chronic obstructive pulmonary disease, unspecified COPD type (H)  Comment: She has noted mild increase in SOB lately-- she has underlying COPD/asthma. She is using Flovent BID. She will keep a close eye on this and we can discuss alternative medications for COPD in October if needed. Lungs sound a little bit coarse today. Not using her albuterol often.  Plan: fluticasone (FLOVENT HFA) 220 MCG/ACT inhaler                 BMI:   Estimated body mass index is 37.89 kg/m  as calculated from the following:    Height as of 8/22/23: 1.524 m (5').     Weight as of 8/22/23: 88 kg (194 lb).   Weight management plan: Discussed healthy diet and exercise guidelines      ELIZABETH Leggett CNP  [facility] Marquez is a 57 year old, presenting for the following health issues:  Refill Request (Pt. Is being seen for medication refills.)        8/25/2023    12:55 PM   Additional Questions   Roomed by Alida Cochran       HPI               Objective    LMP  (LMP Unknown)   Breastfeeding No   There is no height or weight on file to calculate BMI.      BP today is 126/80    Physical Exam  Constitutional:       General: She is not in acute distress.     Appearance: Normal appearance. She is not ill-appearing, toxic-appearing or diaphoretic.   HENT:      Head: Normocephalic and atraumatic.   Eyes:      Conjunctiva/sclera: Conjunctivae normal.   Cardiovascular:      Rate and Rhythm: Normal rate and regular rhythm.      Heart sounds: Normal heart sounds.   Pulmonary:      Effort: Pulmonary effort is normal.      Breath sounds: Normal breath sounds.   Skin:     General: Skin is warm and dry.   Neurological:      Mental Status: She is alert and oriented to person, place, and time.   Psychiatric:         Mood and Affect: Mood normal.         Behavior: Behavior normal.         Thought Content: Thought content normal.         Judgment: Judgment normal.       Diabetic foot exam: normal DP and PT pulses, no trophic changes or ulcerative lesions, and normal sensory exam

## 2023-08-25 NOTE — PATIENT INSTRUCTIONS
Increase Synthroid from 137MCG to 150MCG. Recheck A1C and TSH in 6 weeks. Make an appt with me a few days after labs are drawn.    Please let me know if you have any questions.    Thanks!  ELIZABETH Leggett, CNP   [facility]

## 2023-08-25 NOTE — LETTER
August 25, 2023      Jazlyn Bartlett  [address] [facility] [id number]        To Whom It May Concern:    Jazlyn Bartlett was seen in our clinic today . Please excuse her.       Sincerely,        ELIZABETH Leggett CNP

## 2023-09-01 DIAGNOSIS — I10 HYPERTENSION GOAL BP (BLOOD PRESSURE) < 140/80: ICD-10-CM

## 2023-09-06 RX ORDER — LOSARTAN POTASSIUM 50 MG/1
50 TABLET ORAL DAILY
Qty: 90 TABLET | Refills: 3 | OUTPATIENT
Start: 2023-09-06

## 2023-09-30 ENCOUNTER — TELEPHONE (OUTPATIENT)
Dept: FAMILY MEDICINE | Facility: CLINIC | Age: 58
End: 2023-09-30
Payer: COMMERCIAL

## 2023-09-30 ENCOUNTER — MYC MEDICAL ADVICE (OUTPATIENT)
Dept: INTERNAL MEDICINE | Facility: CLINIC | Age: 58
End: 2023-09-30
Payer: COMMERCIAL

## 2023-09-30 DIAGNOSIS — E03.9 HYPOTHYROIDISM, UNSPECIFIED TYPE: ICD-10-CM

## 2023-10-01 ENCOUNTER — NURSE TRIAGE (OUTPATIENT)
Dept: NURSING | Facility: CLINIC | Age: 58
End: 2023-10-01
Payer: COMMERCIAL

## 2023-10-01 ENCOUNTER — OFFICE VISIT (OUTPATIENT)
Dept: URGENT CARE | Facility: URGENT CARE | Age: 58
End: 2023-10-01
Payer: COMMERCIAL

## 2023-10-01 ENCOUNTER — ANCILLARY PROCEDURE (OUTPATIENT)
Dept: GENERAL RADIOLOGY | Facility: CLINIC | Age: 58
End: 2023-10-01
Attending: FAMILY MEDICINE
Payer: COMMERCIAL

## 2023-10-01 VITALS
BODY MASS INDEX: 37.89 KG/M2 | HEART RATE: 84 BPM | TEMPERATURE: 98 F | OXYGEN SATURATION: 96 % | WEIGHT: 194 LBS | DIASTOLIC BLOOD PRESSURE: 85 MMHG | SYSTOLIC BLOOD PRESSURE: 138 MMHG

## 2023-10-01 DIAGNOSIS — R05.1 ACUTE COUGH: ICD-10-CM

## 2023-10-01 DIAGNOSIS — J44.1 COPD EXACERBATION (H): ICD-10-CM

## 2023-10-01 DIAGNOSIS — U07.1 INFECTION DUE TO 2019 NOVEL CORONAVIRUS: Primary | ICD-10-CM

## 2023-10-01 PROCEDURE — 71046 X-RAY EXAM CHEST 2 VIEWS: CPT | Mod: TC | Performed by: RADIOLOGY

## 2023-10-01 PROCEDURE — 99214 OFFICE O/P EST MOD 30 MIN: CPT | Performed by: FAMILY MEDICINE

## 2023-10-01 RX ORDER — CODEINE PHOSPHATE AND GUAIFENESIN 10; 100 MG/5ML; MG/5ML
1 SOLUTION ORAL EVERY 6 HOURS PRN
Qty: 118 ML | Refills: 0 | Status: SHIPPED | OUTPATIENT
Start: 2023-10-01 | End: 2024-02-15

## 2023-10-01 RX ORDER — BENZONATATE 200 MG/1
200 CAPSULE ORAL 3 TIMES DAILY PRN
Qty: 30 CAPSULE | Refills: 0 | Status: SHIPPED | OUTPATIENT
Start: 2023-10-01 | End: 2024-02-15

## 2023-10-01 NOTE — PATIENT INSTRUCTIONS
Do not take simvastatin (zocor) while taking Paxlovid    Take full course of Paxlovid for treatment of COVID infection.    Okay for tylenol and ibuprofen for fever and discomfort  Okay to take tessalon perles to help with cough  Okay to take robitussin with codeine to help with cough    Continue with inhalers and nebulizer treatment

## 2023-10-01 NOTE — PROGRESS NOTES
SUBJECTIVE:   Jazlyn Bartlett is a 58 year old female presenting with a chief complaint of SOB, COVID positive    Develop symptoms on 9/27 - felt icky, ear pain, sore throat.  Started to feel worse.  More fatigue the following day.  By Friday, developed more body aches, headaches, fever.      COVID at work place, did home rapid test on Friday and was positive.    Having sweats and chills, more respiratory issues yesterday, tighness and coughing, fever.  Has been using albuterol inhaler and has been using but has not resolved symptom.  Does have nebulizer solutions.    Had used spiriva before and worked better but does not have this now.    Home rapid COVID test positive today and due to worsening symptoms, told to be seen.    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, s/p resection    Uncomplicated asthma 2010     Current Outpatient Medications   Medication Sig Dispense Refill    albuterol (PROAIR HFA/PROVENTIL HFA/VENTOLIN HFA) 108 (90 Base) MCG/ACT inhaler Inhale 2 puffs into the lungs every 6 hours as needed for shortness of breath / dyspnea 18 g 5    aspirin (ASA) 81 MG chewable tablet Take 81 mg by mouth daily      fluticasone (FLOVENT HFA) 220 MCG/ACT inhaler Inhale 1 puff into the lungs 2 times daily 12 g 3    glipiZIDE (GLUCOTROL XL) 10 MG 24 hr tablet 1 tab in am and 2 tabs in  tablet 1    hydrochlorothiazide (HYDRODIURIL) 25 MG tablet TAKE 1 TABLET (25 MG) BY MOUTH DAILY 90 tablet 1    levothyroxine (SYNTHROID/LEVOTHROID) 150 MCG tablet Take 1 tablet (150 mcg) by mouth daily 90 tablet 0    losartan (COZAAR) 100 MG tablet Take 1 tablet (100  mg) by mouth daily 90 tablet 0    ondansetron (ZOFRAN) 4 MG tablet Take 1 tablet (4 mg) by mouth every 8 hours as needed for nausea 30 tablet 0    simvastatin (ZOCOR) 20 MG tablet Take 1 tablet (20 mg) by mouth At Bedtime 90 tablet 1     Social History     Tobacco Use    Smoking status: Every Day     Packs/day: 0.50     Years: 37.00     Pack years: 18.50     Types: Cigarettes     Start date: 1/1/1979     Passive exposure: Current    Smokeless tobacco: Never   Substance Use Topics    Alcohol use: No       ROS:  Review of systems negative except as stated above.    OBJECTIVE:  /85   Pulse 84   Temp 98  F (36.7  C)   Wt 88 kg (194 lb)   LMP  (LMP Unknown)   SpO2 96%   BMI 37.89 kg/m    GENERAL APPEARANCE: healthy, alert and no distress  EYES: EOMI,  PERRL, conjunctiva clear  RESP: lungs coarse with scattered wheezes  CV: regular rates and rhythm, normal S1 S2, no murmur noted  PSYCH: mentation appears normal and affect normal/bright    CXR - no acute infiltrate, no pleural effusion, no pneumothorax personally viewed by me    ASSESSMENT/PLAN:  (U07.1) Infection due to 2019 novel coronavirus  (primary encounter diagnosis)  Plan: XR Chest 2 Views, nirmatrelvir and ritonavir         (PAXLOVID) 300 mg/100 mg therapy pack,         benzonatate (TESSALON) 200 MG capsule,         guaiFENesin-codeine (ROBITUSSIN AC) 100-10         MG/5ML solution            (J44.1) COPD exacerbation (H)  Plan: XR Chest 2 Views, benzonatate (TESSALON) 200 MG        capsule, guaiFENesin-codeine (ROBITUSSIN AC)         100-10 MG/5ML solution        Continue with inhalers and nebulizer treatments    (R05.1) Acute cough  Comment: COVID  Plan: benzonatate (TESSALON) 200 MG capsule,         guaiFENesin-codeine (ROBITUSSIN AC) 100-10         MG/5ML solution            Reviewed with patient COVID and COPD/asthma, today is day #4 for COVID infection and RX Paxlovid given for treatment.  Hold zocor while on Paxlovid.  Reviewed symptomatic  treatment with tylenol, plenty of fluids and rest.  RX tessalon perles and RX beth AC given to help with cough.  Encourage to continue with inhalers and to use duoneb treatment to help with COPD.  Will follow up on formal Xray report and notify if any abnormalities.  To ER if any worsening respiratory symptoms.    Follow up with primary provider if no improvement of symptoms in 1 week    Steve Faria MD  October 1, 2023 4:24 PM

## 2023-10-01 NOTE — TELEPHONE ENCOUNTER
Positive COVID via Home test. Friday was positive. Sx: achiness, H/A, SOB, Doing Albuterol nebs frequently, bruising on legs. Onset Symptoms Thursday. Has COPD. 102.1 yesterday, This .   Reason for Disposition   SEVERE difficulty breathing (e.g., struggling for each breath, speaks in single words)    Protocols used: Coronavirus (COVID-19) Diagnosed or [id number]

## 2023-10-01 NOTE — TELEPHONE ENCOUNTER
Patient reports severe SOB.     Protocol reviewed, disposition: call 911. Call back with worsening symptoms, further questions/concerns.    DREW OBRIEN RN  [facility]  10/1/2023  1:05 PM

## 2023-10-02 RX ORDER — LEVOTHYROXINE SODIUM 137 UG/1
TABLET ORAL
Qty: 90 TABLET | Refills: 3 | OUTPATIENT
Start: 2023-10-02

## 2023-10-02 NOTE — TELEPHONE ENCOUNTER
I have adjusted her dose- that is why this medication was discontinued. They need to cancel this prescription

## 2023-10-03 ENCOUNTER — TELEPHONE (OUTPATIENT)
Dept: FAMILY MEDICINE | Facility: CLINIC | Age: 58
End: 2023-10-03
Payer: COMMERCIAL

## 2023-10-03 DIAGNOSIS — E11.9 TYPE 2 DIABETES MELLITUS WITHOUT COMPLICATION, WITHOUT LONG-TERM CURRENT USE OF INSULIN (H): ICD-10-CM

## 2023-10-03 DIAGNOSIS — I10 HYPERTENSION GOAL BP (BLOOD PRESSURE) < 140/80: ICD-10-CM

## 2023-10-03 DIAGNOSIS — E03.9 HYPOTHYROIDISM, UNSPECIFIED TYPE: ICD-10-CM

## 2023-10-03 RX ORDER — LOSARTAN POTASSIUM 50 MG/1
50 TABLET ORAL DAILY
Qty: 90 TABLET | Refills: 3 | OUTPATIENT
Start: 2023-10-03

## 2023-10-03 RX ORDER — LEVOTHYROXINE SODIUM 137 UG/1
TABLET ORAL
Qty: 90 TABLET | Refills: 3 | OUTPATIENT
Start: 2023-10-03

## 2023-10-03 RX ORDER — ONDANSETRON 4 MG/1
4 TABLET, FILM COATED ORAL EVERY 8 HOURS PRN
Qty: 30 TABLET | Refills: 0 | Status: SHIPPED | OUTPATIENT
Start: 2023-10-03 | End: 2024-02-15

## 2023-10-03 NOTE — TELEPHONE ENCOUNTER
These were discontinued as the doses for both medications were changed-- tell the pharmacy to please discontinue these old prescriptions

## 2023-10-03 NOTE — TELEPHONE ENCOUNTER
Call to pharmacy and left detailed message. The losartan 50 mg and Levothyroxine 137 mcg discontinued and have dose changes.

## 2023-10-12 NOTE — TELEPHONE ENCOUNTER
Left message on patients voice mail - I am unable to send forms through my-chart or E-Mail   Patient may  form or I can fax it . -form on my desk .      VALERIA Pedroza LPN

## 2023-11-06 ENCOUNTER — TELEPHONE (OUTPATIENT)
Dept: INTERNAL MEDICINE | Facility: CLINIC | Age: 58
End: 2023-11-06
Payer: COMMERCIAL

## 2023-11-06 DIAGNOSIS — E03.9 HYPOTHYROIDISM, UNSPECIFIED TYPE: ICD-10-CM

## 2023-11-06 RX ORDER — LEVOTHYROXINE SODIUM 150 UG/1
150 TABLET ORAL DAILY
Qty: 90 TABLET | Refills: 0 | OUTPATIENT
Start: 2023-11-06

## 2023-12-19 ENCOUNTER — TELEPHONE (OUTPATIENT)
Dept: FAMILY MEDICINE | Facility: CLINIC | Age: 58
End: 2023-12-19
Payer: COMMERCIAL

## 2023-12-19 ENCOUNTER — TELEPHONE (OUTPATIENT)
Dept: INTERNAL MEDICINE | Facility: CLINIC | Age: 58
End: 2023-12-19
Payer: COMMERCIAL

## 2023-12-19 DIAGNOSIS — E03.9 HYPOTHYROIDISM, UNSPECIFIED TYPE: ICD-10-CM

## 2023-12-19 RX ORDER — LEVOTHYROXINE SODIUM 137 UG/1
TABLET ORAL
Qty: 90 TABLET | Refills: 3 | OUTPATIENT
Start: 2023-12-19

## 2023-12-19 RX ORDER — LEVOTHYROXINE SODIUM 150 UG/1
150 TABLET ORAL DAILY
Qty: 30 TABLET | Refills: 0 | Status: SHIPPED | OUTPATIENT
Start: 2023-12-19 | End: 2024-02-20

## 2023-12-19 NOTE — TELEPHONE ENCOUNTER
This dose was discontinued months ago for a reason-- I changed dose to higher dosage. Did this request come from the pharmacy or the patient?

## 2023-12-19 NOTE — TELEPHONE ENCOUNTER
Patient returned call and confirmed she is taking the 150mcg.    She said the 137mcg was a previous dosage that the pharmacy kept refilling in error.

## 2023-12-19 NOTE — TELEPHONE ENCOUNTER
"Patient currently on Levothyroxine 150mcg and was refilled today by primary care provider.     Called and left patient a voice mail message on December 19, 2023 1:15 PM to call back the clinic to verify if she was requesting 137mcg dose.    Please also relay message in duplicate encounter from Sharron (primary care provider) \"  She is supposed to have TSH level rechecked as well as A1C. I will send for 30 days of synthroid   \"    Thank you,  Zach Hernandez, Triage RN [facility]  1:15 PM 12/19/2023     "

## 2023-12-19 NOTE — TELEPHONE ENCOUNTER
Duplicate encounter.    Thank you,  Zach Hernandez, Triage RN [facility]  1:17 PM 12/19/2023

## 2023-12-26 ENCOUNTER — TRANSFERRED RECORDS (OUTPATIENT)
Dept: HEALTH INFORMATION MANAGEMENT | Facility: CLINIC | Age: 58
End: 2023-12-26
Payer: COMMERCIAL

## 2023-12-26 LAB — RETINOPATHY: NEGATIVE

## 2024-01-28 ENCOUNTER — HEALTH MAINTENANCE LETTER (OUTPATIENT)
Age: 59
End: 2024-01-28

## 2024-02-15 ENCOUNTER — APPOINTMENT (OUTPATIENT)
Dept: CT IMAGING | Facility: CLINIC | Age: 59
End: 2024-02-15
Attending: STUDENT IN AN ORGANIZED HEALTH CARE EDUCATION/TRAINING PROGRAM
Payer: COMMERCIAL

## 2024-02-15 ENCOUNTER — HOSPITAL ENCOUNTER (OUTPATIENT)
Facility: CLINIC | Age: 59
Setting detail: OBSERVATION
Discharge: HOME | End: 2024-02-17
Attending: STUDENT IN AN ORGANIZED HEALTH CARE EDUCATION/TRAINING PROGRAM | Admitting: INTERNAL MEDICINE
Payer: COMMERCIAL

## 2024-02-15 DIAGNOSIS — K63.89 PNEUMATOSIS INTESTINALIS: Primary | ICD-10-CM

## 2024-02-15 DIAGNOSIS — R31.29 OTHER MICROSCOPIC HEMATURIA: ICD-10-CM

## 2024-02-15 DIAGNOSIS — R10.9 LEFT FLANK PAIN: ICD-10-CM

## 2024-02-15 DIAGNOSIS — R10.9 ABDOMINAL PAIN, UNSPECIFIED ABDOMINAL LOCATION: ICD-10-CM

## 2024-02-15 LAB
ALBUMIN UR-MCNC: NEGATIVE MG/DL
ANION GAP SERPL CALCULATED.3IONS-SCNC: 11 MMOL/L (ref 7–15)
APPEARANCE UR: CLEAR
BASOPHILS # BLD AUTO: 0 10E3/UL (ref 0–0.2)
BASOPHILS NFR BLD AUTO: 0 %
BILIRUB UR QL STRIP: NEGATIVE
BUN SERPL-MCNC: 13.7 MG/DL (ref 6–20)
CALCIUM SERPL-MCNC: 10 MG/DL (ref 8.6–10)
CHLORIDE SERPL-SCNC: 100 MMOL/L (ref 98–107)
COLOR UR AUTO: ABNORMAL
CREAT SERPL-MCNC: 0.69 MG/DL (ref 0.51–0.95)
DEPRECATED HCO3 PLAS-SCNC: 27 MMOL/L (ref 22–29)
EGFRCR SERPLBLD CKD-EPI 2021: >90 ML/MIN/1.73M2
EOSINOPHIL # BLD AUTO: 0 10E3/UL (ref 0–0.7)
EOSINOPHIL NFR BLD AUTO: 0 %
ERYTHROCYTE [DISTWIDTH] IN BLOOD BY AUTOMATED COUNT: 14 % (ref 10–15)
GLUCOSE BLDC GLUCOMTR-MCNC: 148 MG/DL (ref 70–99)
GLUCOSE SERPL-MCNC: 159 MG/DL (ref 70–99)
GLUCOSE UR STRIP-MCNC: NEGATIVE MG/DL
HCT VFR BLD AUTO: 46.6 % (ref 35–47)
HGB BLD-MCNC: 15.8 G/DL (ref 11.7–15.7)
HGB UR QL STRIP: ABNORMAL
HYALINE CASTS: 1 /LPF
IMM GRANULOCYTES # BLD: 0.1 10E3/UL
IMM GRANULOCYTES NFR BLD: 1 %
KETONES UR STRIP-MCNC: 40 MG/DL
LEUKOCYTE ESTERASE UR QL STRIP: NEGATIVE
LYMPHOCYTES # BLD AUTO: 0.9 10E3/UL (ref 0.8–5.3)
LYMPHOCYTES NFR BLD AUTO: 7 %
MCH RBC QN AUTO: 30.6 PG (ref 26.5–33)
MCHC RBC AUTO-ENTMCNC: 33.9 G/DL (ref 31.5–36.5)
MCV RBC AUTO: 90 FL (ref 78–100)
MONOCYTES # BLD AUTO: 0.3 10E3/UL (ref 0–1.3)
MONOCYTES NFR BLD AUTO: 2 %
MUCOUS THREADS #/AREA URNS LPF: PRESENT /LPF
NEUTROPHILS # BLD AUTO: 12.2 10E3/UL (ref 1.6–8.3)
NEUTROPHILS NFR BLD AUTO: 90 %
NITRATE UR QL: NEGATIVE
NRBC # BLD AUTO: 0 10E3/UL
NRBC BLD AUTO-RTO: 0 /100
PH UR STRIP: 5.5 [PH] (ref 5–7)
PLATELET # BLD AUTO: 315 10E3/UL (ref 150–450)
POTASSIUM SERPL-SCNC: 3.7 MMOL/L (ref 3.4–5.3)
RBC # BLD AUTO: 5.17 10E6/UL (ref 3.8–5.2)
RBC URINE: 123 /HPF
SODIUM SERPL-SCNC: 138 MMOL/L (ref 135–145)
SP GR UR STRIP: 1.02 (ref 1–1.03)
SQUAMOUS EPITHELIAL: 2 /HPF
UROBILINOGEN UR STRIP-MCNC: NORMAL MG/DL
WBC # BLD AUTO: 13.4 10E3/UL (ref 4–11)
WBC URINE: 1 /HPF

## 2024-02-15 PROCEDURE — 36415 COLL VENOUS BLD VENIPUNCTURE: CPT | Performed by: STUDENT IN AN ORGANIZED HEALTH CARE EDUCATION/TRAINING PROGRAM

## 2024-02-15 PROCEDURE — 96375 TX/PRO/DX INJ NEW DRUG ADDON: CPT

## 2024-02-15 PROCEDURE — 258N000003 HC RX IP 258 OP 636: Performed by: STUDENT IN AN ORGANIZED HEALTH CARE EDUCATION/TRAINING PROGRAM

## 2024-02-15 PROCEDURE — 99222 1ST HOSP IP/OBS MODERATE 55: CPT | Performed by: INTERNAL MEDICINE

## 2024-02-15 PROCEDURE — 82962 GLUCOSE BLOOD TEST: CPT

## 2024-02-15 PROCEDURE — 99285 EMERGENCY DEPT VISIT HI MDM: CPT | Mod: 25

## 2024-02-15 PROCEDURE — 80048 BASIC METABOLIC PNL TOTAL CA: CPT | Performed by: STUDENT IN AN ORGANIZED HEALTH CARE EDUCATION/TRAINING PROGRAM

## 2024-02-15 PROCEDURE — 258N000003 HC RX IP 258 OP 636: Performed by: INTERNAL MEDICINE

## 2024-02-15 PROCEDURE — 96365 THER/PROPH/DIAG IV INF INIT: CPT

## 2024-02-15 PROCEDURE — 74176 CT ABD & PELVIS W/O CONTRAST: CPT | Mod: XU

## 2024-02-15 PROCEDURE — G0378 HOSPITAL OBSERVATION PER HR: HCPCS

## 2024-02-15 PROCEDURE — 96361 HYDRATE IV INFUSION ADD-ON: CPT

## 2024-02-15 PROCEDURE — 250N000011 HC RX IP 250 OP 636: Performed by: STUDENT IN AN ORGANIZED HEALTH CARE EDUCATION/TRAINING PROGRAM

## 2024-02-15 PROCEDURE — 85025 COMPLETE CBC W/AUTO DIFF WBC: CPT | Performed by: STUDENT IN AN ORGANIZED HEALTH CARE EDUCATION/TRAINING PROGRAM

## 2024-02-15 PROCEDURE — 81001 URINALYSIS AUTO W/SCOPE: CPT | Performed by: STUDENT IN AN ORGANIZED HEALTH CARE EDUCATION/TRAINING PROGRAM

## 2024-02-15 PROCEDURE — 250N000011 HC RX IP 250 OP 636: Performed by: INTERNAL MEDICINE

## 2024-02-15 PROCEDURE — 250N000013 HC RX MED GY IP 250 OP 250 PS 637: Performed by: INTERNAL MEDICINE

## 2024-02-15 PROCEDURE — 96375 TX/PRO/DX INJ NEW DRUG ADDON: CPT | Mod: 59

## 2024-02-15 PROCEDURE — 74174 CTA ABD&PLVS W/CONTRAST: CPT

## 2024-02-15 RX ORDER — AMPICILLIN AND SULBACTAM 2; 1 G/1; G/1
3 INJECTION, POWDER, FOR SOLUTION INTRAMUSCULAR; INTRAVENOUS EVERY 6 HOURS
Status: DISCONTINUED | OUTPATIENT
Start: 2024-02-16 | End: 2024-02-17

## 2024-02-15 RX ORDER — LEVOTHYROXINE SODIUM 75 UG/1
150 TABLET ORAL DAILY
Status: DISCONTINUED | OUTPATIENT
Start: 2024-02-16 | End: 2024-02-17 | Stop reason: HOSPADM

## 2024-02-15 RX ORDER — SODIUM CHLORIDE, SODIUM LACTATE, POTASSIUM CHLORIDE, CALCIUM CHLORIDE 600; 310; 30; 20 MG/100ML; MG/100ML; MG/100ML; MG/100ML
INJECTION, SOLUTION INTRAVENOUS CONTINUOUS
Status: DISCONTINUED | OUTPATIENT
Start: 2024-02-15 | End: 2024-02-16

## 2024-02-15 RX ORDER — PIPERACILLIN SODIUM, TAZOBACTAM SODIUM 4; .5 G/20ML; G/20ML
4.5 INJECTION, POWDER, LYOPHILIZED, FOR SOLUTION INTRAVENOUS ONCE
Status: COMPLETED | OUTPATIENT
Start: 2024-02-15 | End: 2024-02-15

## 2024-02-15 RX ORDER — KETOROLAC TROMETHAMINE 15 MG/ML
10 INJECTION, SOLUTION INTRAMUSCULAR; INTRAVENOUS ONCE
Status: COMPLETED | OUTPATIENT
Start: 2024-02-15 | End: 2024-02-15

## 2024-02-15 RX ORDER — HYDROMORPHONE HCL IN WATER/PF 6 MG/30 ML
0.2 PATIENT CONTROLLED ANALGESIA SYRINGE INTRAVENOUS
Status: DISCONTINUED | OUTPATIENT
Start: 2024-02-15 | End: 2024-02-17 | Stop reason: HOSPADM

## 2024-02-15 RX ORDER — SODIUM CHLORIDE 9 MG/ML
INJECTION, SOLUTION INTRAVENOUS CONTINUOUS
Status: DISCONTINUED | OUTPATIENT
Start: 2024-02-15 | End: 2024-02-17

## 2024-02-15 RX ORDER — IOPAMIDOL 755 MG/ML
500 INJECTION, SOLUTION INTRAVASCULAR ONCE
Status: COMPLETED | OUTPATIENT
Start: 2024-02-15 | End: 2024-02-15

## 2024-02-15 RX ORDER — ASPIRIN 81 MG/1
81 TABLET, CHEWABLE ORAL AT BEDTIME
Status: DISCONTINUED | OUTPATIENT
Start: 2024-02-15 | End: 2024-02-17 | Stop reason: HOSPADM

## 2024-02-15 RX ORDER — ALBUTEROL SULFATE 90 UG/1
2 AEROSOL, METERED RESPIRATORY (INHALATION) EVERY 6 HOURS PRN
Status: DISCONTINUED | OUTPATIENT
Start: 2024-02-15 | End: 2024-02-17 | Stop reason: HOSPADM

## 2024-02-15 RX ORDER — HYDROMORPHONE HCL IN WATER/PF 6 MG/30 ML
0.4 PATIENT CONTROLLED ANALGESIA SYRINGE INTRAVENOUS
Status: DISCONTINUED | OUTPATIENT
Start: 2024-02-15 | End: 2024-02-17 | Stop reason: HOSPADM

## 2024-02-15 RX ORDER — SIMVASTATIN 10 MG
20 TABLET ORAL AT BEDTIME
Status: DISCONTINUED | OUTPATIENT
Start: 2024-02-15 | End: 2024-02-17 | Stop reason: HOSPADM

## 2024-02-15 RX ORDER — ONDANSETRON 2 MG/ML
4 INJECTION INTRAMUSCULAR; INTRAVENOUS EVERY 6 HOURS PRN
Status: DISCONTINUED | OUTPATIENT
Start: 2024-02-15 | End: 2024-02-17 | Stop reason: HOSPADM

## 2024-02-15 RX ORDER — ONDANSETRON 4 MG/1
4 TABLET, ORALLY DISINTEGRATING ORAL EVERY 6 HOURS PRN
Status: DISCONTINUED | OUTPATIENT
Start: 2024-02-15 | End: 2024-02-17 | Stop reason: HOSPADM

## 2024-02-15 RX ADMIN — SIMVASTATIN 20 MG: 20 TABLET, FILM COATED ORAL at 23:20

## 2024-02-15 RX ADMIN — SODIUM CHLORIDE, POTASSIUM CHLORIDE, SODIUM LACTATE AND CALCIUM CHLORIDE: 600; 310; 30; 20 INJECTION, SOLUTION INTRAVENOUS at 18:59

## 2024-02-15 RX ADMIN — HYDROMORPHONE HYDROCHLORIDE 0.4 MG: 0.2 INJECTION, SOLUTION INTRAMUSCULAR; INTRAVENOUS; SUBCUTANEOUS at 21:58

## 2024-02-15 RX ADMIN — AMPICILLIN SODIUM AND SULBACTAM SODIUM 3 G: 2; 1 INJECTION, POWDER, FOR SOLUTION INTRAMUSCULAR; INTRAVENOUS at 23:43

## 2024-02-15 RX ADMIN — KETOROLAC TROMETHAMINE 10 MG: 15 INJECTION, SOLUTION INTRAMUSCULAR; INTRAVENOUS at 13:48

## 2024-02-15 RX ADMIN — PIPERACILLIN AND TAZOBACTAM 4.5 G: 4; .5 INJECTION, POWDER, FOR SOLUTION INTRAVENOUS at 18:24

## 2024-02-15 RX ADMIN — IOPAMIDOL 72 ML: 755 INJECTION, SOLUTION INTRAVENOUS at 15:37

## 2024-02-15 RX ADMIN — SODIUM CHLORIDE: 9 INJECTION, SOLUTION INTRAVENOUS at 23:15

## 2024-02-15 RX ADMIN — ASPIRIN 81 MG CHEWABLE TABLET 81 MG: 81 TABLET CHEWABLE at 23:20

## 2024-02-15 NOTE — ED TRIAGE NOTES
Pt with c/o L flank pain that radiates to LLQ and into groin since this morning. Also c/o n/v. Hx kidney stones, feels similar. ABC intact.      Triage Assessment (Adult)       Row Name 02/15/24 1224          Triage Assessment    Airway WDL WDL        Respiratory WDL    Respiratory WDL WDL        Cognitive/Neuro/Behavioral WDL    Cognitive/Neuro/Behavioral WDL WDL

## 2024-02-15 NOTE — ED PROVIDER NOTES
History   Chief Complaint:  Flank Pain and Nausea & Vomiting    HPI:  Jazlyn Bartlett is a very pleasant 58 year old female presenting with flank pain, nausea, vomiting. The L sided flank pain radiates to the LLQ and groin. The pain onset when she was getting ready for work 0530. Alma's symptoms have been constant since this morning. She took Tylenol and nausea medication that provided partial relief. Patient was unable to move prior to medication, but this resolved after medication. Patient has a history of kidney stones and notes this feels similar. She endorses vomiting, fever, chills, pain with urination, urgency, and constipation. She denies hematuria, blood in the stool, or diarrhea. No nausea present upon examination.     Independent Historian:  None. Only the patient provided history.  present at bedside and corroborates the above.     Review of External Notes:  I personally reviewed notes from the patient's clinic visit dated  10/1/2023 . This provided me with information regarding patient's baseline medical problems.     I personally reviewed the patient's chart, including available medication list and available past medical history, past surgical history, family history, and social history.    Physical Exam   Patient Vitals for the past 24 hrs:   BP Temp Pulse Resp SpO2 Height Weight   02/15/24 1830 127/68 -- 56 -- 98 % -- --   02/15/24 1226 (!) 150/85 97.9  F (36.6  C) 83 18 98 % 1.524 m (5') 88.5 kg (195 lb)      Physical Exam  Vitals and nursing note reviewed.   Constitutional:       General: She is in acute distress.      Appearance: She is obese. She is not ill-appearing.   Cardiovascular:      Rate and Rhythm: Normal rate and regular rhythm.   Pulmonary:      Effort: Pulmonary effort is normal.   Abdominal:      General: Abdomen is flat.      Palpations: Abdomen is soft.      Tenderness: There is abdominal tenderness in the left lower quadrant. There is left CVA tenderness. There is no right  "CVA tenderness, guarding or rebound.   Skin:     General: Skin is warm and dry.   Neurological:      Mental Status: She is alert and oriented to person, place, and time.          Emergency Department Course     Imaging & ECG: Laboratory:       CTA Abdomen Pelvis with Contrast   Final Result   IMPRESSION:   1. Pneumatosis or localized pneumoperitoneum surrounding the hepatic   flexure and transverse colon again identified and unchanged. Visceral   arteries are patent, lessening suspicion for ischemia.   2. Moderate to severe stenosis in the proximal left renal artery   related to soft plaque or mural thrombus.   3. Left renal hypodensity is 15 Hounsfield units and 3.6 x 3 cm,   thought to be a cyst.   4. Left adrenal gland adenoma measuring up to 2.3 cm, unchanged.        BRETT HERNANDEZ MD            SYSTEM ID:  [id number]      CT Abdomen Pelvis w/o Contrast   Final Result   IMPRESSION:    1.  No CT evident cause for left flank pain.   2.  Colonic wall pneumatosis involving the hepatic flexure and right   half of the transverse colon with adjacent possible mild   pneumoperitoneum. The cause may be bowel wall ischemia, hollow viscus   perforation or more likely, \"benign\" pneumatosis. No other signs of   bowel wall ischemia or inflammation on noncontrast CT. A short   interval follow-up CT angiogram of the abdomen and pelvis can be   considered to assess for mesenteric arterial occlusion if clinically   indicated.   3.  Hepatic steatosis.   4.  Stable indeterminate lesions in the kidneys. Nonemergent follow-up   renal MRI in about 3 months can be considered.      Findings were discussed with Dr. Ledesma at 2:50 PM.      HAVEN DELACRUZ MD            SYSTEM ID:  [id number]          , Report(s) per radiology.   Labs Ordered and Resulted from Time of ED Arrival to Time of ED Departure   URINE MACROSCOPIC WITH REFLEX TO MICRO - Abnormal       Result Value    Color Urine Light Yellow      Appearance Urine Clear      Glucose " Urine Negative      Bilirubin Urine Negative      Ketones Urine 40 (*)     Specific Gravity Urine 1.022      Blood Urine Large (*)     pH Urine 5.5      Protein Albumin Urine Negative      Urobilinogen Urine Normal      Nitrite Urine Negative      Leukocyte Esterase Urine Negative      RBC Urine 123 (*)     WBC Urine 1      Squamous Epithelials Urine 2 (*)     Mucus Urine Present (*)     Hyaline Casts Urine 1     BASIC METABOLIC PANEL - Abnormal    Sodium 138      Potassium 3.7      Chloride 100      Carbon Dioxide (CO2) 27      Anion Gap 11      Urea Nitrogen 13.7      Creatinine 0.69      GFR Estimate >90      Calcium 10.0      Glucose 159 (*)    CBC WITH PLATELETS AND DIFFERENTIAL - Abnormal    WBC Count 13.4 (*)     RBC Count 5.17      Hemoglobin 15.8 (*)     Hematocrit 46.6      MCV 90      MCH 30.6      MCHC 33.9      RDW 14.0      Platelet Count 315      % Neutrophils 90      % Lymphocytes 7      % Monocytes 2      % Eosinophils 0      % Basophils 0      % Immature Granulocytes 1      NRBCs per 100 WBC 0      Absolute Neutrophils 12.2 (*)     Absolute Lymphocytes 0.9      Absolute Monocytes 0.3      Absolute Eosinophils 0.0      Absolute Basophils 0.0      Absolute Immature Granulocytes 0.1      Absolute NRBCs 0.0           Procedures  None performed    Interventions & Assessments:     Interventions:  Medications   lactated ringers infusion ( Intravenous $New Bag 2/15/24 1859)   ketorolac (TORADOL) injection 10 mg (10 mg Intravenous $Given 2/15/24 1348)   sodium chloride (PF) 0.9% PF flush 100 mL (80 mLs Intravenous $Given 2/15/24 1537)   iopamidol (ISOVUE-370) solution 500 mL (72 mLs Intravenous $Given 2/15/24 1537)   piperacillin-tazobactam (ZOSYN) 4.5 g vial to attach to  mL bag (0 g Intravenous Stopped 2/15/24 1900)        Assessments:  ED Course as of 02/15/24 1947   Thu Feb 15, 2024   1341 I obtained the history and examined the patient as noted above.        Independent Interpretation (X-rays,  CTs, rhythm strip):  None    Consultations/Discussion of Management or Tests:  1652 I discussed the patient's presentation, assessment and plan with Dr. Shah from General Surgery.  1818 I discussed the patient's presentation, assessment and plan with Dr Ruby from Colorectal.   1912 I discussed the patient's presentation, workup, assessment, plan and disposition with hospitalist Dr Juarez.      Social Determinants of Health affecting care:   None.      Disposition:  The patient was admitted to the hospital under the care of Dr. Juarez    Impression & Plan       Medical Decision Making:  Patient presenting with acute left flank pain and left lower quadrant abdominal pain.  Vital signs notable for elevated blood pressure on arrival but otherwise reassuring.  Considered differential including nephrolithiasis, pyelonephritis, diverticulitis, among others.  Initial evaluation notable for mild leukocytosis and hematuria without evidence of pyuria.  High suspicion for nephrolithiasis.  For this reason, obtained noncontrast CT of the abdomen and pelvis.  This showed no hydronephrosis or ureterolithiasis but did show evidence of pneumatosis and possible pneumoperitoneum in the right colon.  I did reassess the patient as she was having continued pain, including some pain in the right side of the abdomen.  I discussed with radiology these findings and they recommended obtaining a CT angiogram to evaluate for ischemic bowel.  We did obtain this and pneumatosis was again demonstrated but there did not appear to be any occlusion of local arteries.  I discussed with general surgery and then colorectal surgery, who recommended observing the patient in the hospital, clear liquid diet, IV fluids and antibiotics, with reexamination tomorrow.  I discussed these findings with the patient, who was comfortable staying in the hospital.  I also discussed with Dr. Juarez of hospitalist team, who will manage the patient.      Diagnosis:     ICD-10-CM    1. Pneumatosis intestinalis  K63.89       2. Abdominal pain, unspecified abdominal location  R10.9       3. Left flank pain  R10.9       4. Other microscopic hematuria  R31.29            Discharge Medications:  New Prescriptions    No medications on file     Scribe Disclosure:  I, Katiana Olson, am serving as a scribe at 1:45 PM on 2/15/2024 to document services personally performed by Emile Ledesma MD based on my observations and the provider's statements to me.      Emile Ledesma MD  02/15/24 1947

## 2024-02-16 LAB
ALBUMIN SERPL BCG-MCNC: 3.7 G/DL (ref 3.5–5.2)
ALP SERPL-CCNC: 55 U/L (ref 40–150)
ALT SERPL W P-5'-P-CCNC: 15 U/L (ref 0–50)
ANION GAP SERPL CALCULATED.3IONS-SCNC: 10 MMOL/L (ref 7–15)
AST SERPL W P-5'-P-CCNC: 19 U/L (ref 0–45)
BILIRUB SERPL-MCNC: 1 MG/DL
BUN SERPL-MCNC: 12.4 MG/DL (ref 6–20)
CALCIUM SERPL-MCNC: 9 MG/DL (ref 8.6–10)
CHLORIDE SERPL-SCNC: 103 MMOL/L (ref 98–107)
CREAT SERPL-MCNC: 0.69 MG/DL (ref 0.51–0.95)
DEPRECATED HCO3 PLAS-SCNC: 26 MMOL/L (ref 22–29)
EGFRCR SERPLBLD CKD-EPI 2021: >90 ML/MIN/1.73M2
ERYTHROCYTE [DISTWIDTH] IN BLOOD BY AUTOMATED COUNT: 14.2 % (ref 10–15)
GLUCOSE BLDC GLUCOMTR-MCNC: 107 MG/DL (ref 70–99)
GLUCOSE BLDC GLUCOMTR-MCNC: 176 MG/DL (ref 70–99)
GLUCOSE SERPL-MCNC: 126 MG/DL (ref 70–99)
HCT VFR BLD AUTO: 40.5 % (ref 35–47)
HGB BLD-MCNC: 13.4 G/DL (ref 11.7–15.7)
LACTATE SERPL-SCNC: 1.1 MMOL/L (ref 0.7–2)
MCH RBC QN AUTO: 29.8 PG (ref 26.5–33)
MCHC RBC AUTO-ENTMCNC: 33.1 G/DL (ref 31.5–36.5)
MCV RBC AUTO: 90 FL (ref 78–100)
PLATELET # BLD AUTO: 263 10E3/UL (ref 150–450)
POTASSIUM SERPL-SCNC: 3.5 MMOL/L (ref 3.4–5.3)
PROT SERPL-MCNC: 5.9 G/DL (ref 6.4–8.3)
RBC # BLD AUTO: 4.49 10E6/UL (ref 3.8–5.2)
SODIUM SERPL-SCNC: 139 MMOL/L (ref 135–145)
WBC # BLD AUTO: 7.9 10E3/UL (ref 4–11)

## 2024-02-16 PROCEDURE — G0378 HOSPITAL OBSERVATION PER HR: HCPCS

## 2024-02-16 PROCEDURE — 96376 TX/PRO/DX INJ SAME DRUG ADON: CPT

## 2024-02-16 PROCEDURE — 85027 COMPLETE CBC AUTOMATED: CPT | Performed by: INTERNAL MEDICINE

## 2024-02-16 PROCEDURE — 250N000011 HC RX IP 250 OP 636: Performed by: INTERNAL MEDICINE

## 2024-02-16 PROCEDURE — 83605 ASSAY OF LACTIC ACID: CPT | Performed by: INTERNAL MEDICINE

## 2024-02-16 PROCEDURE — 250N000013 HC RX MED GY IP 250 OP 250 PS 637: Performed by: INTERNAL MEDICINE

## 2024-02-16 PROCEDURE — 36415 COLL VENOUS BLD VENIPUNCTURE: CPT | Performed by: INTERNAL MEDICINE

## 2024-02-16 PROCEDURE — 99232 SBSQ HOSP IP/OBS MODERATE 35: CPT | Performed by: INTERNAL MEDICINE

## 2024-02-16 PROCEDURE — 258N000003 HC RX IP 258 OP 636: Performed by: INTERNAL MEDICINE

## 2024-02-16 PROCEDURE — 82962 GLUCOSE BLOOD TEST: CPT

## 2024-02-16 PROCEDURE — 80053 COMPREHEN METABOLIC PANEL: CPT | Performed by: INTERNAL MEDICINE

## 2024-02-16 PROCEDURE — 96361 HYDRATE IV INFUSION ADD-ON: CPT

## 2024-02-16 RX ORDER — HYDROXYZINE HYDROCHLORIDE 25 MG/1
25 TABLET, FILM COATED ORAL EVERY 6 HOURS PRN
Status: DISCONTINUED | OUTPATIENT
Start: 2024-02-16 | End: 2024-02-17 | Stop reason: HOSPADM

## 2024-02-16 RX ORDER — NALOXONE HYDROCHLORIDE 0.4 MG/ML
0.4 INJECTION, SOLUTION INTRAMUSCULAR; INTRAVENOUS; SUBCUTANEOUS
Status: DISCONTINUED | OUTPATIENT
Start: 2024-02-16 | End: 2024-02-17 | Stop reason: HOSPADM

## 2024-02-16 RX ORDER — LOSARTAN POTASSIUM 25 MG/1
25 TABLET ORAL DAILY
Status: DISCONTINUED | OUTPATIENT
Start: 2024-02-16 | End: 2024-02-17

## 2024-02-16 RX ORDER — ACETAMINOPHEN 325 MG/1
650 TABLET ORAL EVERY 4 HOURS PRN
Status: DISCONTINUED | OUTPATIENT
Start: 2024-02-16 | End: 2024-02-17 | Stop reason: HOSPADM

## 2024-02-16 RX ORDER — NICOTINE POLACRILEX 4 MG
15-30 LOZENGE BUCCAL
Status: DISCONTINUED | OUTPATIENT
Start: 2024-02-16 | End: 2024-02-17 | Stop reason: HOSPADM

## 2024-02-16 RX ORDER — DEXTROSE MONOHYDRATE 25 G/50ML
25-50 INJECTION, SOLUTION INTRAVENOUS
Status: DISCONTINUED | OUTPATIENT
Start: 2024-02-16 | End: 2024-02-17 | Stop reason: HOSPADM

## 2024-02-16 RX ORDER — NALOXONE HYDROCHLORIDE 0.4 MG/ML
0.2 INJECTION, SOLUTION INTRAMUSCULAR; INTRAVENOUS; SUBCUTANEOUS
Status: DISCONTINUED | OUTPATIENT
Start: 2024-02-16 | End: 2024-02-17 | Stop reason: HOSPADM

## 2024-02-16 RX ORDER — HYDROMORPHONE HYDROCHLORIDE 2 MG/1
2 TABLET ORAL EVERY 4 HOURS PRN
Status: DISCONTINUED | OUTPATIENT
Start: 2024-02-16 | End: 2024-02-17

## 2024-02-16 RX ADMIN — HYDROMORPHONE HYDROCHLORIDE 0.4 MG: 0.2 INJECTION, SOLUTION INTRAMUSCULAR; INTRAVENOUS; SUBCUTANEOUS at 05:31

## 2024-02-16 RX ADMIN — AMPICILLIN SODIUM AND SULBACTAM SODIUM 3 G: 2; 1 INJECTION, POWDER, FOR SOLUTION INTRAMUSCULAR; INTRAVENOUS at 05:32

## 2024-02-16 RX ADMIN — LEVOTHYROXINE SODIUM 150 MCG: 0.07 TABLET ORAL at 05:49

## 2024-02-16 RX ADMIN — SODIUM CHLORIDE: 9 INJECTION, SOLUTION INTRAVENOUS at 23:03

## 2024-02-16 RX ADMIN — ACETAMINOPHEN 650 MG: 325 TABLET, FILM COATED ORAL at 18:51

## 2024-02-16 RX ADMIN — ASPIRIN 81 MG CHEWABLE TABLET 81 MG: 81 TABLET CHEWABLE at 20:57

## 2024-02-16 RX ADMIN — SODIUM CHLORIDE: 9 INJECTION, SOLUTION INTRAVENOUS at 12:15

## 2024-02-16 RX ADMIN — AMPICILLIN SODIUM AND SULBACTAM SODIUM 3 G: 2; 1 INJECTION, POWDER, FOR SOLUTION INTRAMUSCULAR; INTRAVENOUS at 18:45

## 2024-02-16 RX ADMIN — HYDROMORPHONE HYDROCHLORIDE 1 MG: 2 TABLET ORAL at 12:47

## 2024-02-16 RX ADMIN — SIMVASTATIN 20 MG: 20 TABLET, FILM COATED ORAL at 20:57

## 2024-02-16 RX ADMIN — ONDANSETRON 4 MG: 4 TABLET, ORALLY DISINTEGRATING ORAL at 14:10

## 2024-02-16 RX ADMIN — AMPICILLIN SODIUM AND SULBACTAM SODIUM 3 G: 2; 1 INJECTION, POWDER, FOR SOLUTION INTRAMUSCULAR; INTRAVENOUS at 12:15

## 2024-02-16 RX ADMIN — LOSARTAN POTASSIUM 25 MG: 25 TABLET, FILM COATED ORAL at 20:57

## 2024-02-16 RX ADMIN — HYDROMORPHONE HYDROCHLORIDE 0.4 MG: 0.2 INJECTION, SOLUTION INTRAMUSCULAR; INTRAVENOUS; SUBCUTANEOUS at 01:08

## 2024-02-16 NOTE — PROVIDER NOTIFICATION
MD Notification    Notified Person: MD    Notified Person Name: Dr. Santillannat    Notification Date/Time: 2/16/2024 1027    Notification Interaction: Vocera page    Purpose of Notification: Patient has orders for LR and NS continuous IV fluids. Which one do you want patient on? Currently has NS running.    Orders Received: Patient to continue with NS IV fluids. MD to discontinue LR IV fluids.    Comments:

## 2024-02-16 NOTE — PLAN OF CARE
Goal Outcome Evaluation:      Plan of Care Reviewed With: patient    Overall Patient Progress: no changeOverall Patient Progress: no change    PRIMARY DIAGNOSIS: Pneumatosis Intestinalis   OUTPATIENT/OBSERVATION GOALS TO BE MET BEFORE DISCHARGE:  ADLs back to baseline: Yes    Activity and level of assistance: Up with standby assistance.    Pain status: Improved but still requiring IV narcotics.    Return to near baseline physical activity: Yes     Discharge Planner Nurse   Safe discharge environment identified: Yes  Barriers to discharge: Yes       Entered by: Anya Mason RN 02/16/2024 8:52 AM     Please review provider order for any additional goals.   Nurse to notify provider when observation goals have been met and patient is ready for discharge.

## 2024-02-16 NOTE — CONSULTS
[facility]  Colon and Rectal Surgery Consult Note  Name: Jazlyn Bartlett    MRN: [id number]  YOB: 1965    Age: 58 year old  Date of admission: 2/15/2024  Primary care provider: Sharron Justice     Requesting Physician:  Dr. Ledesma  Reason for consult: Abdominal pain, colonic pneumatosis           History of Present Illness:   Jazlyn Bartlett is a 58 year old female, seen at the request of Dr. Ledesma, who presents with left-sided flank pain that radiates to the lower abdomen, suprapubic area and groin.  She reports that these have been constant since this morning, and worsened with urination.  She also noted pain with defecation and had approximately 3 bowel movements today with radiation of the pain in her lower abdomen.  She has also had vomiting, poor oral tolerance and did have a harder stool this morning which then became softer with subsequent bowel movements.  She denies any blood in her stool.  Her last colonoscopy was in October 2022 and she did have a hyperplastic polyp removed at that time.  She denies previous abdominal surgeries.                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, s/p resection     Uncomplicated asthma 2010             Past Surgical History:     Past Surgical History:   Procedure Laterality Date     ABDOMEN SURGERY  1986    Tubal     ABLATION, ENDOMETRIAL, THERMAL, W/O HYSTEROSCOPIC GUIDANCE       BIOPSY  2010    Neck     COLONOSCOPY  2011     COLONOSCOPY N/A 10/28/2022    Procedure: COLONOSCOPY with polypectomy;  Surgeon: Michael Enciso MD;   Location: [facility] OR     DILATION AND CURETTAGE, HYSTEROSCOPY, ABLATE ENDOMETRIUM NOVASURE, COMBINED  10/11/2012    Procedure: COMBINED DILATION AND CURETTAGE, HYSTEROSCOPY, ABLATE ENDOMETRIUM NOVASURE;  COMBINED DILATION AND CURETTAGE, HYSTEROSCOPY, ABLATE ENDOMETRIUM ,pelvic exam under anesthesia;  Surgeon: Shruti Barrios MD;  Location: [facility] OR     ESOPHAGOSCOPY, GASTROSCOPY, DUODENOSCOPY (EGD), COMBINED N/A 01/19/2016    Procedure: COMBINED ESOPHAGOSCOPY, GASTROSCOPY, DUODENOSCOPY (EGD), BIOPSY SINGLE OR MULTIPLE;  Surgeon: Kristofer Molina MD;  Location: [facility] GI     GENITOURINARY SURGERY  1986 tubes tied & burned     HEAD & NECK SURGERY  removal of cancerous limp knod 2010     Removal of cancerous lump on neck       TONSILLECTOMY                 Social History:     Social History     Tobacco Use     Smoking status: Every Day     Packs/day: 0.50     Years: 37.00     Additional pack years: 0.00     Total pack years: 18.50     Types: Cigarettes     Start date: 1/1/1979     Passive exposure: Current     Smokeless tobacco: Never   Substance Use Topics     Alcohol use: No             Family History:     Family History   Problem Relation Age of Onset     Diabetes Mother      Breast Cancer Mother      Hyperlipidemia Mother      Osteoporosis Mother      Diabetes Father      Lung Cancer Father      Rheumatoid Arthritis Father      Coronary Artery Disease Father      Hyperlipidemia Father      Cerebrovascular Disease Father      Prostate Cancer Father      Diabetes Maternal Grandmother      Diabetes Other      Hypertension Other      Depression Other      Anxiety Disorder Other      Asthma Other      Thyroid Disease Other      Obesity Other      Diabetes Sister      Obesity Sister      Diabetes Sister      Diabetes Brother      Diabetes Daughter      Breast Cancer Sister      Anesthesia Reaction Daughter              Allergies:     Allergies   Allergen Reactions     Food      PN: MICHAELLE FI1: jane BRASWELL FI2: jane      Hydrocodone-Acetaminophen Nausea and Vomiting     Ibuprofen Sodium Nausea and Vomiting     Lisinopril Cough             Medications:       piperacillin-tazobactam  4.5 g Intravenous Once             Review of Systems:   A comprehensive greater than 10 system review of systems was carried out.  Pertinent positives and negatives are noted above.  Otherwise negative for contributory info.            Physical Exam:     Blood pressure (!) 150/85, pulse 83, temperature 97.9  F (36.6  C), resp. rate 18, height 1.524 m (5'), weight 88.5 kg (195 lb), SpO2 98%, not currently breastfeeding.  No intake or output data in the 24 hours ending 02/15/24 1833  Exam:  General - Awake alert and oriented, appears stated age  Pulm - Non-labored breathing with normal respiratory effort  CVS - reg rate and rhythm, no peripheral edema  Abd -soft, mild tenderness to palpation in bilateral lower quadrants.  No guarding, rigidity or peritoneal signs.   Neuro - CN II-XII grossly intact  Musculoskeletal - extremities with no clubbing, cyanosis or edema; able to ambulate  Psych - responsive, alert, cooperative; oriented x3; appropriate mood and affect  External/skin - inspection reveals no rashes, lesions or ulcers, normal coloring             Data Reviewed:     Recent Results (from the past 24 hour(s))   CT Abdomen Pelvis w/o Contrast    Narrative    CT ABDOMEN PELVIS W/O CONTRAST 2/15/2024 2:33 PM    CLINICAL HISTORY: flank pain, left  TECHNIQUE: CT scan of the abdomen and pelvis was performed without IV  contrast. Multiplanar reformats were obtained. Dose reduction  techniques were used.  CONTRAST: None.    COMPARISON: 7/15/2023 and 7/25/2019    FINDINGS:   LOWER CHEST: Left lower lobe calcified granuloma. Mild linear  atelectasis in the lung bases.    HEPATOBILIARY: Diffuse hepatic steatosis. No calcified gallstones or  biliary ductal dilatation.    PANCREAS: Normal.    SPLEEN: Normal.    ADRENAL GLANDS: Stable 2.6 cm benign adenoma in the  "left adrenal  gland, previously measuring 2.3 cm. Mildly low dense thickening of the  right adrenal gland, likely due to presence of adenomas.    KIDNEYS/BLADDER: No calculi, hydronephrosis or perinephric stranding.  A 1.6 cm lesion in the anterior cortex of the interpolar region of the  right kidney (series 3, image 91) has slightly increased in size,  previously measuring 1.4 cm on 7/15/2023. A 3.4 cm lesion with a  central hyperdense nodule in the posterior cortex of the left kidney  is stable, previously 3.7 cm.    BOWEL: No small bowel or colonic obstruction. Colonic pneumatosis  involving the wall of the left half of the transverse colon (series 3,  image 71-70). A few small foci of gas along the wall of the colon in  the hepatic flexure could either represent pneumatosis or mild  pneumoperitoneum (series 3, image 58). No associated underlying  colonic wall thickening or surrounding inflammatory changes. Normal  appendix. No mesenteric edema or fluid. No portal venous gas or  mesenteric venous gas. Mild diverticulosis in the sigmoid colon.    LYMPH NODES: No lymphadenopathy.    VASCULATURE: Mild calcified atheromatous plaque in the abdominal  aorta. Evaluation for vascular patency is not possible on noncontrast  CT. No abdominal aortic aneurysm.    PELVIC ORGANS: No pelvic masses.    OTHER: No free fluid or fluid collections. No extraluminal air.    MUSCULOSKELETAL: Unremarkable.      Impression    IMPRESSION:   1.  No CT evident cause for left flank pain.  2.  Colonic wall pneumatosis involving the hepatic flexure and right  half of the transverse colon with adjacent possible mild  pneumoperitoneum. The cause may be bowel wall ischemia, hollow viscus  perforation or more likely, \"benign\" pneumatosis. No other signs of  bowel wall ischemia or inflammation on noncontrast CT. A short  interval follow-up CT angiogram of the abdomen and pelvis can be  considered to assess for mesenteric arterial occlusion if " clinically  indicated.  3.  Hepatic steatosis.  4.  Stable indeterminate lesions in the kidneys. Nonemergent follow-up  renal MRI in about 3 months can be considered.    Findings were discussed with Dr. Ledesma at 2:50 PM.    HAVEN DELACRUZ MD         SYSTEM ID:  [id number]   CTA Abdomen Pelvis with Contrast    Narrative    CTA ABDOMEN PELVIS WITH CONTRAST   2/15/2024 3:49 PM    HISTORY: 58-year-old patient with history of left flank and abdominal  pain as well as question of pneumatosis/pneumoperitoneum on  noncontrast exam.    COMPARISON: February 15, 2024 at 2:25 PM.    TECHNIQUE: Multiplanar multiformatted CTA images obtained from the  lung bases through the abdomen and pelvis after the uneventful  administration of Isovue 370 intravenous contrast given for a total of  72 mL. Radiation dose for this scan was reduced using automated  exposure control, adjustment of the mA and/or kV according to patient  size, or iterative reconstruction technique. 3-D reformatted images  were created at a separate workstation.    FINDINGS: Left lower lung granuloma incidentally noted. No acute  osseous abnormality. Heart size is normal.    Reflux of contrast into the liver may indicate underlying right heart  dysfunction. Otherwise, liver, gallbladder, spleen, right adrenal  gland are unremarkable. Nodule in the left adrenal gland is 12  Hounsfield units and 2.3 x 2.1 cm, noncontrast exam demonstrating -10  Hounsfield units. The pancreas is unremarkable. Exophytic left renal  hypodensity is 15 Hounsfield units on postcontrast exam, unchanged  from noncontrast exam and of measuring 3.6 x 3 cm. Normal perfusion of  both kidneys. No hydronephrosis. The bladder is decompressed. Uterus  and adnexa are unremarkable. Few scattered diverticuli of the sigmoid  colon. Air is noted adjacent to the transverse colon, without apparent  bowel wall thickening. No dilated air-filled loops of bowel.    The visible descending thoracic aorta is  patent. The celiac axis, SMA,  and right renal artery are widely patent. There is tortuosity of the  left renal artery with mural thrombus in the proximal segment and  associated moderate to severe stenosis. Infrarenal abdominal aorta  demonstrates mild atherosclerotic calcification, though nonaneurysmal.  The KALLI is patent. Both common iliac, internal iliac, external iliac,  and common femoral arteries are patent. Air is in close proximity to  the transverse colon, may be pneumatosis, though overall appearance  and extent is unchanged from previous exam.      Impression    IMPRESSION:  1. Pneumatosis or localized pneumoperitoneum surrounding the hepatic  flexure and transverse colon again identified and unchanged. Visceral  arteries are patent, lessening suspicion for ischemia.  2. Moderate to severe stenosis in the proximal left renal artery  related to soft plaque or mural thrombus.  3. Left renal hypodensity is 15 Hounsfield units and 3.6 x 3 cm,  thought to be a cyst.  4. Left adrenal gland adenoma measuring up to 2.3 cm, unchanged.      BRETT HERNANDEZ MD         SYSTEM ID:  [id number]       Recent Labs   Lab 02/15/24  1322   WBC 13.4*   HGB 15.8*   HCT 46.6   MCV 90        Recent Labs   Lab 02/15/24  1322      POTASSIUM 3.7   CHLORIDE 100   CO2 27   ANIONGAP 11   *   BUN 13.7   CR 0.69   GFRESTIMATED >90   NAZIA 10.0         Assessment and Plan:   Jazlyn Bartlett is a 58 year old female who presented with worsening abdominal pain, vomiting and pain with urination and evacuation of stool.  Evaluation in the ER is notable for normal vital signs, a leukocytosis to 13, and CT scan of the abdomen and pelvis demonstrates pneumatosis of the hepatic flexure and proximal transverse colon.  CT angiography with patent mesenteric vessels.  I have personally reviewed her CT images and reports, and reviewed these findings with the patient and her family member at bedside.    Plan: No indication for acute  surgical intervention at this time.  She remains hemodynamically stable with a benign abdominal exam.  The etiology for pneumatosis is unclear, but given her otherwise reassuring exam and vital signs, within this is unlikely to be underlying colonic ischemia.  I would recommend admission for bowel rest, IV fluid hydration, and IV antibiotics with coverage for enteric pathogens.  She is okay for clear liquids, but if she does have increasing pain with oral intake would recommend backing down to n.p.o.  Will plan for serial abdominal exams, and if her symptoms improve with conservative measures, we can plan for a p.o. challenge, and follow-up colonoscopy as an outpatient in approximately 6 to 8 weeks.  If she has worsening abdominal pain, a concerning abdominal exam or change in her vital signs, please notify the colorectal surgery team, as this can be evidence of developing colonic ischemia which may require surgical intervention.  Although I do think the need for surgical intervention is unlikely, we did briefly discuss that this would entail an open surgery with a possible bowel resection and a possible ostomy bag which would be temporary.  Colorectal surgery will continue to follow closely.  Please call with any questions, concerns or significant changes in clinical status.      Additional history obtained from chart.  Time spent on consultation: 60 minutes, greater than 50% spent on counseling and/or coordination of care.      Leanna Ruby MD  Colorectal Surgery    [facility]  [address] [address]  T: [phone]  F: [phone]

## 2024-02-16 NOTE — PLAN OF CARE
Goal Outcome Evaluation:      Plan of Care Reviewed With: patient    Overall Patient Progress: improvingOverall Patient Progress: improving    PRIMARY DIAGNOSIS: Pneumatosis Intestinalis   OUTPATIENT/OBSERVATION GOALS TO BE MET BEFORE DISCHARGE:  ADLs back to baseline: Yes    Activity and level of assistance: Ambulating independently.    Pain status: Improved-controlled with oral pain medications.    Return to near baseline physical activity: Yes     Discharge Planner Nurse   Safe discharge environment identified: Yes  Barriers to discharge: Yes       Entered by: Anya Mason RN 02/16/2024 12:51 PM     Please review provider order for any additional goals.   Nurse to notify provider when observation goals have been met and patient is ready for discharge.

## 2024-02-16 NOTE — ED NOTES
[facility]  ED Nurse Handoff Report    ED Chief complaint: Flank Pain and Nausea & Vomiting  . ED Diagnosis:   Final diagnoses:   None       Allergies:   Allergies   Allergen Reactions    Food      PN: LW FI1: nka LW FI2: nka    Hydrocodone-Acetaminophen Nausea and Vomiting    Ibuprofen Sodium Nausea and Vomiting    Lisinopril Cough       Code Status: Full Code    Activity level - Baseline/Home:  independent.  Activity Level - Current:   assist of 1.   Lift room needed: No.   Bariatric: No   Needed: No   Isolation: No.   Infection: Not Applicable.     Respiratory status: Room air    Vital Signs (within 30 minutes):   Vitals:    02/15/24 1226   BP: (!) 150/85   Pulse: 83   Resp: 18   Temp: 97.9  F (36.6  C)   SpO2: 98%   Weight: 88.5 kg (195 lb)   Height: 1.524 m (5')       Cardiac Rhythm:  ,      Pain level:    Patient confused: No.   Patient Falls Risk: nonskid shoes/slippers when out of bed, arm band in place, patient and family education, and activity supervised.   Elimination Status: Has voided     Patient Report - Initial Complaint: Flank Pain, Nausea & Vomiting.   Focused Assessment: HPI:  Jazlyn Bartlett is a very pleasant 58 year old female presenting with flank pain, nausea, vomiting. The L sided flank pain radiates to the LLQ and groin. The pain onset when she was getting ready for work 0530. Alma's symptoms have been constant since this morning. She took Tylenol and nausea medication that provided partial relief. Patient was unable to move prior to medication, but this resolved after medication. Patient has a history of kidney stones and notes this feels similar. She endorses vomiting, fever, chills, pain with urination, urgency, and constipation. She denies hematuria, blood in the stool, or diarrhea. No nausea present upon examination.      Abnormal Results:   Labs Ordered and Resulted from Time of ED Arrival to Time of ED Departure   URINE MACROSCOPIC WITH REFLEX TO  MICRO - Abnormal       Result Value    Color Urine Light Yellow      Appearance Urine Clear      Glucose Urine Negative      Bilirubin Urine Negative      Ketones Urine 40 (*)     Specific Gravity Urine 1.022      Blood Urine Large (*)     pH Urine 5.5      Protein Albumin Urine Negative      Urobilinogen Urine Normal      Nitrite Urine Negative      Leukocyte Esterase Urine Negative      RBC Urine 123 (*)     WBC Urine 1      Squamous Epithelials Urine 2 (*)     Mucus Urine Present (*)     Hyaline Casts Urine 1     BASIC METABOLIC PANEL - Abnormal    Sodium 138      Potassium 3.7      Chloride 100      Carbon Dioxide (CO2) 27      Anion Gap 11      Urea Nitrogen 13.7      Creatinine 0.69      GFR Estimate >90      Calcium 10.0      Glucose 159 (*)    CBC WITH PLATELETS AND DIFFERENTIAL - Abnormal    WBC Count 13.4 (*)     RBC Count 5.17      Hemoglobin 15.8 (*)     Hematocrit 46.6      MCV 90      MCH 30.6      MCHC 33.9      RDW 14.0      Platelet Count 315      % Neutrophils 90      % Lymphocytes 7      % Monocytes 2      % Eosinophils 0      % Basophils 0      % Immature Granulocytes 1      NRBCs per 100 WBC 0      Absolute Neutrophils 12.2 (*)     Absolute Lymphocytes 0.9      Absolute Monocytes 0.3      Absolute Eosinophils 0.0      Absolute Basophils 0.0      Absolute Immature Granulocytes 0.1      Absolute NRBCs 0.0          CTA Abdomen Pelvis with Contrast   Final Result   IMPRESSION:   1. Pneumatosis or localized pneumoperitoneum surrounding the hepatic   flexure and transverse colon again identified and unchanged. Visceral   arteries are patent, lessening suspicion for ischemia.   2. Moderate to severe stenosis in the proximal left renal artery   related to soft plaque or mural thrombus.   3. Left renal hypodensity is 15 Hounsfield units and 3.6 x 3 cm,   thought to be a cyst.   4. Left adrenal gland adenoma measuring up to 2.3 cm, unchanged.        BRETT HERNANDEZ MD            SYSTEM ID:  [id number]     "  CT Abdomen Pelvis w/o Contrast   Final Result   IMPRESSION:    1.  No CT evident cause for left flank pain.   2.  Colonic wall pneumatosis involving the hepatic flexure and right   half of the transverse colon with adjacent possible mild   pneumoperitoneum. The cause may be bowel wall ischemia, hollow viscus   perforation or more likely, \"benign\" pneumatosis. No other signs of   bowel wall ischemia or inflammation on noncontrast CT. A short   interval follow-up CT angiogram of the abdomen and pelvis can be   considered to assess for mesenteric arterial occlusion if clinically   indicated.   3.  Hepatic steatosis.   4.  Stable indeterminate lesions in the kidneys. Nonemergent follow-up   renal MRI in about 3 months can be considered.      Findings were discussed with Dr. Ledesma at 2:50 PM.      HAVEN DELACRUZ MD            SYSTEM ID:  [id number]          Treatments provided: See MAR  Family Comments: In room.   OBS brochure/video discussed/provided to patient:  Yes  ED Medications:   Medications   piperacillin-tazobactam (ZOSYN) 4.5 g vial to attach to  mL bag (4.5 g Intravenous $New Bag 2/15/24 1824)   lactated ringers infusion (has no administration in time range)   ketorolac (TORADOL) injection 10 mg (10 mg Intravenous $Given 2/15/24 1348)   sodium chloride (PF) 0.9% PF flush 100 mL (80 mLs Intravenous $Given 2/15/24 1537)   iopamidol (ISOVUE-370) solution 500 mL (72 mLs Intravenous $Given 2/15/24 1537)       Drips infusing:  Yes  For the majority of the shift this patient was Green.   Interventions performed were NA.    Sepsis treatment initiated: No    Cares/treatment/interventions/medications to be completed following ED care: See Orders.     ED Nurse Name: Eleanor Mathew RN  6:33 PM    "

## 2024-02-16 NOTE — PROVIDER NOTIFICATION
MD Notification    Notified Person: MD    Notified Person Name: Dr. Whitfield    Notification Date/Time: 2/16/2024 1324     Notification Interaction: Vocera page    Purpose of Notification: Patient inquiring about her glipizide. Do you want to continue to hold off on this med until her diet is more advanced? Was advanced to full liquid for lunch. C/o 4-5/10 abdominal pain which I gave PO dilaudid.    Orders Received: Per MD, no glipizide yet. Hyperglycemia to be treated with sliding scale insulin for now.    Comments:

## 2024-02-16 NOTE — PROGRESS NOTES
COLON & RECTAL SURGERY  PROGRESS NOTE    February 16, 2024    SUBJECTIVE:  Feeling better today, minimal pain in right lower abdomen. No gas or BM. Tolerating clears, no N/V. WBC 7.9 from 13.4. Hgb 13.4 from 15.8. AVSS.     OBJECTIVE:  Temp:  [97.6  F (36.4  C)-98.4  F (36.9  C)] 97.7  F (36.5  C)  Pulse:  [56-83] 56  Resp:  [18-20] 18  BP: (114-161)/(68-87) 114/73  SpO2:  [94 %-98 %] 97 %    Intake/Output Summary (Last 24 hours) at 2/16/2024 1014  Last data filed at 2/16/2024 0550  Gross per 24 hour   Intake 658.33 ml   Output 500 ml   Net 158.33 ml       GENERAL:  Awake, alert, no acute distress, lying in bed  HEAD: Nomocephalic atraumatic  SCLERA: anicteric  EXTREMITIES: warm and well perfused  ABDOMEN:  Soft, mildly tender in right lower abdomen, non-distended, no rebound or guarding, no peritoneal signs      LABS:  Lab Results   Component Value Date    WBC 7.9 02/16/2024    WBC 16.2 09/11/2019     Lab Results   Component Value Date    HGB 13.4 02/16/2024    HGB 15.1 09/11/2019     Lab Results   Component Value Date    HCT 40.5 02/16/2024    HCT 44.2 09/11/2019     Lab Results   Component Value Date     02/16/2024     09/11/2019     Last Basic Metabolic Panel:  Lab Results   Component Value Date     02/16/2024     06/15/2021      Lab Results   Component Value Date    POTASSIUM 3.5 02/16/2024    POTASSIUM 3.8 10/25/2022    POTASSIUM 3.6 06/15/2021     Lab Results   Component Value Date    CHLORIDE 103 02/16/2024    CHLORIDE 105 07/05/2022    CHLORIDE 103 06/15/2021     Lab Results   Component Value Date    NAZIA 9.0 02/16/2024    NAZIA 9.9 06/15/2021     Lab Results   Component Value Date    CO2 26 02/16/2024    CO2 26 07/05/2022    CO2 34 06/15/2021     Lab Results   Component Value Date    BUN 12.4 02/16/2024    BUN 13 07/05/2022    BUN 12 06/15/2021     Lab Results   Component Value Date    CR 0.69 02/16/2024    CR 0.64 06/15/2021     Lab Results   Component Value Date      02/16/2024     02/15/2024     07/05/2022    GLC 92 06/15/2021       ASSESSMENT/PLAN: Jazlyn Bartlett is a 58-year-old woman admitted on 2/15/24 with worsening abdominal pain, vomiting, and pain with urination and evacuation of stool. CT abdomen/pelvis revealed pneumatosis of the hepatic flexure and proximal transverse colon. CTA with patent mesenteric vessels. She is improving conservatively.     - Full liquids, back down if increased pain/N/V  - Continue antibiotics  - Pain management  - If she continues to improve conservatively, recommend colonoscopy in 6-8 weeks      For questions/paging, please contact the [facility] office at [phone].    Nader Avila PA-C  [facility]  Phone: [phone]     [facility] Attending Note    Patient seen and examined independently.  Agree with above assessment and plan.  Up walking in the halls. Still with some pain but overall better.   Continues to smoke.  + BM without bleeding.   Abd soft with upper abdominal tenderness.   Plan  No plans for surgery at this time but if fails to improve or worsens we may need to consider exlap and possible resection with stoma.    Fulls with caution.  Minimize narcotics.  Hydration  Smoking cessation very important in the setting of possible ischemia.    Eleanor Kang MD  [facility]  [address], [address]  [place] [address]  T: [phone]  F: [phone]   [email or web address]

## 2024-02-16 NOTE — PLAN OF CARE
Goal Outcome Evaluation:      Plan of Care Reviewed With: patient    Overall Patient Progress: improvingOverall Patient Progress: improving    PRIMARY DIAGNOSIS: Pneumatosis Intestinalis  OUTPATIENT/OBSERVATION GOALS TO BE MET BEFORE DISCHARGE:  ADLs back to baseline: Yes    Activity and level of assistance: Ambulating independently.    Pain status: Improved-controlled with oral pain medications.    Return to near baseline physical activity: Yes     Discharge Planner Nurse   Safe discharge environment identified: Yes  Barriers to discharge: Yes       Entered by: Anya Mason RN 02/16/2024 4:37 PM     Please review provider order for any additional goals.   Nurse to notify provider when observation goals have been met and patient is ready for discharge.

## 2024-02-16 NOTE — PROVIDER NOTIFICATION
MD Notification    Notified Person: MD    Notified Person Name: Dr. Whitfield    Notification Date/Time: 2/16/2024 1131    Notification Interaction: Vocera page    Purpose of Notification: Please order PRN PO pain med. Patient rating abdominal pain 4/10. She said she was given tramadol in the ED and that seemed to give her more pain relief than the IV dilaudid has been.    Orders Received: MD to order.    Comments:

## 2024-02-16 NOTE — H&P
[facility]    History and Physical - Hospitalist Service       Date of Admission:  2/15/2024    Assessment & Plan      Jazlyn Bartlett is a 58 year old female admitted on 2/15/2024.    Colon wall pneumatosis  Patient came in with abdominal pain of 1 day duration associated with chills, nausea, vomiting, CT scan of the abdomen showing Colonic wall pneumatosis involving hepatic flexure and right half of the transverse colon with adjacent possible mild pneumoperitoneum  Lactic acid was 1.4 WBC was 13 which is within normal limits and subsequent CT angiogram ruled out any mesenteric ischemia with patent arteries  Patient was evaluated by colorectal surgery who thought that the patient was hemodynamically stable with a benign abdomen it was recommended that conservative management including   bowel rest and later - clear liquid diet if not tolerated back to n.p.o. status  IV fluid hydration and   IV antibiotics with coverage for enteric pathogens will be given      COPD  Albuterol will be continued  At the present time I do not think the patient is an acute COPD exacerbation    Hypothyroidism  Continue with levothyroxine 150 mcg daily    Type 2 diabetes mellitus  Will give sliding scale insulin and not give glipizide at this time until patient's eating is well-established    Hypertension  Continue to hold patient's losartan 100 mg daily, hydrochlorothiazide 25 mg daily    Hypercholesteremia   continue simvastatin 20 mg daily at bedtime    Smoking cessation was emphasized          Diet: Clear Liquid Diet    DVT Prophylaxis: Pneumatic Compression Devices  Manuel Catheter: Not present  Lines: None     Cardiac Monitoring: None  Code Status:  Full code    Clinically Significant Risk Factors Present on Admission                # Drug Induced Platelet Defect: home medication list includes an antiplatelet medication   # Hypertension: Noted on problem list      # Obesity: Estimated body mass index is 38.08  kg/m  as calculated from the following:    Height as of this encounter: 1.524 m (5').    Weight as of this encounter: 88.5 kg (195 lb).              Disposition Plan   Admit to observation    Riky Juarez MD  Hospitalist Service  [facility]  Securely message with [employer] (more info)  Text page via [facility] Paging/Directory     ______________________________________________________________________    Chief Complaint   Abdominal pain    History is obtained from the patient, medical records and my discussion with emergency department physician      History of Present Illness lady with past medical history of COPD who continues to smoke cigarettes and was not found in the room initially when I came down to the ER to examine her as she was out smoking, GERD, type 2 diabetes mellitus, depression, hypothyroidism s/p thyroid ablation with radiation for goiter, hypercholesteremia, nephrolithiasis in the past, chronic pain and 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    Patient said that for the past 2 months on and off she has had abdominal pain in the lower abdomen but on the day of admission the pain that was started at around 5:30 in the morning did not go away until 2 PM in the afternoon when she came in for further evaluation and treatment.  She also has nausea and vomiting associated with this and has been sweating profusely on and off.  She has chills there is no fever increased that was recorded.  She is generally constipated and she removes her stools manually which sometimes contain blood.      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, s/p resection    Uncomplicated asthma 2010       Past Surgical History   Past Surgical History:   Procedure Laterality Date    ABDOMEN SURGERY      Tubal    ABLATION, ENDOMETRIAL, THERMAL, W/O HYSTEROSCOPIC GUIDANCE      BIOPSY      Neck    COLONOSCOPY      COLONOSCOPY N/A 10/28/2022    Procedure: COLONOSCOPY with polypectomy;  Surgeon: Micheal Enciso MD;  Location: [facility] OR    DILATION AND CURETTAGE, HYSTEROSCOPY, ABLATE ENDOMETRIUM NOVASURE, COMBINED  10/11/2012    Procedure: COMBINED DILATION AND CURETTAGE, HYSTEROSCOPY, ABLATE ENDOMETRIUM NOVASURE;  COMBINED DILATION AND CURETTAGE, HYSTEROSCOPY, ABLATE ENDOMETRIUM ,pelvic exam under anesthesia;  Surgeon: Shruti Barrios MD;  Location: [facility] OR    ESOPHAGOSCOPY, GASTROSCOPY, DUODENOSCOPY (EGD), COMBINED N/A 2016    Procedure: COMBINED ESOPHAGOSCOPY, GASTROSCOPY, DUODENOSCOPY (EGD), BIOPSY SINGLE OR MULTIPLE;  Surgeon: Kristofer Molina MD;  Location: [facility] GI    GENITOURINARY SURGERY   tubes tied & burned    HEAD & NECK SURGERY  removal of cancerous limp knod     Removal of cancerous lump on neck      TONSILLECTOMY         Prior to Admission Medications   Prior to Admission Medications   Prescriptions Last Dose Informant Patient Reported? Taking?   albuterol (PROAIR HFA/PROVENTIL HFA/VENTOLIN HFA) 108 (90 Base) MCG/ACT inhaler   No No   Sig: Inhale 2 puffs into the lungs every 6 hours as needed for shortness of breath / dyspnea   aspirin (ASA) 81 MG chewable tablet   Yes No   Sig: Take 81 mg by mouth daily   benzonatate (TESSALON) 200 MG capsule   No No   Sig: Take 1 capsule (200 mg) by mouth 3 times daily as needed for cough   fluticasone (FLOVENT HFA) 220 MCG/ACT inhaler   No No   Sig: Inhale 1 puff into the lungs 2 times daily   glipiZIDE (GLUCOTROL XL) 10 MG 24 hr tablet   No No   Si tab in am and 2 tabs in PM   guaiFENesin-codeine (ROBITUSSIN AC) 100-10 MG/5ML solution   No No    Sig: Take 5 mLs by mouth every 6 hours as needed for cough   hydrochlorothiazide (HYDRODIURIL) 25 MG tablet   No No   Sig: TAKE 1 TABLET (25 MG) BY MOUTH DAILY   levothyroxine (SYNTHROID/LEVOTHROID) 150 MCG tablet   No No   Sig: TAKE 1 TABLET BY MOUTH EVERY DAY   losartan (COZAAR) 100 MG tablet   No No   Sig: Take 1 tablet (100 mg) by mouth daily   ondansetron (ZOFRAN) 4 MG tablet   No No   Sig: Take 1 tablet (4 mg) by mouth every 8 hours as needed for nausea   simvastatin (ZOCOR) 20 MG tablet   No No   Sig: Take 1 tablet (20 mg) by mouth At Bedtime      Facility-Administered Medications: None        Review of Systems    For the past 2 days patient has had a headache which is better on the day of admission otherwise denies, blurring of vision or double vision, neck pain jaw pain or shoulder pain, chest pain, she on chronic basis as shortness of breath, cough with clear sputum production, nausea, vomiting, abdominal pain,or constipation as mentioned above.  Has urinary symptoms of burning with increased frequency. Denies any weakness of upper or lower extremities or any seizure activity. Fever she has chills.  Otherwise as mentioned above without bleeding from anywhere else.  No rashes or cellulitis.  She says she has been breast pains on both her breast      Social History   I have reviewed this patient's social history and updated it with pertinent information if needed.  Social History     Tobacco Use    Smoking status: Every Day     Packs/day: 0.50     Years: 37.00     Additional pack years: 0.00     Total pack years: 18.50     Types: Cigarettes     Start date: 1/1/1979     Passive exposure: Current    Smokeless tobacco: Never   Vaping Use    Vaping Use: Never used   Substance Use Topics    Alcohol use: No    Drug use: No         Family History   I have reviewed this patient's family history and updated it with pertinent information if needed.  Family History   Problem Relation Age of Onset    Diabetes Mother      Breast Cancer Mother     Hyperlipidemia Mother     Osteoporosis Mother     Diabetes Father     Lung Cancer Father     Rheumatoid Arthritis Father     Coronary Artery Disease Father     Hyperlipidemia Father     Cerebrovascular Disease Father     Prostate Cancer Father     Diabetes Maternal Grandmother     Diabetes Other     Hypertension Other     Depression Other     Anxiety Disorder Other     Asthma Other     Thyroid Disease Other     Obesity Other     Diabetes Sister     Obesity Sister     Diabetes Sister     Diabetes Brother     Diabetes Daughter     Breast Cancer Sister     Anesthesia Reaction Daughter          Allergies   Allergies   Allergen Reactions    Food      PN: LW FI1: nka LW FI2: nka    Hydrocodone-Acetaminophen Nausea and Vomiting    Ibuprofen Sodium Nausea and Vomiting    Lisinopril Cough        Physical Exam   Vital Signs: Temp: 97.9  F (36.6  C)   BP: 127/68 Pulse: 56   Resp: 18 SpO2: 98 % O2 Device: None (Room air)    Weight: 195 lbs 0 oz      GENERAL: Patient does not look in any acute distress  HEENT: EOM+, Conjunctiva is clear   NECK:  NO Jugular Venous distention  HEART: S1 S2 regular bradycardia is present  LUNGS: Respirations are not laboured, Lungs have decreased breath sounds in the lung bases to auscultation without Crepitations or Wheezing   ABDOMEN: Soft, there is minimal left lower quadrant tenderness I did not elicit any rebound,  Bowel Sounds are  Positive   LOWER LIMBS: Minimal to no pedal Edema  Bilaterally   CNS:  Alert,  Oriented x 3, Moving all the Four Limbs         Data   Imaging results reviewed over the past 24 hrs:   Recent Results (from the past 24 hour(s))   CT Abdomen Pelvis w/o Contrast    Narrative    CT ABDOMEN PELVIS W/O CONTRAST 2/15/2024 2:33 PM    CLINICAL HISTORY: flank pain, left  TECHNIQUE: CT scan of the abdomen and pelvis was performed without IV  contrast. Multiplanar reformats were obtained. Dose reduction  techniques were used.  CONTRAST:  None.    COMPARISON: 7/15/2023 and 7/25/2019    FINDINGS:   LOWER CHEST: Left lower lobe calcified granuloma. Mild linear  atelectasis in the lung bases.    HEPATOBILIARY: Diffuse hepatic steatosis. No calcified gallstones or  biliary ductal dilatation.    PANCREAS: Normal.    SPLEEN: Normal.    ADRENAL GLANDS: Stable 2.6 cm benign adenoma in the left adrenal  gland, previously measuring 2.3 cm. Mildly low dense thickening of the  right adrenal gland, likely due to presence of adenomas.    KIDNEYS/BLADDER: No calculi, hydronephrosis or perinephric stranding.  A 1.6 cm lesion in the anterior cortex of the interpolar region of the  right kidney (series 3, image 91) has slightly increased in size,  previously measuring 1.4 cm on 7/15/2023. A 3.4 cm lesion with a  central hyperdense nodule in the posterior cortex of the left kidney  is stable, previously 3.7 cm.    BOWEL: No small bowel or colonic obstruction. Colonic pneumatosis  involving the wall of the left half of the transverse colon (series 3,  image [id number]). A few small foci of gas along the wall of the colon in  the hepatic flexure could either represent pneumatosis or mild  pneumoperitoneum (series 3, image 58). No associated underlying  colonic wall thickening or surrounding inflammatory changes. Normal  appendix. No mesenteric edema or fluid. No portal venous gas or  mesenteric venous gas. Mild diverticulosis in the sigmoid colon.    LYMPH NODES: No lymphadenopathy.    VASCULATURE: Mild calcified atheromatous plaque in the abdominal  aorta. Evaluation for vascular patency is not possible on noncontrast  CT. No abdominal aortic aneurysm.    PELVIC ORGANS: No pelvic masses.    OTHER: No free fluid or fluid collections. No extraluminal air.    MUSCULOSKELETAL: Unremarkable.      Impression    IMPRESSION:   1.  No CT evident cause for left flank pain.  2.  Colonic wall pneumatosis involving the hepatic flexure and right  half of the transverse colon with adjacent  "possible mild  pneumoperitoneum. The cause may be bowel wall ischemia, hollow viscus  perforation or more likely, \"benign\" pneumatosis. No other signs of  bowel wall ischemia or inflammation on noncontrast CT. A short  interval follow-up CT angiogram of the abdomen and pelvis can be  considered to assess for mesenteric arterial occlusion if clinically  indicated.  3.  Hepatic steatosis.  4.  Stable indeterminate lesions in the kidneys. Nonemergent follow-up  renal MRI in about 3 months can be considered.    Findings were discussed with Dr. Ledesma at 2:50 PM.    HAVEN DELACRUZ MD         SYSTEM ID:  [id number]   CTA Abdomen Pelvis with Contrast    Narrative    CTA ABDOMEN PELVIS WITH CONTRAST   2/15/2024 3:49 PM    HISTORY: 58-year-old patient with history of left flank and abdominal  pain as well as question of pneumatosis/pneumoperitoneum on  noncontrast exam.    COMPARISON: February 15, 2024 at 2:25 PM.    TECHNIQUE: Multiplanar multiformatted CTA images obtained from the  lung bases through the abdomen and pelvis after the uneventful  administration of Isovue 370 intravenous contrast given for a total of  72 mL. Radiation dose for this scan was reduced using automated  exposure control, adjustment of the mA and/or kV according to patient  size, or iterative reconstruction technique. 3-D reformatted images  were created at a separate workstation.    FINDINGS: Left lower lung granuloma incidentally noted. No acute  osseous abnormality. Heart size is normal.    Reflux of contrast into the liver may indicate underlying right heart  dysfunction. Otherwise, liver, gallbladder, spleen, right adrenal  gland are unremarkable. Nodule in the left adrenal gland is 12  Hounsfield units and 2.3 x 2.1 cm, noncontrast exam demonstrating -10  Hounsfield units. The pancreas is unremarkable. Exophytic left renal  hypodensity is 15 Hounsfield units on postcontrast exam, unchanged  from noncontrast exam and of measuring 3.6 x 3 cm. " Normal perfusion of  both kidneys. No hydronephrosis. The bladder is decompressed. Uterus  and adnexa are unremarkable. Few scattered diverticuli of the sigmoid  colon. Air is noted adjacent to the transverse colon, without apparent  bowel wall thickening. No dilated air-filled loops of bowel.    The visible descending thoracic aorta is patent. The celiac axis, SMA,  and right renal artery are widely patent. There is tortuosity of the  left renal artery with mural thrombus in the proximal segment and  associated moderate to severe stenosis. Infrarenal abdominal aorta  demonstrates mild atherosclerotic calcification, though nonaneurysmal.  The KALLI is patent. Both common iliac, internal iliac, external iliac,  and common femoral arteries are patent. Air is in close proximity to  the transverse colon, may be pneumatosis, though overall appearance  and extent is unchanged from previous exam.      Impression    IMPRESSION:  1. Pneumatosis or localized pneumoperitoneum surrounding the hepatic  flexure and transverse colon again identified and unchanged. Visceral  arteries are patent, lessening suspicion for ischemia.  2. Moderate to severe stenosis in the proximal left renal artery  related to soft plaque or mural thrombus.  3. Left renal hypodensity is 15 Hounsfield units and 3.6 x 3 cm,  thought to be a cyst.  4. Left adrenal gland adenoma measuring up to 2.3 cm, unchanged.      BRETT HERNANDEZ MD         SYSTEM ID:  [id number]     Most Recent 3 CBC's:  Recent Labs   Lab Test 02/15/24  1322 08/22/23  1306 06/19/23  1441   WBC 13.4* 10.1 11.9*   HGB 15.8* 16.4* 14.4   MCV 90 91 92    276 322     Most Recent 3 BMP's:  Recent Labs   Lab Test 02/15/24  1322 08/22/23  1306 08/22/23  1250 07/11/23  1048    138  --  139   POTASSIUM 3.7 3.7  --  4.0   CHLORIDE 100 98  --  100   CO2 27 27  --  28   BUN 13.7 9.8  --  12.6   CR 0.69 0.72  --  0.73   ANIONGAP 11 13  --  11   NAZIA 10.0 10.4*  --  10.1*   * 104* 118*  120*     Most Recent 2 LFT's:  Recent Labs   Lab Test 08/22/23  1306 06/19/23  1441   AST 29 19   ALT 21 18   ALKPHOS 81 84   BILITOTAL 0.9 1.1     Most Recent Urinalysis:  Recent Labs   Lab Test 02/15/24  1248 06/19/23  1442 01/09/19  1436   COLOR Light Yellow   < > Yellow   APPEARANCE Clear   < > Clear   URINEGLC Negative   < > Negative   URINEBILI Negative   < > Negative   URINEKETONE 40*   < > Trace*   SG 1.022   < > >1.030   UBLD Large*   < > Small*   URINEPH 5.5   < > 5.5   PROTEIN Negative   < > Negative   UROBILINOGEN  --   --  0.2   NITRITE Negative   < > Negative   LEUKEST Negative   < > Negative   RBCU 123*   < > O - 2   WBCU 1   < > 0 - 5    < > = values in this interval not displayed.      no abrasions, no jaundice, no lesions, no pruritis, and no rashes.

## 2024-02-16 NOTE — PROVIDER NOTIFICATION
MD Notification    Notified Person: MD    Notified Person Name: Dr. Whitfield    Notification Date/Time: 2/16/2024 0875    Notification Interaction: Vocera page     Purpose of Notification: Patient inquiring if her diet can be advanced to fulls? BS hypoactive to R abdomen. Rating abdominal pain 2/10, intermittent. Denies N/V. Tolerating PO clear liquid diet.    Orders Received: Will need to wait for Colorectal to see patient this AM per MD.    Comments:

## 2024-02-16 NOTE — PHARMACY-ADMISSION MEDICATION HISTORY
Pharmacist Admission Medication History    Admission medication history is complete. The information provided in this note is only as accurate as the sources available at the time of the update.    Information Source(s): Patient via in-person    Pertinent Information: uses Flovent on as needed bases when gets sick    Changes made to PTA medication list:  Added: None  Deleted: Benzonatate prn, Robitussin-ac prn; Zofran prn  Changed: None    Allergies reviewed with patient and updates made in EHR: no    Medication History Completed By: Hiren Alexander [facility] 2/15/2024 8:38 PM    Prior to Admission medications    Medication Sig Last Dose Taking? Auth Provider Long Term End Date   aspirin (ASA) 81 MG chewable tablet Take 81 mg by mouth at bedtime 2/14/2024 at hs Yes Reported, Patient     glipiZIDE (GLUCOTROL XL) 10 MG 24 hr tablet 1 tab in am and 2 tabs in PM 2/14/2024 Yes Sharron Justice APRN CNP Yes    hydrochlorothiazide (HYDRODIURIL) 25 MG tablet TAKE 1 TABLET (25 MG) BY MOUTH DAILY 2/14/2024 at hs, however will switch to taking in am Yes Sharron Justice APRN CNP Yes    levothyroxine (SYNTHROID/LEVOTHROID) 150 MCG tablet TAKE 1 TABLET BY MOUTH EVERY DAY 2/15/2024 at am Yes Sharron Justice APRN CNP Yes    losartan (COZAAR) 100 MG tablet Take 1 tablet (100 mg) by mouth daily 2/14/2024 at hs Yes Sharron Justice APRN CNP Yes    simvastatin (ZOCOR) 20 MG tablet Take 1 tablet (20 mg) by mouth At Bedtime 2/14/2024 at hs Yes Sharron Justice APRN CNP Yes    albuterol (PROAIR HFA/PROVENTIL HFA/VENTOLIN HFA) 108 (90 Base) MCG/ACT inhaler Inhale 2 puffs into the lungs every 6 hours as needed for shortness of breath / dyspnea prn  Aarti Betancur MD Yes    fluticasone (FLOVENT HFA) 220 MCG/ACT inhaler Inhale 1 puff into the lungs 2 times daily  Patient using PRN when gets sick. Reported on 2/15/2024 prn  Sharron Justice APRN CNP Yes

## 2024-02-16 NOTE — PLAN OF CARE
PRIMARY DIAGNOSIS: Pneumatosis Intestinalis  OUTPATIENT/OBSERVATION GOALS TO BE MET BEFORE DISCHARGE:  ADLs back to baseline: Yes    Activity and level of assistance: Up with standby assistance.    Pain status: Improved but still requiring IV narcotics.    Return to near baseline physical activity:  N/A     Discharge Planner Nurse   Safe discharge environment identified: Yes  Barriers to discharge: Yes       Entered by: Kim Leong RN 02/16/2024 4:05 AM  A&Ox4, rates pain 3-4, given PRN IV dilaudid, effective per pt. Ambulate SBA, PIV infusing 100ml/hr NS, denies N/V/chills, stable on RA, tolerating clear liquid diet, voiding spontaneously, no BM during shift, will continue to follow plan of care.   BP (!) 155/77   Pulse 58   Temp 97.6  F (36.4  C) (Oral)   Resp 18   Ht 1.524 m (5')   Wt 90.9 kg (200 lb 6.4 oz)   SpO2 96%   BMI 39.14 kg/m       Please review provider order for any additional goals.   Nurse to notify provider when observation goals have been met and patient is ready for discharge.

## 2024-02-16 NOTE — PROGRESS NOTES
[facility]    Medicine Progress Note - Hospitalist Service    Date of Admission:  2/15/2024    Assessment & Plan     Jazlyn Bartlett is a 58 year old female with a past medical history significant for COPD, hypertension, hyperlipidemia, hypothyroidism, and diabetes mellitus type 2.  She presented to the emergency room with abdominal pain.  Found to have colonic wall pneumatosis.  Concern for infectious colitis.  Seen in consultation by colorectal surgery.  Started on IV antibiotics with Unasyn.    Colonic wall pneumatosis.  Possible infectious colitis.  -Appreciate ongoing colorectal surgery input.  -Advance to full liquid diet.  -Continue IV fluids.  -Continue IV Unasyn.  -Pain and nausea medications if needed.    Hypertension.  -Blood pressure a bit low at times.  -Restart losartan at 25 mg a day.  -Hold hydrochlorothiazide.    Hypothyroidism.  -Continue levothyroxine 150 mcg a day.    Hyperlipidemia.  -Continue simvastatin 20 mg a day.    COPD.  -No acute exacerbation.  -Continue inhaled fluticasone twice a day.  -Additional albuterol if needed.    Diabetes mellitus type 2.  -Hold glipizide.  -Aspart insulin sliding scale as needed.       Observation Goals: -diagnostic tests and consults completed and resulted, -vital signs normal or at patient baseline, -tolerating oral intake to maintain hydration, -tolerating oral antibiotics or has plans for home infusion setup, -safe disposition plan has been identified, Nurse to notify provider when observation goals have been met and patient is ready for discharge.  Diet: Full Liquid Diet    DVT Prophylaxis: Pneumatic Compression Devices  Manuel Catheter: Not present  Lines: None     Cardiac Monitoring: None  Code Status: Full Code      Clinically Significant Risk Factors Present on Admission                # Drug Induced Platelet Defect: home medication list includes an antiplatelet medication   # Hypertension: Noted on problem list      # Obesity:  Estimated body mass index is 39.14 kg/m  as calculated from the following:    Height as of this encounter: 1.524 m (5').    Weight as of this encounter: 90.9 kg (200 lb 6.4 oz).              Disposition Plan      Expected Discharge Date: 02/16/2024                    Chris Whitfield DO  Hospitalist Service  [facility]  Securely message with [employer] (more info)  Text page via mobile mum Paging/Directory   ______________________________________________________________________    Interval History   Some abdominal pain.  Improved from previous.  Occasional nausea.  Denies chest pain, shortness of breath, fevers, chills.    Physical Exam   Vital Signs: Temp: 97.8  F (36.6  C) Temp src: Oral BP: (!) 143/77 Pulse: 51   Resp: 18 SpO2: 96 % O2 Device: None (Room air)    Weight: 200 lbs 6.4 oz    Gen:  NAD, A&Ox3.  Eyes:  PERRL, sclera anicteric.  OP:  MMM, no lesions.  Neck:  Supple.  CV:  Regular, no murmurs.  Lung:  CTA b/l, normal effort.  Ab:  +BS, soft.  Skin:  Warm, dry to touch.  No rash.  Ext:  No pitting edema LE b/l.      Medical Decision Making       37 MINUTES SPENT BY ME on the date of service doing chart review, history, exam, documentation & further activities per the note.      Data     I have personally reviewed the following data over the past 24 hrs:    7.9  \   13.4   / 263     139 103 12.4 /  126 (H)   3.5 26 0.69 \     ALT: 15 AST: 19 AP: 55 TBILI: 1.0   ALB: 3.7 TOT PROTEIN: 5.9 (L) LIPASE: N/A     Procal: N/A CRP: N/A Lactic Acid: 1.1         Imaging results reviewed over the past 24 hrs:   Recent Results (from the past 24 hour(s))   CT Abdomen Pelvis w/o Contrast    Narrative    CT ABDOMEN PELVIS W/O CONTRAST 2/15/2024 2:33 PM    CLINICAL HISTORY: flank pain, left  TECHNIQUE: CT scan of the abdomen and pelvis was performed without IV  contrast. Multiplanar reformats were obtained. Dose reduction  techniques were used.  CONTRAST: None.    COMPARISON: 7/15/2023 and  7/25/2019    FINDINGS:   LOWER CHEST: Left lower lobe calcified granuloma. Mild linear  atelectasis in the lung bases.    HEPATOBILIARY: Diffuse hepatic steatosis. No calcified gallstones or  biliary ductal dilatation.    PANCREAS: Normal.    SPLEEN: Normal.    ADRENAL GLANDS: Stable 2.6 cm benign adenoma in the left adrenal  gland, previously measuring 2.3 cm. Mildly low dense thickening of the  right adrenal gland, likely due to presence of adenomas.    KIDNEYS/BLADDER: No calculi, hydronephrosis or perinephric stranding.  A 1.6 cm lesion in the anterior cortex of the interpolar region of the  right kidney (series 3, image 91) has slightly increased in size,  previously measuring 1.4 cm on 7/15/2023. A 3.4 cm lesion with a  central hyperdense nodule in the posterior cortex of the left kidney  is stable, previously 3.7 cm.    BOWEL: No small bowel or colonic obstruction. Colonic pneumatosis  involving the wall of the left half of the transverse colon (series 3,  image [id number]). A few small foci of gas along the wall of the colon in  the hepatic flexure could either represent pneumatosis or mild  pneumoperitoneum (series 3, image 58). No associated underlying  colonic wall thickening or surrounding inflammatory changes. Normal  appendix. No mesenteric edema or fluid. No portal venous gas or  mesenteric venous gas. Mild diverticulosis in the sigmoid colon.    LYMPH NODES: No lymphadenopathy.    VASCULATURE: Mild calcified atheromatous plaque in the abdominal  aorta. Evaluation for vascular patency is not possible on noncontrast  CT. No abdominal aortic aneurysm.    PELVIC ORGANS: No pelvic masses.    OTHER: No free fluid or fluid collections. No extraluminal air.    MUSCULOSKELETAL: Unremarkable.      Impression    IMPRESSION:   1.  No CT evident cause for left flank pain.  2.  Colonic wall pneumatosis involving the hepatic flexure and right  half of the transverse colon with adjacent possible mild  pneumoperitoneum.  "The cause may be bowel wall ischemia, hollow viscus  perforation or more likely, \"benign\" pneumatosis. No other signs of  bowel wall ischemia or inflammation on noncontrast CT. A short  interval follow-up CT angiogram of the abdomen and pelvis can be  considered to assess for mesenteric arterial occlusion if clinically  indicated.  3.  Hepatic steatosis.  4.  Stable indeterminate lesions in the kidneys. Nonemergent follow-up  renal MRI in about 3 months can be considered.    Findings were discussed with Dr. Ledesma at 2:50 PM.    HAVEN DELACRUZ MD         SYSTEM ID:  [id number]   CTA Abdomen Pelvis with Contrast    Narrative    CTA ABDOMEN PELVIS WITH CONTRAST   2/15/2024 3:49 PM    HISTORY: 58-year-old patient with history of left flank and abdominal  pain as well as question of pneumatosis/pneumoperitoneum on  noncontrast exam.    COMPARISON: February 15, 2024 at 2:25 PM.    TECHNIQUE: Multiplanar multiformatted CTA images obtained from the  lung bases through the abdomen and pelvis after the uneventful  administration of Isovue 370 intravenous contrast given for a total of  72 mL. Radiation dose for this scan was reduced using automated  exposure control, adjustment of the mA and/or kV according to patient  size, or iterative reconstruction technique. 3-D reformatted images  were created at a separate workstation.    FINDINGS: Left lower lung granuloma incidentally noted. No acute  osseous abnormality. Heart size is normal.    Reflux of contrast into the liver may indicate underlying right heart  dysfunction. Otherwise, liver, gallbladder, spleen, right adrenal  gland are unremarkable. Nodule in the left adrenal gland is 12  Hounsfield units and 2.3 x 2.1 cm, noncontrast exam demonstrating -10  Hounsfield units. The pancreas is unremarkable. Exophytic left renal  hypodensity is 15 Hounsfield units on postcontrast exam, unchanged  from noncontrast exam and of measuring 3.6 x 3 cm. Normal perfusion of  both " kidneys. No hydronephrosis. The bladder is decompressed. Uterus  and adnexa are unremarkable. Few scattered diverticuli of the sigmoid  colon. Air is noted adjacent to the transverse colon, without apparent  bowel wall thickening. No dilated air-filled loops of bowel.    The visible descending thoracic aorta is patent. The celiac axis, SMA,  and right renal artery are widely patent. There is tortuosity of the  left renal artery with mural thrombus in the proximal segment and  associated moderate to severe stenosis. Infrarenal abdominal aorta  demonstrates mild atherosclerotic calcification, though nonaneurysmal.  The KALLI is patent. Both common iliac, internal iliac, external iliac,  and common femoral arteries are patent. Air is in close proximity to  the transverse colon, may be pneumatosis, though overall appearance  and extent is unchanged from previous exam.      Impression    IMPRESSION:  1. Pneumatosis or localized pneumoperitoneum surrounding the hepatic  flexure and transverse colon again identified and unchanged. Visceral  arteries are patent, lessening suspicion for ischemia.  2. Moderate to severe stenosis in the proximal left renal artery  related to soft plaque or mural thrombus.  3. Left renal hypodensity is 15 Hounsfield units and 3.6 x 3 cm,  thought to be a cyst.  4. Left adrenal gland adenoma measuring up to 2.3 cm, unchanged.      BRETT HERNANDEZ MD         SYSTEM ID:  [id number]

## 2024-02-16 NOTE — PLAN OF CARE
PRIMARY DIAGNOSIS: Pneumatosis Intestinalis  OUTPATIENT/OBSERVATION GOALS TO BE MET BEFORE DISCHARGE:  ADLs back to baseline: Yes    Activity and level of assistance: Up with standby assistance.    Pain status: Improved but still requiring IV narcotics.    Return to near baseline physical activity:  N/A     Discharge Planner Nurse   Safe discharge environment identified: Yes  Barriers to discharge: Yes       Entered by: Kim Leong RN 02/16/2024 2:35 AM     Please review provider order for any additional goals.   Nurse to notify provider when observation goals have been met and patient is ready for discharge.

## 2024-02-17 VITALS
SYSTOLIC BLOOD PRESSURE: 127 MMHG | WEIGHT: 200.4 LBS | TEMPERATURE: 97.5 F | BODY MASS INDEX: 39.34 KG/M2 | HEIGHT: 60 IN | HEART RATE: 46 BPM | OXYGEN SATURATION: 94 % | RESPIRATION RATE: 16 BRPM | DIASTOLIC BLOOD PRESSURE: 90 MMHG

## 2024-02-17 LAB
ANION GAP SERPL CALCULATED.3IONS-SCNC: 9 MMOL/L (ref 7–15)
BUN SERPL-MCNC: 8.9 MG/DL (ref 6–20)
CALCIUM SERPL-MCNC: 8.8 MG/DL (ref 8.6–10)
CHLORIDE SERPL-SCNC: 106 MMOL/L (ref 98–107)
CREAT SERPL-MCNC: 0.63 MG/DL (ref 0.51–0.95)
DEPRECATED HCO3 PLAS-SCNC: 25 MMOL/L (ref 22–29)
EGFRCR SERPLBLD CKD-EPI 2021: >90 ML/MIN/1.73M2
ERYTHROCYTE [DISTWIDTH] IN BLOOD BY AUTOMATED COUNT: 14 % (ref 10–15)
GLUCOSE BLDC GLUCOMTR-MCNC: 153 MG/DL (ref 70–99)
GLUCOSE SERPL-MCNC: 123 MG/DL (ref 70–99)
HCT VFR BLD AUTO: 40 % (ref 35–47)
HGB BLD-MCNC: 13.1 G/DL (ref 11.7–15.7)
MAGNESIUM SERPL-MCNC: 1.8 MG/DL (ref 1.7–2.3)
MCH RBC QN AUTO: 30 PG (ref 26.5–33)
MCHC RBC AUTO-ENTMCNC: 32.8 G/DL (ref 31.5–36.5)
MCV RBC AUTO: 92 FL (ref 78–100)
PHOSPHATE SERPL-MCNC: 2.8 MG/DL (ref 2.5–4.5)
PLATELET # BLD AUTO: 248 10E3/UL (ref 150–450)
POTASSIUM SERPL-SCNC: 4.1 MMOL/L (ref 3.4–5.3)
RBC # BLD AUTO: 4.36 10E6/UL (ref 3.8–5.2)
SODIUM SERPL-SCNC: 140 MMOL/L (ref 135–145)
WBC # BLD AUTO: 6.7 10E3/UL (ref 4–11)

## 2024-02-17 PROCEDURE — 82962 GLUCOSE BLOOD TEST: CPT

## 2024-02-17 PROCEDURE — 250N000013 HC RX MED GY IP 250 OP 250 PS 637: Performed by: COLON & RECTAL SURGERY

## 2024-02-17 PROCEDURE — 99239 HOSP IP/OBS DSCHRG MGMT >30: CPT | Performed by: INTERNAL MEDICINE

## 2024-02-17 PROCEDURE — 85027 COMPLETE CBC AUTOMATED: CPT | Performed by: INTERNAL MEDICINE

## 2024-02-17 PROCEDURE — 80048 BASIC METABOLIC PNL TOTAL CA: CPT | Performed by: INTERNAL MEDICINE

## 2024-02-17 PROCEDURE — 250N000013 HC RX MED GY IP 250 OP 250 PS 637: Performed by: INTERNAL MEDICINE

## 2024-02-17 PROCEDURE — 250N000012 HC RX MED GY IP 250 OP 636 PS 637: Performed by: INTERNAL MEDICINE

## 2024-02-17 PROCEDURE — 96361 HYDRATE IV INFUSION ADD-ON: CPT

## 2024-02-17 PROCEDURE — 250N000011 HC RX IP 250 OP 636: Performed by: INTERNAL MEDICINE

## 2024-02-17 PROCEDURE — 96376 TX/PRO/DX INJ SAME DRUG ADON: CPT

## 2024-02-17 PROCEDURE — 36415 COLL VENOUS BLD VENIPUNCTURE: CPT | Performed by: INTERNAL MEDICINE

## 2024-02-17 PROCEDURE — G0378 HOSPITAL OBSERVATION PER HR: HCPCS

## 2024-02-17 PROCEDURE — 84100 ASSAY OF PHOSPHORUS: CPT | Performed by: INTERNAL MEDICINE

## 2024-02-17 PROCEDURE — 83735 ASSAY OF MAGNESIUM: CPT | Performed by: INTERNAL MEDICINE

## 2024-02-17 RX ORDER — LOSARTAN POTASSIUM 25 MG/1
50 TABLET ORAL DAILY
Status: DISCONTINUED | OUTPATIENT
Start: 2024-02-17 | End: 2024-02-17 | Stop reason: HOSPADM

## 2024-02-17 RX ORDER — ACETAMINOPHEN 325 MG/1
650 TABLET ORAL EVERY 4 HOURS PRN
COMMUNITY
Start: 2024-02-17 | End: 2024-07-05

## 2024-02-17 RX ORDER — TRAMADOL HYDROCHLORIDE 25 MG/1
25 TABLET, COATED ORAL EVERY 6 HOURS PRN
Qty: 6 TABLET | Refills: 0 | Status: SHIPPED | OUTPATIENT
Start: 2024-02-17 | End: 2024-07-05

## 2024-02-17 RX ADMIN — AMPICILLIN SODIUM AND SULBACTAM SODIUM 3 G: 2; 1 INJECTION, POWDER, FOR SOLUTION INTRAMUSCULAR; INTRAVENOUS at 00:29

## 2024-02-17 RX ADMIN — INSULIN ASPART 1 UNITS: 100 INJECTION, SOLUTION INTRAVENOUS; SUBCUTANEOUS at 12:36

## 2024-02-17 RX ADMIN — AMOXICILLIN AND CLAVULANATE POTASSIUM 1 TABLET: 875; 125 TABLET, FILM COATED ORAL at 14:10

## 2024-02-17 RX ADMIN — AMPICILLIN SODIUM AND SULBACTAM SODIUM 3 G: 2; 1 INJECTION, POWDER, FOR SOLUTION INTRAMUSCULAR; INTRAVENOUS at 05:40

## 2024-02-17 RX ADMIN — TRAMADOL HYDROCHLORIDE 25 MG: 50 TABLET, COATED ORAL at 09:19

## 2024-02-17 RX ADMIN — LEVOTHYROXINE SODIUM 150 MCG: 0.07 TABLET ORAL at 05:40

## 2024-02-17 NOTE — PLAN OF CARE
Goal Outcome Evaluation:    Vitals: afebrile. VSS. , no insulin given per order parameters. Independent in room. Pt rested comfortably between cares.   Resp: WDL.  LS clear and equal bilaterally   GI/: abdomen tender in RLQ passing flatus, BS active and audible. Tolerating full liquid diet. Voiding WDL.  IV: WDL dressing c/d/i, infusing NS @ 100  Pain/Comfort: 0-5/10. Refuses pain medication when offered  Skin: abscess on middle upper back   Social: coping well  Plan: IV abx, BG checks, monitor and manage pain and nausea

## 2024-02-17 NOTE — PROVIDER NOTIFICATION
MD Notification    Notified Person: MD    Notified Person Name: Dr. Whitfield    Notification Date/Time: 2/17/2024 1243    Notification Interaction: Vocera page    Purpose of Notification: Patient ate 100% of breakfast and 50% of lunch. Gave PRN tramadol with breakfast. Okay to discharge? If so, do you want me to give her 1200 IV abx before she leaves?    Orders Received: MD to place discharge orders. Switching IV abx to PO abx.    Comments:

## 2024-02-17 NOTE — PROGRESS NOTES
COLON & RECTAL SURGERY  PROGRESS NOTE    February 17, 2024    SUBJECTIVE: Reports improvement in abdominal pain.  No increased pain with eating.  Had 3 soft bowel movements yesterday.  Vital signs been stable.    OBJECTIVE:  Temp:  [97.7  F (36.5  C)-98.2  F (36.8  C)] 98.2  F (36.8  C)  Pulse:  [50-54] 54  Resp:  [12-18] 16  BP: (118-143)/(71-77) 142/72  SpO2:  [91 %-96 %] 91 %    Intake/Output Summary (Last 24 hours) at 2/17/2024 0913  Last data filed at 2/16/2024 1900  Gross per 24 hour   Intake 1880 ml   Output --   Net 1880 ml       GENERAL:  Awake, alert, no acute distress  EXTREMITIES: Warm and well perfused, no edema   ABDOMEN:  Soft, non tender, non-distended. No guarding, rigidity, or peritoneal signs.       LABS:  Lab Results   Component Value Date    WBC 6.7 02/17/2024    WBC 16.2 09/11/2019     Lab Results   Component Value Date    HGB 13.1 02/17/2024    HGB 15.1 09/11/2019     Lab Results   Component Value Date    HCT 40.0 02/17/2024    HCT 44.2 09/11/2019     Lab Results   Component Value Date     02/17/2024     09/11/2019     Last Basic Metabolic Panel:  Lab Results   Component Value Date     02/17/2024     06/15/2021      Lab Results   Component Value Date    POTASSIUM 4.1 02/17/2024    POTASSIUM 3.8 10/25/2022    POTASSIUM 3.6 06/15/2021     Lab Results   Component Value Date    CHLORIDE 106 02/17/2024    CHLORIDE 105 07/05/2022    CHLORIDE 103 06/15/2021     Lab Results   Component Value Date    NAZIA 8.8 02/17/2024    NAZIA 9.9 06/15/2021     Lab Results   Component Value Date    CO2 25 02/17/2024    CO2 26 07/05/2022    CO2 34 06/15/2021     Lab Results   Component Value Date    BUN 8.9 02/17/2024    BUN 13 07/05/2022    BUN 12 06/15/2021     Lab Results   Component Value Date    CR 0.63 02/17/2024    CR 0.64 06/15/2021     Lab Results   Component Value Date     02/17/2024     02/16/2024     07/05/2022    GLC 92 06/15/2021       ASSESSMENT/PLAN: Jazlyn  HEMAL Bartlett is a 58 year old female admitted with worsening abdominal pain, vomiting and pain with urination.  CT scan of the abdomen and pelvis demonstrated pneumatosis of the hepatic flexure and proximal transverse colon.  She was admitted for conservative management with bowel rest and IV antibiotics.  She has been improving with conservative measures.    Clinically Significant Risk Factors Present on Admission                # Drug Induced Platelet Defect: home medication list includes an antiplatelet medication   # Hypertension: Noted on problem list      # Obesity: Estimated body mass index is 39.14 kg/m  as calculated from the following:    Height as of this encounter: 1.524 m (5').    Weight as of this encounter: 90.9 kg (200 lb 6.4 oz).              No indication for acute surgical intervention.    Will advance to a regular diet this morning.  If tolerates diet advancement, okay to transition to oral antibiotics and would recommend a 1 week course of Augmentin.  Will plan for outpatient colonoscopy in 6 to 8 weeks.  If tolerates diet advancement and oral antibiotics, okay to discharge home later on today from a colorectal surgery standpoint.    For questions/paging, please contact the CRS office at [phone].    Leanna Ruby MD  Colorectal Surgery    [facility]  [address] [address]  T: [phone]  F: [phone]

## 2024-02-17 NOTE — DISCHARGE SUMMARY
[facility]  Hospitalist Discharge Summary      Date of Admission:  2/15/2024  Date of Discharge:  2/17/2024  Discharging Provider: Chris Whitfield DO  Discharge Service: Hospitalist Service    Discharge Diagnoses   Pneumatosis intestinalis.  Infectious versus ischemic colitis.  Tobacco use disorder.  Hypertension.  Hypothyroidism.  Hyperlipidemia.  Diabetes mellitus type 2.  COPD.    Clinically Significant Risk Factors     # Obesity: Estimated body mass index is 39.14 kg/m  as calculated from the following:    Height as of this encounter: 1.524 m (5').    Weight as of this encounter: 90.9 kg (200 lb 6.4 oz).       Follow-ups Needed After Discharge   Follow-up Appointments     Follow-up and recommended labs and tests       Follow-up with colorectal surgery within 4 weeks.            Discharge Disposition   Discharged to home  Condition at discharge: Stable    Hospital Course     Jazlyn Bartlett is a 58 year old female with a past medical history significant for COPD, hypertension, hyperlipidemia, hypothyroidism, and diabetes mellitus type 2.  She presented to the emergency room with abdominal pain.  Found to have colonic wall pneumatosis.  Concern for infectious colitis.  Seen in consultation by colorectal surgery.  Started on IV antibiotics with Unasyn.  Symptoms have improved.  Colorectal surgery has advance to regular diet.  Tolerating regular diet by day of discharge.  Does need to follow-up with colorectal surgery within 4 weeks.  Planning for colonoscopy in 6 to 8 weeks.  IV Unasyn now switched to oral Augmentin 875/125 mg twice a day for an additional 7 days.    Consultations This Hospital Stay   None    Code Status   Full Code    Time Spent on this Encounter   I spent 45 minutes with Ms. Bartlett and working on discharge on 2/17/2024.       DO P[facility]  [address] [place] [address]  Phone: [phone]  Fax:  [phone]  ______________________________________________________________________    Physical Exam   Vital Signs: Temp: 97.5  F (36.4  C) Temp src: Oral BP: (!) 127/90 Pulse: (!) 46   Resp: 16 SpO2: 94 % O2 Device: None (Room air)    Weight: 200 lbs 6.4 oz  Gen:  NAD, A&Ox3.  Eyes:  PERRL, sclera anicteric.  OP:  MMM, no lesions.  Neck:  Supple.  CV:  Regular, no murmurs.  Lung:  CTA b/l, normal effort.  Ab:  +BS, soft.  Skin:  Warm, dry to touch.  No rash.  Ext:  No pitting edema LE b/l.         Primary Care Physician   Sharron Justice    Discharge Orders      Reason for your hospital stay    Pneumatosis coli.     Follow-up and recommended labs and tests     Follow-up with colorectal surgery within 4 weeks.     Activity    Your activity upon discharge: activity as tolerated     Diet    Follow this diet upon discharge: Regular           Discharge Medications   Current Discharge Medication List        START taking these medications    Details   acetaminophen (TYLENOL) 325 MG tablet Take 2 tablets (650 mg) by mouth every 4 hours as needed for mild pain    Associated Diagnoses: Pneumatosis intestinalis      amoxicillin-clavulanate (AUGMENTIN) 875-125 MG tablet Take 1 tablet by mouth every 12 hours for 7 days  Qty: 14 tablet, Refills: 0    Associated Diagnoses: Pneumatosis intestinalis      traMADol 25 MG TABS Take 25 mg by mouth every 6 hours as needed for moderate pain  Qty: 6 tablet, Refills: 0    Associated Diagnoses: Pneumatosis intestinalis           CONTINUE these medications which have NOT CHANGED    Details   aspirin (ASA) 81 MG chewable tablet Take 81 mg by mouth at bedtime      glipiZIDE (GLUCOTROL XL) 10 MG 24 hr tablet 1 tab in am and 2 tabs in PM  Qty: 270 tablet, Refills: 1    Comments: DO NOT FILL until patient requests. Thanks.  Associated Diagnoses: Type 2 diabetes mellitus without complication, without long-term current use of insulin (H)      hydrochlorothiazide (HYDRODIURIL) 25 MG tablet TAKE 1  TABLET (25 MG) BY MOUTH DAILY  Qty: 90 tablet, Refills: 1    Comments: DO NOT FILL until patient requests. Thanks.  Associated Diagnoses: Hypertension goal BP (blood pressure) < 140/80      levothyroxine (SYNTHROID/LEVOTHROID) 150 MCG tablet TAKE 1 TABLET BY MOUTH EVERY DAY  Qty: 30 tablet, Refills: 0    Comments: DX Code Needed  .  Associated Diagnoses: Hypothyroidism, unspecified type      losartan (COZAAR) 100 MG tablet Take 1 tablet (100 mg) by mouth daily  Qty: 90 tablet, Refills: 0    Comments: DO NOT FILL until patient requests. Thanks.  Associated Diagnoses: Hypertension goal BP (blood pressure) < 140/80      simvastatin (ZOCOR) 20 MG tablet Take 1 tablet (20 mg) by mouth At Bedtime  Qty: 90 tablet, Refills: 1    Comments: DO NOT FILL until patient requests. Thanks.  Associated Diagnoses: Hyperlipidemia LDL goal <100      albuterol (PROAIR HFA/PROVENTIL HFA/VENTOLIN HFA) 108 (90 Base) MCG/ACT inhaler Inhale 2 puffs into the lungs every 6 hours as needed for shortness of breath / dyspnea  Qty: 18 g, Refills: 5    Comments: Pharmacy may dispense brand covered by insurance (Proair, or proventil or ventolin or generic albuterol inhaler)  Associated Diagnoses: Clinical diagnosis of COVID-19      fluticasone (FLOVENT HFA) 220 MCG/ACT inhaler Inhale 1 puff into the lungs 2 times daily  Qty: 12 g, Refills: 3    Comments: Pharmacy may dispense brand if preferred by insurance. DO NOT FILL until patient requests. Thanks.  Associated Diagnoses: Chronic obstructive pulmonary disease, unspecified COPD type (H)           Allergies   Allergies   Allergen Reactions    Food      PN: LW FI1: nka LW FI2: nka    Hydrocodone-Acetaminophen Nausea and Vomiting    Ibuprofen Sodium Nausea and Vomiting    Lisinopril Cough

## 2024-02-17 NOTE — PLAN OF CARE
Goal Outcome Evaluation:      Plan of Care Reviewed With: patient    Overall Patient Progress: improvingOverall Patient Progress: improving    A&Ox4. VSS on RA except bradycardia. Up in room independently. Ambulating hallway. BS hypoactive. C/o HA, given PRN Tylenol. C/o abdominal pain, given PRN PO dilaudid. Full liquid diet, tolerating. C/o intermittent nausea with PO meds, given PRN zofran x 1 with relief. IV fluids running. IV abx. Voiding without difficulty. BG checks. Colorectal surgery following.

## 2024-02-17 NOTE — PLAN OF CARE
Goal Outcome Evaluation:      Plan of Care Reviewed With: patient    Overall Patient Progress: improvingOverall Patient Progress: improving    PRIMARY DIAGNOSIS: Pneumatosis Intestinalis   OUTPATIENT/OBSERVATION GOALS TO BE MET BEFORE DISCHARGE:  ADLs back to baseline: Yes    Activity and level of assistance: Ambulating independently.    Pain status: Improved-controlled with oral pain medications.    Return to near baseline physical activity: Yes     Discharge Planner Nurse   Safe discharge environment identified: Yes  Barriers to discharge: Yes       Entered by: Anya Mason RN 02/17/2024 10:36 AM     Please review provider order for any additional goals.   Nurse to notify provider when observation goals have been met and patient is ready for discharge.

## 2024-02-17 NOTE — PLAN OF CARE
Goal Outcome Evaluation:      Plan of Care Reviewed With: patient    Overall Patient Progress: improvingOverall Patient Progress: improving    A&Ox4. VSS on RA except bradycardia. C/o lower abdominal pain, given PRN Tramadol with decrease in pain. Up in room independently. Regular diet, tolerating. BG checks. IV abx switched to PO abx. Voiding without difficulty. 3 BMs since this morning per patient. Denies N/V. Discharge instructions discussed with patient. Discharge meds discussed and given to patient. Hard copy rx tramadol given to patient to fill at her preferred pharmacy. Personal belongings with patient. Spouse to provide transportation home.

## 2024-02-19 ENCOUNTER — TELEPHONE (OUTPATIENT)
Dept: INTERNAL MEDICINE | Facility: CLINIC | Age: 59
End: 2024-02-19
Payer: COMMERCIAL

## 2024-02-19 NOTE — TELEPHONE ENCOUNTER
Reason for Call:  Appointment Request    Patient requesting this type of appt:  Hospital/ED Follow-Up     Requested provider: Sharron Justice    Reason patient unable to be scheduled: Not within requested timeframe of pt requesting     When does patient want to be seen/preferred time: sooner than march 12th appt scheduled     Comments: found something going on with heart also, would like to discuss,     Could we send this information to you in [facility] or would you prefer to receive a phone call?:   Patient would prefer a phone call   Okay to leave a detailed message?: Yes at Home number on file [phone] (home)    Call taken on 2/19/2024 at 8:07 AM by BALWINDER COUGHLIN

## 2024-02-20 ENCOUNTER — TELEPHONE (OUTPATIENT)
Dept: INTERNAL MEDICINE | Facility: CLINIC | Age: 59
End: 2024-02-20

## 2024-02-20 ENCOUNTER — MYC MEDICAL ADVICE (OUTPATIENT)
Dept: INTERNAL MEDICINE | Facility: CLINIC | Age: 59
End: 2024-02-20

## 2024-02-20 ENCOUNTER — OFFICE VISIT (OUTPATIENT)
Dept: INTERNAL MEDICINE | Facility: CLINIC | Age: 59
End: 2024-02-20
Payer: COMMERCIAL

## 2024-02-20 VITALS
BODY MASS INDEX: 38.68 KG/M2 | TEMPERATURE: 97.2 F | SYSTOLIC BLOOD PRESSURE: 154 MMHG | WEIGHT: 197 LBS | RESPIRATION RATE: 16 BRPM | HEIGHT: 60 IN | HEART RATE: 77 BPM | OXYGEN SATURATION: 97 % | DIASTOLIC BLOOD PRESSURE: 82 MMHG

## 2024-02-20 DIAGNOSIS — N28.1 RENAL CYST: ICD-10-CM

## 2024-02-20 DIAGNOSIS — Z71.6 ENCOUNTER FOR TOBACCO USE CESSATION COUNSELING: ICD-10-CM

## 2024-02-20 DIAGNOSIS — E03.9 HYPOTHYROIDISM, UNSPECIFIED TYPE: ICD-10-CM

## 2024-02-20 DIAGNOSIS — I70.1 RENAL ARTERY STENOSIS (H): ICD-10-CM

## 2024-02-20 DIAGNOSIS — J44.9 CHRONIC OBSTRUCTIVE PULMONARY DISEASE, UNSPECIFIED COPD TYPE (H): ICD-10-CM

## 2024-02-20 DIAGNOSIS — E78.5 HYPERLIPIDEMIA LDL GOAL <100: ICD-10-CM

## 2024-02-20 DIAGNOSIS — I51.9 DYSFUNCTION OF RIGHT CARDIAC VENTRICLE: ICD-10-CM

## 2024-02-20 DIAGNOSIS — I10 HYPERTENSION GOAL BP (BLOOD PRESSURE) < 140/80: ICD-10-CM

## 2024-02-20 DIAGNOSIS — E11.9 TYPE 2 DIABETES MELLITUS WITHOUT COMPLICATION, WITHOUT LONG-TERM CURRENT USE OF INSULIN (H): ICD-10-CM

## 2024-02-20 DIAGNOSIS — K63.89 PNEUMATOSIS INTESTINALIS: Primary | ICD-10-CM

## 2024-02-20 LAB — HBA1C MFR BLD: 7 % (ref 0–5.6)

## 2024-02-20 PROCEDURE — 83036 HEMOGLOBIN GLYCOSYLATED A1C: CPT

## 2024-02-20 PROCEDURE — 99495 TRANSJ CARE MGMT MOD F2F 14D: CPT

## 2024-02-20 PROCEDURE — 84443 ASSAY THYROID STIM HORMONE: CPT

## 2024-02-20 PROCEDURE — 36415 COLL VENOUS BLD VENIPUNCTURE: CPT

## 2024-02-20 PROCEDURE — 80048 BASIC METABOLIC PNL TOTAL CA: CPT

## 2024-02-20 RX ORDER — LORAZEPAM 0.5 MG/1
TABLET ORAL
Qty: 4 TABLET | Refills: 0 | Status: SHIPPED | OUTPATIENT
Start: 2024-02-20 | End: 2024-07-05

## 2024-02-20 RX ORDER — HYDROCHLOROTHIAZIDE 25 MG/1
TABLET ORAL
Qty: 90 TABLET | Refills: 1 | Status: SHIPPED | OUTPATIENT
Start: 2024-02-20

## 2024-02-20 RX ORDER — GLIPIZIDE 10 MG/1
TABLET, FILM COATED, EXTENDED RELEASE ORAL
Qty: 270 TABLET | Refills: 1 | Status: SHIPPED | OUTPATIENT
Start: 2024-02-20 | End: 2024-07-15

## 2024-02-20 RX ORDER — SIMVASTATIN 20 MG
20 TABLET ORAL AT BEDTIME
Qty: 90 TABLET | Refills: 1 | Status: SHIPPED | OUTPATIENT
Start: 2024-02-20 | End: 2024-07-15

## 2024-02-20 RX ORDER — LOSARTAN POTASSIUM 100 MG/1
100 TABLET ORAL DAILY
Qty: 90 TABLET | Refills: 1 | Status: SHIPPED | OUTPATIENT
Start: 2024-02-20 | End: 2024-07-15

## 2024-02-20 RX ORDER — FLUTICASONE PROPIONATE 220 UG/1
1 AEROSOL, METERED RESPIRATORY (INHALATION) 2 TIMES DAILY
Qty: 12 G | Refills: 3 | Status: SHIPPED | OUTPATIENT
Start: 2024-02-20

## 2024-02-20 RX ORDER — LEVOTHYROXINE SODIUM 150 UG/1
150 TABLET ORAL DAILY
Qty: 90 TABLET | Refills: 1 | Status: SHIPPED | OUTPATIENT
Start: 2024-02-20 | End: 2024-07-15

## 2024-02-20 ASSESSMENT — PATIENT HEALTH QUESTIONNAIRE - PHQ9
SUM OF ALL RESPONSES TO PHQ QUESTIONS 1-9: 13
10. IF YOU CHECKED OFF ANY PROBLEMS, HOW DIFFICULT HAVE THESE PROBLEMS MADE IT FOR YOU TO DO YOUR WORK, TAKE CARE OF THINGS AT HOME, OR GET ALONG WITH OTHER PEOPLE: SOMEWHAT DIFFICULT
SUM OF ALL RESPONSES TO PHQ QUESTIONS 1-9: 13

## 2024-02-20 NOTE — TELEPHONE ENCOUNTER
Left message for Patient to call clinic back to see if she can come in today as noted below. MyChart message also sent.

## 2024-02-20 NOTE — TELEPHONE ENCOUNTER
Diagnosis, Referred by & from: Pneumatosis Intestinalis   Appt date: 4/30/2024   NOTES STATUS DETAILS   OFFICE NOTE from referring provider [facility], [place], operated by [facility]:  2/20/24, 7/11/23 - PCC OV with Sharron Justice NP   OFFICE NOTE from other specialist Care Everywhere / Internal [facility]:  6/19/23 - PCC OV with YEMI Ivan    [facility]:  6/19/23 - PCC OV with Dr. Samantha ARAGON Urology:  7/14/22 - URO OV with YEMI Espitia  7/23/21 - URO OV with Dr. Ellsworth   DISCHARGE SUMMARY from [facility]:  2/15/24 - Admission with Dr. Juarez   DISCHARGE REPORT from the ER N/A    OPERATIVE REPORT Internal [facility]:  10/11/12 - OP Note for COMBINED DILATION AND CURETTAGE, HYSTEROSCOPY, ABLATE ENDOMETRIUM ,pelvic exam under anesthesia with Dr. Barrios   MEDICATION LIST Internal    LABS     BIOPSIES/PATHOLOGY RELATED TO DIAGNOSIS Internal [facility]:  10/28/22 - Colon Biopsy (Case: [id number])   DIAGNOSTIC PROCEDURES     COLONOSCOPY (most recent all time after 5 years) Internal / Received [facility]:  10/28/22 - Colonoscopy    [facility]:  9/25/12 - Colonoscopy   UPPER ENDOSCOPY (EGD) Internal [facility]:  1/19/16 - EGD   IMAGING (DISC & REPORT)      CT Internal [facility]:  2/15/24 - CT Abd/Pelvis  7/11/22 - CT Chest  7/25/19 - CT Chest/Abd/Pelvis   MRI Internal [facility]:  7/15/23 - MRI Renal  7/12/21 - MRI Renal

## 2024-02-20 NOTE — LETTER
2/20/2024        RE: Jazlyn Bartlett  [address]  [facility] [address]      Dear whom this may concern,  Jazyln Bartlett is a patient under my professional care.  Please excuse her from work on 2/20/24 due to a doctor appointment.        Sincerely,        ELIZABETH Leggett CNP

## 2024-02-20 NOTE — TELEPHONE ENCOUNTER
IP F/U    Date: 2/17/24  Diagnosis: Pneumatosis Intestinalis  Is patient active in care coordination? No  Was patient in TCU? No      Patient had office visit with primary care provider today.  Will close encounter

## 2024-02-20 NOTE — TELEPHONE ENCOUNTER
Can she come in today at 2:00pm? 1:40 check in time? I am going to cancel the 200PM patient but have not yet-

## 2024-02-20 NOTE — PROGRESS NOTES
Assessment & Plan     (K63.89) Pneumatosis intestinalis  (primary encounter diagnosis)  Comment:     Per hospital H&P:    'Colon wall pneumatosis      Patient came in with abdominal pain of 1 day duration associated with chills, nausea, vomiting, CT scan of the abdomen showing Colonic wall pneumatosis involving hepatic flexure and right half of the transverse colon with adjacent possible mild pneumoperitoneum      Lactic acid was 1.4 WBC was 13 which is within normal limits and subsequent CT angiogram ruled out any mesenteric ischemia with patent arteries      Patient was evaluated by colorectal surgery who thought that the patient was hemodynamically stable with a benign abdomen it was recommended that conservative management including       bowel rest and later - clear liquid diet if not tolerated back to n.p.o. status      IV fluid hydration and       IV antibiotics with coverage for enteric pathogens will be given'    She was discharged with Augmentin for 7 days and recommended to see colorectal in 4 weeks with plan for colonoscopy in 6-8 weeks  Pt states she has not yet received a call from colorectal. I will send staff message to colorectal associates and send referral today.   It is unclear at this time what underlying cause is of the colonic wall pneumatosis- I will order ECHO today    Plan: Adult Colorectal Surgery  Referral            (I70.1) Renal artery stenosis (H24)  Comment: CT A/P during hospitalization showed moderate to severe renal artery stenosis of left kidney. We will order MRI to further evaluate this.   Plan:     (N28.1) Renal cyst  Comment: will plan for renal MRI in 2-3 months to assess for underlying renal artery stenosis and renal cysts  Plan: MR Renal wo & w Contrast, LORazepam (ATIVAN)         0.5 MG tablet            (I51.9) Dysfunction of right cardiac ventricle  Comment:   CT A/P showed: Reflux of contrast into the liver may indicate underlying right  heart  dysfunction.  Plan: Echocardiogram Complete            (E03.9) Hypothyroidism, unspecified type  Comment:   Plan: levothyroxine (SYNTHROID/LEVOTHROID) 150 MCG         tablet            (I10) Hypertension goal BP (blood pressure) < 140/80  Comment:   Plan: hydrochlorothiazide (HYDRODIURIL) 25 MG tablet,        losartan (COZAAR) 100 MG tablet            (E78.5) Hyperlipidemia LDL goal <100  Comment:   Plan: simvastatin (ZOCOR) 20 MG tablet            (E11.9) Type 2 diabetes mellitus without complication, without long-term current use of insulin (H)  Comment:   Plan: glipiZIDE (GLUCOTROL XL) 10 MG 24 hr tablet,         Hemoglobin A1c, Basic metabolic panel  (Ca, Cl,        CO2, Creat, Gluc, K, Na, BUN)            (J44.9) Chronic obstructive pulmonary disease, unspecified COPD type (H)  Comment:   Plan: fluticasone (FLOVENT HFA) 220 MCG/ACT inhaler            (Z71.6) Encounter for tobacco use cessation counseling  Comment:   Plan: Quit Partner (Tobacco Cessation) Referral              MED REC REQUIRED  Post Medication Reconciliation Status: discharge medications reconciled, continue medications without change  Nicotine/Tobacco Cessation  She reports that she has been smoking cigarettes. She started smoking about 45 years ago. She has a 18.5 pack-year smoking history. She has been exposed to tobacco smoke. She has never used smokeless tobacco.  Nicotine/Tobacco Cessation Plan  Phone counseling: Place order for Quit Partner Referral      BMI  Estimated body mass index is 38.47 kg/m  as calculated from the following:    Height as of this encounter: 1.524 m (5').    Weight as of this encounter: 89.4 kg (197 lb).   Weight management plan: Discussed healthy diet and exercise guidelines                Fe Marquez is a 58 year old, presenting for the following health issues:  Hospital F/U      2/20/2024     1:40 PM   Additional Questions   Roomed by Caitlyn     [facility] Follow-up  Visit:    Hospital/Nursing Home/IP Rehab Facility: [facility]  Date of Admission: 2/15/24  Date of Discharge: 2/17/24  Reason(s) for Admission: abdominal pain     Was your hospitalization related to COVID-19? No   Problems taking medications regularly:  None  Medication changes since discharge: None  Problems adhering to non-medication therapy:  None    Summary of hospitalization:  [facility] discharge summary reviewed  Diagnostic Tests/Treatments reviewed.  Follow up needed: follow up with colorectal in 4 weeks and colonoscopy in 6-8 weeks  Other Healthcare Providers Involved in Patient s Care:         None  Update since discharge: stable.         Plan of care communicated with patient and family                       Objective    BP (!) 154/82   Pulse 77   Temp 97.2  F (36.2  C) (Oral)   Resp 16   Ht 1.524 m (5')   Wt 89.4 kg (197 lb)   SpO2 97%   BMI 38.47 kg/m    Body mass index is 38.47 kg/m .      Physical Exam  Constitutional:       General: She is not in acute distress.     Appearance: Normal appearance. She is not ill-appearing, toxic-appearing or diaphoretic.   HENT:      Head: Normocephalic and atraumatic.   Eyes:      Conjunctiva/sclera: Conjunctivae normal.   Cardiovascular:      Rate and Rhythm: Normal rate and regular rhythm.      Heart sounds: Normal heart sounds.   Pulmonary:      Effort: Pulmonary effort is normal.      Breath sounds: Normal breath sounds.   Skin:     General: Skin is warm and dry.   Neurological:      Mental Status: She is alert and oriented to person, place, and time.   Psychiatric:         Mood and Affect: Mood normal.         Behavior: Behavior normal.         Thought Content: Thought content normal.         Judgment: Judgment normal.             Signed Electronically by: ELIZABETH Leggett CNP    Answers submitted by the patient for this visit:  Patient Health Questionnaire (Submitted on 2/20/2024)  If you checked off any problems,  how difficult have these problems made it for you to do your work, take care of things at home, or get along with other people?: Somewhat difficult  PHQ9 TOTAL SCORE: 13

## 2024-02-21 LAB
ANION GAP SERPL CALCULATED.3IONS-SCNC: 15 MMOL/L (ref 7–15)
BUN SERPL-MCNC: 9.7 MG/DL (ref 6–20)
CALCIUM SERPL-MCNC: 9.4 MG/DL (ref 8.6–10)
CHLORIDE SERPL-SCNC: 105 MMOL/L (ref 98–107)
CREAT SERPL-MCNC: 0.78 MG/DL (ref 0.51–0.95)
DEPRECATED HCO3 PLAS-SCNC: 24 MMOL/L (ref 22–29)
EGFRCR SERPLBLD CKD-EPI 2021: 88 ML/MIN/1.73M2
GLUCOSE SERPL-MCNC: 79 MG/DL (ref 70–99)
POTASSIUM SERPL-SCNC: 3.6 MMOL/L (ref 3.4–5.3)
SODIUM SERPL-SCNC: 144 MMOL/L (ref 135–145)
TSH SERPL DL<=0.005 MIU/L-ACNC: 0.75 UIU/ML (ref 0.3–4.2)

## 2024-02-22 NOTE — PROGRESS NOTES
"Colon and Rectal Surgery Clinic Note    RE: Jazlyn Bartlett.  : 1965.  KELECHI: 3/5/2024.    Reason for visit: Pneumatosis intestinalis     HPI: Jazlyn \"Alma\"Dhruv is a 58 year old female who presents today for pneumatosis intestinalis. 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 On 2/15/2024 she presented to the ED for abdominal pain and was found to have colonic wall pneumatosis on CT. There was concern for infectious colitis. She was started on IV antibiotics with Unasyn with improvement of symptoms. She was discharged on Augmentin.    She is on 81mg of Asprin.     Today Alma feels fine. She reports mild abdominal pain 5/10 twice a day. This is a dull pain. She reports night sweats, which she has often had in her life. She is tolerating a general diet and having fairly regular bowel function.     Colonoscopy (10/28/2022):  Findings:       The digital rectal exam findings include decreased sphincter tone.        A 2 mm polyp was found in the sigmoid colon. The polyp was sessile. The        polyp was removed with a cold snare. Resection and retrieval were        complete. Estimated blood loss was minimal.        A few medium-mouthed diverticula were found in the sigmoid colon.        The exam was otherwise without abnormality on direct and retroflexion        views.                                                                                    Impression:               - Decreased sphincter tone found on digital rectal                             exam.                             - One 2 mm polyp in the sigmoid colon, removed with                             a cold snare. Resected and retrieved.                             - Diverticulosis in the sigmoid colon.                             - The examination was otherwise normal on direct                 "             and retroflexion views.         Surgical Pathology (10/28/2022):  A.  Colon, Sigmoid, polyp, polypectomy:  -Colonic mucosa with focal surface serrated changes most consistent with hyperplastic polyp  -Negative for dysplasia or malignancy.    CTA Abdomen/Pelvis (2/15/2024):  IMPRESSION:  1. Pneumatosis or localized pneumoperitoneum surrounding the hepatic  flexure and transverse colon again identified and unchanged. Visceral  arteries are patent, lessening suspicion for ischemia.  2. Moderate to severe stenosis in the proximal left renal artery  related to soft plaque or mural thrombus.  3. Left renal hypodensity is 15 Hounsfield units and 3.6 x 3 cm,  thought to be a cyst.  4. Left adrenal gland adenoma measuring up to 2.3 cm, unchanged.      Medical history:  Arthritis   COPD  Depression   Type 2 diabetes   GERD   HTN  Hypothyroidism   Mucoepidermoid carcinoma of parotid gland   Asthma     Surgical history:  1. Excision of mucoepidermoid carcinoma in 2010  2. Tonsillectomy     Family history:  Family History   Problem Relation Age of Onset    Diabetes Mother     Breast Cancer Mother     Hyperlipidemia Mother     Osteoporosis Mother     Diabetes Father     Lung Cancer Father     Rheumatoid Arthritis Father     Coronary Artery Disease Father     Hyperlipidemia Father     Cerebrovascular Disease Father     Prostate Cancer Father     Diabetes Maternal Grandmother     Diabetes Other     Hypertension Other     Depression Other     Anxiety Disorder Other     Asthma Other     Thyroid Disease Other     Obesity Other     Diabetes Sister     Obesity Sister     Diabetes Sister     Diabetes Brother     Diabetes Daughter     Breast Cancer Sister     Anesthesia Reaction Daughter      Medications:  Current Outpatient Medications   Medication Sig Dispense Refill    acetaminophen (TYLENOL) 325 MG tablet Take 2 tablets (650 mg) by mouth every 4 hours as needed for mild pain      albuterol (PROAIR HFA/PROVENTIL  HFA/VENTOLIN HFA) 108 (90 Base) MCG/ACT inhaler Inhale 2 puffs into the lungs every 6 hours as needed for shortness of breath / dyspnea 18 g 5    aspirin (ASA) 81 MG chewable tablet Take 81 mg by mouth at bedtime      fluticasone (FLOVENT HFA) 220 MCG/ACT inhaler Inhale 1 puff into the lungs 2 times daily 12 g 3    glipiZIDE (GLUCOTROL XL) 10 MG 24 hr tablet 1 tab in am and 2 tabs in  tablet 1    hydrochlorothiazide (HYDRODIURIL) 25 MG tablet TAKE 1 TABLET (25 MG) BY MOUTH DAILY 90 tablet 1    levothyroxine (SYNTHROID/LEVOTHROID) 150 MCG tablet Take 1 tablet (150 mcg) by mouth daily 90 tablet 1    LORazepam (ATIVAN) 0.5 MG tablet TAKE 1-2 TABLETS 15-30 MINUTES PRIOR TO MRI 4 tablet 0    losartan (COZAAR) 100 MG tablet Take 1 tablet (100 mg) by mouth daily 90 tablet 1    simvastatin (ZOCOR) 20 MG tablet Take 1 tablet (20 mg) by mouth at bedtime 90 tablet 1    traMADol 25 MG TABS Take 25 mg by mouth every 6 hours as needed for moderate pain 6 tablet 0       Allergies:  Allergies   Allergen Reactions    Food      PN: LW FI1: nka LW FI2: nka    Hydrocodone-Acetaminophen Nausea and Vomiting    Ibuprofen Sodium Nausea and Vomiting    Lisinopril Cough       Social history:   Social History     Tobacco Use    Smoking status: Every Day     Packs/day: 0.50     Years: 37.00     Additional pack years: 0.00     Total pack years: 18.50     Types: Cigarettes     Start date: 1/1/1979     Passive exposure: Current    Smokeless tobacco: Never   Substance Use Topics    Alcohol use: No     Marital status: .    ROS:  A complete review of systems was performed with the patient and all systems negative except as per HPI.    Physical Examination:  Exam was chaperoned by Jef Velasco, EMT-P  BP (!) 160/95 (BP Location: Left arm, Patient Position: Sitting, Cuff Size: Adult Large)   Pulse 90   SpO2 97%   General: Well hydrated. No acute distress.  CV: RRR  Lung: Non-labored breathing on RA  Abdomen: Soft, NT, obese habitus. No  inguinal adenopathy palpated.    ASSESSMENT    This is a 58 year old F with recent findings of pneumatosis intestinalis in the setting of chronic disease and likely infectious colitis. She has done well with abx. I think she will be ok without any further intervention at this time. She had a CTA which showed patent visceral vessels. Due to her persistent pain and concerns, I would think a colonoscopy is indicate for further workup. She asked appropriate questions, which were answered.    All pertinent labs and imaging were personally reviewed by me.      PLAN  - Colonoscopy  - Video visit after her endoscopy      30 minutes spent on the date of the encounter doing chart review, history and exam, imaging review, documentation and further activities as noted above.    Elliott Mendoza MD    Division of Colon and Rectal Surgery  [facility]    Referring Provider:  ELIZABETH Cardenas CNP  [address] [place] [address]     Primary Care Provider:  Sharron Justice

## 2024-03-01 ENCOUNTER — HOSPITAL ENCOUNTER (OUTPATIENT)
Dept: CARDIOLOGY | Facility: CLINIC | Age: 59
Discharge: HOME | End: 2024-03-01
Payer: COMMERCIAL

## 2024-03-01 DIAGNOSIS — I51.9 DYSFUNCTION OF RIGHT CARDIAC VENTRICLE: ICD-10-CM

## 2024-03-01 LAB — LVEF ECHO: NORMAL

## 2024-03-01 PROCEDURE — 93306 TTE W/DOPPLER COMPLETE: CPT

## 2024-03-01 PROCEDURE — 93306 TTE W/DOPPLER COMPLETE: CPT | Mod: 26 | Performed by: INTERNAL MEDICINE

## 2024-03-05 ENCOUNTER — OFFICE VISIT (OUTPATIENT)
Dept: SURGERY | Facility: CLINIC | Age: 59
End: 2024-03-05
Payer: COMMERCIAL

## 2024-03-05 VITALS — HEART RATE: 90 BPM | SYSTOLIC BLOOD PRESSURE: 160 MMHG | DIASTOLIC BLOOD PRESSURE: 95 MMHG | OXYGEN SATURATION: 97 %

## 2024-03-05 DIAGNOSIS — J44.9 CHRONIC OBSTRUCTIVE PULMONARY DISEASE, UNSPECIFIED COPD TYPE (H): ICD-10-CM

## 2024-03-05 DIAGNOSIS — E66.01 MORBID OBESITY (H): ICD-10-CM

## 2024-03-05 DIAGNOSIS — K63.89 PNEUMATOSIS INTESTINALIS: Primary | ICD-10-CM

## 2024-03-05 DIAGNOSIS — E11.9 TYPE 2 DIABETES MELLITUS WITHOUT COMPLICATION, WITHOUT LONG-TERM CURRENT USE OF INSULIN (H): ICD-10-CM

## 2024-03-05 PROCEDURE — 99203 OFFICE O/P NEW LOW 30 MIN: CPT | Performed by: SURGERY

## 2024-03-05 ASSESSMENT — PAIN SCALES - GENERAL: PAINLEVEL: NO PAIN (0)

## 2024-03-05 NOTE — NURSING NOTE
Chief Complaint   Patient presents with    Consult       Vitals:    03/05/24 1512   BP: (!) 160/95   BP Location: Left arm   Patient Position: Sitting   Cuff Size: Adult Large   Pulse: 90   SpO2: 97%       There is no height or weight on file to calculate BMI.    Jef Velasco EMT-P

## 2024-03-05 NOTE — LETTER
"3/5/2024       RE: Jazlyn Bartlett  [address]  [facility] [address]     Dear Colleague,    Thank you for referring your patient, Jazlyn Bartlett, to the [facility] [place] [facility]. Please see a copy of my visit note below.    Colon and Rectal Surgery Clinic Note    RE: Jazlyn Bartlett.  : 1965.  KELECHI: 3/5/2024.    Reason for visit: Pneumatosis intestinalis     HPI: Jazlyn \"Alma\"Dhruv is a 58 year old female who presents today for pneumatosis intestinalis. 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 On 2/15/2024 she presented to the ED for abdominal pain and was found to have colonic wall pneumatosis on CT. There was concern for infectious colitis. She was started on IV antibiotics with Unasyn with improvement of symptoms. She was discharged on Augmentin.    She is on 81mg of Asprin.     Today Alma feels fine. She reports mild abdominal pain 5/10 twice a day. This is a dull pain. She reports night sweats, which she has often had in her life. She is tolerating a general diet and having fairly regular bowel function.     Colonoscopy (10/28/2022):  Findings:       The digital rectal exam findings include decreased sphincter tone.        A 2 mm polyp was found in the sigmoid colon. The polyp was sessile. The        polyp was removed with a cold snare. Resection and retrieval were        complete. Estimated blood loss was minimal.        A few medium-mouthed diverticula were found in the sigmoid colon.        The exam was otherwise without abnormality on direct and retroflexion        views.                                                                                    Impression:               - Decreased sphincter tone found on digital rectal      "                        exam.                             - One 2 mm polyp in the sigmoid colon, removed with                             a cold snare. Resected and retrieved.                             - Diverticulosis in the sigmoid colon.                             - The examination was otherwise normal on direct                             and retroflexion views.         Surgical Pathology (10/28/2022):  A.  Colon, Sigmoid, polyp, polypectomy:  -Colonic mucosa with focal surface serrated changes most consistent with hyperplastic polyp  -Negative for dysplasia or malignancy.    CTA Abdomen/Pelvis (2/15/2024):  IMPRESSION:  1. Pneumatosis or localized pneumoperitoneum surrounding the hepatic  flexure and transverse colon again identified and unchanged. Visceral  arteries are patent, lessening suspicion for ischemia.  2. Moderate to severe stenosis in the proximal left renal artery  related to soft plaque or mural thrombus.  3. Left renal hypodensity is 15 Hounsfield units and 3.6 x 3 cm,  thought to be a cyst.  4. Left adrenal gland adenoma measuring up to 2.3 cm, unchanged.      Medical history:  Arthritis   COPD  Depression   Type 2 diabetes   GERD   HTN  Hypothyroidism   Mucoepidermoid carcinoma of parotid gland   Asthma     Surgical history:  1. Excision of mucoepidermoid carcinoma in 2010  2. Tonsillectomy     Family history:  Family History   Problem Relation Age of Onset     Diabetes Mother      Breast Cancer Mother      Hyperlipidemia Mother      Osteoporosis Mother      Diabetes Father      Lung Cancer Father      Rheumatoid Arthritis Father      Coronary Artery Disease Father      Hyperlipidemia Father      Cerebrovascular Disease Father      Prostate Cancer Father      Diabetes Maternal Grandmother      Diabetes Other      Hypertension Other      Depression Other      Anxiety Disorder Other      Asthma Other      Thyroid Disease Other      Obesity Other      Diabetes Sister      Obesity Sister       Diabetes Sister      Diabetes Brother      Diabetes Daughter      Breast Cancer Sister      Anesthesia Reaction Daughter      Medications:  Current Outpatient Medications   Medication Sig Dispense Refill     acetaminophen (TYLENOL) 325 MG tablet Take 2 tablets (650 mg) by mouth every 4 hours as needed for mild pain       albuterol (PROAIR HFA/PROVENTIL HFA/VENTOLIN HFA) 108 (90 Base) MCG/ACT inhaler Inhale 2 puffs into the lungs every 6 hours as needed for shortness of breath / dyspnea 18 g 5     aspirin (ASA) 81 MG chewable tablet Take 81 mg by mouth at bedtime       fluticasone (FLOVENT HFA) 220 MCG/ACT inhaler Inhale 1 puff into the lungs 2 times daily 12 g 3     glipiZIDE (GLUCOTROL XL) 10 MG 24 hr tablet 1 tab in am and 2 tabs in  tablet 1     hydrochlorothiazide (HYDRODIURIL) 25 MG tablet TAKE 1 TABLET (25 MG) BY MOUTH DAILY 90 tablet 1     levothyroxine (SYNTHROID/LEVOTHROID) 150 MCG tablet Take 1 tablet (150 mcg) by mouth daily 90 tablet 1     LORazepam (ATIVAN) 0.5 MG tablet TAKE 1-2 TABLETS 15-30 MINUTES PRIOR TO MRI 4 tablet 0     losartan (COZAAR) 100 MG tablet Take 1 tablet (100 mg) by mouth daily 90 tablet 1     simvastatin (ZOCOR) 20 MG tablet Take 1 tablet (20 mg) by mouth at bedtime 90 tablet 1     traMADol 25 MG TABS Take 25 mg by mouth every 6 hours as needed for moderate pain 6 tablet 0       Allergies:  Allergies   Allergen Reactions     Food      PN: LW FI1: nka LW FI2: nka     Hydrocodone-Acetaminophen Nausea and Vomiting     Ibuprofen Sodium Nausea and Vomiting     Lisinopril Cough       Social history:   Social History     Tobacco Use     Smoking status: Every Day     Packs/day: 0.50     Years: 37.00     Additional pack years: 0.00     Total pack years: 18.50     Types: Cigarettes     Start date: 1/1/1979     Passive exposure: Current     Smokeless tobacco: Never   Substance Use Topics     Alcohol use: No     Marital status: .    ROS:  A complete review of systems was performed  with the patient and all systems negative except as per HPI.    Physical Examination:  Exam was chaperoned by Jef Velasco, EMT-P  BP (!) 160/95 (BP Location: Left arm, Patient Position: Sitting, Cuff Size: Adult Large)   Pulse 90   SpO2 97%   General: Well hydrated. No acute distress.  CV: RRR  Lung: Non-labored breathing on RA  Abdomen: Soft, NT, obese habitus. No inguinal adenopathy palpated.    ASSESSMENT    This is a 58 year old F with recent findings of pneumatosis intestinalis in the setting of chronic disease and likely infectious colitis. She has done well with abx. I think she will be ok without any further intervention at this time. She had a CTA which showed patent visceral vessels. Due to her persistent pain and concerns, I would think a colonoscopy is indicate for further workup. She asked appropriate questions, which were answered.    All pertinent labs and imaging were personally reviewed by me.      PLAN  - Colonoscopy  - Video visit after her endoscopy      30 minutes spent on the date of the encounter doing chart review, history and exam, imaging review, documentation and further activities as noted above.    Elliott Mendoza MD    Division of Colon and Rectal Surgery  [facility]    Referring Provider:  ELIZABETH Cardenas CNP  [address] [place] [address]     Primary Care Provider:  Sharron Justice      Again, thank you for allowing me to participate in the care of your patient.      Sincerely,    Elliott Mendoza MD

## 2024-03-05 NOTE — LETTER
March 5, 2024    RE:  Jazlyn Bartlett                              [address]  [facility] [address]      To Whom It May Concern:    This letter is written to document that Jazlyn Bartlett is under the medical care of Dr. Elliott Mendoza of the Division of Colon and Rectal Surgery at the [facility] outpatient clinics.    Jazlyn hopes to return to work on on 3/6/23. Please excuse Jazlyn from work today, March 5th 2024.      Elliott Mendoza MD      Division of Colon and Rectal Surgery  Department of Surgery

## 2024-03-07 ENCOUNTER — MYC MEDICAL ADVICE (OUTPATIENT)
Dept: INTERNAL MEDICINE | Facility: CLINIC | Age: 59
End: 2024-03-07
Payer: COMMERCIAL

## 2024-03-08 ENCOUNTER — TELEPHONE (OUTPATIENT)
Dept: SURGERY | Facility: CLINIC | Age: 59
End: 2024-03-08
Payer: COMMERCIAL

## 2024-03-08 NOTE — TELEPHONE ENCOUNTER
Left Voicemail (1st Attempt) for the patient to call back and schedule the following:    Appointment type: Return Patient  Provider: Dr. Mendoza  Return date: July 2024  Specialty phone number: [phone]  Additional appointment(s) needed: n/a  Additonal Notes: ok to schedule as a video, per checkout note on 3/5 appointment  For / Appt Notes: 4 month follow up per provider    Left CC#

## 2024-03-11 ENCOUNTER — VIRTUAL VISIT (OUTPATIENT)
Dept: INTERNAL MEDICINE | Facility: CLINIC | Age: 59
End: 2024-03-11
Payer: COMMERCIAL

## 2024-03-11 DIAGNOSIS — K63.89 PNEUMATOSIS INTESTINALIS: Primary | ICD-10-CM

## 2024-03-11 DIAGNOSIS — I10 HYPERTENSION GOAL BP (BLOOD PRESSURE) < 140/80: ICD-10-CM

## 2024-03-11 DIAGNOSIS — E11.9 TYPE 2 DIABETES MELLITUS WITHOUT COMPLICATION, WITHOUT LONG-TERM CURRENT USE OF INSULIN (H): ICD-10-CM

## 2024-03-11 PROCEDURE — 99214 OFFICE O/P EST MOD 30 MIN: CPT | Mod: 95

## 2024-03-11 NOTE — TELEPHONE ENCOUNTER
Patient called back, said she can make anything work but needs appointment today or tomorrow to be completed. 130 appointment showed up after call, writer scheduled patient. Left detailed message of appointment time for patient and will send [employer] message. If appointment time no longer works, please help patient re-schedule.    Sharyn MOLINA

## 2024-03-11 NOTE — PROGRESS NOTES
Alma is a 58 year old who is being evaluated via a billable video visit.      How would you like to obtain your AVS? MyChart  If the video visit is dropped, the invitation should be resent by: Text to cell phone: [phone]  Will anyone else be joining your video visit? No          Assessment & Plan     (K63.89) Pneumatosis intestinalis  (primary encounter diagnosis)  Comment: FMLA completed today for missed work dates due to hospitalization for pneumatosis intestinalis and past/future appts needed for this.    Plan:     (E11.9) Type 2 diabetes mellitus without complication, without long-term current use of insulin (H)  Comment: Past A1C was 7.0%. She denies any hypo or hyperglycemic episodes over the past few weeks. Will continue with Glipizide XL 10MG in AM and 20MG in PM.  Plan:     (I10) Hypertension goal BP (blood pressure) < 140/80  Comment: recent BP at colorectal clinic was 160/95. She has been checking BP at home and it is running in the 120's/70's.  Plan:           Subjective   Alma is a 58 year old, presenting for the following health issues:  Forms      3/11/2024     1:03 PM   Additional Questions   Roomed by Caitlyn MARIN           Objective           Vitals:  No vitals were obtained today due to virtual visit.    Physical Exam   GENERAL: alert and no distress  EYES: Eyes grossly normal to inspection.  No discharge or erythema, or obvious scleral/conjunctival abnormalities.  RESP: No audible wheeze, cough, or visible cyanosis.    SKIN: Visible skin clear. No significant rash, abnormal pigmentation or lesions.  NEURO: Cranial nerves grossly intact.  Mentation and speech appropriate for age.  PSYCH: Appropriate affect, tone, and pace of words          Video-Visit Details    Type of service:  Video Visit     Originating Location (pt. Location): Home    Distant Location (provider location):  On-site  Platform used for Video Visit: Stacy  Signed Electronically by: ELIZABETH Leggett CNP

## 2024-03-19 ENCOUNTER — MYC MEDICAL ADVICE (OUTPATIENT)
Dept: INTERNAL MEDICINE | Facility: CLINIC | Age: 59
End: 2024-03-19
Payer: COMMERCIAL

## 2024-03-20 NOTE — TELEPHONE ENCOUNTER
Fax received from [facility] for review and signature.  Put in Sharron Justice's in basket.    Please see MyChart message.

## 2024-03-21 NOTE — TELEPHONE ENCOUNTER
Left message for patient to call back and sent my chart message to call us.  Sharron wants to clarify the patient's message  about a job accommodation.and the different dates the patient has listed in her paper note to Sharron.

## 2024-03-26 ENCOUNTER — TELEPHONE (OUTPATIENT)
Dept: INTERNAL MEDICINE | Facility: CLINIC | Age: 59
End: 2024-03-26
Payer: COMMERCIAL

## 2024-03-26 NOTE — TELEPHONE ENCOUNTER
General Call    Contacts         Type Contact Phone/Fax    03/26/2024 06:32 PM CDT Phone (Incoming) Alma Bartlett (Self) [phone] (M)          Reason for Call:  Work Accomodation Forms that need to be picked up    What are your questions or concerns:  Patient came but clinic was closed. Please upload forms into [employer].    Date of last appointment with provider: 2/20/24    Could we send this information to you in [employer] or would you prefer to receive a phone call?:   No preference   Okay to leave a detailed message?: Yes at Cell number on file:    Telephone Information:   Mobile [phone]

## 2024-03-27 ENCOUNTER — TELEPHONE (OUTPATIENT)
Dept: GASTROENTEROLOGY | Facility: CLINIC | Age: 59
End: 2024-03-27
Payer: COMMERCIAL

## 2024-03-27 ENCOUNTER — HOSPITAL ENCOUNTER (OUTPATIENT)
Facility: CLINIC | Age: 59
End: 2024-03-27
Attending: INTERNAL MEDICINE | Admitting: INTERNAL MEDICINE
Payer: COMMERCIAL

## 2024-03-27 DIAGNOSIS — Z12.11 SPECIAL SCREENING FOR MALIGNANT NEOPLASMS, COLON: Primary | ICD-10-CM

## 2024-03-27 RX ORDER — BISACODYL 5 MG/1
TABLET, DELAYED RELEASE ORAL
Qty: 4 TABLET | Refills: 0 | Status: SHIPPED | OUTPATIENT
Start: 2024-03-27 | End: 2024-07-05

## 2024-03-27 NOTE — TELEPHONE ENCOUNTER
Add on colonoscopy    Attempted to contact patient in order to complete pre assessment questions.     No answer. Left message to return call to [phone] option 4    Callback required communication sent via [employer].      Procedure details:    Patient scheduled for Colonoscopy  on 4.3.24.     Arrival time: 0915. Procedure time 1015    Facility location: [facility]; [address], [address], [place] [address]. Check in location: Main entrance at registration desk.    Sedation type: Conscious sedation     Pre op exam needed? N/A    Indication for procedure: Screening      Chart review:     Electronic implanted devices? No    Recent diagnosis of diverticulitis within the last 6 weeks? No    Diabetic? Yes. Diabetic medication HOLDING recommendations: Oral diabetic medications: Yes:  Glipizide (glucotrol): HOLD day of procedure.       Medication review:    Anticoagulants? No    NSAIDS? No NSAID medications per patient's medication list.  RN will verify with pre-assessment call.    Other medication HOLDING recommendations:  N/A      Prep for procedure:     Bowel prep recommendation: Standard Golytely. Bowel prep prescription sent to [id number] [address]  Due to: diabetes.     Prep instructions sent via [employer].       Stacey Larkin RN  Endoscopy Procedure Pre Assessment RN

## 2024-03-27 NOTE — TELEPHONE ENCOUNTER
"Endoscopy Scheduling Screen    Have you had a positive Covid test in the last 14 days?  No      What is your communication preference for Instructions and/or Bowel Prep?   MyChart      What insurance is in the chart?  Other:  bcbs    Ordering/Referring Provider: LEONORA   (If ordering provider performs procedure, schedule with ordering provider unless otherwise instructed. )    BMI: Estimated body mass index is 38.47 kg/m  as calculated from the following:    Height as of 2/20/24: 1.524 m (5').    Weight as of 2/20/24: 89.4 kg (197 lb).     Sedation Ordered  moderate sedation.   If patient BMI > 50 do not schedule in ASC.    If patient BMI > 45 do not schedule at [facility].    Are you taking methadone or Suboxone?  No    Have you had difficulties, pain, or discomfort during past endoscopy procedures?  No    Are you taking any prescription medications for pain 3 or more times per week?   NO, No RN review required.      Do you have a history of malignant hyperthermia?  No      (Females) Are you currently pregnant?          Have you been diagnosed or told you have pulmonary hypertension?   No      Do you have an LVAD?  No      Have you been told you have moderate to severe sleep apnea?  No    Have you been told you have COPD, asthma, or any other lung disease?  Yes     What breathing problems do you have?  COPD     Do you use home oxygen?  No    Have your breathing problems required an ED visit or hospitalization in the last year?  No      Do you have any heart conditions?  No       Have you ever had or are you waiting for an organ transplant?  No. Continue scheduling, no site restrictions.      Have you had a stroke or transient ischemic attack (TIA aka \"mini stroke\" in the last 6 months?   No      Have you been diagnosed with or been told you have cirrhosis of the liver?   No      Are you currently on dialysis?   No      Do you need assistance transferring?   No    BMI: Estimated body mass index is 38.47 kg/m  as " calculated from the following:    Height as of 2/20/24: 1.524 m (5').    Weight as of 2/20/24: 89.4 kg (197 lb).     Is patients BMI > 40 and scheduling location [facility]?  No      Do you take an injectable medication for weight loss or diabetes (excluding insulin)?  No    Do you take the medication Naltrexone?  No    Do you take blood thinners?  No       Prep   Are you currently on dialysis or do you have chronic kidney disease?  No    Do you have a diagnosis of diabetes?  Yes (Golytely Prep)    Do you have a diagnosis of cystic fibrosis (CF)?  No    On a regular basis do you go 3 -5 days between bowel movements?  Some days  No    BMI > 40?  No    Preferred Pharmacy:    [id number] - [place], [place] - [address]  [address] [address]  Phone: [phone] Fax: [phone]      Final Scheduling Details     Procedure scheduled  Colonoscopy    Surgeon:  MICKI     Date of procedure:  4/3/24     Pre-OP / PAC:   No - Not required for this site.    Location  [facility]  1st available    Sedation   Moderate Sedation - Per order.      Patient Reminders:   You will receive a call from a Nurse to review instructions and health history.  This assessment must be completed prior to your procedure.  Failure to complete the Nurse assessment may result in the procedure being cancelled.      On the day of your procedure, please designate an adult(s) who can drive you home stay with you for the next 24 hours. The medicines used in the exam will make you sleepy. You will not be able to drive.      You cannot take public transportation, ride share services, or non-medical taxi service without a responsible caregiver.  Medical transport services are allowed with the requirement that a responsible caregiver will receive you at your destination.  We require that drivers and caregivers are confirmed prior to your procedure.

## 2024-03-27 NOTE — TELEPHONE ENCOUNTER
Pre assessment completed for upcoming procedure.   (Please see previous telephone encounter notes for complete details)    Patient  returned call.       Procedure details:    Arrival time and facility location reviewed.    Pre op exam needed? N/A    Designated  policy reviewed. Instructed to have someone stay 6  hours post procedure.       Medication review:    Oral diabetic medication(s): Glipizide (glucotrol): HOLD day of procedure.      Prep for procedure:     Procedure prep instructions reviewed.        Any additional information needed:  N/A      Patient  verbalized understanding and had no questions or concerns at this time.      Stacey Larkin RN  Endoscopy Procedure Pre Assessment RN  [phone] option 4

## 2024-04-03 NOTE — TELEPHONE ENCOUNTER
Patient called this morning stating that they were unable to drink second half of Golytely.  Per patient they continued to vomit with second half of Golytely bowel prep.  Pt stated they did take zofran tablets which did not help.     Patient was instructed to reschedule their colonoscopy and transferred to endo scheduling.     Mayela Hernández RN

## 2024-04-03 NOTE — TELEPHONE ENCOUNTER
Caller: Jazlyn Bartlett     Reason for Reschedule/Cancellation   (please be detailed, any staff messages or encounters to note?): could not tolerate the prep      Prior to reschedule please review:  Ordering Provider: Marcello  Sedation Determined: moderate  Does patient have any ASC Exclusions, please identify?: N      Notes on Cancelled Procedure:  Procedure: Lower Endoscopy [Colonoscopy]   Date: 4/3  Location: [facility]; [address] [place] [address]   Surgeon: Madie      Rescheduled: Yes,   Procedure: Lower Endoscopy [Colonoscopy]    Date: 7/24   Location: [facility]; [address], [address] [place] [address]    Surgeon: Madie   Sedation Level Scheduled  moderate ,  Reason for Sedation Level ordered   Instructions updated and sent: y     Does patient need PAC or Pre -Op Rescheduled? : no       Did you cancel or rescheduled an EUS procedure? No.

## 2024-04-07 ENCOUNTER — HEALTH MAINTENANCE LETTER (OUTPATIENT)
Age: 59
End: 2024-04-07

## 2024-04-08 ENCOUNTER — TELEPHONE (OUTPATIENT)
Dept: INTERNAL MEDICINE | Facility: CLINIC | Age: 59
End: 2024-04-08
Payer: COMMERCIAL

## 2024-04-08 NOTE — TELEPHONE ENCOUNTER
Left message for patient to call back and sent my chart message.  Sharron would like to have an in person appointment with you to address these forms.

## 2024-04-08 NOTE — TELEPHONE ENCOUNTER
Fax received from [facility] information needed. for review and signature.  Put in Sharron Justice's in basket.

## 2024-04-09 NOTE — TELEPHONE ENCOUNTER
Her letter that was attached to the forms is quite confusing and complex to me- if someone can please call her and specify exact changes and then make the necessary changes on the forms, I am happy to sign off on this. I do not know exactly what she is asking to be changed/done and I do not have time to sit and figure this okay. Please and thank you!!

## 2024-04-09 NOTE — TELEPHONE ENCOUNTER
Patient calls back and informed of message from Sharron. 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 She would be able to do a Virtual Visit to discuss this, but unable to leave work right now.     Patient states that Sharron has filled out the job accomodation form for her and requires an update about recent hospital stay - this form is due 4/18.     The umbrella form will allow more time to fill out and she would be willing to try to schedule this further out.    Nedra Torres RN  [facility]

## 2024-04-10 ENCOUNTER — MEDICAL CORRESPONDENCE (OUTPATIENT)
Dept: HEALTH INFORMATION MANAGEMENT | Facility: CLINIC | Age: 59
End: 2024-04-10

## 2024-04-10 NOTE — TELEPHONE ENCOUNTER
Reviewed forms with patient. Originals at  for patient or her  to . Copies made for patient's chart records.    Patient states page 7 on both forms talked about page 8 attached with job descriptions & had nothing for provider to complete. Patient will make sure all pages available when requesting future forms be completed.

## 2024-04-22 ENCOUNTER — TELEPHONE (OUTPATIENT)
Dept: GASTROENTEROLOGY | Facility: CLINIC | Age: 59
End: 2024-04-22
Payer: COMMERCIAL

## 2024-04-22 NOTE — TELEPHONE ENCOUNTER
Caller: LVM for Alma    Reason for Reschedule/Cancellation   (please be detailed, any staff messages or encounters to note?): Provider out       Prior to reschedule please review:  Ordering Provider:     Elliott Mendoza MD in [facility]     Sedation Determined: moderate  Does patient have any ASC Exclusions, please identify?: n      Notes on Cancelled Procedure:  Procedure: Lower Endoscopy [Colonoscopy]   Date: 07/24/2024  Location: [facility]; [address] [place] [address], [address], [place] [address]   Surgeon: Cindy      Rescheduled: No, CASE IN DEPOT        Did you cancel or rescheduled an EUS procedure? No.

## 2024-04-30 ENCOUNTER — PRE VISIT (OUTPATIENT)
Dept: SURGERY | Facility: CLINIC | Age: 59
End: 2024-04-30

## 2024-05-01 NOTE — TELEPHONE ENCOUNTER
Pharmacy is requesting 90 day supply  Pharmacy comments:   Insurance only covers 90 days supply. Pt is out of her med.     % Final      Lab Results   Component Value Date    TSH 1.04 2024     Infectious   Temperature range: Temp  Av.9 °F (36.6 °C)  Min: 97.7 °F (36.5 °C)  Max: 98 °F (36.7 °C)  Recent Labs     24  0525 24  0430 24  0420   WBC 23.2* 19.7* 15.4*     Results       Procedure Component Value Units Date/Time    Culture, Blood 2 [[id number]] Collected: 24 1445    Order Status: Completed Specimen: Blood Updated: 24 1536     Specimen Description .BLOOD LEFT     Special Requests          Culture NO GROWTH 3 DAYS    Culture, Blood 1 [[id number]] Collected: 24 1440    Order Status: Completed Specimen: Blood Updated: 24 153     Specimen Description .BLOOD RIGHT     Special Requests          Culture NO GROWTH 3 DAYS    Culture, Blood 1 [[id number]]     Order Status: Canceled Specimen: Blood     Culture, Blood 2 [[id number]]     Order Status: Canceled Specimen: Blood     Culture, Respiratory [[id number]]     Order Status: Sent Specimen: Sputum Expectorated     Culture, Respiratory [[id number]]     Order Status: Sent Specimen: Sputum Induced     Culture, Respiratory [[id number]] Collected: 24 0915    Order Status: Canceled Specimen: Sputum Expectorated     Culture, Urine [[id number]] Collected: 24    Order Status: Completed Specimen: Urine, clean catch Updated: 24 1121     Specimen Description .CLEAN CATCH URINE     Culture NO GROWTH    Strep Pneumoniae Antigen [[id number]] Collected: 24    Order Status: Completed Specimen: Urine, straight catheter Updated: 24 0908     Source .CLEAN CATCH URINE     Strep pneumo Ag NEGATIVE     Comment:       Presumptive Negative  suggests no current or recent pneumococcal infection. Infection due to Strep pneumoniae   cannot be ruled out since the antigen present in the sample may be below the detection limit   of the test.         Legionella antigen, urine [[id number]] Collected: 24    Order Status:  labs, replace electrolytes accordingly  Continue Vitamin D weekly  Hold Heparin for DVT prophylaxis, as HGB decreasing  Wound care is following  [facility] 9/24  Cardiology signed off, recommended to start statin if needed as per ASCVD risk calculation  Follow PT/OT    Tobacco use per chart:  reports that she has quit smoking. Her smoking use included cigarettes. She has never used smokeless tobacco.  Alcohol use per chart:  reports no history of alcohol use.  DVT prophylaxis: Heparin held  GI prophylaxis: Protonix  Bowel regimen: As needed  Diet:   ADULT DIET; Regular  ADULT ORAL NUTRITION SUPPLEMENT; Breakfast, Lunch; Wound Healing Oral Supplement  ADULT ORAL NUTRITION SUPPLEMENT; Breakfast, Dinner; Standard High Calorie/High Protein Oral Supplement  Pain management: as needed  Code status: Full Code  Disposition: Continue Current Care  Family: updated as available    Annabel Bettencourt MD, PGY-1  Attending physician: ERIC Huang

## 2024-05-02 ENCOUNTER — PATIENT OUTREACH (OUTPATIENT)
Dept: INTERNAL MEDICINE | Facility: CLINIC | Age: 59
End: 2024-05-02
Payer: COMMERCIAL

## 2024-05-02 NOTE — TELEPHONE ENCOUNTER
"Patient Quality Outreach    Patient is due for the following:   Diabetes -  LDL (Fasting) and Microalbumin  Hypertension -  BP check  Physical Preventive Adult Physical    Next Steps:   Schedule a Adult Preventative. Had virtual visit 3-. Please get home BPs from patient & document in\"patient reported vitals\".     Type of outreach:    Phone, left message for patient/parent to call back.      Questions for provider review:    None           Bernice Garcia LPN  Chart routed to none.      "

## 2024-05-06 NOTE — TELEPHONE ENCOUNTER
Caller: Alma    Reason for Reschedule/Cancellation   (please be detailed, any staff messages or encounters to note?): provider out      Prior to reschedule please review:  Ordering Provider: Elliott Mendoza   Sedation Determined: CS  Does patient have any ASC Exclusions, please identify?: no      Notes on Cancelled Procedure:  Procedure: Lower Endoscopy [Colonoscopy]   Date: 7/24  Location: [facility]; [address] [place] [address]   Surgeon: Cindy      Rescheduled: Yes,   Procedure: Lower Endoscopy [Colonoscopy]    Date: 5/28   Location: [facility]; [address], [address] [place] [address]    Surgeon: Michi   Sedation Level Scheduled  CS ,  Reason for Sedation Level order   Instructions updated and sent: y     Does patient need PAC or Pre -Op Rescheduled? : n       Did you cancel or rescheduled an EUS procedure? No.

## 2024-05-13 NOTE — TELEPHONE ENCOUNTER
Attempted to contact patient in order to complete pre assessment questions.     No answer. Left message to return call to [phone] option 4    Callback required communication sent via [employer].    Ilana Paris RN  Endoscopy Procedure Pre Assessment RN

## 2024-05-13 NOTE — TELEPHONE ENCOUNTER
Pre visit planning completed.      Procedure details:    Patient scheduled for Colonoscopy  on 5/28/2024.     Arrival time: 0700. Procedure time 0800    Facility location: [facility]; [address], [address], [place] [address]. Check in location: Main entrance at registration desk.    Sedation type: Conscious sedation     Pre op exam needed? N/A    Indication for procedure: screening colonoscopy      Chart review:     Electronic implanted devices? No    Recent diagnosis of diverticulitis within the last 6 weeks? No    Diabetic? Yes. Diabetic medication HOLDING recommendations: Oral diabetic medications: Yes:  Glipizide (glucotrol): HOLD day of procedure.       Medication review:    Anticoagulants? No    NSAIDS? No NSAID medications per patient's medication list.  RN will verify with pre-assessment call.    Other medication HOLDING recommendations:  N/A      Prep for procedure:     Bowel prep recommendation: Standard Miralax with Gatorade Zero  Due to: diabetes.  and unable to tolerate Golytely    Prep instructions sent via [employer] Paris RN  Endoscopy Procedure Pre Assessment RN  [phone] option 4

## 2024-05-17 NOTE — TELEPHONE ENCOUNTER
Second call attempt to complete pre assessment.     No answer.  Left message to return call to [phone] #4 by next business day prior to 4PM or procedure will be sent to cancel.     Callback required communication sent via [employer].      Ilana Paris RN  Endoscopy Procedure Pre Assessment RN

## 2024-05-20 NOTE — TELEPHONE ENCOUNTER
Pre assessment completed for upcoming procedure.   (Please see previous telephone encounter notes for complete details)    Patient  returned call.       Procedure details:    Arrival time and facility location reviewed.    Pre op exam needed? N/A    Designated  policy reviewed. Instructed to have someone stay 6  hours post procedure.       Medication review:    Medications reviewed. Please see supporting documentation below. Holding recommendations discussed (if applicable).       Prep for procedure:     Procedure prep instructions reviewed.        Any additional information needed:  N/A      Patient  verbalized understanding and had no questions or concerns at this time.      Mayela Hernández RN  Endoscopy Procedure Pre Assessment RN  [phone] option 4

## 2024-05-22 ENCOUNTER — MYC MEDICAL ADVICE (OUTPATIENT)
Dept: INTERNAL MEDICINE | Facility: CLINIC | Age: 59
End: 2024-05-22
Payer: COMMERCIAL

## 2024-05-22 DIAGNOSIS — E11.9 TYPE 2 DIABETES MELLITUS WITHOUT COMPLICATION (H): Primary | ICD-10-CM

## 2024-05-23 RX ORDER — BLOOD-GLUCOSE METER
1 EACH MISCELLANEOUS DAILY
Qty: 1 KIT | Refills: 0 | Status: SHIPPED | OUTPATIENT
Start: 2024-05-23

## 2024-05-23 RX ORDER — LANCETS
EACH MISCELLANEOUS
Qty: 100 EACH | Refills: 3 | Status: SHIPPED | OUTPATIENT
Start: 2024-05-23

## 2024-05-23 RX ORDER — BLOOD SUGAR DIAGNOSTIC
STRIP MISCELLANEOUS
Qty: 100 STRIP | Refills: 3 | Status: SHIPPED | OUTPATIENT
Start: 2024-05-23

## 2024-05-28 ENCOUNTER — HOSPITAL ENCOUNTER (OUTPATIENT)
Facility: CLINIC | Age: 59
Discharge: HOME | End: 2024-05-28
Attending: SURGERY | Admitting: SURGERY
Payer: COMMERCIAL

## 2024-05-28 VITALS
HEART RATE: 62 BPM | RESPIRATION RATE: 18 BRPM | DIASTOLIC BLOOD PRESSURE: 97 MMHG | SYSTOLIC BLOOD PRESSURE: 182 MMHG | OXYGEN SATURATION: 95 %

## 2024-05-28 LAB
COLONOSCOPY: NORMAL
GLUCOSE BLDC GLUCOMTR-MCNC: 137 MG/DL (ref 70–99)

## 2024-05-28 PROCEDURE — G0500 MOD SEDAT ENDO SERVICE >5YRS: HCPCS | Performed by: SURGERY

## 2024-05-28 PROCEDURE — 45378 DIAGNOSTIC COLONOSCOPY: CPT | Performed by: SURGERY

## 2024-05-28 PROCEDURE — 250N000011 HC RX IP 250 OP 636: Performed by: SURGERY

## 2024-05-28 PROCEDURE — 99153 MOD SED SAME PHYS/QHP EA: CPT | Performed by: SURGERY

## 2024-05-28 PROCEDURE — 82962 GLUCOSE BLOOD TEST: CPT

## 2024-05-28 RX ORDER — FENTANYL CITRATE 50 UG/ML
INJECTION, SOLUTION INTRAMUSCULAR; INTRAVENOUS PRN
Status: DISCONTINUED | OUTPATIENT
Start: 2024-05-28 | End: 2024-05-28 | Stop reason: HOSPADM

## 2024-05-28 RX ORDER — NALOXONE HYDROCHLORIDE 0.4 MG/ML
0.4 INJECTION, SOLUTION INTRAMUSCULAR; INTRAVENOUS; SUBCUTANEOUS
Status: DISCONTINUED | OUTPATIENT
Start: 2024-05-28 | End: 2024-05-28 | Stop reason: HOSPADM

## 2024-05-28 RX ORDER — LIDOCAINE 40 MG/G
CREAM TOPICAL
Status: DISCONTINUED | OUTPATIENT
Start: 2024-05-28 | End: 2024-05-28 | Stop reason: HOSPADM

## 2024-05-28 RX ORDER — ONDANSETRON 2 MG/ML
4 INJECTION INTRAMUSCULAR; INTRAVENOUS EVERY 6 HOURS PRN
Status: DISCONTINUED | OUTPATIENT
Start: 2024-05-28 | End: 2024-05-28 | Stop reason: HOSPADM

## 2024-05-28 RX ORDER — FLUMAZENIL 0.1 MG/ML
0.2 INJECTION, SOLUTION INTRAVENOUS
Status: DISCONTINUED | OUTPATIENT
Start: 2024-05-28 | End: 2024-05-28 | Stop reason: HOSPADM

## 2024-05-28 RX ORDER — PROCHLORPERAZINE MALEATE 5 MG
10 TABLET ORAL EVERY 6 HOURS PRN
Status: DISCONTINUED | OUTPATIENT
Start: 2024-05-28 | End: 2024-05-28 | Stop reason: HOSPADM

## 2024-05-28 RX ORDER — ONDANSETRON 2 MG/ML
4 INJECTION INTRAMUSCULAR; INTRAVENOUS
Status: DISCONTINUED | OUTPATIENT
Start: 2024-05-28 | End: 2024-05-28 | Stop reason: HOSPADM

## 2024-05-28 RX ORDER — ONDANSETRON 4 MG/1
4 TABLET, ORALLY DISINTEGRATING ORAL EVERY 6 HOURS PRN
Status: DISCONTINUED | OUTPATIENT
Start: 2024-05-28 | End: 2024-05-28 | Stop reason: HOSPADM

## 2024-05-28 RX ORDER — NALOXONE HYDROCHLORIDE 0.4 MG/ML
0.2 INJECTION, SOLUTION INTRAMUSCULAR; INTRAVENOUS; SUBCUTANEOUS
Status: DISCONTINUED | OUTPATIENT
Start: 2024-05-28 | End: 2024-05-28 | Stop reason: HOSPADM

## 2024-05-28 ASSESSMENT — ACTIVITIES OF DAILY LIVING (ADL)
ADLS_ACUITY_SCORE: 35

## 2024-05-28 NOTE — OR NURSING
Patient had colonoscopy NO interventions.  Patient tolerated procedure under conscious sedation and 2 liters nasal cannula.

## 2024-07-05 ENCOUNTER — OFFICE VISIT (OUTPATIENT)
Dept: URGENT CARE | Facility: URGENT CARE | Age: 59
End: 2024-07-05
Payer: COMMERCIAL

## 2024-07-05 VITALS
DIASTOLIC BLOOD PRESSURE: 85 MMHG | SYSTOLIC BLOOD PRESSURE: 124 MMHG | TEMPERATURE: 98.3 F | HEART RATE: 82 BPM | OXYGEN SATURATION: 96 %

## 2024-07-05 DIAGNOSIS — R59.9 SWOLLEN GLAND: Primary | ICD-10-CM

## 2024-07-05 LAB
BASOPHILS # BLD AUTO: 0 10E3/UL (ref 0–0.2)
BASOPHILS NFR BLD AUTO: 0 %
DEPRECATED S PYO AG THROAT QL EIA: NEGATIVE
EOSINOPHIL # BLD AUTO: 0.3 10E3/UL (ref 0–0.7)
EOSINOPHIL NFR BLD AUTO: 4 %
ERYTHROCYTE [DISTWIDTH] IN BLOOD BY AUTOMATED COUNT: 13.4 % (ref 10–15)
GROUP A STREP BY PCR: NOT DETECTED
HCT VFR BLD AUTO: 45 % (ref 35–47)
HGB BLD-MCNC: 15 G/DL (ref 11.7–15.7)
IMM GRANULOCYTES # BLD: 0 10E3/UL
IMM GRANULOCYTES NFR BLD: 0 %
LYMPHOCYTES # BLD AUTO: 2.7 10E3/UL (ref 0.8–5.3)
LYMPHOCYTES NFR BLD AUTO: 31 %
MCH RBC QN AUTO: 30.2 PG (ref 26.5–33)
MCHC RBC AUTO-ENTMCNC: 33.3 G/DL (ref 31.5–36.5)
MCV RBC AUTO: 91 FL (ref 78–100)
MONOCYTES # BLD AUTO: 0.6 10E3/UL (ref 0–1.3)
MONOCYTES NFR BLD AUTO: 6 %
NEUTROPHILS # BLD AUTO: 5.1 10E3/UL (ref 1.6–8.3)
NEUTROPHILS NFR BLD AUTO: 59 %
PLATELET # BLD AUTO: 356 10E3/UL (ref 150–450)
RBC # BLD AUTO: 4.96 10E6/UL (ref 3.8–5.2)
WBC # BLD AUTO: 8.7 10E3/UL (ref 4–11)

## 2024-07-05 PROCEDURE — 87651 STREP A DNA AMP PROBE: CPT | Performed by: FAMILY MEDICINE

## 2024-07-05 PROCEDURE — 36415 COLL VENOUS BLD VENIPUNCTURE: CPT | Performed by: FAMILY MEDICINE

## 2024-07-05 PROCEDURE — 85025 COMPLETE CBC W/AUTO DIFF WBC: CPT | Performed by: FAMILY MEDICINE

## 2024-07-05 PROCEDURE — 99214 OFFICE O/P EST MOD 30 MIN: CPT | Performed by: FAMILY MEDICINE

## 2024-07-05 NOTE — PROGRESS NOTES
SUBJECTIVE:  Chief Complaint   Patient presents with    Urgent Care     Painful lump on left side of neck     Jazlyn Bartlett is a 58 year old female who presents with a chief complaint of painful lump on left side of neck.    Noticed small lump behind left ear yesterday, this has gotten larger and now more in jaw area.  Denies any fever or ear pain.  Has mild sore throat.    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 100 strip 3    albuterol (PROAIR HFA/PROVENTIL HFA/VENTOLIN HFA) 108 (90 Base) MCG/ACT inhaler Inhale 2 puffs into the lungs every 6 hours as needed for shortness of breath / dyspnea 18 g 5    aspirin (ASA) 81 MG chewable tablet Take 81 mg by mouth at bedtime      blood glucose monitoring (SOFTCLIX) lancets Use to test blood sugar One time daily. 100 each 3    Blood Glucose Monitoring Suppl (ACCU-CHEK GUIDE ME) w/Device KIT 1 each daily Use to test blood sugar 1 kit 0    fluticasone (FLOVENT HFA) 220 MCG/ACT inhaler Inhale 1 puff into the lungs 2 times daily 12 g 3    glipiZIDE (GLUCOTROL XL) 10 MG 24 hr tablet 1 tab in am and 2 tabs in  tablet 1    hydrochlorothiazide (HYDRODIURIL) 25 MG tablet TAKE 1 TABLET (25 MG) BY MOUTH DAILY 90 tablet 1    levothyroxine (SYNTHROID/LEVOTHROID) 150 MCG tablet Take 1 tablet (150 mcg) by mouth daily 90 tablet 1    losartan (COZAAR) 100 MG tablet Take 1 tablet (100 mg) by mouth  daily 90 tablet 1    simvastatin (ZOCOR) 20 MG tablet Take 1 tablet (20 mg) by mouth at bedtime 90 tablet 1    acetaminophen (TYLENOL) 325 MG tablet Take 2 tablets (650 mg) by mouth every 4 hours as needed for mild pain      bisacodyl (DULCOLAX) 5 MG EC tablet Take 2 tablets at 3 pm the day before your procedure. If your procedure is before 11 am, take 2 additional tablets at 11 pm. If your procedure is after 11 am, take 2 additional tablets at 6 am. For additional instructions refer to your colonoscopy prep instructions. 4 tablet 0    LORazepam (ATIVAN) 0.5 MG tablet TAKE 1-2 TABLETS 15-30 MINUTES PRIOR TO MRI 4 tablet 0    polyethylene glycol (GOLYTELY) 236 g suspension The night before the exam at 6 pm drink an 8-ounce glass every 15 minutes until the jug is half empty. If you arrive before 11 AM: Drink the other half of the Golytely jug at 11 PM night before procedure. If you arrive after 11 AM: Drink the other half of the Golytely jug at 6 AM day of procedure. For additional instructions refer to your colonoscopy prep instructions. 4000 mL 0    traMADol 25 MG TABS Take 25 mg by mouth every 6 hours as needed for moderate pain 6 tablet 0     Social History     Tobacco Use    Smoking status: Every Day     Current packs/day: 0.50     Average packs/day: 0.5 packs/day for 45.5 years (22.8 ttl pk-yrs)     Types: Cigarettes     Start date: 1/1/1979     Passive exposure: Current    Smokeless tobacco: Never   Substance Use Topics    Alcohol use: No       ROS:  Review of systems negative except as stated above.    EXAM:   /85   Pulse 82   Temp 98.3  F (36.8  C) (Tympanic)   SpO2 96%   GENERAL APPEARANCE: healthy, alert and no distress  EARS: ear canals and TMs normal  NECK: supple, mild tenderness on left submandibular glang  PSYCH:alert, affect bright    Results for orders placed or performed in visit on 07/05/24   CBC with platelets and differential     Status: None   Result Value Ref Range    WBC Count 8.7 4.0  - 11.0 10e3/uL    RBC Count 4.96 3.80 - 5.20 10e6/uL    Hemoglobin 15.0 11.7 - 15.7 g/dL    Hematocrit 45.0 35.0 - 47.0 %    MCV 91 78 - 100 fL    MCH 30.2 26.5 - 33.0 pg    MCHC 33.3 31.5 - 36.5 g/dL    RDW 13.4 10.0 - 15.0 %    Platelet Count 356 150 - 450 10e3/uL    % Neutrophils 59 %    % Lymphocytes 31 %    % Monocytes 6 %    % Eosinophils 4 %    % Basophils 0 %    % Immature Granulocytes 0 %    Absolute Neutrophils 5.1 1.6 - 8.3 10e3/uL    Absolute Lymphocytes 2.7 0.8 - 5.3 10e3/uL    Absolute Monocytes 0.6 0.0 - 1.3 10e3/uL    Absolute Eosinophils 0.3 0.0 - 0.7 10e3/uL    Absolute Basophils 0.0 0.0 - 0.2 10e3/uL    Absolute Immature Granulocytes 0.0 <=0.4 10e3/uL   Streptococcus A Rapid Screen w/Reflex to PCR - Clinic Collect     Status: Normal    Specimen: Throat; Swab   Result Value Ref Range    Group A Strep antigen Negative Negative   CBC with platelets and differential     Status: None    Narrative    The following orders were created for panel order CBC with platelets and differential.  Procedure                               Abnormality         Status                     ---------                               -----------         ------                     CBC with platelets and d...[[id number]]                      Final result                 Please view results for these tests on the individual orders.         ASSESSMENT/PLAN:  (R59.9) Swollen gland  (primary encounter diagnosis)  Comment: left sided  Plan: CBC with platelets and differential,         Streptococcus A Rapid Screen w/Reflex to PCR -         Clinic Collect, Group A Streptococcus PCR         Throat Swab, amoxicillin-clavulanate         (AUGMENTIN) 875-125 MG tablet            Reassurance given, reviewed symptomatic treatment with tylenol, ibuprofen, plenty of fluids and rest.  RX Augmentin given for treatment of probable salivary gland infection, encourage to eat sour candy.  Will follow up on throat culture and notify if positive,  reviewed that Augmentin will already treat strep.    Follow up with primary provider in 1-2 weeks if no improvement of symptoms for further evaluation    Steve Faria MD  July 5, 2024 12:18 PM

## 2024-07-14 DIAGNOSIS — E03.9 HYPOTHYROIDISM, UNSPECIFIED TYPE: ICD-10-CM

## 2024-07-14 DIAGNOSIS — E11.9 TYPE 2 DIABETES MELLITUS WITHOUT COMPLICATION, WITHOUT LONG-TERM CURRENT USE OF INSULIN (H): ICD-10-CM

## 2024-07-14 DIAGNOSIS — I10 HYPERTENSION GOAL BP (BLOOD PRESSURE) < 140/80: ICD-10-CM

## 2024-07-15 ENCOUNTER — TELEPHONE (OUTPATIENT)
Dept: INTERNAL MEDICINE | Facility: CLINIC | Age: 59
End: 2024-07-15
Payer: COMMERCIAL

## 2024-07-15 DIAGNOSIS — E78.5 HYPERLIPIDEMIA LDL GOAL <100: ICD-10-CM

## 2024-07-15 RX ORDER — SIMVASTATIN 20 MG
20 TABLET ORAL AT BEDTIME
Qty: 90 TABLET | Refills: 0 | Status: SHIPPED | OUTPATIENT
Start: 2024-07-15

## 2024-07-15 RX ORDER — GLIPIZIDE 10 MG/1
TABLET, FILM COATED, EXTENDED RELEASE ORAL
Qty: 270 TABLET | Refills: 1 | Status: SHIPPED | OUTPATIENT
Start: 2024-07-15

## 2024-07-15 RX ORDER — LEVOTHYROXINE SODIUM 150 UG/1
150 TABLET ORAL DAILY
Qty: 90 TABLET | Refills: 1 | Status: SHIPPED | OUTPATIENT
Start: 2024-07-15

## 2024-07-15 RX ORDER — LOSARTAN POTASSIUM 100 MG/1
100 TABLET ORAL DAILY
Qty: 90 TABLET | Refills: 1 | Status: SHIPPED | OUTPATIENT
Start: 2024-07-15

## 2024-07-16 NOTE — TELEPHONE ENCOUNTER
Attempt # 1  Called Phone # [phone]      Was Call answered? No.      Non-detailed voicemail left on July 16, 2024 1:39 PM to call clinic at: [phone].     On Call Back:     Please relay med refilled, but due for diabetic follow up in August.      Thank you,  Zach Hernandez, Triage RN [facility]  1:39 PM 7/16/2024

## 2024-07-17 NOTE — TELEPHONE ENCOUNTER
Attempt # 2  Called Phone # [phone]      Was Call answered? No     Non-detailed voicemail left on July 17, 2024 3:29 PM to call clinic at: [phone].     On Call Back:     Please relay patient due for follow up in August. Please help schedule.      Thank you,  Zach Hernandez, Triage RN [facility]  3:29 PM 7/17/2024

## 2024-08-25 ENCOUNTER — HEALTH MAINTENANCE LETTER (OUTPATIENT)
Age: 59
End: 2024-08-25

## 2024-10-23 ASSESSMENT — PATIENT HEALTH QUESTIONNAIRE - PHQ9: SUM OF ALL RESPONSES TO PHQ QUESTIONS 1-9: 14

## 2024-11-14 ENCOUNTER — OFFICE VISIT (OUTPATIENT)
Dept: URGENT CARE | Facility: URGENT CARE | Age: 59
End: 2024-11-14
Payer: COMMERCIAL

## 2024-11-14 ENCOUNTER — ANCILLARY PROCEDURE (OUTPATIENT)
Dept: GENERAL RADIOLOGY | Facility: CLINIC | Age: 59
End: 2024-11-14
Attending: FAMILY MEDICINE
Payer: COMMERCIAL

## 2024-11-14 VITALS
WEIGHT: 193 LBS | RESPIRATION RATE: 20 BRPM | BODY MASS INDEX: 37.69 KG/M2 | OXYGEN SATURATION: 95 % | TEMPERATURE: 98.5 F | SYSTOLIC BLOOD PRESSURE: 150 MMHG | DIASTOLIC BLOOD PRESSURE: 89 MMHG | HEART RATE: 73 BPM

## 2024-11-14 DIAGNOSIS — J44.1 COPD EXACERBATION (H): Primary | ICD-10-CM

## 2024-11-14 DIAGNOSIS — R05.1 ACUTE COUGH: ICD-10-CM

## 2024-11-14 PROCEDURE — 94640 AIRWAY INHALATION TREATMENT: CPT | Performed by: FAMILY MEDICINE

## 2024-11-14 PROCEDURE — 71046 X-RAY EXAM CHEST 2 VIEWS: CPT | Mod: TC | Performed by: RADIOLOGY

## 2024-11-14 PROCEDURE — 99214 OFFICE O/P EST MOD 30 MIN: CPT | Mod: 25 | Performed by: FAMILY MEDICINE

## 2024-11-14 PROCEDURE — 87635 SARS-COV-2 COVID-19 AMP PRB: CPT | Performed by: FAMILY MEDICINE

## 2024-11-14 RX ORDER — IPRATROPIUM BROMIDE AND ALBUTEROL SULFATE 2.5; .5 MG/3ML; MG/3ML
3 SOLUTION RESPIRATORY (INHALATION) ONCE
Status: COMPLETED | OUTPATIENT
Start: 2024-11-14 | End: 2024-11-14

## 2024-11-14 RX ORDER — PREDNISONE 20 MG/1
40 TABLET ORAL DAILY
Qty: 10 TABLET | Refills: 0 | Status: SHIPPED | OUTPATIENT
Start: 2024-11-14 | End: 2024-11-19

## 2024-11-14 RX ADMIN — IPRATROPIUM BROMIDE AND ALBUTEROL SULFATE 3 ML: 2.5; .5 SOLUTION RESPIRATORY (INHALATION) at 13:58

## 2024-11-14 NOTE — LETTER
November 14, 2024      Jazlyn Bartlett  [address] [facility] [address]        To Whom It May Concern:    Jazlyn Bartlett  was seen on 11/14.  Please excuse her from work 11/11-11/15  due to illness.        Sincerely,        Steve Faria MD

## 2024-11-14 NOTE — PROGRESS NOTES
SUBJECTIVE:   Jazlyn Bartlett is a 59 year old female presenting with a chief complaint of cough, congestion, wheezing.  Onset of symptoms was 4 day(s) ago.  Course of illness is worsening.    Severity moderate  Current and Associated symptoms: cough, wheezing  Treatment measures tried include Fluids, Rest, and inhaler.  Predisposing factors include HX of COPD.    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 100 strip 3    albuterol (PROAIR HFA/PROVENTIL HFA/VENTOLIN HFA) 108 (90 Base) MCG/ACT inhaler Inhale 2 puffs into the lungs every 6 hours as needed for shortness of breath / dyspnea 18 g 5    aspirin (ASA) 81 MG chewable tablet Take 81 mg by mouth at bedtime      blood glucose monitoring (SOFTCLIX) lancets Use to test blood sugar One time daily. 100 each 3    Blood Glucose Monitoring Suppl (ACCU-CHEK GUIDE ME) w/Device KIT 1 each daily Use to test blood sugar 1 kit 0    fluticasone (FLOVENT HFA) 220 MCG/ACT inhaler Inhale 1 puff into the lungs 2 times daily 12 g 3    glipiZIDE (GLUCOTROL XL) 10 MG 24 hr tablet 1 TAB IN AM AND 2 TABS IN  tablet 1    hydrochlorothiazide (HYDRODIURIL) 25 MG tablet TAKE 1 TABLET (25 MG) BY MOUTH DAILY 90 tablet 1    levothyroxine (SYNTHROID/LEVOTHROID) 150 MCG tablet TAKE 1 TABLET BY MOUTH EVERY DAY 90 tablet 1    losartan  (COZAAR) 100 MG tablet TAKE 1 TABLET BY MOUTH EVERY DAY 90 tablet 1    simvastatin (ZOCOR) 20 MG tablet Take 1 tablet (20 mg) by mouth at bedtime 90 tablet 0     Social History     Tobacco Use    Smoking status: Every Day     Current packs/day: 0.50     Average packs/day: 0.5 packs/day for 45.9 years (22.9 ttl pk-yrs)     Types: Cigarettes     Start date: 1/1/1979     Passive exposure: Current    Smokeless tobacco: Never   Substance Use Topics    Alcohol use: No       ROS:  Review of systems negative except as stated above.    OBJECTIVE:  BP (!) 150/89   Pulse 73   Temp 98.5  F (36.9  C)   Resp 20   Wt 87.5 kg (193 lb)   SpO2 95%   BMI 37.69 kg/m    GENERAL APPEARANCE: healthy, alert and no distress  EYES: EOMI,  PERRL, conjunctiva clear  RESP: lungs coarse with few scattered wheezes  PSYCH: mentation appears normal and affect normal/bright    CXR - no acute infiltrate, no pleural effusion, no pneumothorax personally viewed by me    ASSESSMENT/PLAN:  (J44.1) COPD exacerbation (H)  (primary encounter diagnosis)  Plan: ipratropium - albuterol 0.5 mg/2.5 mg/3 mL         (DUONEB) neb solution 3 mL,         INHALATION/NEBULIZER TREATMENT, INITIAL,         amoxicillin-clavulanate (AUGMENTIN) 875-125 MG         tablet, predniSONE (DELTASONE) 20 MG tablet            (R05.1) Acute cough  Plan: XR Chest 2 Views, COVID-19 Virus (Coronavirus)         by PCR Nose, ipratropium - albuterol 0.5 mg/2.5        mg/3 mL (DUONEB) neb solution 3 mL,         INHALATION/NEBULIZER TREATMENT, INITIAL              Reassurance given, reviewed symptomatic treatment with tylenol, ibuprofen, plenty of fluids and rest.  Encourage to continue with inhalers.  Duoneb given in clinic with improvement of symptoms.  RX Augmentin and RX prednisone burst given for COPD flare.  Reviewed that sugar will be elevated due to prednisone.     COVID screen obtained as symptoms overlap with COVID infection, quarantine while awaiting result.    Work excuse  note given  Follow up with primary provider if no improvement of symptoms in 1 week    Steve Faria MD  November 14, 2024 2:46 PM

## 2024-11-15 LAB — SARS-COV-2 RNA RESP QL NAA+PROBE: NEGATIVE

## 2024-12-10 ENCOUNTER — TELEPHONE (OUTPATIENT)
Dept: FAMILY MEDICINE | Facility: CLINIC | Age: 59
End: 2024-12-10

## 2024-12-10 ENCOUNTER — OFFICE VISIT (OUTPATIENT)
Dept: FAMILY MEDICINE | Facility: CLINIC | Age: 59
End: 2024-12-10
Payer: COMMERCIAL

## 2024-12-10 VITALS
TEMPERATURE: 97.5 F | BODY MASS INDEX: 39.44 KG/M2 | DIASTOLIC BLOOD PRESSURE: 85 MMHG | HEART RATE: 71 BPM | SYSTOLIC BLOOD PRESSURE: 141 MMHG | RESPIRATION RATE: 20 BRPM | OXYGEN SATURATION: 98 % | WEIGHT: 200.9 LBS | HEIGHT: 60 IN

## 2024-12-10 DIAGNOSIS — E03.9 HYPOTHYROIDISM, UNSPECIFIED TYPE: ICD-10-CM

## 2024-12-10 DIAGNOSIS — E11.9 TYPE 2 DIABETES MELLITUS WITHOUT COMPLICATION, WITHOUT LONG-TERM CURRENT USE OF INSULIN (H): ICD-10-CM

## 2024-12-10 DIAGNOSIS — I10 HYPERTENSION GOAL BP (BLOOD PRESSURE) < 140/80: ICD-10-CM

## 2024-12-10 DIAGNOSIS — E78.5 HYPERLIPIDEMIA LDL GOAL <100: ICD-10-CM

## 2024-12-10 DIAGNOSIS — Z00.00 ENCOUNTER FOR PREVENTIVE CARE: Primary | ICD-10-CM

## 2024-12-10 LAB
CHOLEST SERPL-MCNC: 151 MG/DL
CREAT UR-MCNC: 149 MG/DL
EST. AVERAGE GLUCOSE BLD GHB EST-MCNC: 169 MG/DL
FASTING STATUS PATIENT QL REPORTED: YES
HBA1C MFR BLD: 7.5 % (ref 0–5.6)
HDLC SERPL-MCNC: 49 MG/DL
LDLC SERPL CALC-MCNC: 77 MG/DL
MICROALBUMIN UR-MCNC: <12 MG/L
MICROALBUMIN/CREAT UR: NORMAL MG/G{CREAT}
NONHDLC SERPL-MCNC: 102 MG/DL
TRIGL SERPL-MCNC: 127 MG/DL

## 2024-12-10 PROCEDURE — 36415 COLL VENOUS BLD VENIPUNCTURE: CPT | Performed by: INTERNAL MEDICINE

## 2024-12-10 PROCEDURE — 91320 SARSCV2 VAC 30MCG TRS-SUC IM: CPT | Performed by: INTERNAL MEDICINE

## 2024-12-10 PROCEDURE — 80061 LIPID PANEL: CPT | Performed by: INTERNAL MEDICINE

## 2024-12-10 PROCEDURE — 90480 ADMN SARSCOV2 VAC 1/ONLY CMP: CPT | Performed by: INTERNAL MEDICINE

## 2024-12-10 PROCEDURE — 99214 OFFICE O/P EST MOD 30 MIN: CPT | Mod: 25 | Performed by: INTERNAL MEDICINE

## 2024-12-10 PROCEDURE — 82043 UR ALBUMIN QUANTITATIVE: CPT | Performed by: INTERNAL MEDICINE

## 2024-12-10 PROCEDURE — 83036 HEMOGLOBIN GLYCOSYLATED A1C: CPT | Performed by: INTERNAL MEDICINE

## 2024-12-10 PROCEDURE — 82570 ASSAY OF URINE CREATININE: CPT | Performed by: INTERNAL MEDICINE

## 2024-12-10 PROCEDURE — 99396 PREV VISIT EST AGE 40-64: CPT | Mod: 25 | Performed by: INTERNAL MEDICINE

## 2024-12-10 RX ORDER — GLIPIZIDE 10 MG/1
10 TABLET, FILM COATED, EXTENDED RELEASE ORAL 2 TIMES DAILY
Qty: 180 TABLET | Refills: 1 | Status: SHIPPED | OUTPATIENT
Start: 2024-12-10

## 2024-12-10 RX ORDER — GLIPIZIDE 10 MG/1
TABLET, FILM COATED, EXTENDED RELEASE ORAL
Qty: 270 TABLET | Refills: 1 | Status: SHIPPED | OUTPATIENT
Start: 2024-12-10 | End: 2024-12-10

## 2024-12-10 RX ORDER — LEVOTHYROXINE SODIUM 150 UG/1
150 TABLET ORAL DAILY
Qty: 90 TABLET | Refills: 1 | Status: SHIPPED | OUTPATIENT
Start: 2024-12-10

## 2024-12-10 RX ORDER — LOSARTAN POTASSIUM 100 MG/1
100 TABLET ORAL DAILY
Qty: 90 TABLET | Refills: 1 | Status: SHIPPED | OUTPATIENT
Start: 2024-12-10

## 2024-12-10 RX ORDER — SIMVASTATIN 20 MG
20 TABLET ORAL AT BEDTIME
Qty: 90 TABLET | Refills: 1 | Status: SHIPPED | OUTPATIENT
Start: 2024-12-10

## 2024-12-10 RX ORDER — HYDROCHLOROTHIAZIDE 25 MG/1
TABLET ORAL
Qty: 90 TABLET | Refills: 1 | Status: SHIPPED | OUTPATIENT
Start: 2024-12-10

## 2024-12-10 SDOH — HEALTH STABILITY: PHYSICAL HEALTH: ON AVERAGE, HOW MANY MINUTES DO YOU ENGAGE IN EXERCISE AT THIS LEVEL?: 0 MIN

## 2024-12-10 SDOH — HEALTH STABILITY: PHYSICAL HEALTH: ON AVERAGE, HOW MANY DAYS PER WEEK DO YOU ENGAGE IN MODERATE TO STRENUOUS EXERCISE (LIKE A BRISK WALK)?: 0 DAYS

## 2024-12-10 ASSESSMENT — PATIENT HEALTH QUESTIONNAIRE - PHQ9
SUM OF ALL RESPONSES TO PHQ QUESTIONS 1-9: 11
SUM OF ALL RESPONSES TO PHQ QUESTIONS 1-9: 11
10. IF YOU CHECKED OFF ANY PROBLEMS, HOW DIFFICULT HAVE THESE PROBLEMS MADE IT FOR YOU TO DO YOUR WORK, TAKE CARE OF THINGS AT HOME, OR GET ALONG WITH OTHER PEOPLE: SOMEWHAT DIFFICULT

## 2024-12-10 ASSESSMENT — SOCIAL DETERMINANTS OF HEALTH (SDOH): HOW OFTEN DO YOU GET TOGETHER WITH FRIENDS OR RELATIVES?: NEVER

## 2024-12-10 ASSESSMENT — PAIN SCALES - GENERAL: PAINLEVEL_OUTOF10: NO PAIN (0)

## 2024-12-10 NOTE — PROGRESS NOTES
{PROVIDER CHARTING PREFERENCE:825881}    Fe Marquez is a 59 year old, presenting for the following health issues:  Follow Up and Diabetes        12/10/2024     8:16 AM   Additional Questions   Roomed by Darren MARIN     {MA/LPN/RN Pre-Provider Visit Orders- hCG/UA/Strep (Optional):637049}  {SUPERLIST (Optional):247755}  {additonal problems for provider to add (Optional):682114}    {ROS Picklists (Optional):200908}      Objective    There were no vitals taken for this visit.  There is no height or weight on file to calculate BMI.  Physical Exam   {Exam List (Optional):839943}    {Diagnostic Test Results (Optional):836646}        Signed Electronically by: Juvenal Naik MD  {Email feedback regarding this note to [email or web address]   :055752}

## 2024-12-10 NOTE — Clinical Note
12/10/2024    Jazlyn Bartlett   1965        To Whom it May Concern;    Please excuse Jazlyn HEMAL Bartlett from work/school for a healthcare visit on Dec 10, 2024.    Sincerely,        Juvenal Naik MD

## 2024-12-10 NOTE — PATIENT INSTRUCTIONS
You got a COVID vaccine today    Medication refills as below    LABS today    Please see Sharron Justice in 4 months.

## 2024-12-10 NOTE — PROGRESS NOTES
Preventive Care Visit  [facility]  Juvenal Naik MD, Internal Medicine  Dec 10, 2024      Assessment & Plan     Alma is a pleasant 59-year-old meeting for the first time.  The patient is now planning to establish care here.  She plans to continue at the [facility].      (Z00.00) Encounter for preventive care  (primary encounter diagnosis)  Comment: Age and gender appropriate preventive care and screenings are discussed.  Particular attention to personal preventive care and age appropriate lifestyle including the incorporation of healthy diet and physical activity is made.      Plan: Agrees to COVID vaccination.  Declines influenza vaccine.  Remainder of her health maintenance is reviewed.    (I10) Hypertension goal BP (blood pressure) < 140/80  Comment: Blood pressure was initially high.  Better on recheck.  The patient is maintained on hydrochlorothiazide and losartan.  We will continue for now.  The patient is asked to schedule a visit with her primary clinician for further management and titration if indicated.  Plan: losartan (COZAAR) 100 MG tablet,         hydrochlorothiazide (HYDRODIURIL) 25 MG tablet            (E11.9) Type 2 diabetes mellitus without complication, without long-term current use of insulin (H)  Comment: The patient is currently being managed with glipizide alone.  The patient is on 30 mg a day.  It is unclear why.  Max dose of extended release is typically 20 mg a day.  Her A1c today 7.5.  This was resulted after the patient left clinic.  We will reach out to her and decrease glipizide to 10 mg twice a day.  Start Jardiance at 10 mg once a day.     Consider Jardiance or similar.  I have urged patient to schedule another visit with her primary clinician to discuss the high dose of glipizide that she is currently on.  Plan: Albumin Random Urine Quantitative with Creat         Ratio, HEMOGLOBIN A1C, glipiZIDE (GLUCOTROL XL)        10 MG 24 hr tablet         "    (E03.9) Hypothyroidism, unspecified type  Comment: Ran out of levothyroxine 2 days ago.  Her last TSH from February 2024 was reviewed.  Medication is refilled.  Plan: levothyroxine (SYNTHROID/LEVOTHROID) 150 MCG         tablet            (E78.5) Hyperlipidemia LDL goal <100  Comment: Patient is on a statin.  Will assess a lipid profile today.  Plan: Lipid panel reflex to direct LDL Non-fasting,         simvastatin (ZOCOR) 20 MG tablet                     BMI  Estimated body mass index is 39.34 kg/m  as calculated from the following:    Height as of this encounter: 1.522 m (4' 11.92\").    Weight as of this encounter: 91.1 kg (200 lb 14.4 oz).       Counseling  Appropriate preventive services were addressed with this patient via screening, questionnaire, or discussion as appropriate for fall prevention, nutrition, physical activity, Tobacco-use cessation, social engagement, weight loss and cognition.  Checklist reviewing preventive services available has been given to the patient.  Reviewed patient's diet, addressing concerns and/or questions.   Patient is at risk for social isolation and has been provided with information about the benefit of social connection.   She is at risk for psychosocial distress and has been provided with information to reduce risk.   The patient's PHQ-9 score is consistent with moderate depression. She was provided with information regarding depression.           Fe Marquez is a 59 year old, presenting for the following:  Follow Up, Diabetes, and Recheck Medication        12/10/2024     8:16 AM   Additional Questions   Roomed by Darren MARIN    Pt is new to me but not the system.  Usually goes to the [facility].  Recent appt there cancelled.  Here for med refills.    She ran out of Levothyroxine on Sunday    She is low on hydrochlorothiazide and needs refill of that also.      Checks BS at home intermittently.  Last week she had a 175.      Currently only on " Glipizide.  Had GI issues with Metformin.  Has also had GI SE with GLP 1.      She is currently taking 30 mg of glipizide daily.  I did verify this with the patient.            12/10/2024   General Health   How would you rate your overall physical health? (!) FAIR   Feel stress (tense, anxious, or unable to sleep) Very much      (!) STRESS CONCERN      12/10/2024   Nutrition   Three or more servings of calcium each day? (!) NO   Diet: Regular (no restrictions)   How many servings of fruit and vegetables per day? (!) 0-1   How many sweetened beverages each day? 0-1            12/10/2024   Exercise   Days per week of moderate/strenous exercise 0 days   Average minutes spent exercising at this level 0 min      (!) EXERCISE CONCERN      12/10/2024   Social Factors   Frequency of gathering with friends or relatives Never   Worry food won't last until get money to buy more Yes   Food not last or not have enough money for food? Yes   Do you have housing? (Housing is defined as stable permanent housing and does not include staying ouside in a car, in a tent, in an abandoned building, in an overnight shelter, or couch-surfing.) Yes   Are you worried about losing your housing? Yes   Lack of transportation? No   Unable to get utilities (heat,electricity)? Yes   Want help with housing or utility concern? (!) YES      (!) FOOD SECURITY CONCERN PRESENT(!) HOUSING CONCERN PRESENT(!) FINANCIAL RESOURCE STRAIN CONCERN(!) SOCIAL CONNECTIONS CONCERN      12/10/2024   Fall Risk   Fallen 2 or more times in the past year? Yes    Trouble with walking or balance? Yes        Patient-reported           12/10/2024   Dental   Dentist two times every year? Yes            12/10/2024   TB Screening   Were you born outside of the [place]? No          Today's PHQ-9 Score:       12/10/2024     8:15 AM   PHQ-9 SCORE   PHQ-9 Total Score MyChart 11 (Moderate depression)   PHQ-9 Total Score 11        Patient-reported         12/10/2024   Substance Use   If I  could quit smoking, I would Completely agree   I want to quit somking, worry about health affects Completely agree   Willing to make a plan to quit smoking Completely agree   Willing to cut down before quitting Completely agree   Alcohol more than 3/day or more than 7/wk No   Do you use any other substances recreationally? No        Social History     Tobacco Use    Smoking status: Every Day     Current packs/day: 0.50     Average packs/day: 0.5 packs/day for 45.9 years (23.0 ttl pk-yrs)     Types: Cigarettes     Start date: 1/1/1979     Passive exposure: Current    Smokeless tobacco: Never   Vaping Use    Vaping status: Never Used   Substance Use Topics    Alcohol use: No    Drug use: No           7/18/2023   LAST FHS-7 RESULTS   1st degree relative breast or ovarian cancer Yes   Any relative bilateral breast cancer No   Any male have breast cancer No   Any ONE woman have BOTH breast AND ovarian cancer No   Any woman with breast cancer before 50yrs No   2 or more relatives with breast AND/OR ovarian cancer No   2 or more relatives with breast AND/OR bowel cancer No                   12/10/2024   STI Screening   New sexual partner(s) since last STI/HIV test? (!) YES         History of abnormal Pap smear:         Latest Ref Rng & Units 2/3/2023     7:32 AM 11/7/2017     5:35 PM 11/7/2017     5:00 PM   PAP / HPV   PAP  Negative for Intraepithelial Lesion or Malignancy (NILM)      PAP (Historical)    NIL    HPV 16 DNA Negative Negative  Negative     HPV 18 DNA Negative Negative  Negative     Other HR HPV Negative Negative  Negative       ASCVD Risk   The 10-year ASCVD risk score (Dipak LOZA, et al., 2019) is: 16.1%    Values used to calculate the score:      Age: 59 years      Sex: Female      Is Non- : No      Diabetic: Yes      Tobacco smoker: Yes      Systolic Blood Pressure: 141 mmHg      Is BP treated: Yes      HDL Cholesterol: 46 mg/dL      Total Cholesterol: 138 mg/dL          "  Reviewed and updated as needed this visit by Provider                             Objective    Exam  BP (!) 141/85 (BP Location: Left arm, Patient Position: Sitting, Cuff Size: Adult Large)   Pulse 71   Resp 20   Ht 1.522 m (4' 11.92\")   Wt 91.1 kg (200 lb 14.4 oz)   SpO2 98%   BMI 39.34 kg/m     Estimated body mass index is 39.34 kg/m  as calculated from the following:    Height as of this encounter: 1.522 m (4' 11.92\").    Weight as of this encounter: 91.1 kg (200 lb 14.4 oz).    Physical Exam  GENERAL: alert and no distress  EYES: Eyes grossly normal to inspection, PERRL and conjunctivae and sclerae normal  HENT: ear canals and TM's normal, nose and mouth without ulcers or lesions  NECK: no adenopathy, no asymmetry, masses, or scars  RESP: lungs clear to auscultation - no rales, rhonchi or wheezes  CV: regular rate and rhythm, normal S1 S2, no S3 or S4, no murmur, click or rub, no peripheral edema  ABDOMEN: soft, nontender, no hepatosplenomegaly, no masses and bowel sounds normal  MS: no gross musculoskeletal defects noted, no edema  SKIN: no suspicious lesions or rashes  NEURO: Normal strength and tone, mentation intact and speech normal  PSYCH: mentation appears normal, affect normal/bright        Signed Electronically by: Juvenal Naik MD    "

## 2024-12-10 NOTE — TELEPHONE ENCOUNTER
Patient Contact     Attempt # 1     Was call answered?  No.  Left message on voicemail with information to call triage back.     On callback please inform pt on the provider's result and recommendations.      Lul Gee RN  [facility]

## 2024-12-10 NOTE — TELEPHONE ENCOUNTER
Please call patient.  Her A1c is 7.5.    I would like her to reduce glipizide to 10 mg twice a day.  I would like her to start Jardiance at 10 mg once a day.    She should see her primary clinician at [place] in 3 to 4 months.    Juvenal Naik MD on 12/10/2024 at 9:38 AM

## 2025-01-24 ENCOUNTER — TELEPHONE (OUTPATIENT)
Dept: INTERNAL MEDICINE | Facility: CLINIC | Age: 60
End: 2025-01-24
Payer: COMMERCIAL

## 2025-01-24 DIAGNOSIS — E78.5 HYPERLIPIDEMIA LDL GOAL <100: ICD-10-CM

## 2025-01-24 DIAGNOSIS — E03.9 HYPOTHYROIDISM, UNSPECIFIED TYPE: ICD-10-CM

## 2025-01-24 DIAGNOSIS — E11.9 TYPE 2 DIABETES MELLITUS WITHOUT COMPLICATION, WITHOUT LONG-TERM CURRENT USE OF INSULIN (H): ICD-10-CM

## 2025-01-24 DIAGNOSIS — I10 HYPERTENSION GOAL BP (BLOOD PRESSURE) < 140/80: ICD-10-CM

## 2025-01-24 RX ORDER — HYDROCHLOROTHIAZIDE 25 MG/1
TABLET ORAL
Qty: 90 TABLET | Refills: 0 | Status: SHIPPED | OUTPATIENT
Start: 2025-01-24

## 2025-01-24 RX ORDER — SIMVASTATIN 20 MG
20 TABLET ORAL AT BEDTIME
Qty: 90 TABLET | Refills: 0 | Status: SHIPPED | OUTPATIENT
Start: 2025-01-24

## 2025-01-24 RX ORDER — LEVOTHYROXINE SODIUM 150 UG/1
150 TABLET ORAL DAILY
Qty: 90 TABLET | Refills: 0 | Status: SHIPPED | OUTPATIENT
Start: 2025-01-24

## 2025-01-24 RX ORDER — GLIPIZIDE 10 MG/1
10 TABLET, FILM COATED, EXTENDED RELEASE ORAL 2 TIMES DAILY
Qty: 180 TABLET | Refills: 0 | Status: SHIPPED | OUTPATIENT
Start: 2025-01-24

## 2025-01-24 RX ORDER — LOSARTAN POTASSIUM 100 MG/1
100 TABLET ORAL DAILY
Qty: 90 TABLET | Refills: 0 | Status: SHIPPED | OUTPATIENT
Start: 2025-01-24

## 2025-01-24 NOTE — TELEPHONE ENCOUNTER
She should schedule a diabetes follow-up with me. It looks like her A1c was checked with a provider last month and it was above 7%.     This can be a video visit. Recommend she see me in February for video visit with labs done 3 days prior. I will check a 1 month BG average called a fructosamine level.      I sent refills today.

## 2025-01-24 NOTE — TELEPHONE ENCOUNTER
Patient calls and states that the prescriptions sent on 12/10/2024 were sent to the incorrect pharmacy and her insurance will not cover the [facility] pharmacy.    Please resend all prescriptions to [facility].  Orders pended    Patient has been out of medication for almost 3 weeks. Please sign when possible.

## 2025-01-27 NOTE — TELEPHONE ENCOUNTER
Left message to call back , please let Patient know she is due to be seen in Feb , needs labs 3 days prior to appiontment VALERIA Pedroza LPN

## 2025-01-29 NOTE — TELEPHONE ENCOUNTER
Patient's home/cell number message on machine to call back.  (Also noted patient has physical scheduled for 3- but just had a physical 12-.)

## 2025-02-11 ENCOUNTER — LAB (OUTPATIENT)
Dept: LAB | Facility: CLINIC | Age: 60
End: 2025-02-11
Payer: COMMERCIAL

## 2025-02-11 DIAGNOSIS — E05.00 GRAVES' DISEASE: Primary | ICD-10-CM

## 2025-02-11 DIAGNOSIS — E11.9 TYPE 2 DIABETES MELLITUS WITHOUT COMPLICATION, WITHOUT LONG-TERM CURRENT USE OF INSULIN (H): ICD-10-CM

## 2025-02-11 LAB
ANION GAP SERPL CALCULATED.3IONS-SCNC: 15 MMOL/L (ref 7–15)
BUN SERPL-MCNC: 17.9 MG/DL (ref 8–23)
CALCIUM SERPL-MCNC: 10.1 MG/DL (ref 8.8–10.4)
CHLORIDE SERPL-SCNC: 100 MMOL/L (ref 98–107)
CREAT SERPL-MCNC: 0.76 MG/DL (ref 0.51–0.95)
EGFRCR SERPLBLD CKD-EPI 2021: 90 ML/MIN/1.73M2
GLUCOSE SERPL-MCNC: 140 MG/DL (ref 70–99)
HCO3 SERPL-SCNC: 25 MMOL/L (ref 22–29)
POTASSIUM SERPL-SCNC: 4 MMOL/L (ref 3.4–5.3)
SODIUM SERPL-SCNC: 140 MMOL/L (ref 135–145)
T4 FREE SERPL-MCNC: 1.27 NG/DL (ref 0.9–1.7)
TSH SERPL DL<=0.005 MIU/L-ACNC: 5.02 UIU/ML (ref 0.3–4.2)

## 2025-02-11 PROCEDURE — 84439 ASSAY OF FREE THYROXINE: CPT

## 2025-02-11 PROCEDURE — 99000 SPECIMEN HANDLING OFFICE-LAB: CPT

## 2025-02-11 PROCEDURE — 80048 BASIC METABOLIC PNL TOTAL CA: CPT

## 2025-02-11 PROCEDURE — 36415 COLL VENOUS BLD VENIPUNCTURE: CPT

## 2025-02-11 PROCEDURE — 82985 ASSAY OF GLYCATED PROTEIN: CPT | Mod: 90

## 2025-02-11 PROCEDURE — 84443 ASSAY THYROID STIM HORMONE: CPT

## 2025-02-13 LAB — FRUCTOSAMINE SERPL-SCNC: 284 UMOL/L

## 2025-02-14 ENCOUNTER — MYC MEDICAL ADVICE (OUTPATIENT)
Dept: INTERNAL MEDICINE | Facility: CLINIC | Age: 60
End: 2025-02-14

## 2025-02-14 ENCOUNTER — TELEPHONE (OUTPATIENT)
Dept: INTERNAL MEDICINE | Facility: CLINIC | Age: 60
End: 2025-02-14

## 2025-02-14 DIAGNOSIS — J44.9 CHRONIC OBSTRUCTIVE PULMONARY DISEASE, UNSPECIFIED COPD TYPE (H): Primary | ICD-10-CM

## 2025-02-14 NOTE — TELEPHONE ENCOUNTER
Please call patient and see if she is okay with trying pulmicort instead as flovent inhaler is no longer covered

## 2025-02-14 NOTE — TELEPHONE ENCOUNTER
Call to imaging. States a renal ultrasound would also include the adrenal glands so this order will be fine.

## 2025-02-14 NOTE — TELEPHONE ENCOUNTER
PRIOR AUTHORIZATION DENIED    Medication: FLUTICASONE PROPIONATE  MCG/ACT IN AERO  Insurance Company: [employer] - Private Location App - Phone [phone] Fax [phone]  Denial Date: 2/14/2025  Denial Reason(s): Must try/fail all preferred meds: Asmanex and Pulmicort (tried)        Appeal Information:       Patient Notified: No

## 2025-02-14 NOTE — TELEPHONE ENCOUNTER
Please call radiology department to see if I need to change her imaging order:    It was recommended that she have a renal MRI done to follow up on renal cysts. (Last renal MRI was done in July of 2023 and recommend 6-12 months surveillance MRI).    There was an incidental finding of a left adrenal gland adenoma seen on CT scan on 2/15/24.     I would like to order imaging that can look at the kidneys as well as the adrenal glands. Should I change her order to an abdominal MRI? Or will radiology be able to visualize the adrenal glands with a renal MRI?    Thank you!

## 2025-02-18 NOTE — TELEPHONE ENCOUNTER
Patient responds via MyChart:    Hi yes what ever insurance will cover if fine. Thnk you Alma      Please send alternative.    Thank you,  Zach, Triage RN Yuliana Baires    1:08 PM 2/18/2025

## 2025-02-20 NOTE — TELEPHONE ENCOUNTER
Last office visit 2/14/25 changing medications was discussed but decision was to hold off and patient to work on diet and exercise/weight loss.    See [employer] message. Patient asking about Rivera.    Is this an option? Does patient need a virtual visit to discuss?

## 2025-02-20 NOTE — TELEPHONE ENCOUNTER
This should be discussed during a visit.  I do recall she did try another GLP-1 (ozempic) in the past and had severe GI symptoms with this so it was discontinued.  We could still consider trying another alternative as some people do better with one medication versus another but we can discuss this at a visit

## 2025-02-20 NOTE — TELEPHONE ENCOUNTER
Sent [employer] message to patient.    Thank you,  Zach, Triage RN Yuliana Baires    2:48 PM 2/20/2025

## 2025-03-05 ENCOUNTER — HOSPITAL ENCOUNTER (OUTPATIENT)
Dept: MRI IMAGING | Facility: CLINIC | Age: 60
Discharge: HOME | End: 2025-03-05
Payer: COMMERCIAL

## 2025-03-05 DIAGNOSIS — N28.1 RENAL CYST: ICD-10-CM

## 2025-03-05 PROCEDURE — 74183 MRI ABD W/O CNTR FLWD CNTR: CPT

## 2025-03-05 PROCEDURE — 255N000002 HC RX 255 OP 636

## 2025-03-05 PROCEDURE — A9585 GADOBUTROL INJECTION: HCPCS

## 2025-03-05 RX ORDER — GADOBUTROL 604.72 MG/ML
9 INJECTION INTRAVENOUS ONCE
Status: COMPLETED | OUTPATIENT
Start: 2025-03-05 | End: 2025-03-05

## 2025-03-05 RX ADMIN — GADOBUTROL 9 ML: 604.72 INJECTION INTRAVENOUS at 14:07

## 2025-03-07 ENCOUNTER — TELEPHONE (OUTPATIENT)
Dept: INTERNAL MEDICINE | Facility: CLINIC | Age: 60
End: 2025-03-07
Payer: COMMERCIAL

## 2025-03-07 ENCOUNTER — MYC MEDICAL ADVICE (OUTPATIENT)
Dept: INTERNAL MEDICINE | Facility: CLINIC | Age: 60
End: 2025-03-07
Payer: COMMERCIAL

## 2025-03-07 DIAGNOSIS — N28.1 RENAL CYST: Primary | ICD-10-CM

## 2025-03-07 NOTE — TELEPHONE ENCOUNTER
Attempt # 1  Called Phone # [phone]      Was Call answered? NO     Non-detailed voicemail left on March 7, 2025 2:38 PM to call clinic at: [phone] regarding lab results.     On Call Back:     Please relay labs/provider's message below. Also sent MyChart. If patient reviews MyChart, please close encounter.     Thank you,  Zach, Triage TRACY Baires    2:38 PM 3/7/2025

## 2025-03-07 NOTE — TELEPHONE ENCOUNTER
----- Message from Sharron Justice sent at 3/7/2025  2:10 PM CST -----  Please let patient know we will review results at upcoming appt.  If she would like to go over results sooner, she could make video visit to go over these as there are a few things I want to go over with her.  Otherwise, okay to wait until diabetes follow up appt on 3/24.

## 2025-03-23 ENCOUNTER — HOSPITAL ENCOUNTER (EMERGENCY)
Facility: CLINIC | Age: 60
Discharge: HOME | End: 2025-03-24
Attending: EMERGENCY MEDICINE | Admitting: EMERGENCY MEDICINE
Payer: COMMERCIAL

## 2025-03-23 ENCOUNTER — APPOINTMENT (OUTPATIENT)
Dept: CT IMAGING | Facility: CLINIC | Age: 60
End: 2025-03-23
Attending: EMERGENCY MEDICINE
Payer: COMMERCIAL

## 2025-03-23 VITALS
RESPIRATION RATE: 17 BRPM | SYSTOLIC BLOOD PRESSURE: 158 MMHG | HEART RATE: 74 BPM | OXYGEN SATURATION: 96 % | TEMPERATURE: 96.7 F | DIASTOLIC BLOOD PRESSURE: 89 MMHG

## 2025-03-23 DIAGNOSIS — R91.1 PULMONARY NODULE: ICD-10-CM

## 2025-03-23 DIAGNOSIS — R07.9 CHEST PAIN, UNSPECIFIED TYPE: ICD-10-CM

## 2025-03-23 LAB
ANION GAP SERPL CALCULATED.3IONS-SCNC: 12 MMOL/L (ref 7–15)
BASOPHILS # BLD AUTO: 0 10E3/UL (ref 0–0.2)
BASOPHILS NFR BLD AUTO: 0 %
BUN SERPL-MCNC: 10.2 MG/DL (ref 8–23)
CALCIUM SERPL-MCNC: 9.7 MG/DL (ref 8.8–10.4)
CHLORIDE SERPL-SCNC: 99 MMOL/L (ref 98–107)
CREAT SERPL-MCNC: 0.62 MG/DL (ref 0.51–0.95)
D DIMER PPP FEU-MCNC: 0.71 UG/ML FEU (ref 0–0.5)
EGFRCR SERPLBLD CKD-EPI 2021: >90 ML/MIN/1.73M2
EOSINOPHIL # BLD AUTO: 0.1 10E3/UL (ref 0–0.7)
EOSINOPHIL NFR BLD AUTO: 1 %
ERYTHROCYTE [DISTWIDTH] IN BLOOD BY AUTOMATED COUNT: 12.8 % (ref 10–15)
GLUCOSE SERPL-MCNC: 137 MG/DL (ref 70–99)
HCO3 SERPL-SCNC: 23 MMOL/L (ref 22–29)
HCT VFR BLD AUTO: 42.5 % (ref 35–47)
HGB BLD-MCNC: 14.6 G/DL (ref 11.7–15.7)
IMM GRANULOCYTES # BLD: 0.1 10E3/UL
IMM GRANULOCYTES NFR BLD: 1 %
LYMPHOCYTES # BLD AUTO: 2.3 10E3/UL (ref 0.8–5.3)
LYMPHOCYTES NFR BLD AUTO: 16 %
MCH RBC QN AUTO: 30.5 PG (ref 26.5–33)
MCHC RBC AUTO-ENTMCNC: 34.4 G/DL (ref 31.5–36.5)
MCV RBC AUTO: 89 FL (ref 78–100)
MONOCYTES # BLD AUTO: 1.3 10E3/UL (ref 0–1.3)
MONOCYTES NFR BLD AUTO: 9 %
NEUTROPHILS # BLD AUTO: 11 10E3/UL (ref 1.6–8.3)
NEUTROPHILS NFR BLD AUTO: 74 %
NRBC # BLD AUTO: 0 10E3/UL
NRBC BLD AUTO-RTO: 0 /100
PLATELET # BLD AUTO: 291 10E3/UL (ref 150–450)
POTASSIUM SERPL-SCNC: 3.8 MMOL/L (ref 3.4–5.3)
RBC # BLD AUTO: 4.79 10E6/UL (ref 3.8–5.2)
SODIUM SERPL-SCNC: 134 MMOL/L (ref 135–145)
TROPONIN T SERPL HS-MCNC: <6 NG/L
WBC # BLD AUTO: 14.8 10E3/UL (ref 4–11)

## 2025-03-23 PROCEDURE — 85025 COMPLETE CBC W/AUTO DIFF WBC: CPT | Performed by: EMERGENCY MEDICINE

## 2025-03-23 PROCEDURE — 250N000009 HC RX 250: Performed by: EMERGENCY MEDICINE

## 2025-03-23 PROCEDURE — 71275 CT ANGIOGRAPHY CHEST: CPT

## 2025-03-23 PROCEDURE — 36415 COLL VENOUS BLD VENIPUNCTURE: CPT | Performed by: EMERGENCY MEDICINE

## 2025-03-23 PROCEDURE — 99285 EMERGENCY DEPT VISIT HI MDM: CPT | Mod: 25

## 2025-03-23 PROCEDURE — 96375 TX/PRO/DX INJ NEW DRUG ADDON: CPT

## 2025-03-23 PROCEDURE — 96374 THER/PROPH/DIAG INJ IV PUSH: CPT | Mod: 59

## 2025-03-23 PROCEDURE — 250N000011 HC RX IP 250 OP 636: Mod: JZ | Performed by: EMERGENCY MEDICINE

## 2025-03-23 PROCEDURE — 84484 ASSAY OF TROPONIN QUANT: CPT | Performed by: EMERGENCY MEDICINE

## 2025-03-23 PROCEDURE — 80048 BASIC METABOLIC PNL TOTAL CA: CPT | Performed by: EMERGENCY MEDICINE

## 2025-03-23 PROCEDURE — 250N000011 HC RX IP 250 OP 636: Performed by: EMERGENCY MEDICINE

## 2025-03-23 PROCEDURE — 93005 ELECTROCARDIOGRAM TRACING: CPT

## 2025-03-23 PROCEDURE — 85379 FIBRIN DEGRADATION QUANT: CPT | Performed by: EMERGENCY MEDICINE

## 2025-03-23 RX ORDER — KETOROLAC TROMETHAMINE 15 MG/ML
15 INJECTION, SOLUTION INTRAMUSCULAR; INTRAVENOUS ONCE
Status: COMPLETED | OUTPATIENT
Start: 2025-03-23 | End: 2025-03-23

## 2025-03-23 RX ORDER — HYDROMORPHONE HYDROCHLORIDE 1 MG/ML
0.5 INJECTION, SOLUTION INTRAMUSCULAR; INTRAVENOUS; SUBCUTANEOUS ONCE
Status: COMPLETED | OUTPATIENT
Start: 2025-03-23 | End: 2025-03-23

## 2025-03-23 RX ORDER — IOPAMIDOL 755 MG/ML
500 INJECTION, SOLUTION INTRAVASCULAR ONCE
Status: COMPLETED | OUTPATIENT
Start: 2025-03-23 | End: 2025-03-23

## 2025-03-23 RX ADMIN — SODIUM CHLORIDE 75 ML: 9 INJECTION, SOLUTION INTRAVENOUS at 23:18

## 2025-03-23 RX ADMIN — HYDROMORPHONE HYDROCHLORIDE 0.5 MG: 1 INJECTION, SOLUTION INTRAMUSCULAR; INTRAVENOUS; SUBCUTANEOUS at 21:11

## 2025-03-23 RX ADMIN — IOPAMIDOL 75 ML: 755 INJECTION, SOLUTION INTRAVENOUS at 23:18

## 2025-03-23 RX ADMIN — KETOROLAC TROMETHAMINE 15 MG: 15 INJECTION, SOLUTION INTRAMUSCULAR; INTRAVENOUS at 21:10

## 2025-03-23 ASSESSMENT — COLUMBIA-SUICIDE SEVERITY RATING SCALE - C-SSRS
2. HAVE YOU ACTUALLY HAD ANY THOUGHTS OF KILLING YOURSELF IN THE PAST MONTH?: NO
6. HAVE YOU EVER DONE ANYTHING, STARTED TO DO ANYTHING, OR PREPARED TO DO ANYTHING TO END YOUR LIFE?: NO
1. IN THE PAST MONTH, HAVE YOU WISHED YOU WERE DEAD OR WISHED YOU COULD GO TO SLEEP AND NOT WAKE UP?: NO

## 2025-03-23 ASSESSMENT — ACTIVITIES OF DAILY LIVING (ADL)
ADLS_ACUITY_SCORE: 48

## 2025-03-23 NOTE — Clinical Note
Jazlyn Bartlett was seen and treated in our emergency department on 3/23/2025.  She may return to work on [unfilled].  [unfilled]     If you have any questions or concerns, please don't hesitate to call.      [unfilled]

## 2025-03-23 NOTE — LETTER
March 24, 2025      To Whom It May Concern:      Jazlyn Bartlett was seen in our Emergency Department today, 03/24/25.  I expect her condition to improve over the next 1-2 days.  She may return to work when improved.    Sincerely,        Glory Hinds RN  Electronically signed

## 2025-03-24 LAB
ATRIAL RATE - MUSE: 81 BPM
DIASTOLIC BLOOD PRESSURE - MUSE: NORMAL MMHG
INTERPRETATION ECG - MUSE: NORMAL
P AXIS - MUSE: 49 DEGREES
PR INTERVAL - MUSE: 142 MS
QRS DURATION - MUSE: 82 MS
QT - MUSE: 386 MS
QTC - MUSE: 448 MS
R AXIS - MUSE: -10 DEGREES
SYSTOLIC BLOOD PRESSURE - MUSE: NORMAL MMHG
T AXIS - MUSE: 58 DEGREES
VENTRICULAR RATE- MUSE: 81 BPM

## 2025-03-24 RX ORDER — LIDOCAINE 4 G/G
1 PATCH TOPICAL EVERY 24 HOURS
Qty: 10 PATCH | Refills: 0 | Status: SHIPPED | OUTPATIENT
Start: 2025-03-24

## 2025-03-24 RX ORDER — CYCLOBENZAPRINE HCL 10 MG
10 TABLET ORAL 3 TIMES DAILY PRN
Qty: 10 TABLET | Refills: 0 | Status: SHIPPED | OUTPATIENT
Start: 2025-03-24

## 2025-03-24 ASSESSMENT — ACTIVITIES OF DAILY LIVING (ADL): ADLS_ACUITY_SCORE: 48

## 2025-03-24 NOTE — DISCHARGE INSTRUCTIONS
You were incidentally noted to have a lung nodule.  This will need to be followed by your primary care physician to ensure it is not enlarging.    Discharge Instructions  Chest Pain    You have been seen today for chest pain or discomfort.  At this time, your provider has found no signs that your chest pain is due to a serious or life-threatening condition, (or you have declined more testing and/or admission to the hospital). However, sometimes there is a serious problem that does not show up right away. Your evaluation today may not be complete and you may need further testing and evaluation.     Generally, every Emergency Department visit should have a follow-up clinic visit with either a primary or a specialty clinic/provider. Please follow-up as instructed by your emergency provider today.  Return to the Emergency Department if:  Your chest pain changes, gets worse, starts to happen more often, or comes with less activity.  You are newly short of breath.  You get very weak or tired.  You pass out or faint.  You have any new symptoms, like fever, cough, numb legs, or you cough up blood.  You have anything else that worries you.    Until you follow-up with your regular provider, please do the following:  Take one aspirin daily unless you have an allergy or are told not to by your provider.  If a stress test appointment has been made, go to the appointment.  If you have questions, contact your regular provider.  Follow-up with your regular provider/clinic as directed; this is very important.    If you were given a prescription for medicine here today, be sure to read all of the information (including the package insert) that comes with your prescription.  This will include important information about the medicine, its side effects, and any warnings that you need to know about.  The pharmacist who fills the prescription can provide more information and answer questions you may have about the medicine.  If you have  questions or concerns that the pharmacist cannot address, please call or return to the Emergency Department.       Remember that you can always come back to the Emergency Department if you are not able to see your regular provider in the amount of time listed above, if you get any new symptoms, or if there is anything that worries you.  Discharge Instructions  Incidental Findings    An incidental finding is something unexpected that was found while you were being treated and is felt to not be related to the reason that you came to the Emergency Department.  While this finding is not an emergency, you need to follow up with your primary provider (or occasionally a specialist) to determine if anything should be done about it.    These findings can come from:  Checking your vital signs (example: high blood pressure).  Taking your history (example: unexplained weight loss).  The physical exam (example: a heart murmur).  Laboratory study (example: anemia or low blood count).  X-rays/ultrasound/CT or other imaging (example: an unexplained mass).    Generally, every Emergency Department visit should have a follow-up clinic visit with either a primary or a specialty clinic/provider. Please follow-up as instructed by your emergency provider today.    Return to the Emergency Department if:  Your condition worsens.  You develop unexpected pain.  You now develop new symptoms or have new concerns.  If you were given a prescription for medicine here today, be sure to read all of the information (including the package insert) that comes with your prescription.  This will include important information about the medicine, its side effects, and any warnings that you need to know about.  The pharmacist who fills the prescription can provide more information and answer questions you may have about the medicine.  If you have questions or concerns that the pharmacist cannot address, please call or return to the Emergency Department.    Remember that you can always come back to the Emergency Department if you are not able to see your regular provider in the amount of time listed above, if you get any new symptoms, or if there is anything that worries you.

## 2025-03-24 NOTE — ED PROVIDER NOTES
Emergency Department Note      History of Present Illness     Chief Complaint   Chest Pain and Back Pain    HPI     Jazlyn Bartlett is a 59 year old female with a history as noted below who presents to the emergency department for chest pain and back pain. The patient states that earlier tonight, while cooking, she began experiencing an onset of left-sided chest pain under her left breast that has since been radiating to her back. She reports that this chest pain is exacerbated upon inspiration and palpation of her chest. She took aspirin at home for this chest pain. She denies performing any exertion or strenuous activity during chest pain onset. She reports that 1.5 weeks ago, she experienced some right-sided chest pain after leaning on a wooden chair to brush her dog's fur. She adds that she is currently nauseated. No shortness of breath or abdominal pain. No recent travel or surgery. No hemoptysis. No hormone therapy. No hx of DVT/PE. 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     Independent Historian   None    Review of External Notes   None     Past Medical History     Medical History and Problem List   Arthritis  Chronic pain  COPD  Depressive disorder  Diabetes mellitus - type 2  GERD  Hypertension  Hypothyroidism  Kidney stone  Mucoepidermoid carcinoma of parotid gland  Uncomplicated asthma    Medications   albuterol (PROAIR HFA/PROVENTIL HFA/VENTOLIN HFA) 108 (90 Base) MCG/ACT inhaler  aspirin (ASA) 81 MG chewable tablet  budesonide (PULMICORT FLEXHALER) 180 MCG/ACT inhaler  fluticasone (FLOVENT HFA) 220 MCG/ACT inhaler  glipiZIDE (GLUCOTROL XL) 10 MG 24 hr tablet  hydrochlorothiazide (HYDRODIURIL) 25 MG tablet  levothyroxine (SYNTHROID/LEVOTHROID) 150 MCG tablet  LORazepam (ATIVAN) 0.5 MG tablet  losartan (COZAAR) 100 MG tablet  simvastatin (ZOCOR) 20 MG tablet    Surgical History   Tubal ligation  Endometrial  ablation  D&C  EGD   surgery  Removal of cancerous lump, neck  Tonsillectomy    Physical Exam     Patient Vitals for the past 24 hrs:   BP Temp Temp src Pulse Resp SpO2   03/23/25 2300 -- -- -- 74 17 96 %   03/23/25 2230 (!) 158/89 -- -- 67 17 96 %   03/23/25 2200 (!) 162/89 -- -- 68 11 96 %   03/23/25 2145 (!) 177/83 -- -- 64 11 94 %   03/23/25 2130 (!) 163/85 -- -- 60 22 96 %   03/23/25 2115 (!) 155/103 -- -- 75 -- 98 %   03/23/25 2050 (!) 166/106 -- -- -- -- 99 %   03/23/25 2040 (!) 160/89 -- -- -- -- 96 %   03/23/25 2030 (!) 146/86 (!) 96.7  F (35.9  C) Temporal 73 18 97 %     Physical Exam      HEENT:    Oropharynx is moist  Eyes:    Conjunctiva normal  Neck:     Supple, no meningismus.     CV:     Regular rate and rhythm.      No murmurs, rubs or gallops.       No unilateral leg swelling.       2+ radial pulses bilateral.       No lower extremity edema.  PULM:    Clear to auscultation bilateral.       No respiratory distress.      Good air exchange.     No rales or wheezing.     Moderate focal tenderness just inferior to the left breast which reproduces patient's pain  ABD:    Soft, non-tender, non-distended.       No pulsatile masses.       No rebound, guarding or rigidity.  MSK:     No gross deformity to all four extremities.   LYMPH:   No cervical lymphadenopathy.  NEURO:   Alert. Good muscle tone, no atrophy.  Skin:    Warm, dry and intact.    Psych:    Mood is good and affect is appropriate.      Diagnostics     Lab Results   Labs Ordered and Resulted from Time of ED Arrival to Time of ED Departure   D DIMER QUANTITATIVE - Abnormal       Result Value    D-Dimer Quantitative 0.71 (*)    BASIC METABOLIC PANEL - Abnormal    Sodium 134 (*)     Potassium 3.8      Chloride 99      Carbon Dioxide (CO2) 23      Anion Gap 12      Urea Nitrogen 10.2      Creatinine 0.62      GFR Estimate >90      Calcium 9.7      Glucose 137 (*)    CBC WITH PLATELETS AND DIFFERENTIAL - Abnormal    WBC Count 14.8 (*)     RBC Count  4.79      Hemoglobin 14.6      Hematocrit 42.5      MCV 89      MCH 30.5      MCHC 34.4      RDW 12.8      Platelet Count 291      % Neutrophils 74      % Lymphocytes 16      % Monocytes 9      % Eosinophils 1      % Basophils 0      % Immature Granulocytes 1      NRBCs per 100 WBC 0      Absolute Neutrophils 11.0 (*)     Absolute Lymphocytes 2.3      Absolute Monocytes 1.3      Absolute Eosinophils 0.1      Absolute Basophils 0.0      Absolute Immature Granulocytes 0.1      Absolute NRBCs 0.0     TROPONIN T, HIGH SENSITIVITY - Normal    Troponin T, High Sensitivity <6       Imaging   CT Chest Pulmonary Embolism w Contrast   Final Result   IMPRESSION:   1.  No acute pulmonary embolism.   2.  Indeterminate 3 mm right lower lobe pulmonary nodule. Follow-up is recommended according to Fleischner criteria, as detailed below.   3.  Hepatic steatosis.   4.  Unchanged 2.5 cm left adrenal adenoma.            Fleischner Society Recommendations for Pulmonary Nodules      Nodule size less than 6 mm:       Single or multiple nodules:       Nodules < 6 mm do not require routine follow-up, but certain patients at high risk with suspicious nodule morphology, upper lobe location, or both may warrant 12-month follow-up.        EKG   ECG results from 03/23/25   EKG 12-lead, tracing only     Value    Systolic Blood Pressure     Diastolic Blood Pressure     Ventricular Rate 81    Atrial Rate 81    GA Interval 142    QRS Duration 82        QTc 448    P Axis 49    R AXIS -10    T Axis 58    Interpretation ECG      Sinus rhythm  Low voltage QRS  Nonspecific T wave abnormality  Abnormal ECG  When compared with ECG of 22-Aug-2023 13:52,  Criteria for Inferior infarct are no longer Present       Independent Interpretation   None    ED Course      Medications Administered   Medications   ketorolac (TORADOL) injection 15 mg (15 mg Intravenous $Given 3/23/25 2110)   HYDROmorphone (PF) (DILAUDID) injection 0.5 mg (0.5 mg Intravenous $Given  3/23/25 2111)   CT scan flush (75 mLs Intravenous $Given 3/23/25 2318)   iopamidol (ISOVUE-370) solution 500 mL (75 mLs Intravenous $Given 3/23/25 2318)     Discussion of Management   None    ED Course   ED Course as of 03/24/25 0045   Sun Mar 23, 2025   2051 I obtained history and examined the patient as noted above.        Additional Documentation  None    Medical Decision Making / Diagnosis     CMS Diagnoses: None    MIPS    CT for PE was ordered because the patient had an abnormal d-dimer.    NEREYDA Bartlett is a 59 year old female presents with left-sided chest pain.  EKG without ischemic changes.  Troponin within normal limits.  Based on duration of symptoms, no indication for serial enzymes.  Low suspicion for PE, D-dimer sent and elevated.  CT scan of the chest is negative for pulmonary embolism or acute intrathoracic pathology to account for symptoms.  She  has significant point tenderness drawing concern for musculoskeletal source including a costal muscle strain or costochondritis.  She is safe for discharge home with supportive measures, close follow-up with PCP and return to ED for worsening symptoms.    She was incidentally noted to have a right lung nodule.  She was made aware of this finding need for surveillance through PCP.    Disposition     The patient was discharged.     Diagnosis     ICD-10-CM    1. Chest pain, unspecified type  R07.9       2. Pulmonary nodule  R91.1            Discharge Medications   New Prescriptions    CYCLOBENZAPRINE (FLEXERIL) 10 MG TABLET    Take 1 tablet (10 mg) by mouth 3 times daily as needed for muscle spasms or other (chest pain).    LIDOCAINE (LIDOCARE) 4 % PATCH    Place 1 patch over 12 hours onto the skin every 24 hours. To prevent lidocaine toxicity, patient should be patch free for 12 hrs daily.     Scribe Disclosure:  Maxwell MARTIN, am serving as a scribe at 8:45 PM on 3/23/2025 to document services personally performed by Clement Mcdonnell MD based  on my observations and the provider's statements to me.        Clement Mcdonnell MD  03/24/25 0046

## 2025-03-24 NOTE — ED TRIAGE NOTES
Patient states that last week she hurt her right sided rib under her breast. No states she's having left sided chest, neck, back pain. Hurts to breath, move, talk. Unable to tolerate the pain any longer. ABCS intact. VSS.

## 2025-06-14 ENCOUNTER — TELEPHONE (OUTPATIENT)
Dept: INTERNAL MEDICINE | Facility: CLINIC | Age: 60
End: 2025-06-14
Payer: COMMERCIAL

## 2025-06-14 DIAGNOSIS — E03.9 HYPOTHYROIDISM, UNSPECIFIED TYPE: ICD-10-CM

## 2025-06-16 RX ORDER — LEVOTHYROXINE SODIUM 150 MCG
150 TABLET ORAL DAILY
Qty: 7 TABLET | Refills: 0 | Status: SHIPPED | OUTPATIENT
Start: 2025-06-16

## 2025-06-16 NOTE — TELEPHONE ENCOUNTER
Will send remainder of refill after she has lab updated as I may be adjusting dose after we recheck TSH on 6/20

## 2025-06-17 NOTE — TELEPHONE ENCOUNTER
Left message on cell voicemail asking patient to return call to clinic.  Please assist in scheduling lab appointment to recheck thyroid labs. .PATY Huang R.N.

## 2025-06-17 NOTE — TELEPHONE ENCOUNTER
Patient has lab appointment.    Appointments in Next Year      Jun 20, 2025 7:15 AM  LAB with [facility]  [facility] ([facility] [place] ) [phone]          Thank you,  Zach, Triage RN [facility]    10:23 AM 6/17/2025

## 2025-06-21 DIAGNOSIS — E03.9 HYPOTHYROIDISM, UNSPECIFIED TYPE: ICD-10-CM

## 2025-06-23 ENCOUNTER — RESULTS FOLLOW-UP (OUTPATIENT)
Dept: INTERNAL MEDICINE | Facility: CLINIC | Age: 60
End: 2025-06-23

## 2025-06-23 DIAGNOSIS — E11.9 TYPE 2 DIABETES MELLITUS WITHOUT COMPLICATION, WITHOUT LONG-TERM CURRENT USE OF INSULIN (H): Primary | ICD-10-CM

## 2025-06-23 RX ORDER — LEVOTHYROXINE SODIUM 150 MCG
150 TABLET ORAL DAILY
Qty: 90 TABLET | Refills: 2 | Status: SHIPPED | OUTPATIENT
Start: 2025-06-23

## (undated) DEVICE — BAG CLEAR TRASH 1.3M 39X33" P4040C

## (undated) DEVICE — SUCTION MANIFOLD NEPTUNE 2 SYS 4 PORT 0702-020-000

## (undated) DEVICE — COVER FOOTSWITCH URO

## (undated) DEVICE — SOL WATER IRRIG 1000ML BOTTLE 2F7114

## (undated) DEVICE — BAG RED BIOHAZARD 37X50" 40GAL A7450PR

## (undated) DEVICE — TUBING SUCTION MEDI-VAC SOFT 3/16"X20' N520A

## (undated) DEVICE — ENDO SNARE EXACTO COLD 9MM LOOP 2.4MMX230CM 00711115

## (undated) DEVICE — APPLICATOR COTTON TIP 6"X2 STERILE LF 6012

## (undated) DEVICE — PAD CHUX UNDERPAD 30X36" P3036C

## (undated) RX ORDER — FENTANYL CITRATE 50 UG/ML
INJECTION, SOLUTION INTRAMUSCULAR; INTRAVENOUS
Status: DISPENSED
Start: 2024-05-28

## (undated) RX ORDER — PROPOFOL 10 MG/ML
INJECTION, EMULSION INTRAVENOUS
Status: DISPENSED
Start: 2022-10-28

## (undated) RX ORDER — ONDANSETRON 2 MG/ML
INJECTION INTRAMUSCULAR; INTRAVENOUS
Status: DISPENSED
Start: 2022-10-28

## (undated) RX ORDER — FENTANYL CITRATE 50 UG/ML
INJECTION, SOLUTION INTRAMUSCULAR; INTRAVENOUS
Status: DISPENSED
Start: 2022-10-28

## (undated) RX ORDER — DEXAMETHASONE SODIUM PHOSPHATE 4 MG/ML
INJECTION, SOLUTION INTRA-ARTICULAR; INTRALESIONAL; INTRAMUSCULAR; INTRAVENOUS; SOFT TISSUE
Status: DISPENSED
Start: 2022-10-28

## (undated) RX ORDER — LIDOCAINE HYDROCHLORIDE 10 MG/ML
INJECTION, SOLUTION EPIDURAL; INFILTRATION; INTRACAUDAL; PERINEURAL
Status: DISPENSED
Start: 2022-10-28

## (undated) RX ORDER — GLYCOPYRROLATE 0.2 MG/ML
INJECTION INTRAMUSCULAR; INTRAVENOUS
Status: DISPENSED
Start: 2022-10-28